# Patient Record
Sex: FEMALE | Race: WHITE | NOT HISPANIC OR LATINO | Employment: OTHER | URBAN - METROPOLITAN AREA
[De-identification: names, ages, dates, MRNs, and addresses within clinical notes are randomized per-mention and may not be internally consistent; named-entity substitution may affect disease eponyms.]

---

## 2017-01-12 ENCOUNTER — AMBULATORY - HEALTHEAST (OUTPATIENT)
Dept: INFUSION THERAPY | Facility: HOSPITAL | Age: 82
End: 2017-01-12

## 2017-01-12 ENCOUNTER — INFUSION - HEALTHEAST (OUTPATIENT)
Dept: INFUSION THERAPY | Facility: HOSPITAL | Age: 82
End: 2017-01-12

## 2017-01-12 DIAGNOSIS — D69.3 IDIOPATHIC THROMBOCYTOPENIC PURPURA (H): ICD-10-CM

## 2017-01-20 ENCOUNTER — AMBULATORY - HEALTHEAST (OUTPATIENT)
Dept: INFUSION THERAPY | Facility: HOSPITAL | Age: 82
End: 2017-01-20

## 2017-01-20 ENCOUNTER — INFUSION - HEALTHEAST (OUTPATIENT)
Dept: INFUSION THERAPY | Facility: HOSPITAL | Age: 82
End: 2017-01-20

## 2017-01-20 DIAGNOSIS — D69.3 IDIOPATHIC THROMBOCYTOPENIC PURPURA (H): ICD-10-CM

## 2017-01-24 ENCOUNTER — INFUSION - HEALTHEAST (OUTPATIENT)
Dept: INFUSION THERAPY | Facility: HOSPITAL | Age: 82
End: 2017-01-24

## 2017-01-24 DIAGNOSIS — D69.6 THROMBOCYTOPENIA (H): ICD-10-CM

## 2017-01-24 ASSESSMENT — MIFFLIN-ST. JEOR: SCORE: 866.8

## 2017-02-09 ENCOUNTER — AMBULATORY - HEALTHEAST (OUTPATIENT)
Dept: INFUSION THERAPY | Facility: HOSPITAL | Age: 82
End: 2017-02-09

## 2017-02-09 ENCOUNTER — INFUSION - HEALTHEAST (OUTPATIENT)
Dept: INFUSION THERAPY | Facility: HOSPITAL | Age: 82
End: 2017-02-09

## 2017-02-09 DIAGNOSIS — D69.3 IDIOPATHIC THROMBOCYTOPENIC PURPURA (H): ICD-10-CM

## 2017-02-20 ENCOUNTER — RECORDS - HEALTHEAST (OUTPATIENT)
Dept: ADMINISTRATIVE | Facility: OTHER | Age: 82
End: 2017-02-20

## 2017-02-23 ENCOUNTER — INFUSION - HEALTHEAST (OUTPATIENT)
Dept: INFUSION THERAPY | Facility: HOSPITAL | Age: 82
End: 2017-02-23

## 2017-02-23 ENCOUNTER — AMBULATORY - HEALTHEAST (OUTPATIENT)
Dept: INFUSION THERAPY | Facility: HOSPITAL | Age: 82
End: 2017-02-23

## 2017-02-23 DIAGNOSIS — D69.3 IDIOPATHIC THROMBOCYTOPENIC PURPURA (H): ICD-10-CM

## 2017-03-09 ENCOUNTER — INFUSION - HEALTHEAST (OUTPATIENT)
Dept: INFUSION THERAPY | Facility: HOSPITAL | Age: 82
End: 2017-03-09

## 2017-03-09 ENCOUNTER — AMBULATORY - HEALTHEAST (OUTPATIENT)
Dept: INFUSION THERAPY | Facility: HOSPITAL | Age: 82
End: 2017-03-09

## 2017-03-09 DIAGNOSIS — D69.3 IDIOPATHIC THROMBOCYTOPENIC PURPURA (H): ICD-10-CM

## 2017-03-20 ENCOUNTER — RECORDS - HEALTHEAST (OUTPATIENT)
Dept: ADMINISTRATIVE | Facility: OTHER | Age: 82
End: 2017-03-20

## 2017-03-23 ENCOUNTER — INFUSION - HEALTHEAST (OUTPATIENT)
Dept: INFUSION THERAPY | Facility: HOSPITAL | Age: 82
End: 2017-03-23

## 2017-03-23 ENCOUNTER — AMBULATORY - HEALTHEAST (OUTPATIENT)
Dept: INFUSION THERAPY | Facility: HOSPITAL | Age: 82
End: 2017-03-23

## 2017-03-23 ENCOUNTER — OFFICE VISIT - HEALTHEAST (OUTPATIENT)
Dept: ONCOLOGY | Facility: HOSPITAL | Age: 82
End: 2017-03-23

## 2017-03-23 DIAGNOSIS — D69.3 IDIOPATHIC THROMBOCYTOPENIC PURPURA (H): ICD-10-CM

## 2017-04-06 ENCOUNTER — INFUSION - HEALTHEAST (OUTPATIENT)
Dept: INFUSION THERAPY | Facility: HOSPITAL | Age: 82
End: 2017-04-06

## 2017-04-06 ENCOUNTER — AMBULATORY - HEALTHEAST (OUTPATIENT)
Dept: INFUSION THERAPY | Facility: HOSPITAL | Age: 82
End: 2017-04-06

## 2017-04-06 DIAGNOSIS — D69.3 IDIOPATHIC THROMBOCYTOPENIC PURPURA (H): ICD-10-CM

## 2017-04-06 DIAGNOSIS — D69.3 CHRONIC ITP (IDIOPATHIC THROMBOCYTOPENIC PURPURA) (H): ICD-10-CM

## 2017-04-12 ASSESSMENT — MIFFLIN-ST. JEOR: SCORE: 883.58

## 2017-04-13 ENCOUNTER — AMBULATORY - HEALTHEAST (OUTPATIENT)
Dept: ONCOLOGY | Facility: HOSPITAL | Age: 82
End: 2017-04-13

## 2017-04-13 ENCOUNTER — SURGERY - HEALTHEAST (OUTPATIENT)
Dept: GASTROENTEROLOGY | Facility: HOSPITAL | Age: 82
End: 2017-04-13

## 2017-04-14 ENCOUNTER — AMBULATORY - HEALTHEAST (OUTPATIENT)
Dept: ONCOLOGY | Facility: HOSPITAL | Age: 82
End: 2017-04-14

## 2017-04-14 ASSESSMENT — MIFFLIN-ST. JEOR: SCORE: 885.25

## 2017-04-15 ASSESSMENT — MIFFLIN-ST. JEOR: SCORE: 870.42

## 2017-04-16 ASSESSMENT — MIFFLIN-ST. JEOR: SCORE: 875.87

## 2017-04-17 ASSESSMENT — MIFFLIN-ST. JEOR: SCORE: 881.31

## 2017-04-18 ENCOUNTER — HOME CARE/HOSPICE - HEALTHEAST (OUTPATIENT)
Dept: HOME HEALTH SERVICES | Facility: HOME HEALTH | Age: 82
End: 2017-04-18

## 2017-04-18 ASSESSMENT — MIFFLIN-ST. JEOR: SCORE: 892.65

## 2017-04-19 ASSESSMENT — MIFFLIN-ST. JEOR: SCORE: 909.89

## 2017-04-20 ENCOUNTER — HOME CARE/HOSPICE - HEALTHEAST (OUTPATIENT)
Dept: HOME HEALTH SERVICES | Facility: HOME HEALTH | Age: 82
End: 2017-04-20

## 2017-04-20 ASSESSMENT — MIFFLIN-ST. JEOR: SCORE: 913.29

## 2017-04-21 ENCOUNTER — COMMUNICATION - HEALTHEAST (OUTPATIENT)
Dept: HOME HEALTH SERVICES | Facility: HOME HEALTH | Age: 82
End: 2017-04-21

## 2017-04-25 ENCOUNTER — OFFICE VISIT - HEALTHEAST (OUTPATIENT)
Dept: INTERNAL MEDICINE | Facility: CLINIC | Age: 82
End: 2017-04-25

## 2017-04-25 DIAGNOSIS — M89.9 DISORDER OF BONE AND CARTILAGE: ICD-10-CM

## 2017-04-25 DIAGNOSIS — M94.9 DISORDER OF BONE AND CARTILAGE: ICD-10-CM

## 2017-04-25 DIAGNOSIS — I48.91 ATRIAL FIBRILLATION (H): ICD-10-CM

## 2017-04-25 DIAGNOSIS — Z86.2 HISTORY OF ITP: ICD-10-CM

## 2017-04-25 DIAGNOSIS — D50.9 IRON DEFICIENCY ANEMIA: ICD-10-CM

## 2017-04-26 ENCOUNTER — COMMUNICATION - HEALTHEAST (OUTPATIENT)
Dept: INTERNAL MEDICINE | Facility: CLINIC | Age: 82
End: 2017-04-26

## 2017-04-26 ENCOUNTER — HOME CARE/HOSPICE - HEALTHEAST (OUTPATIENT)
Dept: HOME HEALTH SERVICES | Facility: HOME HEALTH | Age: 82
End: 2017-04-26

## 2017-04-27 ENCOUNTER — HOME CARE/HOSPICE - HEALTHEAST (OUTPATIENT)
Dept: HOME HEALTH SERVICES | Facility: HOME HEALTH | Age: 82
End: 2017-04-27

## 2017-04-27 ENCOUNTER — AMBULATORY - HEALTHEAST (OUTPATIENT)
Dept: INFUSION THERAPY | Facility: HOSPITAL | Age: 82
End: 2017-04-27

## 2017-04-27 ENCOUNTER — INFUSION - HEALTHEAST (OUTPATIENT)
Dept: INFUSION THERAPY | Facility: HOSPITAL | Age: 82
End: 2017-04-27

## 2017-04-27 DIAGNOSIS — D69.3 IDIOPATHIC THROMBOCYTOPENIC PURPURA (H): ICD-10-CM

## 2017-04-27 DIAGNOSIS — D69.6 THROMBOCYTOPENIA (H): ICD-10-CM

## 2017-04-28 ENCOUNTER — COMMUNICATION - HEALTHEAST (OUTPATIENT)
Dept: PEDIATRICS | Facility: CLINIC | Age: 82
End: 2017-04-28

## 2017-04-28 ENCOUNTER — HOME CARE/HOSPICE - HEALTHEAST (OUTPATIENT)
Dept: HOME HEALTH SERVICES | Facility: HOME HEALTH | Age: 82
End: 2017-04-28

## 2017-05-01 ENCOUNTER — COMMUNICATION - HEALTHEAST (OUTPATIENT)
Dept: HOME HEALTH SERVICES | Facility: HOME HEALTH | Age: 82
End: 2017-05-01

## 2017-05-01 ENCOUNTER — HOME CARE/HOSPICE - HEALTHEAST (OUTPATIENT)
Dept: HOME HEALTH SERVICES | Facility: HOME HEALTH | Age: 82
End: 2017-05-01

## 2017-05-03 ENCOUNTER — HOME CARE/HOSPICE - HEALTHEAST (OUTPATIENT)
Dept: HOME HEALTH SERVICES | Facility: HOME HEALTH | Age: 82
End: 2017-05-03

## 2017-05-04 ENCOUNTER — HOME CARE/HOSPICE - HEALTHEAST (OUTPATIENT)
Dept: HOME HEALTH SERVICES | Facility: HOME HEALTH | Age: 82
End: 2017-05-04

## 2017-05-04 ENCOUNTER — AMBULATORY - HEALTHEAST (OUTPATIENT)
Dept: INFUSION THERAPY | Facility: HOSPITAL | Age: 82
End: 2017-05-04

## 2017-05-04 ENCOUNTER — INFUSION - HEALTHEAST (OUTPATIENT)
Dept: INFUSION THERAPY | Facility: HOSPITAL | Age: 82
End: 2017-05-04

## 2017-05-04 DIAGNOSIS — D69.3 IDIOPATHIC THROMBOCYTOPENIC PURPURA (H): ICD-10-CM

## 2017-05-04 ASSESSMENT — MIFFLIN-ST. JEOR: SCORE: 876.19

## 2017-05-08 ENCOUNTER — HOME CARE/HOSPICE - HEALTHEAST (OUTPATIENT)
Dept: HOME HEALTH SERVICES | Facility: HOME HEALTH | Age: 82
End: 2017-05-08

## 2017-05-09 ENCOUNTER — HOME CARE/HOSPICE - HEALTHEAST (OUTPATIENT)
Dept: HOME HEALTH SERVICES | Facility: HOME HEALTH | Age: 82
End: 2017-05-09

## 2017-05-11 ENCOUNTER — AMBULATORY - HEALTHEAST (OUTPATIENT)
Dept: INFUSION THERAPY | Facility: HOSPITAL | Age: 82
End: 2017-05-11

## 2017-05-11 ENCOUNTER — INFUSION - HEALTHEAST (OUTPATIENT)
Dept: INFUSION THERAPY | Facility: HOSPITAL | Age: 82
End: 2017-05-11

## 2017-05-11 DIAGNOSIS — D69.3 IDIOPATHIC THROMBOCYTOPENIC PURPURA (H): ICD-10-CM

## 2017-05-12 ENCOUNTER — HOME CARE/HOSPICE - HEALTHEAST (OUTPATIENT)
Dept: HOME HEALTH SERVICES | Facility: HOME HEALTH | Age: 82
End: 2017-05-12

## 2017-05-15 ENCOUNTER — OFFICE VISIT - HEALTHEAST (OUTPATIENT)
Dept: INTERNAL MEDICINE | Facility: CLINIC | Age: 82
End: 2017-05-15

## 2017-05-15 ENCOUNTER — RECORDS - HEALTHEAST (OUTPATIENT)
Dept: ADMINISTRATIVE | Facility: OTHER | Age: 82
End: 2017-05-15

## 2017-05-15 DIAGNOSIS — I48.91 ATRIAL FIBRILLATION (H): ICD-10-CM

## 2017-05-15 DIAGNOSIS — G47.00 INSOMNIA: ICD-10-CM

## 2017-05-16 ENCOUNTER — HOME CARE/HOSPICE - HEALTHEAST (OUTPATIENT)
Dept: HOME HEALTH SERVICES | Facility: HOME HEALTH | Age: 82
End: 2017-05-16

## 2017-05-18 ENCOUNTER — INFUSION - HEALTHEAST (OUTPATIENT)
Dept: INFUSION THERAPY | Facility: HOSPITAL | Age: 82
End: 2017-05-18

## 2017-05-18 ENCOUNTER — AMBULATORY - HEALTHEAST (OUTPATIENT)
Dept: INFUSION THERAPY | Facility: HOSPITAL | Age: 82
End: 2017-05-18

## 2017-05-18 DIAGNOSIS — D69.6 THROMBOCYTOPENIA (H): ICD-10-CM

## 2017-05-18 DIAGNOSIS — D69.3 IDIOPATHIC THROMBOCYTOPENIC PURPURA (H): ICD-10-CM

## 2017-05-22 ENCOUNTER — AMBULATORY - HEALTHEAST (OUTPATIENT)
Dept: ONCOLOGY | Facility: HOSPITAL | Age: 82
End: 2017-05-22

## 2017-05-22 ENCOUNTER — AMBULATORY - HEALTHEAST (OUTPATIENT)
Dept: INFUSION THERAPY | Facility: HOSPITAL | Age: 82
End: 2017-05-22

## 2017-05-22 ENCOUNTER — INFUSION - HEALTHEAST (OUTPATIENT)
Dept: INFUSION THERAPY | Facility: HOSPITAL | Age: 82
End: 2017-05-22

## 2017-05-22 DIAGNOSIS — D69.3 IDIOPATHIC THROMBOCYTOPENIC PURPURA (H): ICD-10-CM

## 2017-05-22 DIAGNOSIS — D69.3 CHRONIC ITP (IDIOPATHIC THROMBOCYTOPENIC PURPURA) (H): ICD-10-CM

## 2017-05-25 ENCOUNTER — INFUSION - HEALTHEAST (OUTPATIENT)
Dept: INFUSION THERAPY | Facility: HOSPITAL | Age: 82
End: 2017-05-25

## 2017-05-25 ENCOUNTER — AMBULATORY - HEALTHEAST (OUTPATIENT)
Dept: INFUSION THERAPY | Facility: HOSPITAL | Age: 82
End: 2017-05-25

## 2017-05-25 DIAGNOSIS — D69.3 IDIOPATHIC THROMBOCYTOPENIC PURPURA (H): ICD-10-CM

## 2017-06-01 ENCOUNTER — INFUSION - HEALTHEAST (OUTPATIENT)
Dept: INFUSION THERAPY | Facility: HOSPITAL | Age: 82
End: 2017-06-01

## 2017-06-01 ENCOUNTER — AMBULATORY - HEALTHEAST (OUTPATIENT)
Dept: INFUSION THERAPY | Facility: HOSPITAL | Age: 82
End: 2017-06-01

## 2017-06-01 DIAGNOSIS — D69.3 IDIOPATHIC THROMBOCYTOPENIC PURPURA (H): ICD-10-CM

## 2017-06-05 ENCOUNTER — OFFICE VISIT - HEALTHEAST (OUTPATIENT)
Dept: CARDIOLOGY | Facility: CLINIC | Age: 82
End: 2017-06-05

## 2017-06-05 DIAGNOSIS — Z53.09 CONTRAINDICATION TO ANTICOAGULATION THERAPY: ICD-10-CM

## 2017-06-05 DIAGNOSIS — D69.3 IDIOPATHIC THROMBOCYTOPENIC PURPURA (H): ICD-10-CM

## 2017-06-05 DIAGNOSIS — R41.89 COGNITIVE IMPAIRMENT: ICD-10-CM

## 2017-06-05 DIAGNOSIS — I48.0 PAROXYSMAL ATRIAL FIBRILLATION (H): ICD-10-CM

## 2017-06-05 LAB
ATRIAL RATE - MUSE: 63 BPM
DIASTOLIC BLOOD PRESSURE - MUSE: NORMAL MMHG
INTERPRETATION ECG - MUSE: NORMAL
P AXIS - MUSE: 68 DEGREES
PR INTERVAL - MUSE: 150 MS
QRS DURATION - MUSE: 98 MS
QT - MUSE: 436 MS
QTC - MUSE: 446 MS
R AXIS - MUSE: -41 DEGREES
SYSTOLIC BLOOD PRESSURE - MUSE: NORMAL MMHG
T AXIS - MUSE: 50 DEGREES
VENTRICULAR RATE- MUSE: 63 BPM

## 2017-06-05 ASSESSMENT — MIFFLIN-ST. JEOR: SCORE: 890.61

## 2017-06-08 ENCOUNTER — INFUSION - HEALTHEAST (OUTPATIENT)
Dept: INFUSION THERAPY | Facility: HOSPITAL | Age: 82
End: 2017-06-08

## 2017-06-08 ENCOUNTER — AMBULATORY - HEALTHEAST (OUTPATIENT)
Dept: INFUSION THERAPY | Facility: HOSPITAL | Age: 82
End: 2017-06-08

## 2017-06-08 DIAGNOSIS — D69.3 IDIOPATHIC THROMBOCYTOPENIC PURPURA (H): ICD-10-CM

## 2017-06-13 ENCOUNTER — OFFICE VISIT - HEALTHEAST (OUTPATIENT)
Dept: INTERNAL MEDICINE | Facility: CLINIC | Age: 82
End: 2017-06-13

## 2017-06-13 DIAGNOSIS — G30.9 ALZHEIMER DISEASE (H): ICD-10-CM

## 2017-06-13 DIAGNOSIS — Z23 NEED FOR TETANUS BOOSTER: ICD-10-CM

## 2017-06-13 DIAGNOSIS — F02.80 ALZHEIMER DISEASE (H): ICD-10-CM

## 2017-06-16 ENCOUNTER — AMBULATORY - HEALTHEAST (OUTPATIENT)
Dept: INFUSION THERAPY | Facility: HOSPITAL | Age: 82
End: 2017-06-16

## 2017-06-16 ENCOUNTER — HOSPITAL ENCOUNTER (OUTPATIENT)
Dept: CARDIOLOGY | Facility: HOSPITAL | Age: 82
Discharge: HOME | End: 2017-06-16
Attending: INTERNAL MEDICINE

## 2017-06-16 ENCOUNTER — INFUSION - HEALTHEAST (OUTPATIENT)
Dept: INFUSION THERAPY | Facility: HOSPITAL | Age: 82
End: 2017-06-16

## 2017-06-16 DIAGNOSIS — D69.6 THROMBOCYTOPENIA (H): ICD-10-CM

## 2017-06-16 DIAGNOSIS — I48.0 PAROXYSMAL ATRIAL FIBRILLATION (H): ICD-10-CM

## 2017-06-16 DIAGNOSIS — D69.3 IDIOPATHIC THROMBOCYTOPENIC PURPURA (H): ICD-10-CM

## 2017-06-16 LAB
AORTIC ROOT: 3.3 CM
AORTIC VALVE MEAN VELOCITY: 73.8 CM/S
AV DIMENSIONLESS INDEX VTI: 0.9
AV MEAN GRADIENT: 3 MMHG
AV PEAK GRADIENT: 5.2 MMHG
AV VALVE AREA: 2 CM2
AV VELOCITY RATIO: 0.8
BSA FOR ECHO PROCEDURE: 1.47 M2
CV BLOOD PRESSURE: NORMAL MMHG
CV ECHO HEIGHT: 62.5 IN
CV ECHO WEIGHT: 108 LBS
DOP CALC AO PEAK VEL: 114 CM/S
DOP CALC AO VTI: 25.4 CM
DOP CALC LVOT AREA: 2.27 CM2
DOP CALC LVOT DIAMETER: 1.7 CM
DOP CALC LVOT PEAK VEL: 91.5 CM/S
DOP CALC LVOT STROKE VOLUME: 50.1 CM3
DOP CALCLVOT PEAK VEL VTI: 22.1 CM
EJECTION FRACTION: 60 % (ref 55–75)
FRACTIONAL SHORTENING: 37.2 % (ref 28–44)
INTERVENTRICULAR SEPTUM IN END DIASTOLE: 0.7 CM (ref 0.6–0.9)
IVS/PW RATIO: 0.8
LA AREA 1: 11.1 CM2
LA AREA 2: 9.98 CM2
LEFT ATRIUM LENGTH: 3.9 CM
LEFT ATRIUM SIZE: 1.5 CM
LEFT ATRIUM TO AORTIC ROOT RATIO: 0.42 NO UNITS
LEFT ATRIUM VOLUME INDEX: 16.4 ML/M2
LEFT ATRIUM VOLUME: 24.1 CM3
LEFT VENTRICLE CARDIAC INDEX: 2.4 L/MIN/M2
LEFT VENTRICLE CARDIAC OUTPUT: 3.5 L/MIN
LEFT VENTRICLE DIASTOLIC VOLUME INDEX: 34 CM3/M2 (ref 34–74)
LEFT VENTRICLE DIASTOLIC VOLUME: 50 CM3 (ref 46–106)
LEFT VENTRICLE HEART RATE: 69 BPM
LEFT VENTRICLE MASS INDEX: 71.7 G/M2
LEFT VENTRICLE SYSTOLIC VOLUME INDEX: 13.6 CM3/M2 (ref 11–31)
LEFT VENTRICLE SYSTOLIC VOLUME: 20 CM3 (ref 14–42)
LEFT VENTRICULAR INTERNAL DIMENSION IN DIASTOLE: 4.3 CM (ref 3.8–5.2)
LEFT VENTRICULAR INTERNAL DIMENSION IN SYSTOLE: 2.7 CM (ref 2.2–3.5)
LEFT VENTRICULAR MASS: 105.3 G
LEFT VENTRICULAR OUTFLOW TRACT MEAN GRADIENT: 2 MMHG
LEFT VENTRICULAR OUTFLOW TRACT MEAN VELOCITY: 61.6 CM/S
LEFT VENTRICULAR OUTFLOW TRACT PEAK GRADIENT: 3 MMHG
LEFT VENTRICULAR POSTERIOR WALL IN END DIASTOLE: 0.9 CM (ref 0.6–0.9)
LV STROKE VOLUME INDEX: 34.1 ML/M2
MITRAL VALVE E/A RATIO: 1.2
MV DECELERATION TIME: 197 MS
MV PEAK A VELOCITY: 84.9 CM/S
MV PEAK E VELOCITY: 97.7 CM/S
NUC REST DIASTOLIC VOLUME INDEX: 1728 LBS
NUC REST SYSTOLIC VOLUME INDEX: 62.5 IN
RIGHT VENTRICULAR INTERNAL DIMENSION IN DYSTOLE: 1.3 CM
TRICUSPID REGURGITATION PEAK PRESSURE GRADIENT: 33.9 MMHG
TRICUSPID VALVE ANULAR PLANE SYSTOLIC EXCURSION: 2.2 CM
TRICUSPID VALVE PEAK REGURGITANT VELOCITY: 291 CM/S

## 2017-06-16 ASSESSMENT — MIFFLIN-ST. JEOR: SCORE: 886.07

## 2017-06-19 ENCOUNTER — INFUSION - HEALTHEAST (OUTPATIENT)
Dept: INFUSION THERAPY | Facility: HOSPITAL | Age: 82
End: 2017-06-19

## 2017-06-19 ENCOUNTER — AMBULATORY - HEALTHEAST (OUTPATIENT)
Dept: ONCOLOGY | Facility: CLINIC | Age: 82
End: 2017-06-19

## 2017-06-19 ENCOUNTER — AMBULATORY - HEALTHEAST (OUTPATIENT)
Dept: INFUSION THERAPY | Facility: HOSPITAL | Age: 82
End: 2017-06-19

## 2017-06-19 DIAGNOSIS — D69.3 IDIOPATHIC THROMBOCYTOPENIC PURPURA (H): ICD-10-CM

## 2017-06-19 DIAGNOSIS — D69.6 THROMBOCYTOPENIA (H): ICD-10-CM

## 2017-06-23 ENCOUNTER — AMBULATORY - HEALTHEAST (OUTPATIENT)
Dept: INFUSION THERAPY | Facility: HOSPITAL | Age: 82
End: 2017-06-23

## 2017-06-23 ENCOUNTER — OFFICE VISIT - HEALTHEAST (OUTPATIENT)
Dept: ONCOLOGY | Facility: HOSPITAL | Age: 82
End: 2017-06-23

## 2017-06-23 DIAGNOSIS — D69.3 IDIOPATHIC THROMBOCYTOPENIC PURPURA (H): ICD-10-CM

## 2017-06-29 ENCOUNTER — AMBULATORY - HEALTHEAST (OUTPATIENT)
Dept: INFUSION THERAPY | Facility: HOSPITAL | Age: 82
End: 2017-06-29

## 2017-06-29 ENCOUNTER — AMBULATORY - HEALTHEAST (OUTPATIENT)
Dept: ONCOLOGY | Facility: HOSPITAL | Age: 82
End: 2017-06-29

## 2017-06-29 ENCOUNTER — INFUSION - HEALTHEAST (OUTPATIENT)
Dept: INFUSION THERAPY | Facility: HOSPITAL | Age: 82
End: 2017-06-29

## 2017-06-29 DIAGNOSIS — D69.3 IDIOPATHIC THROMBOCYTOPENIC PURPURA (H): ICD-10-CM

## 2017-07-05 ENCOUNTER — COMMUNICATION - HEALTHEAST (OUTPATIENT)
Dept: ONCOLOGY | Facility: HOSPITAL | Age: 82
End: 2017-07-05

## 2017-07-05 DIAGNOSIS — D50.9 IRON DEFICIENCY ANEMIA: ICD-10-CM

## 2017-07-06 ENCOUNTER — INFUSION - HEALTHEAST (OUTPATIENT)
Dept: INFUSION THERAPY | Facility: HOSPITAL | Age: 82
End: 2017-07-06

## 2017-07-06 ENCOUNTER — AMBULATORY - HEALTHEAST (OUTPATIENT)
Dept: INFUSION THERAPY | Facility: HOSPITAL | Age: 82
End: 2017-07-06

## 2017-07-06 ENCOUNTER — AMBULATORY - HEALTHEAST (OUTPATIENT)
Dept: ONCOLOGY | Facility: HOSPITAL | Age: 82
End: 2017-07-06

## 2017-07-06 DIAGNOSIS — D50.9 IRON DEFICIENCY ANEMIA: ICD-10-CM

## 2017-07-06 DIAGNOSIS — D69.3 IDIOPATHIC THROMBOCYTOPENIC PURPURA (H): ICD-10-CM

## 2017-07-13 ENCOUNTER — INFUSION - HEALTHEAST (OUTPATIENT)
Dept: INFUSION THERAPY | Facility: HOSPITAL | Age: 82
End: 2017-07-13

## 2017-07-13 ENCOUNTER — AMBULATORY - HEALTHEAST (OUTPATIENT)
Dept: INFUSION THERAPY | Facility: HOSPITAL | Age: 82
End: 2017-07-13

## 2017-07-13 DIAGNOSIS — D69.3 IDIOPATHIC THROMBOCYTOPENIC PURPURA (H): ICD-10-CM

## 2017-07-20 ENCOUNTER — AMBULATORY - HEALTHEAST (OUTPATIENT)
Dept: INFUSION THERAPY | Facility: HOSPITAL | Age: 82
End: 2017-07-20

## 2017-07-20 ENCOUNTER — AMBULATORY - HEALTHEAST (OUTPATIENT)
Dept: ONCOLOGY | Facility: HOSPITAL | Age: 82
End: 2017-07-20

## 2017-07-20 ENCOUNTER — INFUSION - HEALTHEAST (OUTPATIENT)
Dept: INFUSION THERAPY | Facility: HOSPITAL | Age: 82
End: 2017-07-20

## 2017-07-20 DIAGNOSIS — D69.3 IDIOPATHIC THROMBOCYTOPENIC PURPURA (H): ICD-10-CM

## 2017-07-27 ENCOUNTER — AMBULATORY - HEALTHEAST (OUTPATIENT)
Dept: INFUSION THERAPY | Facility: HOSPITAL | Age: 82
End: 2017-07-27

## 2017-07-27 ENCOUNTER — INFUSION - HEALTHEAST (OUTPATIENT)
Dept: INFUSION THERAPY | Facility: HOSPITAL | Age: 82
End: 2017-07-27

## 2017-07-27 DIAGNOSIS — D69.3 IDIOPATHIC THROMBOCYTOPENIC PURPURA (H): ICD-10-CM

## 2017-08-03 ENCOUNTER — AMBULATORY - HEALTHEAST (OUTPATIENT)
Dept: INFUSION THERAPY | Facility: HOSPITAL | Age: 82
End: 2017-08-03

## 2017-08-03 ENCOUNTER — INFUSION - HEALTHEAST (OUTPATIENT)
Dept: INFUSION THERAPY | Facility: HOSPITAL | Age: 82
End: 2017-08-03

## 2017-08-03 DIAGNOSIS — D69.3 IDIOPATHIC THROMBOCYTOPENIC PURPURA (H): ICD-10-CM

## 2017-08-10 ENCOUNTER — INFUSION - HEALTHEAST (OUTPATIENT)
Dept: INFUSION THERAPY | Facility: HOSPITAL | Age: 82
End: 2017-08-10

## 2017-08-10 ENCOUNTER — AMBULATORY - HEALTHEAST (OUTPATIENT)
Dept: INFUSION THERAPY | Facility: HOSPITAL | Age: 82
End: 2017-08-10

## 2017-08-10 DIAGNOSIS — D69.3 IDIOPATHIC THROMBOCYTOPENIC PURPURA (H): ICD-10-CM

## 2017-08-17 ENCOUNTER — AMBULATORY - HEALTHEAST (OUTPATIENT)
Dept: INFUSION THERAPY | Facility: HOSPITAL | Age: 82
End: 2017-08-17

## 2017-08-17 ENCOUNTER — INFUSION - HEALTHEAST (OUTPATIENT)
Dept: INFUSION THERAPY | Facility: HOSPITAL | Age: 82
End: 2017-08-17

## 2017-08-17 DIAGNOSIS — D69.3 IDIOPATHIC THROMBOCYTOPENIC PURPURA (H): ICD-10-CM

## 2017-08-17 ASSESSMENT — MIFFLIN-ST. JEOR: SCORE: 906.03

## 2017-08-24 ENCOUNTER — INFUSION - HEALTHEAST (OUTPATIENT)
Dept: INFUSION THERAPY | Facility: HOSPITAL | Age: 82
End: 2017-08-24

## 2017-08-24 ENCOUNTER — AMBULATORY - HEALTHEAST (OUTPATIENT)
Dept: INFUSION THERAPY | Facility: HOSPITAL | Age: 82
End: 2017-08-24

## 2017-08-24 DIAGNOSIS — D69.3 IDIOPATHIC THROMBOCYTOPENIC PURPURA (H): ICD-10-CM

## 2017-08-31 ENCOUNTER — AMBULATORY - HEALTHEAST (OUTPATIENT)
Dept: INFUSION THERAPY | Facility: HOSPITAL | Age: 82
End: 2017-08-31

## 2017-08-31 ENCOUNTER — INFUSION - HEALTHEAST (OUTPATIENT)
Dept: INFUSION THERAPY | Facility: HOSPITAL | Age: 82
End: 2017-08-31

## 2017-08-31 DIAGNOSIS — D69.3 IDIOPATHIC THROMBOCYTOPENIC PURPURA (H): ICD-10-CM

## 2017-09-07 ENCOUNTER — AMBULATORY - HEALTHEAST (OUTPATIENT)
Dept: INFUSION THERAPY | Facility: HOSPITAL | Age: 82
End: 2017-09-07

## 2017-09-07 ENCOUNTER — INFUSION - HEALTHEAST (OUTPATIENT)
Dept: INFUSION THERAPY | Facility: HOSPITAL | Age: 82
End: 2017-09-07

## 2017-09-07 DIAGNOSIS — D69.3 IDIOPATHIC THROMBOCYTOPENIC PURPURA (H): ICD-10-CM

## 2017-09-07 ASSESSMENT — MIFFLIN-ST. JEOR: SCORE: 906.03

## 2017-09-14 ENCOUNTER — INFUSION - HEALTHEAST (OUTPATIENT)
Dept: INFUSION THERAPY | Facility: HOSPITAL | Age: 82
End: 2017-09-14

## 2017-09-14 ENCOUNTER — AMBULATORY - HEALTHEAST (OUTPATIENT)
Dept: INFUSION THERAPY | Facility: HOSPITAL | Age: 82
End: 2017-09-14

## 2017-09-14 DIAGNOSIS — D69.3 IDIOPATHIC THROMBOCYTOPENIC PURPURA (H): ICD-10-CM

## 2017-09-20 ENCOUNTER — INFUSION - HEALTHEAST (OUTPATIENT)
Dept: INFUSION THERAPY | Facility: HOSPITAL | Age: 82
End: 2017-09-20

## 2017-09-20 ENCOUNTER — AMBULATORY - HEALTHEAST (OUTPATIENT)
Dept: INFUSION THERAPY | Facility: HOSPITAL | Age: 82
End: 2017-09-20

## 2017-09-20 ENCOUNTER — OFFICE VISIT - HEALTHEAST (OUTPATIENT)
Dept: ONCOLOGY | Facility: HOSPITAL | Age: 82
End: 2017-09-20

## 2017-09-20 DIAGNOSIS — D69.3 IDIOPATHIC THROMBOCYTOPENIC PURPURA (H): ICD-10-CM

## 2017-09-28 ENCOUNTER — INFUSION - HEALTHEAST (OUTPATIENT)
Dept: INFUSION THERAPY | Facility: HOSPITAL | Age: 82
End: 2017-09-28

## 2017-09-28 ENCOUNTER — AMBULATORY - HEALTHEAST (OUTPATIENT)
Dept: INFUSION THERAPY | Facility: HOSPITAL | Age: 82
End: 2017-09-28

## 2017-09-28 DIAGNOSIS — D69.3 IDIOPATHIC THROMBOCYTOPENIC PURPURA (H): ICD-10-CM

## 2017-10-05 ENCOUNTER — AMBULATORY - HEALTHEAST (OUTPATIENT)
Dept: INFUSION THERAPY | Facility: HOSPITAL | Age: 82
End: 2017-10-05

## 2017-10-05 ENCOUNTER — INFUSION - HEALTHEAST (OUTPATIENT)
Dept: INFUSION THERAPY | Facility: HOSPITAL | Age: 82
End: 2017-10-05

## 2017-10-05 DIAGNOSIS — D69.3 IDIOPATHIC THROMBOCYTOPENIC PURPURA (H): ICD-10-CM

## 2017-10-12 ENCOUNTER — INFUSION - HEALTHEAST (OUTPATIENT)
Dept: INFUSION THERAPY | Facility: HOSPITAL | Age: 82
End: 2017-10-12

## 2017-10-12 ENCOUNTER — AMBULATORY - HEALTHEAST (OUTPATIENT)
Dept: INFUSION THERAPY | Facility: HOSPITAL | Age: 82
End: 2017-10-12

## 2017-10-12 DIAGNOSIS — D69.3 IDIOPATHIC THROMBOCYTOPENIC PURPURA (H): ICD-10-CM

## 2017-10-19 ENCOUNTER — INFUSION - HEALTHEAST (OUTPATIENT)
Dept: INFUSION THERAPY | Facility: HOSPITAL | Age: 82
End: 2017-10-19

## 2017-10-19 ENCOUNTER — AMBULATORY - HEALTHEAST (OUTPATIENT)
Dept: INFUSION THERAPY | Facility: HOSPITAL | Age: 82
End: 2017-10-19

## 2017-10-19 DIAGNOSIS — D69.3 IDIOPATHIC THROMBOCYTOPENIC PURPURA (H): ICD-10-CM

## 2017-10-26 ENCOUNTER — AMBULATORY - HEALTHEAST (OUTPATIENT)
Dept: INFUSION THERAPY | Facility: HOSPITAL | Age: 82
End: 2017-10-26

## 2017-10-26 ENCOUNTER — INFUSION - HEALTHEAST (OUTPATIENT)
Dept: INFUSION THERAPY | Facility: HOSPITAL | Age: 82
End: 2017-10-26

## 2017-10-26 DIAGNOSIS — D69.3 IDIOPATHIC THROMBOCYTOPENIC PURPURA (H): ICD-10-CM

## 2017-11-02 ENCOUNTER — INFUSION - HEALTHEAST (OUTPATIENT)
Dept: INFUSION THERAPY | Facility: HOSPITAL | Age: 82
End: 2017-11-02

## 2017-11-02 ENCOUNTER — AMBULATORY - HEALTHEAST (OUTPATIENT)
Dept: INFUSION THERAPY | Facility: HOSPITAL | Age: 82
End: 2017-11-02

## 2017-11-02 DIAGNOSIS — D69.3 IDIOPATHIC THROMBOCYTOPENIC PURPURA (H): ICD-10-CM

## 2017-11-09 ENCOUNTER — AMBULATORY - HEALTHEAST (OUTPATIENT)
Dept: INFUSION THERAPY | Facility: HOSPITAL | Age: 82
End: 2017-11-09

## 2017-11-09 ENCOUNTER — INFUSION - HEALTHEAST (OUTPATIENT)
Dept: INFUSION THERAPY | Facility: HOSPITAL | Age: 82
End: 2017-11-09

## 2017-11-09 DIAGNOSIS — D69.3 IDIOPATHIC THROMBOCYTOPENIC PURPURA (H): ICD-10-CM

## 2017-11-16 ENCOUNTER — INFUSION - HEALTHEAST (OUTPATIENT)
Dept: INFUSION THERAPY | Facility: HOSPITAL | Age: 82
End: 2017-11-16

## 2017-11-16 ENCOUNTER — AMBULATORY - HEALTHEAST (OUTPATIENT)
Dept: INFUSION THERAPY | Facility: HOSPITAL | Age: 82
End: 2017-11-16

## 2017-11-16 DIAGNOSIS — D69.3 IDIOPATHIC THROMBOCYTOPENIC PURPURA (H): ICD-10-CM

## 2017-11-24 ENCOUNTER — AMBULATORY - HEALTHEAST (OUTPATIENT)
Dept: INFUSION THERAPY | Facility: HOSPITAL | Age: 82
End: 2017-11-24

## 2017-11-24 ENCOUNTER — INFUSION - HEALTHEAST (OUTPATIENT)
Dept: INFUSION THERAPY | Facility: HOSPITAL | Age: 82
End: 2017-11-24

## 2017-11-24 DIAGNOSIS — D69.3 IDIOPATHIC THROMBOCYTOPENIC PURPURA (H): ICD-10-CM

## 2017-11-30 ENCOUNTER — INFUSION - HEALTHEAST (OUTPATIENT)
Dept: INFUSION THERAPY | Facility: HOSPITAL | Age: 82
End: 2017-11-30

## 2017-11-30 ENCOUNTER — AMBULATORY - HEALTHEAST (OUTPATIENT)
Dept: INFUSION THERAPY | Facility: HOSPITAL | Age: 82
End: 2017-11-30

## 2017-11-30 DIAGNOSIS — D69.3 IDIOPATHIC THROMBOCYTOPENIC PURPURA (H): ICD-10-CM

## 2017-12-07 ENCOUNTER — INFUSION - HEALTHEAST (OUTPATIENT)
Dept: INFUSION THERAPY | Facility: HOSPITAL | Age: 82
End: 2017-12-07

## 2017-12-07 ENCOUNTER — AMBULATORY - HEALTHEAST (OUTPATIENT)
Dept: INFUSION THERAPY | Facility: HOSPITAL | Age: 82
End: 2017-12-07

## 2017-12-07 DIAGNOSIS — D69.3 IDIOPATHIC THROMBOCYTOPENIC PURPURA (H): ICD-10-CM

## 2017-12-12 ENCOUNTER — OFFICE VISIT - HEALTHEAST (OUTPATIENT)
Dept: INTERNAL MEDICINE | Facility: CLINIC | Age: 82
End: 2017-12-12

## 2017-12-12 DIAGNOSIS — R41.89 COGNITIVE IMPAIRMENT: ICD-10-CM

## 2017-12-12 DIAGNOSIS — R73.03 PREDIABETES: ICD-10-CM

## 2017-12-12 DIAGNOSIS — D69.3 IDIOPATHIC THROMBOCYTOPENIC PURPURA (H): ICD-10-CM

## 2017-12-14 ENCOUNTER — INFUSION - HEALTHEAST (OUTPATIENT)
Dept: INFUSION THERAPY | Facility: HOSPITAL | Age: 82
End: 2017-12-14

## 2017-12-14 ENCOUNTER — AMBULATORY - HEALTHEAST (OUTPATIENT)
Dept: INFUSION THERAPY | Facility: HOSPITAL | Age: 82
End: 2017-12-14

## 2017-12-14 DIAGNOSIS — D69.3 IDIOPATHIC THROMBOCYTOPENIC PURPURA (H): ICD-10-CM

## 2017-12-20 ENCOUNTER — OFFICE VISIT - HEALTHEAST (OUTPATIENT)
Dept: ONCOLOGY | Facility: HOSPITAL | Age: 82
End: 2017-12-20

## 2017-12-20 ENCOUNTER — INFUSION - HEALTHEAST (OUTPATIENT)
Dept: INFUSION THERAPY | Facility: HOSPITAL | Age: 82
End: 2017-12-20

## 2017-12-20 ENCOUNTER — AMBULATORY - HEALTHEAST (OUTPATIENT)
Dept: INFUSION THERAPY | Facility: HOSPITAL | Age: 82
End: 2017-12-20

## 2017-12-20 DIAGNOSIS — D69.3 IDIOPATHIC THROMBOCYTOPENIC PURPURA (H): ICD-10-CM

## 2017-12-28 ENCOUNTER — AMBULATORY - HEALTHEAST (OUTPATIENT)
Dept: INFUSION THERAPY | Facility: HOSPITAL | Age: 82
End: 2017-12-28

## 2017-12-28 ENCOUNTER — INFUSION - HEALTHEAST (OUTPATIENT)
Dept: INFUSION THERAPY | Facility: HOSPITAL | Age: 82
End: 2017-12-28

## 2017-12-28 DIAGNOSIS — D69.3 IDIOPATHIC THROMBOCYTOPENIC PURPURA (H): ICD-10-CM

## 2018-01-04 ENCOUNTER — AMBULATORY - HEALTHEAST (OUTPATIENT)
Dept: INFUSION THERAPY | Facility: HOSPITAL | Age: 83
End: 2018-01-04

## 2018-01-04 ENCOUNTER — INFUSION - HEALTHEAST (OUTPATIENT)
Dept: INFUSION THERAPY | Facility: HOSPITAL | Age: 83
End: 2018-01-04

## 2018-01-04 DIAGNOSIS — D69.3 IDIOPATHIC THROMBOCYTOPENIC PURPURA (H): ICD-10-CM

## 2018-01-04 LAB
ERYTHROCYTE [DISTWIDTH] IN BLOOD BY AUTOMATED COUNT: 15.6 % (ref 11–14.5)
HCT VFR BLD AUTO: 43 % (ref 35–47)
HGB BLD-MCNC: 14.3 G/DL (ref 12–16)
MCH RBC QN AUTO: 32.4 PG (ref 27–34)
MCHC RBC AUTO-ENTMCNC: 33.3 G/DL (ref 32–36)
MCV RBC AUTO: 97 FL (ref 80–100)
PLATELET # BLD AUTO: 283 THOU/UL (ref 140–440)
PMV BLD AUTO: 11.7 FL (ref 8.5–12.5)
RBC # BLD AUTO: 4.42 MILL/UL (ref 3.8–5.4)
WBC: 11.1 THOU/UL (ref 4–11)

## 2018-01-11 ENCOUNTER — AMBULATORY - HEALTHEAST (OUTPATIENT)
Dept: INFUSION THERAPY | Facility: HOSPITAL | Age: 83
End: 2018-01-11

## 2018-01-11 ENCOUNTER — INFUSION - HEALTHEAST (OUTPATIENT)
Dept: INFUSION THERAPY | Facility: HOSPITAL | Age: 83
End: 2018-01-11

## 2018-01-11 DIAGNOSIS — D69.3 IDIOPATHIC THROMBOCYTOPENIC PURPURA (H): ICD-10-CM

## 2018-01-11 LAB
ERYTHROCYTE [DISTWIDTH] IN BLOOD BY AUTOMATED COUNT: 15.5 % (ref 11–14.5)
HCT VFR BLD AUTO: 45.3 % (ref 35–47)
HGB BLD-MCNC: 14.5 G/DL (ref 12–16)
MCH RBC QN AUTO: 31.5 PG (ref 27–34)
MCHC RBC AUTO-ENTMCNC: 32 G/DL (ref 32–36)
MCV RBC AUTO: 99 FL (ref 80–100)
PLATELET # BLD AUTO: 235 THOU/UL (ref 140–440)
PMV BLD AUTO: 11.9 FL (ref 8.5–12.5)
RBC # BLD AUTO: 4.6 MILL/UL (ref 3.8–5.4)
WBC: 12.9 THOU/UL (ref 4–11)

## 2018-01-18 ENCOUNTER — INFUSION - HEALTHEAST (OUTPATIENT)
Dept: INFUSION THERAPY | Facility: HOSPITAL | Age: 83
End: 2018-01-18

## 2018-01-18 ENCOUNTER — AMBULATORY - HEALTHEAST (OUTPATIENT)
Dept: INFUSION THERAPY | Facility: HOSPITAL | Age: 83
End: 2018-01-18

## 2018-01-18 DIAGNOSIS — D69.3 IDIOPATHIC THROMBOCYTOPENIC PURPURA (H): ICD-10-CM

## 2018-01-18 LAB
ERYTHROCYTE [DISTWIDTH] IN BLOOD BY AUTOMATED COUNT: 15.1 % (ref 11–14.5)
HCT VFR BLD AUTO: 43 % (ref 35–47)
HGB BLD-MCNC: 14 G/DL (ref 12–16)
MCH RBC QN AUTO: 31.7 PG (ref 27–34)
MCHC RBC AUTO-ENTMCNC: 32.6 G/DL (ref 32–36)
MCV RBC AUTO: 98 FL (ref 80–100)
PLATELET # BLD AUTO: 63 THOU/UL (ref 140–440)
PMV BLD AUTO: 12.7 FL (ref 8.5–12.5)
RBC # BLD AUTO: 4.41 MILL/UL (ref 3.8–5.4)
WBC: 12.8 THOU/UL (ref 4–11)

## 2018-01-26 ENCOUNTER — AMBULATORY - HEALTHEAST (OUTPATIENT)
Dept: INFUSION THERAPY | Facility: HOSPITAL | Age: 83
End: 2018-01-26

## 2018-01-26 ENCOUNTER — INFUSION - HEALTHEAST (OUTPATIENT)
Dept: INFUSION THERAPY | Facility: HOSPITAL | Age: 83
End: 2018-01-26

## 2018-01-26 DIAGNOSIS — D69.3 IDIOPATHIC THROMBOCYTOPENIC PURPURA (H): ICD-10-CM

## 2018-01-26 LAB
ERYTHROCYTE [DISTWIDTH] IN BLOOD BY AUTOMATED COUNT: 14.9 % (ref 11–14.5)
HCT VFR BLD AUTO: 42.7 % (ref 35–47)
HGB BLD-MCNC: 14.1 G/DL (ref 12–16)
MCH RBC QN AUTO: 32.3 PG (ref 27–34)
MCHC RBC AUTO-ENTMCNC: 33 G/DL (ref 32–36)
MCV RBC AUTO: 98 FL (ref 80–100)
PLATELET # BLD AUTO: 96 THOU/UL (ref 140–440)
PMV BLD AUTO: 12.2 FL (ref 8.5–12.5)
RBC # BLD AUTO: 4.37 MILL/UL (ref 3.8–5.4)
WBC: 12.7 THOU/UL (ref 4–11)

## 2018-01-29 ENCOUNTER — COMMUNICATION - HEALTHEAST (OUTPATIENT)
Dept: INTERNAL MEDICINE | Facility: CLINIC | Age: 83
End: 2018-01-29

## 2018-01-29 ENCOUNTER — HOME CARE/HOSPICE - HEALTHEAST (OUTPATIENT)
Dept: HOME HEALTH SERVICES | Facility: HOME HEALTH | Age: 83
End: 2018-01-29

## 2018-01-30 ENCOUNTER — COMMUNICATION - HEALTHEAST (OUTPATIENT)
Dept: HOME HEALTH SERVICES | Facility: HOME HEALTH | Age: 83
End: 2018-01-30

## 2018-02-01 ENCOUNTER — OFFICE VISIT - HEALTHEAST (OUTPATIENT)
Dept: INTERNAL MEDICINE | Facility: CLINIC | Age: 83
End: 2018-02-01

## 2018-02-01 ENCOUNTER — COMMUNICATION - HEALTHEAST (OUTPATIENT)
Dept: INTERNAL MEDICINE | Facility: CLINIC | Age: 83
End: 2018-02-01

## 2018-02-01 DIAGNOSIS — R41.89 COGNITIVE IMPAIRMENT: ICD-10-CM

## 2018-02-01 DIAGNOSIS — Z09 HOSPITAL DISCHARGE FOLLOW-UP: ICD-10-CM

## 2018-02-01 DIAGNOSIS — D69.3 IDIOPATHIC THROMBOCYTOPENIC PURPURA (H): ICD-10-CM

## 2018-02-02 ENCOUNTER — AMBULATORY - HEALTHEAST (OUTPATIENT)
Dept: INFUSION THERAPY | Facility: HOSPITAL | Age: 83
End: 2018-02-02

## 2018-02-02 ENCOUNTER — INFUSION - HEALTHEAST (OUTPATIENT)
Dept: INFUSION THERAPY | Facility: HOSPITAL | Age: 83
End: 2018-02-02

## 2018-02-02 DIAGNOSIS — D69.3 IDIOPATHIC THROMBOCYTOPENIC PURPURA (H): ICD-10-CM

## 2018-02-02 LAB
ERYTHROCYTE [DISTWIDTH] IN BLOOD BY AUTOMATED COUNT: 15 % (ref 11–14.5)
HCT VFR BLD AUTO: 44.1 % (ref 35–47)
HGB BLD-MCNC: 14.3 G/DL (ref 12–16)
MCH RBC QN AUTO: 31.9 PG (ref 27–34)
MCHC RBC AUTO-ENTMCNC: 32.4 G/DL (ref 32–36)
MCV RBC AUTO: 98 FL (ref 80–100)
PLATELET # BLD AUTO: 136 THOU/UL (ref 140–440)
PMV BLD AUTO: 12.7 FL (ref 8.5–12.5)
RBC # BLD AUTO: 4.48 MILL/UL (ref 3.8–5.4)
WBC: 13.1 THOU/UL (ref 4–11)

## 2018-02-05 ENCOUNTER — COMMUNICATION - HEALTHEAST (OUTPATIENT)
Dept: INTERNAL MEDICINE | Facility: CLINIC | Age: 83
End: 2018-02-05

## 2018-02-06 ENCOUNTER — COMMUNICATION - HEALTHEAST (OUTPATIENT)
Dept: INTERNAL MEDICINE | Facility: CLINIC | Age: 83
End: 2018-02-06

## 2018-02-08 ENCOUNTER — INFUSION - HEALTHEAST (OUTPATIENT)
Dept: INFUSION THERAPY | Facility: HOSPITAL | Age: 83
End: 2018-02-08

## 2018-02-08 ENCOUNTER — AMBULATORY - HEALTHEAST (OUTPATIENT)
Dept: INFUSION THERAPY | Facility: HOSPITAL | Age: 83
End: 2018-02-08

## 2018-02-08 DIAGNOSIS — D69.3 IDIOPATHIC THROMBOCYTOPENIC PURPURA (H): ICD-10-CM

## 2018-02-08 LAB
ERYTHROCYTE [DISTWIDTH] IN BLOOD BY AUTOMATED COUNT: 14.9 % (ref 11–14.5)
HCT VFR BLD AUTO: 45.8 % (ref 35–47)
HGB BLD-MCNC: 14.8 G/DL (ref 12–16)
MCH RBC QN AUTO: 31.8 PG (ref 27–34)
MCHC RBC AUTO-ENTMCNC: 32.3 G/DL (ref 32–36)
MCV RBC AUTO: 99 FL (ref 80–100)
PLATELET # BLD AUTO: 132 THOU/UL (ref 140–440)
PMV BLD AUTO: 12.3 FL (ref 8.5–12.5)
RBC # BLD AUTO: 4.65 MILL/UL (ref 3.8–5.4)
WBC: 15.2 THOU/UL (ref 4–11)

## 2018-02-16 ENCOUNTER — AMBULATORY - HEALTHEAST (OUTPATIENT)
Dept: INFUSION THERAPY | Facility: HOSPITAL | Age: 83
End: 2018-02-16

## 2018-02-16 ENCOUNTER — INFUSION - HEALTHEAST (OUTPATIENT)
Dept: INFUSION THERAPY | Facility: HOSPITAL | Age: 83
End: 2018-02-16

## 2018-02-16 DIAGNOSIS — D69.3 IDIOPATHIC THROMBOCYTOPENIC PURPURA (H): ICD-10-CM

## 2018-02-16 LAB
ERYTHROCYTE [DISTWIDTH] IN BLOOD BY AUTOMATED COUNT: 14.8 % (ref 11–14.5)
HCT VFR BLD AUTO: 44.2 % (ref 35–47)
HGB BLD-MCNC: 14.5 G/DL (ref 12–16)
MCH RBC QN AUTO: 32.2 PG (ref 27–34)
MCHC RBC AUTO-ENTMCNC: 32.8 G/DL (ref 32–36)
MCV RBC AUTO: 98 FL (ref 80–100)
PLATELET # BLD AUTO: 222 THOU/UL (ref 140–440)
PMV BLD AUTO: 11.7 FL (ref 8.5–12.5)
RBC # BLD AUTO: 4.51 MILL/UL (ref 3.8–5.4)
WBC: 13.7 THOU/UL (ref 4–11)

## 2018-02-16 ASSESSMENT — MIFFLIN-ST. JEOR: SCORE: 929.39

## 2018-02-22 ENCOUNTER — RECORDS - HEALTHEAST (OUTPATIENT)
Dept: ADMINISTRATIVE | Facility: OTHER | Age: 83
End: 2018-02-22

## 2018-02-23 ENCOUNTER — INFUSION - HEALTHEAST (OUTPATIENT)
Dept: INFUSION THERAPY | Facility: HOSPITAL | Age: 83
End: 2018-02-23

## 2018-02-23 ENCOUNTER — AMBULATORY - HEALTHEAST (OUTPATIENT)
Dept: INFUSION THERAPY | Facility: HOSPITAL | Age: 83
End: 2018-02-23

## 2018-02-23 DIAGNOSIS — D69.3 IDIOPATHIC THROMBOCYTOPENIC PURPURA (H): ICD-10-CM

## 2018-02-23 LAB
ERYTHROCYTE [DISTWIDTH] IN BLOOD BY AUTOMATED COUNT: 14.9 % (ref 11–14.5)
HCT VFR BLD AUTO: 43.8 % (ref 35–47)
HGB BLD-MCNC: 14.2 G/DL (ref 12–16)
MCH RBC QN AUTO: 32.1 PG (ref 27–34)
MCHC RBC AUTO-ENTMCNC: 32.4 G/DL (ref 32–36)
MCV RBC AUTO: 99 FL (ref 80–100)
PLATELET # BLD AUTO: 389 THOU/UL (ref 140–440)
PMV BLD AUTO: 11.6 FL (ref 8.5–12.5)
RBC # BLD AUTO: 4.43 MILL/UL (ref 3.8–5.4)
WBC: 14.9 THOU/UL (ref 4–11)

## 2018-03-02 ENCOUNTER — AMBULATORY - HEALTHEAST (OUTPATIENT)
Dept: INFUSION THERAPY | Facility: HOSPITAL | Age: 83
End: 2018-03-02

## 2018-03-02 ENCOUNTER — INFUSION - HEALTHEAST (OUTPATIENT)
Dept: INFUSION THERAPY | Facility: HOSPITAL | Age: 83
End: 2018-03-02

## 2018-03-02 DIAGNOSIS — D69.3 IDIOPATHIC THROMBOCYTOPENIC PURPURA (H): ICD-10-CM

## 2018-03-02 LAB
ERYTHROCYTE [DISTWIDTH] IN BLOOD BY AUTOMATED COUNT: 14.8 % (ref 11–14.5)
HCT VFR BLD AUTO: 43.3 % (ref 35–47)
HGB BLD-MCNC: 14.1 G/DL (ref 12–16)
MCH RBC QN AUTO: 32.2 PG (ref 27–34)
MCHC RBC AUTO-ENTMCNC: 32.6 G/DL (ref 32–36)
MCV RBC AUTO: 99 FL (ref 80–100)
PLATELET # BLD AUTO: 414 THOU/UL (ref 140–440)
PMV BLD AUTO: 10.8 FL (ref 8.5–12.5)
RBC # BLD AUTO: 4.38 MILL/UL (ref 3.8–5.4)
WBC: 14.7 THOU/UL (ref 4–11)

## 2018-03-09 ENCOUNTER — INFUSION - HEALTHEAST (OUTPATIENT)
Dept: INFUSION THERAPY | Facility: HOSPITAL | Age: 83
End: 2018-03-09

## 2018-03-09 ENCOUNTER — AMBULATORY - HEALTHEAST (OUTPATIENT)
Dept: INFUSION THERAPY | Facility: HOSPITAL | Age: 83
End: 2018-03-09

## 2018-03-09 DIAGNOSIS — D69.3 IDIOPATHIC THROMBOCYTOPENIC PURPURA (H): ICD-10-CM

## 2018-03-09 LAB
ERYTHROCYTE [DISTWIDTH] IN BLOOD BY AUTOMATED COUNT: 14.6 % (ref 11–14.5)
HCT VFR BLD AUTO: 43.6 % (ref 35–47)
HGB BLD-MCNC: 14.3 G/DL (ref 12–16)
MCH RBC QN AUTO: 32.2 PG (ref 27–34)
MCHC RBC AUTO-ENTMCNC: 32.8 G/DL (ref 32–36)
MCV RBC AUTO: 98 FL (ref 80–100)
PLATELET # BLD AUTO: 121 THOU/UL (ref 140–440)
PMV BLD AUTO: 12.8 FL (ref 8.5–12.5)
RBC # BLD AUTO: 4.44 MILL/UL (ref 3.8–5.4)
WBC: 13.3 THOU/UL (ref 4–11)

## 2018-03-15 ENCOUNTER — RECORDS - HEALTHEAST (OUTPATIENT)
Dept: ADMINISTRATIVE | Facility: OTHER | Age: 83
End: 2018-03-15

## 2018-03-16 ENCOUNTER — AMBULATORY - HEALTHEAST (OUTPATIENT)
Dept: INFUSION THERAPY | Facility: HOSPITAL | Age: 83
End: 2018-03-16

## 2018-03-16 ENCOUNTER — INFUSION - HEALTHEAST (OUTPATIENT)
Dept: INFUSION THERAPY | Facility: HOSPITAL | Age: 83
End: 2018-03-16

## 2018-03-16 DIAGNOSIS — D69.3 IDIOPATHIC THROMBOCYTOPENIC PURPURA (H): ICD-10-CM

## 2018-03-16 LAB
ERYTHROCYTE [DISTWIDTH] IN BLOOD BY AUTOMATED COUNT: 14.6 % (ref 11–14.5)
HCT VFR BLD AUTO: 42.1 % (ref 35–47)
HGB BLD-MCNC: 13.9 G/DL (ref 12–16)
MCH RBC QN AUTO: 32.2 PG (ref 27–34)
MCHC RBC AUTO-ENTMCNC: 33 G/DL (ref 32–36)
MCV RBC AUTO: 98 FL (ref 80–100)
PLATELET # BLD AUTO: 25 THOU/UL (ref 140–440)
RBC # BLD AUTO: 4.32 MILL/UL (ref 3.8–5.4)
WBC: 12.9 THOU/UL (ref 4–11)

## 2018-03-23 ENCOUNTER — INFUSION - HEALTHEAST (OUTPATIENT)
Dept: INFUSION THERAPY | Facility: HOSPITAL | Age: 83
End: 2018-03-23

## 2018-03-23 ENCOUNTER — AMBULATORY - HEALTHEAST (OUTPATIENT)
Dept: INFUSION THERAPY | Facility: HOSPITAL | Age: 83
End: 2018-03-23

## 2018-03-23 DIAGNOSIS — D69.3 IDIOPATHIC THROMBOCYTOPENIC PURPURA (H): ICD-10-CM

## 2018-03-23 LAB
ERYTHROCYTE [DISTWIDTH] IN BLOOD BY AUTOMATED COUNT: 15 % (ref 11–14.5)
HCT VFR BLD AUTO: 43.3 % (ref 35–47)
HGB BLD-MCNC: 14.2 G/DL (ref 12–16)
MCH RBC QN AUTO: 32.2 PG (ref 27–34)
MCHC RBC AUTO-ENTMCNC: 32.8 G/DL (ref 32–36)
MCV RBC AUTO: 98 FL (ref 80–100)
PLATELET # BLD AUTO: 107 THOU/UL (ref 140–440)
PMV BLD AUTO: 12.4 FL (ref 8.5–12.5)
RBC # BLD AUTO: 4.41 MILL/UL (ref 3.8–5.4)
WBC: 14.2 THOU/UL (ref 4–11)

## 2018-03-30 ENCOUNTER — AMBULATORY - HEALTHEAST (OUTPATIENT)
Dept: INFUSION THERAPY | Facility: HOSPITAL | Age: 83
End: 2018-03-30

## 2018-03-30 ENCOUNTER — INFUSION - HEALTHEAST (OUTPATIENT)
Dept: INFUSION THERAPY | Facility: HOSPITAL | Age: 83
End: 2018-03-30

## 2018-03-30 DIAGNOSIS — D69.3 IDIOPATHIC THROMBOCYTOPENIC PURPURA (H): ICD-10-CM

## 2018-03-30 LAB
ERYTHROCYTE [DISTWIDTH] IN BLOOD BY AUTOMATED COUNT: 15.2 % (ref 11–14.5)
HCT VFR BLD AUTO: 42.6 % (ref 35–47)
HGB BLD-MCNC: 14 G/DL (ref 12–16)
MCH RBC QN AUTO: 32.2 PG (ref 27–34)
MCHC RBC AUTO-ENTMCNC: 32.9 G/DL (ref 32–36)
MCV RBC AUTO: 98 FL (ref 80–100)
PLATELET # BLD AUTO: 111 THOU/UL (ref 140–440)
PMV BLD AUTO: 12.3 FL (ref 8.5–12.5)
RBC # BLD AUTO: 4.35 MILL/UL (ref 3.8–5.4)
WBC: 13.2 THOU/UL (ref 4–11)

## 2018-04-06 ENCOUNTER — AMBULATORY - HEALTHEAST (OUTPATIENT)
Dept: INFUSION THERAPY | Facility: HOSPITAL | Age: 83
End: 2018-04-06

## 2018-04-06 ENCOUNTER — INFUSION - HEALTHEAST (OUTPATIENT)
Dept: INFUSION THERAPY | Facility: HOSPITAL | Age: 83
End: 2018-04-06

## 2018-04-06 DIAGNOSIS — D69.3 IDIOPATHIC THROMBOCYTOPENIC PURPURA (H): ICD-10-CM

## 2018-04-06 LAB
ERYTHROCYTE [DISTWIDTH] IN BLOOD BY AUTOMATED COUNT: 14.6 % (ref 11–14.5)
HCT VFR BLD AUTO: 46.6 % (ref 35–47)
HGB BLD-MCNC: 15.5 G/DL (ref 12–16)
MCH RBC QN AUTO: 32.4 PG (ref 27–34)
MCHC RBC AUTO-ENTMCNC: 33.3 G/DL (ref 32–36)
MCV RBC AUTO: 98 FL (ref 80–100)
PLATELET # BLD AUTO: 24 THOU/UL (ref 140–440)
RBC # BLD AUTO: 4.78 MILL/UL (ref 3.8–5.4)
WBC: 12.4 THOU/UL (ref 4–11)

## 2018-04-13 ENCOUNTER — AMBULATORY - HEALTHEAST (OUTPATIENT)
Dept: INFUSION THERAPY | Facility: HOSPITAL | Age: 83
End: 2018-04-13

## 2018-04-13 ENCOUNTER — INFUSION - HEALTHEAST (OUTPATIENT)
Dept: INFUSION THERAPY | Facility: HOSPITAL | Age: 83
End: 2018-04-13

## 2018-04-13 DIAGNOSIS — D69.3 IDIOPATHIC THROMBOCYTOPENIC PURPURA (H): ICD-10-CM

## 2018-04-13 LAB
ERYTHROCYTE [DISTWIDTH] IN BLOOD BY AUTOMATED COUNT: 14.9 % (ref 11–14.5)
HCT VFR BLD AUTO: 41.9 % (ref 35–47)
HGB BLD-MCNC: 13.7 G/DL (ref 12–16)
MCH RBC QN AUTO: 32.2 PG (ref 27–34)
MCHC RBC AUTO-ENTMCNC: 32.7 G/DL (ref 32–36)
MCV RBC AUTO: 99 FL (ref 80–100)
PLATELET # BLD AUTO: 100 THOU/UL (ref 140–440)
PMV BLD AUTO: 11.9 FL (ref 8.5–12.5)
RBC # BLD AUTO: 4.25 MILL/UL (ref 3.8–5.4)
WBC: 13.4 THOU/UL (ref 4–11)

## 2018-04-16 ENCOUNTER — RECORDS - HEALTHEAST (OUTPATIENT)
Dept: ADMINISTRATIVE | Facility: OTHER | Age: 83
End: 2018-04-16

## 2018-04-20 ENCOUNTER — AMBULATORY - HEALTHEAST (OUTPATIENT)
Dept: INFUSION THERAPY | Facility: HOSPITAL | Age: 83
End: 2018-04-20

## 2018-04-20 ENCOUNTER — INFUSION - HEALTHEAST (OUTPATIENT)
Dept: INFUSION THERAPY | Facility: HOSPITAL | Age: 83
End: 2018-04-20

## 2018-04-20 DIAGNOSIS — D69.3 IDIOPATHIC THROMBOCYTOPENIC PURPURA (H): ICD-10-CM

## 2018-04-20 LAB
ERYTHROCYTE [DISTWIDTH] IN BLOOD BY AUTOMATED COUNT: 14.9 % (ref 11–14.5)
HCT VFR BLD AUTO: 43.7 % (ref 35–47)
HGB BLD-MCNC: 14.5 G/DL (ref 12–16)
MCH RBC QN AUTO: 32.4 PG (ref 27–34)
MCHC RBC AUTO-ENTMCNC: 33.2 G/DL (ref 32–36)
MCV RBC AUTO: 98 FL (ref 80–100)
PLATELET # BLD AUTO: 179 THOU/UL (ref 140–440)
PMV BLD AUTO: 11.7 FL (ref 8.5–12.5)
RBC # BLD AUTO: 4.47 MILL/UL (ref 3.8–5.4)
WBC: 14.2 THOU/UL (ref 4–11)

## 2018-04-27 ENCOUNTER — AMBULATORY - HEALTHEAST (OUTPATIENT)
Dept: INFUSION THERAPY | Facility: HOSPITAL | Age: 83
End: 2018-04-27

## 2018-04-27 ENCOUNTER — INFUSION - HEALTHEAST (OUTPATIENT)
Dept: INFUSION THERAPY | Facility: HOSPITAL | Age: 83
End: 2018-04-27

## 2018-04-27 DIAGNOSIS — D69.3 IDIOPATHIC THROMBOCYTOPENIC PURPURA (H): ICD-10-CM

## 2018-04-27 LAB
ALBUMIN SERPL-MCNC: 3.7 G/DL (ref 3.5–5)
ALP SERPL-CCNC: 81 U/L (ref 45–120)
ALT SERPL W P-5'-P-CCNC: 12 U/L (ref 0–45)
ANION GAP SERPL CALCULATED.3IONS-SCNC: 13 MMOL/L (ref 5–18)
AST SERPL W P-5'-P-CCNC: 26 U/L (ref 0–40)
BASOPHILS # BLD AUTO: 0.1 THOU/UL (ref 0–0.2)
BASOPHILS NFR BLD AUTO: 1 % (ref 0–2)
BILIRUB SERPL-MCNC: 0.5 MG/DL (ref 0–1)
BUN SERPL-MCNC: 20 MG/DL (ref 8–28)
CALCIUM SERPL-MCNC: 9.3 MG/DL (ref 8.5–10.5)
CHLORIDE BLD-SCNC: 102 MMOL/L (ref 98–107)
CO2 SERPL-SCNC: 27 MMOL/L (ref 22–31)
CREAT SERPL-MCNC: 0.9 MG/DL (ref 0.6–1.1)
EOSINOPHIL # BLD AUTO: 0.2 THOU/UL (ref 0–0.4)
EOSINOPHIL NFR BLD AUTO: 2 % (ref 0–6)
ERYTHROCYTE [DISTWIDTH] IN BLOOD BY AUTOMATED COUNT: 15.2 % (ref 11–14.5)
GFR SERPL CREATININE-BSD FRML MDRD: 60 ML/MIN/1.73M2
GLUCOSE BLD-MCNC: 100 MG/DL (ref 70–125)
HCT VFR BLD AUTO: 44 % (ref 35–47)
HGB BLD-MCNC: 14.5 G/DL (ref 12–16)
LYMPHOCYTES # BLD AUTO: 4.2 THOU/UL (ref 0.8–4.4)
LYMPHOCYTES NFR BLD AUTO: 32 % (ref 20–40)
MCH RBC QN AUTO: 32.4 PG (ref 27–34)
MCHC RBC AUTO-ENTMCNC: 33 G/DL (ref 32–36)
MCV RBC AUTO: 98 FL (ref 80–100)
MONOCYTES # BLD AUTO: 1.1 THOU/UL (ref 0–0.9)
MONOCYTES NFR BLD AUTO: 8 % (ref 2–10)
NEUTROPHILS # BLD AUTO: 7.3 THOU/UL (ref 2–7.7)
NEUTROPHILS NFR BLD AUTO: 57 % (ref 50–70)
PLATELET # BLD AUTO: 186 THOU/UL (ref 140–440)
PMV BLD AUTO: 12.3 FL (ref 8.5–12.5)
POTASSIUM BLD-SCNC: 4.1 MMOL/L (ref 3.5–5)
PROT SERPL-MCNC: 7.3 G/DL (ref 6–8)
RBC # BLD AUTO: 4.48 MILL/UL (ref 3.8–5.4)
SODIUM SERPL-SCNC: 142 MMOL/L (ref 136–145)
WBC: 12.9 THOU/UL (ref 4–11)

## 2018-05-04 ENCOUNTER — INFUSION - HEALTHEAST (OUTPATIENT)
Dept: INFUSION THERAPY | Facility: HOSPITAL | Age: 83
End: 2018-05-04

## 2018-05-04 ENCOUNTER — AMBULATORY - HEALTHEAST (OUTPATIENT)
Dept: INFUSION THERAPY | Facility: HOSPITAL | Age: 83
End: 2018-05-04

## 2018-05-04 DIAGNOSIS — D69.3 IDIOPATHIC THROMBOCYTOPENIC PURPURA (H): ICD-10-CM

## 2018-05-04 LAB
ERYTHROCYTE [DISTWIDTH] IN BLOOD BY AUTOMATED COUNT: 15 % (ref 11–14.5)
HCT VFR BLD AUTO: 42 % (ref 35–47)
HGB BLD-MCNC: 13.8 G/DL (ref 12–16)
MCH RBC QN AUTO: 32.2 PG (ref 27–34)
MCHC RBC AUTO-ENTMCNC: 32.9 G/DL (ref 32–36)
MCV RBC AUTO: 98 FL (ref 80–100)
PLATELET # BLD AUTO: 134 THOU/UL (ref 140–440)
PMV BLD AUTO: 12.3 FL (ref 8.5–12.5)
RBC # BLD AUTO: 4.28 MILL/UL (ref 3.8–5.4)
WBC: 14.4 THOU/UL (ref 4–11)

## 2018-05-11 ENCOUNTER — AMBULATORY - HEALTHEAST (OUTPATIENT)
Dept: INFUSION THERAPY | Facility: HOSPITAL | Age: 83
End: 2018-05-11

## 2018-05-11 ENCOUNTER — INFUSION - HEALTHEAST (OUTPATIENT)
Dept: INFUSION THERAPY | Facility: HOSPITAL | Age: 83
End: 2018-05-11

## 2018-05-11 DIAGNOSIS — D69.3 IDIOPATHIC THROMBOCYTOPENIC PURPURA (H): ICD-10-CM

## 2018-05-11 LAB
ERYTHROCYTE [DISTWIDTH] IN BLOOD BY AUTOMATED COUNT: 15 % (ref 11–14.5)
HCT VFR BLD AUTO: 44.2 % (ref 35–47)
HGB BLD-MCNC: 14.6 G/DL (ref 12–16)
MCH RBC QN AUTO: 32.5 PG (ref 27–34)
MCHC RBC AUTO-ENTMCNC: 33 G/DL (ref 32–36)
MCV RBC AUTO: 98 FL (ref 80–100)
PLATELET # BLD AUTO: 76 THOU/UL (ref 140–440)
PMV BLD AUTO: 13.3 FL (ref 8.5–12.5)
RBC # BLD AUTO: 4.49 MILL/UL (ref 3.8–5.4)
WBC: 13.6 THOU/UL (ref 4–11)

## 2018-05-18 ENCOUNTER — AMBULATORY - HEALTHEAST (OUTPATIENT)
Dept: INFUSION THERAPY | Facility: HOSPITAL | Age: 83
End: 2018-05-18

## 2018-05-18 ENCOUNTER — INFUSION - HEALTHEAST (OUTPATIENT)
Dept: INFUSION THERAPY | Facility: HOSPITAL | Age: 83
End: 2018-05-18

## 2018-05-18 DIAGNOSIS — D69.3 IDIOPATHIC THROMBOCYTOPENIC PURPURA (H): ICD-10-CM

## 2018-05-18 LAB
ERYTHROCYTE [DISTWIDTH] IN BLOOD BY AUTOMATED COUNT: 15.2 % (ref 11–14.5)
HCT VFR BLD AUTO: 42.1 % (ref 35–47)
HGB BLD-MCNC: 13.9 G/DL (ref 12–16)
MCH RBC QN AUTO: 32.4 PG (ref 27–34)
MCHC RBC AUTO-ENTMCNC: 33 G/DL (ref 32–36)
MCV RBC AUTO: 98 FL (ref 80–100)
PLATELET # BLD AUTO: 77 THOU/UL (ref 140–440)
PMV BLD AUTO: 12.5 FL (ref 8.5–12.5)
RBC # BLD AUTO: 4.29 MILL/UL (ref 3.8–5.4)
WBC: 12.5 THOU/UL (ref 4–11)

## 2018-05-25 ENCOUNTER — AMBULATORY - HEALTHEAST (OUTPATIENT)
Dept: INFUSION THERAPY | Facility: HOSPITAL | Age: 83
End: 2018-05-25

## 2018-05-25 ENCOUNTER — INFUSION - HEALTHEAST (OUTPATIENT)
Dept: INFUSION THERAPY | Facility: HOSPITAL | Age: 83
End: 2018-05-25

## 2018-05-25 DIAGNOSIS — D69.3 IDIOPATHIC THROMBOCYTOPENIC PURPURA (H): ICD-10-CM

## 2018-05-25 LAB
ERYTHROCYTE [DISTWIDTH] IN BLOOD BY AUTOMATED COUNT: 15.2 % (ref 11–14.5)
HCT VFR BLD AUTO: 45.7 % (ref 35–47)
HGB BLD-MCNC: 14.9 G/DL (ref 12–16)
MCH RBC QN AUTO: 32 PG (ref 27–34)
MCHC RBC AUTO-ENTMCNC: 32.6 G/DL (ref 32–36)
MCV RBC AUTO: 98 FL (ref 80–100)
PLATELET # BLD AUTO: 172 THOU/UL (ref 140–440)
PMV BLD AUTO: 11.4 FL (ref 8.5–12.5)
RBC # BLD AUTO: 4.65 MILL/UL (ref 3.8–5.4)
WBC: 14.1 THOU/UL (ref 4–11)

## 2018-05-31 ENCOUNTER — INFUSION - HEALTHEAST (OUTPATIENT)
Dept: INFUSION THERAPY | Facility: HOSPITAL | Age: 83
End: 2018-05-31

## 2018-05-31 ENCOUNTER — AMBULATORY - HEALTHEAST (OUTPATIENT)
Dept: INFUSION THERAPY | Facility: HOSPITAL | Age: 83
End: 2018-05-31

## 2018-05-31 DIAGNOSIS — D69.3 IDIOPATHIC THROMBOCYTOPENIC PURPURA (H): ICD-10-CM

## 2018-05-31 LAB
ERYTHROCYTE [DISTWIDTH] IN BLOOD BY AUTOMATED COUNT: 15.4 % (ref 11–14.5)
HCT VFR BLD AUTO: 41.5 % (ref 35–47)
HGB BLD-MCNC: 13.8 G/DL (ref 12–16)
MCH RBC QN AUTO: 32.5 PG (ref 27–34)
MCHC RBC AUTO-ENTMCNC: 33.3 G/DL (ref 32–36)
MCV RBC AUTO: 98 FL (ref 80–100)
PLATELET # BLD AUTO: 109 THOU/UL (ref 140–440)
PMV BLD AUTO: 12 FL (ref 8.5–12.5)
RBC # BLD AUTO: 4.25 MILL/UL (ref 3.8–5.4)
WBC: 13 THOU/UL (ref 4–11)

## 2018-06-07 ENCOUNTER — INFUSION - HEALTHEAST (OUTPATIENT)
Dept: INFUSION THERAPY | Facility: HOSPITAL | Age: 83
End: 2018-06-07

## 2018-06-07 ENCOUNTER — AMBULATORY - HEALTHEAST (OUTPATIENT)
Dept: INFUSION THERAPY | Facility: HOSPITAL | Age: 83
End: 2018-06-07

## 2018-06-07 DIAGNOSIS — D69.3 IDIOPATHIC THROMBOCYTOPENIC PURPURA (H): ICD-10-CM

## 2018-06-07 LAB
ERYTHROCYTE [DISTWIDTH] IN BLOOD BY AUTOMATED COUNT: 15.1 % (ref 11–14.5)
HCT VFR BLD AUTO: 42.8 % (ref 35–47)
HGB BLD-MCNC: 13.9 G/DL (ref 12–16)
MCH RBC QN AUTO: 31.9 PG (ref 27–34)
MCHC RBC AUTO-ENTMCNC: 32.5 G/DL (ref 32–36)
MCV RBC AUTO: 98 FL (ref 80–100)
PLATELET # BLD AUTO: 94 THOU/UL (ref 140–440)
PMV BLD AUTO: 12.7 FL (ref 8.5–12.5)
RBC # BLD AUTO: 4.36 MILL/UL (ref 3.8–5.4)
WBC: 13.1 THOU/UL (ref 4–11)

## 2018-06-14 ENCOUNTER — INFUSION - HEALTHEAST (OUTPATIENT)
Dept: INFUSION THERAPY | Facility: HOSPITAL | Age: 83
End: 2018-06-14

## 2018-06-14 ENCOUNTER — AMBULATORY - HEALTHEAST (OUTPATIENT)
Dept: INFUSION THERAPY | Facility: HOSPITAL | Age: 83
End: 2018-06-14

## 2018-06-14 ENCOUNTER — AMBULATORY - HEALTHEAST (OUTPATIENT)
Dept: ONCOLOGY | Facility: HOSPITAL | Age: 83
End: 2018-06-14

## 2018-06-14 DIAGNOSIS — D69.3 IDIOPATHIC THROMBOCYTOPENIC PURPURA (H): ICD-10-CM

## 2018-06-14 LAB
ERYTHROCYTE [DISTWIDTH] IN BLOOD BY AUTOMATED COUNT: 15 % (ref 11–14.5)
HCT VFR BLD AUTO: 42.5 % (ref 35–47)
HGB BLD-MCNC: 14 G/DL (ref 12–16)
MCH RBC QN AUTO: 32.3 PG (ref 27–34)
MCHC RBC AUTO-ENTMCNC: 32.9 G/DL (ref 32–36)
MCV RBC AUTO: 98 FL (ref 80–100)
PLATELET # BLD AUTO: 82 THOU/UL (ref 140–440)
PMV BLD AUTO: 12.6 FL (ref 8.5–12.5)
RBC # BLD AUTO: 4.34 MILL/UL (ref 3.8–5.4)
WBC: 11.7 THOU/UL (ref 4–11)

## 2018-06-20 ENCOUNTER — INFUSION - HEALTHEAST (OUTPATIENT)
Dept: INFUSION THERAPY | Facility: HOSPITAL | Age: 83
End: 2018-06-20

## 2018-06-20 ENCOUNTER — AMBULATORY - HEALTHEAST (OUTPATIENT)
Dept: INFUSION THERAPY | Facility: HOSPITAL | Age: 83
End: 2018-06-20

## 2018-06-20 ENCOUNTER — OFFICE VISIT - HEALTHEAST (OUTPATIENT)
Dept: ONCOLOGY | Facility: HOSPITAL | Age: 83
End: 2018-06-20

## 2018-06-20 DIAGNOSIS — D69.3 IDIOPATHIC THROMBOCYTOPENIC PURPURA (H): ICD-10-CM

## 2018-06-20 LAB
ERYTHROCYTE [DISTWIDTH] IN BLOOD BY AUTOMATED COUNT: 15.1 % (ref 11–14.5)
HCT VFR BLD AUTO: 43.3 % (ref 35–47)
HGB BLD-MCNC: 14.3 G/DL (ref 12–16)
MCH RBC QN AUTO: 32.5 PG (ref 27–34)
MCHC RBC AUTO-ENTMCNC: 33 G/DL (ref 32–36)
MCV RBC AUTO: 98 FL (ref 80–100)
PLATELET # BLD AUTO: 75 THOU/UL (ref 140–440)
PMV BLD AUTO: 12.5 FL (ref 8.5–12.5)
RBC # BLD AUTO: 4.4 MILL/UL (ref 3.8–5.4)
WBC: 12.8 THOU/UL (ref 4–11)

## 2018-06-27 ENCOUNTER — INFUSION - HEALTHEAST (OUTPATIENT)
Dept: INFUSION THERAPY | Facility: HOSPITAL | Age: 83
End: 2018-06-27

## 2018-06-27 ENCOUNTER — AMBULATORY - HEALTHEAST (OUTPATIENT)
Dept: INFUSION THERAPY | Facility: HOSPITAL | Age: 83
End: 2018-06-27

## 2018-06-27 DIAGNOSIS — D69.3 IDIOPATHIC THROMBOCYTOPENIC PURPURA (H): ICD-10-CM

## 2018-06-27 LAB
ERYTHROCYTE [DISTWIDTH] IN BLOOD BY AUTOMATED COUNT: 15.1 % (ref 11–14.5)
HCT VFR BLD AUTO: 43.6 % (ref 35–47)
HGB BLD-MCNC: 14.3 G/DL (ref 12–16)
MCH RBC QN AUTO: 32.3 PG (ref 27–34)
MCHC RBC AUTO-ENTMCNC: 32.8 G/DL (ref 32–36)
MCV RBC AUTO: 98 FL (ref 80–100)
PLATELET # BLD AUTO: 80 THOU/UL (ref 140–440)
PMV BLD AUTO: 13 FL (ref 8.5–12.5)
RBC # BLD AUTO: 4.43 MILL/UL (ref 3.8–5.4)
WBC: 13 THOU/UL (ref 4–11)

## 2018-07-05 ENCOUNTER — AMBULATORY - HEALTHEAST (OUTPATIENT)
Dept: INFUSION THERAPY | Facility: HOSPITAL | Age: 83
End: 2018-07-05

## 2018-07-05 ENCOUNTER — INFUSION - HEALTHEAST (OUTPATIENT)
Dept: INFUSION THERAPY | Facility: HOSPITAL | Age: 83
End: 2018-07-05

## 2018-07-05 DIAGNOSIS — D69.3 IDIOPATHIC THROMBOCYTOPENIC PURPURA (H): ICD-10-CM

## 2018-07-05 LAB
ERYTHROCYTE [DISTWIDTH] IN BLOOD BY AUTOMATED COUNT: 14.8 % (ref 11–14.5)
HCT VFR BLD AUTO: 43 % (ref 35–47)
HGB BLD-MCNC: 14.2 G/DL (ref 12–16)
MCH RBC QN AUTO: 32.3 PG (ref 27–34)
MCHC RBC AUTO-ENTMCNC: 33 G/DL (ref 32–36)
MCV RBC AUTO: 98 FL (ref 80–100)
PLATELET # BLD AUTO: 97 THOU/UL (ref 140–440)
PMV BLD AUTO: 12.2 FL (ref 8.5–12.5)
RBC # BLD AUTO: 4.4 MILL/UL (ref 3.8–5.4)
WBC: 13.2 THOU/UL (ref 4–11)

## 2018-07-11 ENCOUNTER — INFUSION - HEALTHEAST (OUTPATIENT)
Dept: INFUSION THERAPY | Facility: HOSPITAL | Age: 83
End: 2018-07-11

## 2018-07-11 ENCOUNTER — AMBULATORY - HEALTHEAST (OUTPATIENT)
Dept: INFUSION THERAPY | Facility: HOSPITAL | Age: 83
End: 2018-07-11

## 2018-07-11 DIAGNOSIS — D69.3 IDIOPATHIC THROMBOCYTOPENIC PURPURA (H): ICD-10-CM

## 2018-07-11 LAB
ERYTHROCYTE [DISTWIDTH] IN BLOOD BY AUTOMATED COUNT: 14.8 % (ref 11–14.5)
HCT VFR BLD AUTO: 42.7 % (ref 35–47)
HGB BLD-MCNC: 14.3 G/DL (ref 12–16)
MCH RBC QN AUTO: 32.6 PG (ref 27–34)
MCHC RBC AUTO-ENTMCNC: 33.5 G/DL (ref 32–36)
MCV RBC AUTO: 97 FL (ref 80–100)
PLATELET # BLD AUTO: 66 THOU/UL (ref 140–440)
PMV BLD AUTO: 12.3 FL (ref 8.5–12.5)
RBC # BLD AUTO: 4.39 MILL/UL (ref 3.8–5.4)
WBC: 12.8 THOU/UL (ref 4–11)

## 2018-07-18 ENCOUNTER — INFUSION - HEALTHEAST (OUTPATIENT)
Dept: INFUSION THERAPY | Facility: HOSPITAL | Age: 83
End: 2018-07-18

## 2018-07-18 ENCOUNTER — AMBULATORY - HEALTHEAST (OUTPATIENT)
Dept: INFUSION THERAPY | Facility: HOSPITAL | Age: 83
End: 2018-07-18

## 2018-07-18 DIAGNOSIS — D69.3 IDIOPATHIC THROMBOCYTOPENIC PURPURA (H): ICD-10-CM

## 2018-07-18 LAB
ERYTHROCYTE [DISTWIDTH] IN BLOOD BY AUTOMATED COUNT: 14.9 % (ref 11–14.5)
HCT VFR BLD AUTO: 41.7 % (ref 35–47)
HGB BLD-MCNC: 13.8 G/DL (ref 12–16)
MCH RBC QN AUTO: 32.5 PG (ref 27–34)
MCHC RBC AUTO-ENTMCNC: 33.1 G/DL (ref 32–36)
MCV RBC AUTO: 98 FL (ref 80–100)
PLATELET # BLD AUTO: 151 THOU/UL (ref 140–440)
PMV BLD AUTO: 11.9 FL (ref 8.5–12.5)
RBC # BLD AUTO: 4.24 MILL/UL (ref 3.8–5.4)
WBC: 10.3 THOU/UL (ref 4–11)

## 2018-07-25 ENCOUNTER — INFUSION - HEALTHEAST (OUTPATIENT)
Dept: INFUSION THERAPY | Facility: HOSPITAL | Age: 83
End: 2018-07-25

## 2018-07-25 ENCOUNTER — AMBULATORY - HEALTHEAST (OUTPATIENT)
Dept: INFUSION THERAPY | Facility: HOSPITAL | Age: 83
End: 2018-07-25

## 2018-07-25 DIAGNOSIS — D69.3 IDIOPATHIC THROMBOCYTOPENIC PURPURA (H): ICD-10-CM

## 2018-07-25 LAB
ERYTHROCYTE [DISTWIDTH] IN BLOOD BY AUTOMATED COUNT: 14.9 % (ref 11–14.5)
HCT VFR BLD AUTO: 42.1 % (ref 35–47)
HGB BLD-MCNC: 13.7 G/DL (ref 12–16)
MCH RBC QN AUTO: 32.3 PG (ref 27–34)
MCHC RBC AUTO-ENTMCNC: 32.5 G/DL (ref 32–36)
MCV RBC AUTO: 99 FL (ref 80–100)
PLATELET # BLD AUTO: 155 THOU/UL (ref 140–440)
PMV BLD AUTO: 11.9 FL (ref 8.5–12.5)
RBC # BLD AUTO: 4.24 MILL/UL (ref 3.8–5.4)
WBC: 12.7 THOU/UL (ref 4–11)

## 2018-08-01 ENCOUNTER — AMBULATORY - HEALTHEAST (OUTPATIENT)
Dept: INFUSION THERAPY | Facility: HOSPITAL | Age: 83
End: 2018-08-01

## 2018-08-01 ENCOUNTER — INFUSION - HEALTHEAST (OUTPATIENT)
Dept: INFUSION THERAPY | Facility: HOSPITAL | Age: 83
End: 2018-08-01

## 2018-08-01 DIAGNOSIS — D69.3 IDIOPATHIC THROMBOCYTOPENIC PURPURA (H): ICD-10-CM

## 2018-08-01 LAB
ERYTHROCYTE [DISTWIDTH] IN BLOOD BY AUTOMATED COUNT: 15 % (ref 11–14.5)
HCT VFR BLD AUTO: 42.7 % (ref 35–47)
HGB BLD-MCNC: 14.1 G/DL (ref 12–16)
MCH RBC QN AUTO: 32.6 PG (ref 27–34)
MCHC RBC AUTO-ENTMCNC: 33 G/DL (ref 32–36)
MCV RBC AUTO: 99 FL (ref 80–100)
PLATELET # BLD AUTO: 93 THOU/UL (ref 140–440)
PMV BLD AUTO: 12.7 FL (ref 8.5–12.5)
RBC # BLD AUTO: 4.32 MILL/UL (ref 3.8–5.4)
WBC: 14 THOU/UL (ref 4–11)

## 2018-08-06 ENCOUNTER — COMMUNICATION - HEALTHEAST (OUTPATIENT)
Dept: INTERNAL MEDICINE | Facility: CLINIC | Age: 83
End: 2018-08-06

## 2018-08-06 ENCOUNTER — OFFICE VISIT - HEALTHEAST (OUTPATIENT)
Dept: INTERNAL MEDICINE | Facility: CLINIC | Age: 83
End: 2018-08-06

## 2018-08-06 DIAGNOSIS — E04.9 NON-TOXIC NODULAR GOITER: ICD-10-CM

## 2018-08-06 DIAGNOSIS — E78.5 HLD (HYPERLIPIDEMIA): ICD-10-CM

## 2018-08-06 DIAGNOSIS — R73.03 PREDIABETES: ICD-10-CM

## 2018-08-06 DIAGNOSIS — G47.00 INSOMNIA: ICD-10-CM

## 2018-08-06 LAB
CHOLEST SERPL-MCNC: 192 MG/DL
FASTING STATUS PATIENT QL REPORTED: ABNORMAL
HBA1C MFR BLD: 5.7 % (ref 3.5–6)
HDLC SERPL-MCNC: 60 MG/DL
LDLC SERPL CALC-MCNC: 101 MG/DL
TRIGL SERPL-MCNC: 157 MG/DL

## 2018-08-08 ENCOUNTER — AMBULATORY - HEALTHEAST (OUTPATIENT)
Dept: INFUSION THERAPY | Facility: HOSPITAL | Age: 83
End: 2018-08-08

## 2018-08-08 ENCOUNTER — INFUSION - HEALTHEAST (OUTPATIENT)
Dept: INFUSION THERAPY | Facility: HOSPITAL | Age: 83
End: 2018-08-08

## 2018-08-08 DIAGNOSIS — D69.3 IDIOPATHIC THROMBOCYTOPENIC PURPURA (H): ICD-10-CM

## 2018-08-08 LAB
ERYTHROCYTE [DISTWIDTH] IN BLOOD BY AUTOMATED COUNT: 14.8 % (ref 11–14.5)
HCT VFR BLD AUTO: 42.2 % (ref 35–47)
HGB BLD-MCNC: 14 G/DL (ref 12–16)
MCH RBC QN AUTO: 32.6 PG (ref 27–34)
MCHC RBC AUTO-ENTMCNC: 33.2 G/DL (ref 32–36)
MCV RBC AUTO: 98 FL (ref 80–100)
PLATELET # BLD AUTO: 129 THOU/UL (ref 140–440)
PMV BLD AUTO: 11.9 FL (ref 8.5–12.5)
RBC # BLD AUTO: 4.3 MILL/UL (ref 3.8–5.4)
WBC: 13.1 THOU/UL (ref 4–11)

## 2018-08-15 ENCOUNTER — INFUSION - HEALTHEAST (OUTPATIENT)
Dept: INFUSION THERAPY | Facility: HOSPITAL | Age: 83
End: 2018-08-15

## 2018-08-15 ENCOUNTER — AMBULATORY - HEALTHEAST (OUTPATIENT)
Dept: INFUSION THERAPY | Facility: HOSPITAL | Age: 83
End: 2018-08-15

## 2018-08-15 DIAGNOSIS — D69.3 IDIOPATHIC THROMBOCYTOPENIC PURPURA (H): ICD-10-CM

## 2018-08-15 LAB
ERYTHROCYTE [DISTWIDTH] IN BLOOD BY AUTOMATED COUNT: 14.6 % (ref 11–14.5)
HCT VFR BLD AUTO: 41.5 % (ref 35–47)
HGB BLD-MCNC: 13.7 G/DL (ref 12–16)
MCH RBC QN AUTO: 32.5 PG (ref 27–34)
MCHC RBC AUTO-ENTMCNC: 33 G/DL (ref 32–36)
MCV RBC AUTO: 99 FL (ref 80–100)
PLATELET # BLD AUTO: 221 THOU/UL (ref 140–440)
PMV BLD AUTO: 11.7 FL (ref 8.5–12.5)
RBC # BLD AUTO: 4.21 MILL/UL (ref 3.8–5.4)
WBC: 12.4 THOU/UL (ref 4–11)

## 2018-08-22 ENCOUNTER — AMBULATORY - HEALTHEAST (OUTPATIENT)
Dept: INFUSION THERAPY | Facility: HOSPITAL | Age: 83
End: 2018-08-22

## 2018-08-22 ENCOUNTER — INFUSION - HEALTHEAST (OUTPATIENT)
Dept: INFUSION THERAPY | Facility: HOSPITAL | Age: 83
End: 2018-08-22

## 2018-08-22 DIAGNOSIS — D69.3 IDIOPATHIC THROMBOCYTOPENIC PURPURA (H): ICD-10-CM

## 2018-08-22 LAB
ERYTHROCYTE [DISTWIDTH] IN BLOOD BY AUTOMATED COUNT: 14.7 % (ref 11–14.5)
HCT VFR BLD AUTO: 42.9 % (ref 35–47)
HGB BLD-MCNC: 14 G/DL (ref 12–16)
MCH RBC QN AUTO: 32.3 PG (ref 27–34)
MCHC RBC AUTO-ENTMCNC: 32.6 G/DL (ref 32–36)
MCV RBC AUTO: 99 FL (ref 80–100)
PLATELET # BLD AUTO: 208 THOU/UL (ref 140–440)
PMV BLD AUTO: 11.8 FL (ref 8.5–12.5)
RBC # BLD AUTO: 4.33 MILL/UL (ref 3.8–5.4)
WBC: 12.6 THOU/UL (ref 4–11)

## 2018-08-22 ASSESSMENT — MIFFLIN-ST. JEOR: SCORE: 914.25

## 2018-08-29 ENCOUNTER — AMBULATORY - HEALTHEAST (OUTPATIENT)
Dept: INFUSION THERAPY | Facility: HOSPITAL | Age: 83
End: 2018-08-29

## 2018-08-29 ENCOUNTER — INFUSION - HEALTHEAST (OUTPATIENT)
Dept: INFUSION THERAPY | Facility: HOSPITAL | Age: 83
End: 2018-08-29

## 2018-08-29 DIAGNOSIS — D69.3 IDIOPATHIC THROMBOCYTOPENIC PURPURA (H): ICD-10-CM

## 2018-08-29 LAB
ERYTHROCYTE [DISTWIDTH] IN BLOOD BY AUTOMATED COUNT: 14.6 % (ref 11–14.5)
HCT VFR BLD AUTO: 43.6 % (ref 35–47)
HGB BLD-MCNC: 14.4 G/DL (ref 12–16)
MCH RBC QN AUTO: 32.9 PG (ref 27–34)
MCHC RBC AUTO-ENTMCNC: 33 G/DL (ref 32–36)
MCV RBC AUTO: 100 FL (ref 80–100)
PLATELET # BLD AUTO: 135 THOU/UL (ref 140–440)
PMV BLD AUTO: 12.1 FL (ref 8.5–12.5)
RBC # BLD AUTO: 4.38 MILL/UL (ref 3.8–5.4)
WBC: 12.3 THOU/UL (ref 4–11)

## 2018-09-05 ENCOUNTER — INFUSION - HEALTHEAST (OUTPATIENT)
Dept: INFUSION THERAPY | Facility: HOSPITAL | Age: 83
End: 2018-09-05

## 2018-09-05 ENCOUNTER — AMBULATORY - HEALTHEAST (OUTPATIENT)
Dept: INFUSION THERAPY | Facility: HOSPITAL | Age: 83
End: 2018-09-05

## 2018-09-05 DIAGNOSIS — D69.3 IDIOPATHIC THROMBOCYTOPENIC PURPURA (H): ICD-10-CM

## 2018-09-05 LAB
ERYTHROCYTE [DISTWIDTH] IN BLOOD BY AUTOMATED COUNT: 14.6 % (ref 11–14.5)
HCT VFR BLD AUTO: 43.5 % (ref 35–47)
HGB BLD-MCNC: 14.4 G/DL (ref 12–16)
MCH RBC QN AUTO: 32.9 PG (ref 27–34)
MCHC RBC AUTO-ENTMCNC: 33.1 G/DL (ref 32–36)
MCV RBC AUTO: 99 FL (ref 80–100)
PLATELET # BLD AUTO: 99 THOU/UL (ref 140–440)
PMV BLD AUTO: 12.4 FL (ref 8.5–12.5)
RBC # BLD AUTO: 4.38 MILL/UL (ref 3.8–5.4)
WBC: 11.7 THOU/UL (ref 4–11)

## 2018-09-06 ENCOUNTER — COMMUNICATION - HEALTHEAST (OUTPATIENT)
Dept: ADMINISTRATIVE | Facility: HOSPITAL | Age: 83
End: 2018-09-06

## 2018-09-12 ENCOUNTER — INFUSION - HEALTHEAST (OUTPATIENT)
Dept: INFUSION THERAPY | Facility: HOSPITAL | Age: 83
End: 2018-09-12

## 2018-09-12 ENCOUNTER — AMBULATORY - HEALTHEAST (OUTPATIENT)
Dept: INFUSION THERAPY | Facility: HOSPITAL | Age: 83
End: 2018-09-12

## 2018-09-12 DIAGNOSIS — D69.3 IDIOPATHIC THROMBOCYTOPENIC PURPURA (H): ICD-10-CM

## 2018-09-12 LAB
ERYTHROCYTE [DISTWIDTH] IN BLOOD BY AUTOMATED COUNT: 14.8 % (ref 11–14.5)
HCT VFR BLD AUTO: 42.4 % (ref 35–47)
HGB BLD-MCNC: 13.6 G/DL (ref 12–16)
MCH RBC QN AUTO: 31.9 PG (ref 27–34)
MCHC RBC AUTO-ENTMCNC: 32.1 G/DL (ref 32–36)
MCV RBC AUTO: 100 FL (ref 80–100)
PLATELET # BLD AUTO: 116 THOU/UL (ref 140–440)
PMV BLD AUTO: 12.5 FL (ref 8.5–12.5)
RBC # BLD AUTO: 4.26 MILL/UL (ref 3.8–5.4)
WBC: 12.1 THOU/UL (ref 4–11)

## 2018-09-19 ENCOUNTER — INFUSION - HEALTHEAST (OUTPATIENT)
Dept: INFUSION THERAPY | Facility: HOSPITAL | Age: 83
End: 2018-09-19

## 2018-09-19 ENCOUNTER — AMBULATORY - HEALTHEAST (OUTPATIENT)
Dept: INFUSION THERAPY | Facility: HOSPITAL | Age: 83
End: 2018-09-19

## 2018-09-19 DIAGNOSIS — D69.3 IDIOPATHIC THROMBOCYTOPENIC PURPURA (H): ICD-10-CM

## 2018-09-19 LAB
ERYTHROCYTE [DISTWIDTH] IN BLOOD BY AUTOMATED COUNT: 14.7 % (ref 11–14.5)
HCT VFR BLD AUTO: 44.3 % (ref 35–47)
HGB BLD-MCNC: 14.6 G/DL (ref 12–16)
MCH RBC QN AUTO: 32.7 PG (ref 27–34)
MCHC RBC AUTO-ENTMCNC: 33 G/DL (ref 32–36)
MCV RBC AUTO: 99 FL (ref 80–100)
PLATELET # BLD AUTO: 167 THOU/UL (ref 140–440)
PMV BLD AUTO: 11.8 FL (ref 8.5–12.5)
RBC # BLD AUTO: 4.47 MILL/UL (ref 3.8–5.4)
WBC: 13.5 THOU/UL (ref 4–11)

## 2018-09-26 ENCOUNTER — AMBULATORY - HEALTHEAST (OUTPATIENT)
Dept: INFUSION THERAPY | Facility: HOSPITAL | Age: 83
End: 2018-09-26

## 2018-09-26 ENCOUNTER — INFUSION - HEALTHEAST (OUTPATIENT)
Dept: INFUSION THERAPY | Facility: HOSPITAL | Age: 83
End: 2018-09-26

## 2018-09-26 DIAGNOSIS — D69.3 IDIOPATHIC THROMBOCYTOPENIC PURPURA (H): ICD-10-CM

## 2018-09-26 LAB
ERYTHROCYTE [DISTWIDTH] IN BLOOD BY AUTOMATED COUNT: 14.5 % (ref 11–14.5)
HCT VFR BLD AUTO: 45.6 % (ref 35–47)
HGB BLD-MCNC: 14.7 G/DL (ref 12–16)
MCH RBC QN AUTO: 32 PG (ref 27–34)
MCHC RBC AUTO-ENTMCNC: 32.2 G/DL (ref 32–36)
MCV RBC AUTO: 99 FL (ref 80–100)
PLATELET # BLD AUTO: 261 THOU/UL (ref 140–440)
PMV BLD AUTO: 11 FL (ref 8.5–12.5)
RBC # BLD AUTO: 4.59 MILL/UL (ref 3.8–5.4)
WBC: 15.2 THOU/UL (ref 4–11)

## 2018-10-03 ENCOUNTER — INFUSION - HEALTHEAST (OUTPATIENT)
Dept: INFUSION THERAPY | Facility: HOSPITAL | Age: 83
End: 2018-10-03

## 2018-10-03 ENCOUNTER — AMBULATORY - HEALTHEAST (OUTPATIENT)
Dept: INFUSION THERAPY | Facility: HOSPITAL | Age: 83
End: 2018-10-03

## 2018-10-03 DIAGNOSIS — D69.3 IDIOPATHIC THROMBOCYTOPENIC PURPURA (H): ICD-10-CM

## 2018-10-03 LAB
ERYTHROCYTE [DISTWIDTH] IN BLOOD BY AUTOMATED COUNT: 14.4 % (ref 11–14.5)
HCT VFR BLD AUTO: 43 % (ref 35–47)
HGB BLD-MCNC: 14.3 G/DL (ref 12–16)
MCH RBC QN AUTO: 32.9 PG (ref 27–34)
MCHC RBC AUTO-ENTMCNC: 33.3 G/DL (ref 32–36)
MCV RBC AUTO: 99 FL (ref 80–100)
PLATELET # BLD AUTO: 189 THOU/UL (ref 140–440)
PMV BLD AUTO: 11.4 FL (ref 8.5–12.5)
RBC # BLD AUTO: 4.34 MILL/UL (ref 3.8–5.4)
WBC: 16.2 THOU/UL (ref 4–11)

## 2018-10-10 ENCOUNTER — INFUSION - HEALTHEAST (OUTPATIENT)
Dept: INFUSION THERAPY | Facility: HOSPITAL | Age: 83
End: 2018-10-10

## 2018-10-10 ENCOUNTER — AMBULATORY - HEALTHEAST (OUTPATIENT)
Dept: INFUSION THERAPY | Facility: HOSPITAL | Age: 83
End: 2018-10-10

## 2018-10-10 DIAGNOSIS — D69.3 IDIOPATHIC THROMBOCYTOPENIC PURPURA (H): ICD-10-CM

## 2018-10-10 LAB
ERYTHROCYTE [DISTWIDTH] IN BLOOD BY AUTOMATED COUNT: 14.3 % (ref 11–14.5)
HCT VFR BLD AUTO: 45.4 % (ref 35–47)
HGB BLD-MCNC: 14.9 G/DL (ref 12–16)
MCH RBC QN AUTO: 32.5 PG (ref 27–34)
MCHC RBC AUTO-ENTMCNC: 32.8 G/DL (ref 32–36)
MCV RBC AUTO: 99 FL (ref 80–100)
PLATELET # BLD AUTO: 184 THOU/UL (ref 140–440)
PMV BLD AUTO: 11.7 FL (ref 8.5–12.5)
RBC # BLD AUTO: 4.59 MILL/UL (ref 3.8–5.4)
WBC: 15.2 THOU/UL (ref 4–11)

## 2018-10-17 ENCOUNTER — AMBULATORY - HEALTHEAST (OUTPATIENT)
Dept: INFUSION THERAPY | Facility: HOSPITAL | Age: 83
End: 2018-10-17

## 2018-10-17 ENCOUNTER — INFUSION - HEALTHEAST (OUTPATIENT)
Dept: INFUSION THERAPY | Facility: HOSPITAL | Age: 83
End: 2018-10-17

## 2018-10-17 DIAGNOSIS — D69.3 IDIOPATHIC THROMBOCYTOPENIC PURPURA (H): ICD-10-CM

## 2018-10-17 LAB
ERYTHROCYTE [DISTWIDTH] IN BLOOD BY AUTOMATED COUNT: 14.5 % (ref 11–14.5)
HCT VFR BLD AUTO: 43.8 % (ref 35–47)
HGB BLD-MCNC: 14.2 G/DL (ref 12–16)
MCH RBC QN AUTO: 32.1 PG (ref 27–34)
MCHC RBC AUTO-ENTMCNC: 32.4 G/DL (ref 32–36)
MCV RBC AUTO: 99 FL (ref 80–100)
PLATELET # BLD AUTO: 165 THOU/UL (ref 140–440)
PMV BLD AUTO: 11.5 FL (ref 8.5–12.5)
RBC # BLD AUTO: 4.42 MILL/UL (ref 3.8–5.4)
WBC: 13.7 THOU/UL (ref 4–11)

## 2018-10-24 ENCOUNTER — AMBULATORY - HEALTHEAST (OUTPATIENT)
Dept: INFUSION THERAPY | Facility: HOSPITAL | Age: 83
End: 2018-10-24

## 2018-10-24 ENCOUNTER — INFUSION - HEALTHEAST (OUTPATIENT)
Dept: INFUSION THERAPY | Facility: HOSPITAL | Age: 83
End: 2018-10-24

## 2018-10-24 DIAGNOSIS — D69.3 IDIOPATHIC THROMBOCYTOPENIC PURPURA (H): ICD-10-CM

## 2018-10-24 LAB
ERYTHROCYTE [DISTWIDTH] IN BLOOD BY AUTOMATED COUNT: 14.5 % (ref 11–14.5)
HCT VFR BLD AUTO: 44 % (ref 35–47)
HGB BLD-MCNC: 14.3 G/DL (ref 12–16)
MCH RBC QN AUTO: 32.4 PG (ref 27–34)
MCHC RBC AUTO-ENTMCNC: 32.5 G/DL (ref 32–36)
MCV RBC AUTO: 100 FL (ref 80–100)
PLATELET # BLD AUTO: 146 THOU/UL (ref 140–440)
PMV BLD AUTO: 11.7 FL (ref 8.5–12.5)
RBC # BLD AUTO: 4.41 MILL/UL (ref 3.8–5.4)
WBC: 12.5 THOU/UL (ref 4–11)

## 2018-10-31 ENCOUNTER — AMBULATORY - HEALTHEAST (OUTPATIENT)
Dept: INFUSION THERAPY | Facility: HOSPITAL | Age: 83
End: 2018-10-31

## 2018-10-31 ENCOUNTER — AMBULATORY - HEALTHEAST (OUTPATIENT)
Dept: ONCOLOGY | Facility: HOSPITAL | Age: 83
End: 2018-10-31

## 2018-10-31 ENCOUNTER — INFUSION - HEALTHEAST (OUTPATIENT)
Dept: INFUSION THERAPY | Facility: HOSPITAL | Age: 83
End: 2018-10-31

## 2018-10-31 DIAGNOSIS — D69.3 IDIOPATHIC THROMBOCYTOPENIC PURPURA (H): ICD-10-CM

## 2018-10-31 DIAGNOSIS — D50.9 IRON DEFICIENCY ANEMIA: ICD-10-CM

## 2018-10-31 LAB
ERYTHROCYTE [DISTWIDTH] IN BLOOD BY AUTOMATED COUNT: 14.6 % (ref 11–14.5)
HCT VFR BLD AUTO: 41.6 % (ref 35–47)
HGB BLD-MCNC: 13.6 G/DL (ref 12–16)
MCH RBC QN AUTO: 32.3 PG (ref 27–34)
MCHC RBC AUTO-ENTMCNC: 32.7 G/DL (ref 32–36)
MCV RBC AUTO: 99 FL (ref 80–100)
PLATELET # BLD AUTO: 154 THOU/UL (ref 140–440)
PMV BLD AUTO: 11.1 FL (ref 8.5–12.5)
RBC # BLD AUTO: 4.21 MILL/UL (ref 3.8–5.4)
WBC: 13.1 THOU/UL (ref 4–11)

## 2018-11-07 ENCOUNTER — INFUSION - HEALTHEAST (OUTPATIENT)
Dept: INFUSION THERAPY | Facility: HOSPITAL | Age: 83
End: 2018-11-07

## 2018-11-07 ENCOUNTER — AMBULATORY - HEALTHEAST (OUTPATIENT)
Dept: INFUSION THERAPY | Facility: HOSPITAL | Age: 83
End: 2018-11-07

## 2018-11-07 DIAGNOSIS — D69.3 IDIOPATHIC THROMBOCYTOPENIC PURPURA (H): ICD-10-CM

## 2018-11-07 LAB
ERYTHROCYTE [DISTWIDTH] IN BLOOD BY AUTOMATED COUNT: 14.7 % (ref 11–14.5)
HCT VFR BLD AUTO: 46.6 % (ref 35–47)
HGB BLD-MCNC: 15.2 G/DL (ref 12–16)
MCH RBC QN AUTO: 32.5 PG (ref 27–34)
MCHC RBC AUTO-ENTMCNC: 32.6 G/DL (ref 32–36)
MCV RBC AUTO: 100 FL (ref 80–100)
PLATELET # BLD AUTO: 221 THOU/UL (ref 140–440)
PMV BLD AUTO: 11.4 FL (ref 8.5–12.5)
RBC # BLD AUTO: 4.67 MILL/UL (ref 3.8–5.4)
WBC: 14.2 THOU/UL (ref 4–11)

## 2018-11-12 ENCOUNTER — COMMUNICATION - HEALTHEAST (OUTPATIENT)
Dept: ADMINISTRATIVE | Facility: HOSPITAL | Age: 83
End: 2018-11-12

## 2018-11-14 ENCOUNTER — AMBULATORY - HEALTHEAST (OUTPATIENT)
Dept: INFUSION THERAPY | Facility: HOSPITAL | Age: 83
End: 2018-11-14

## 2018-11-14 ENCOUNTER — INFUSION - HEALTHEAST (OUTPATIENT)
Dept: INFUSION THERAPY | Facility: HOSPITAL | Age: 83
End: 2018-11-14

## 2018-11-14 DIAGNOSIS — D69.3 IDIOPATHIC THROMBOCYTOPENIC PURPURA (H): ICD-10-CM

## 2018-11-14 LAB
ERYTHROCYTE [DISTWIDTH] IN BLOOD BY AUTOMATED COUNT: 14.5 % (ref 11–14.5)
HCT VFR BLD AUTO: 44.4 % (ref 35–47)
HGB BLD-MCNC: 14.5 G/DL (ref 12–16)
MCH RBC QN AUTO: 32.4 PG (ref 27–34)
MCHC RBC AUTO-ENTMCNC: 32.7 G/DL (ref 32–36)
MCV RBC AUTO: 99 FL (ref 80–100)
PLATELET # BLD AUTO: 198 THOU/UL (ref 140–440)
PMV BLD AUTO: 12 FL (ref 8.5–12.5)
RBC # BLD AUTO: 4.48 MILL/UL (ref 3.8–5.4)
WBC: 12.6 THOU/UL (ref 4–11)

## 2018-11-21 ENCOUNTER — INFUSION - HEALTHEAST (OUTPATIENT)
Dept: INFUSION THERAPY | Facility: HOSPITAL | Age: 83
End: 2018-11-21

## 2018-11-21 ENCOUNTER — AMBULATORY - HEALTHEAST (OUTPATIENT)
Dept: INFUSION THERAPY | Facility: HOSPITAL | Age: 83
End: 2018-11-21

## 2018-11-21 DIAGNOSIS — D69.3 IDIOPATHIC THROMBOCYTOPENIC PURPURA (H): ICD-10-CM

## 2018-11-21 DIAGNOSIS — D50.9 IRON DEFICIENCY ANEMIA: ICD-10-CM

## 2018-11-21 LAB
ERYTHROCYTE [DISTWIDTH] IN BLOOD BY AUTOMATED COUNT: 14.6 % (ref 11–14.5)
HCT VFR BLD AUTO: 43.5 % (ref 35–47)
HGB BLD-MCNC: 14.3 G/DL (ref 12–16)
MCH RBC QN AUTO: 32.5 PG (ref 27–34)
MCHC RBC AUTO-ENTMCNC: 32.9 G/DL (ref 32–36)
MCV RBC AUTO: 99 FL (ref 80–100)
PLATELET # BLD AUTO: 152 THOU/UL (ref 140–440)
PMV BLD AUTO: 12.2 FL (ref 8.5–12.5)
RBC # BLD AUTO: 4.4 MILL/UL (ref 3.8–5.4)
WBC: 15.5 THOU/UL (ref 4–11)

## 2018-11-28 ENCOUNTER — AMBULATORY - HEALTHEAST (OUTPATIENT)
Dept: INFUSION THERAPY | Facility: HOSPITAL | Age: 83
End: 2018-11-28

## 2018-11-28 ENCOUNTER — INFUSION - HEALTHEAST (OUTPATIENT)
Dept: INFUSION THERAPY | Facility: HOSPITAL | Age: 83
End: 2018-11-28

## 2018-11-28 DIAGNOSIS — D69.3 IDIOPATHIC THROMBOCYTOPENIC PURPURA (H): ICD-10-CM

## 2018-11-28 DIAGNOSIS — D50.9 IRON DEFICIENCY ANEMIA: ICD-10-CM

## 2018-11-28 LAB
ERYTHROCYTE [DISTWIDTH] IN BLOOD BY AUTOMATED COUNT: 14.6 % (ref 11–14.5)
HCT VFR BLD AUTO: 45.3 % (ref 35–47)
HGB BLD-MCNC: 14.8 G/DL (ref 12–16)
MCH RBC QN AUTO: 32.6 PG (ref 27–34)
MCHC RBC AUTO-ENTMCNC: 32.7 G/DL (ref 32–36)
MCV RBC AUTO: 100 FL (ref 80–100)
PLATELET # BLD AUTO: 182 THOU/UL (ref 140–440)
PMV BLD AUTO: 11.9 FL (ref 8.5–12.5)
RBC # BLD AUTO: 4.54 MILL/UL (ref 3.8–5.4)
WBC: 15.4 THOU/UL (ref 4–11)

## 2018-12-05 ENCOUNTER — AMBULATORY - HEALTHEAST (OUTPATIENT)
Dept: INFUSION THERAPY | Facility: HOSPITAL | Age: 83
End: 2018-12-05

## 2018-12-05 ENCOUNTER — INFUSION - HEALTHEAST (OUTPATIENT)
Dept: INFUSION THERAPY | Facility: HOSPITAL | Age: 83
End: 2018-12-05

## 2018-12-05 DIAGNOSIS — D69.3 IDIOPATHIC THROMBOCYTOPENIC PURPURA (H): ICD-10-CM

## 2018-12-05 LAB
ERYTHROCYTE [DISTWIDTH] IN BLOOD BY AUTOMATED COUNT: 14.6 % (ref 11–14.5)
HCT VFR BLD AUTO: 44.5 % (ref 35–47)
HGB BLD-MCNC: 14.7 G/DL (ref 12–16)
MCH RBC QN AUTO: 32.7 PG (ref 27–34)
MCHC RBC AUTO-ENTMCNC: 33 G/DL (ref 32–36)
MCV RBC AUTO: 99 FL (ref 80–100)
PLATELET # BLD AUTO: 224 THOU/UL (ref 140–440)
PMV BLD AUTO: 11.6 FL (ref 8.5–12.5)
RBC # BLD AUTO: 4.49 MILL/UL (ref 3.8–5.4)
WBC: 13.8 THOU/UL (ref 4–11)

## 2018-12-12 ENCOUNTER — INFUSION - HEALTHEAST (OUTPATIENT)
Dept: INFUSION THERAPY | Facility: HOSPITAL | Age: 83
End: 2018-12-12

## 2018-12-12 ENCOUNTER — AMBULATORY - HEALTHEAST (OUTPATIENT)
Dept: INFUSION THERAPY | Facility: HOSPITAL | Age: 83
End: 2018-12-12

## 2018-12-12 DIAGNOSIS — D69.3 IDIOPATHIC THROMBOCYTOPENIC PURPURA (H): ICD-10-CM

## 2018-12-12 DIAGNOSIS — D50.9 IRON DEFICIENCY ANEMIA: ICD-10-CM

## 2018-12-12 LAB
ERYTHROCYTE [DISTWIDTH] IN BLOOD BY AUTOMATED COUNT: 14.6 % (ref 11–14.5)
HCT VFR BLD AUTO: 43.2 % (ref 35–47)
HGB BLD-MCNC: 14.4 G/DL (ref 12–16)
MCH RBC QN AUTO: 32.9 PG (ref 27–34)
MCHC RBC AUTO-ENTMCNC: 33.3 G/DL (ref 32–36)
MCV RBC AUTO: 99 FL (ref 80–100)
PLATELET # BLD AUTO: 222 THOU/UL (ref 140–440)
PMV BLD AUTO: 11.5 FL (ref 8.5–12.5)
RBC # BLD AUTO: 4.38 MILL/UL (ref 3.8–5.4)
WBC: 14.6 THOU/UL (ref 4–11)

## 2018-12-19 ENCOUNTER — AMBULATORY - HEALTHEAST (OUTPATIENT)
Dept: INFUSION THERAPY | Facility: HOSPITAL | Age: 83
End: 2018-12-19

## 2018-12-19 ENCOUNTER — INFUSION - HEALTHEAST (OUTPATIENT)
Dept: INFUSION THERAPY | Facility: HOSPITAL | Age: 83
End: 2018-12-19

## 2018-12-19 ENCOUNTER — OFFICE VISIT - HEALTHEAST (OUTPATIENT)
Dept: ONCOLOGY | Facility: HOSPITAL | Age: 83
End: 2018-12-19

## 2018-12-19 DIAGNOSIS — D69.3 IDIOPATHIC THROMBOCYTOPENIC PURPURA (H): ICD-10-CM

## 2018-12-19 LAB
ERYTHROCYTE [DISTWIDTH] IN BLOOD BY AUTOMATED COUNT: 14.6 % (ref 11–14.5)
HCT VFR BLD AUTO: 45.9 % (ref 35–47)
HGB BLD-MCNC: 15.1 G/DL (ref 12–16)
MCH RBC QN AUTO: 32.3 PG (ref 27–34)
MCHC RBC AUTO-ENTMCNC: 32.9 G/DL (ref 32–36)
MCV RBC AUTO: 98 FL (ref 80–100)
PLATELET # BLD AUTO: 174 THOU/UL (ref 140–440)
PMV BLD AUTO: 12 FL (ref 8.5–12.5)
RBC # BLD AUTO: 4.68 MILL/UL (ref 3.8–5.4)
WBC: 14.7 THOU/UL (ref 4–11)

## 2018-12-27 ENCOUNTER — AMBULATORY - HEALTHEAST (OUTPATIENT)
Dept: INFUSION THERAPY | Facility: HOSPITAL | Age: 83
End: 2018-12-27

## 2018-12-27 ENCOUNTER — INFUSION - HEALTHEAST (OUTPATIENT)
Dept: INFUSION THERAPY | Facility: HOSPITAL | Age: 83
End: 2018-12-27

## 2018-12-27 DIAGNOSIS — D50.9 IRON DEFICIENCY ANEMIA: ICD-10-CM

## 2018-12-27 DIAGNOSIS — D69.3 IDIOPATHIC THROMBOCYTOPENIC PURPURA (H): ICD-10-CM

## 2018-12-27 LAB
ERYTHROCYTE [DISTWIDTH] IN BLOOD BY AUTOMATED COUNT: 14.6 % (ref 11–14.5)
HCT VFR BLD AUTO: 43.2 % (ref 35–47)
HGB BLD-MCNC: 14.2 G/DL (ref 12–16)
MCH RBC QN AUTO: 32.6 PG (ref 27–34)
MCHC RBC AUTO-ENTMCNC: 32.9 G/DL (ref 32–36)
MCV RBC AUTO: 99 FL (ref 80–100)
PLATELET # BLD AUTO: 184 THOU/UL (ref 140–440)
PMV BLD AUTO: 11.3 FL (ref 8.5–12.5)
RBC # BLD AUTO: 4.36 MILL/UL (ref 3.8–5.4)
WBC: 14 THOU/UL (ref 4–11)

## 2019-01-03 ENCOUNTER — AMBULATORY - HEALTHEAST (OUTPATIENT)
Dept: INFUSION THERAPY | Facility: HOSPITAL | Age: 84
End: 2019-01-03

## 2019-01-03 ENCOUNTER — INFUSION - HEALTHEAST (OUTPATIENT)
Dept: INFUSION THERAPY | Facility: HOSPITAL | Age: 84
End: 2019-01-03

## 2019-01-03 DIAGNOSIS — D69.3 IDIOPATHIC THROMBOCYTOPENIC PURPURA (H): ICD-10-CM

## 2019-01-03 LAB
ERYTHROCYTE [DISTWIDTH] IN BLOOD BY AUTOMATED COUNT: 14.6 % (ref 11–14.5)
HCT VFR BLD AUTO: 44.5 % (ref 35–47)
HGB BLD-MCNC: 14.5 G/DL (ref 12–16)
MCH RBC QN AUTO: 32.2 PG (ref 27–34)
MCHC RBC AUTO-ENTMCNC: 32.6 G/DL (ref 32–36)
MCV RBC AUTO: 99 FL (ref 80–100)
PLATELET # BLD AUTO: 201 THOU/UL (ref 140–440)
PMV BLD AUTO: 11.4 FL (ref 8.5–12.5)
RBC # BLD AUTO: 4.51 MILL/UL (ref 3.8–5.4)
WBC: 13.6 THOU/UL (ref 4–11)

## 2019-01-10 ENCOUNTER — INFUSION - HEALTHEAST (OUTPATIENT)
Dept: INFUSION THERAPY | Facility: HOSPITAL | Age: 84
End: 2019-01-10

## 2019-01-10 ENCOUNTER — AMBULATORY - HEALTHEAST (OUTPATIENT)
Dept: INFUSION THERAPY | Facility: HOSPITAL | Age: 84
End: 2019-01-10

## 2019-01-10 DIAGNOSIS — D69.3 IDIOPATHIC THROMBOCYTOPENIC PURPURA (H): ICD-10-CM

## 2019-01-10 LAB
ERYTHROCYTE [DISTWIDTH] IN BLOOD BY AUTOMATED COUNT: 14.7 % (ref 11–14.5)
HCT VFR BLD AUTO: 44.1 % (ref 35–47)
HGB BLD-MCNC: 14.5 G/DL (ref 12–16)
MCH RBC QN AUTO: 32.7 PG (ref 27–34)
MCHC RBC AUTO-ENTMCNC: 32.9 G/DL (ref 32–36)
MCV RBC AUTO: 100 FL (ref 80–100)
PLATELET # BLD AUTO: 206 THOU/UL (ref 140–440)
PMV BLD AUTO: 11.6 FL (ref 8.5–12.5)
RBC # BLD AUTO: 4.43 MILL/UL (ref 3.8–5.4)
WBC: 12.3 THOU/UL (ref 4–11)

## 2019-01-10 ASSESSMENT — MIFFLIN-ST. JEOR: SCORE: 928.03

## 2019-01-17 ENCOUNTER — AMBULATORY - HEALTHEAST (OUTPATIENT)
Dept: INFUSION THERAPY | Facility: HOSPITAL | Age: 84
End: 2019-01-17

## 2019-01-17 ENCOUNTER — INFUSION - HEALTHEAST (OUTPATIENT)
Dept: INFUSION THERAPY | Facility: HOSPITAL | Age: 84
End: 2019-01-17

## 2019-01-17 DIAGNOSIS — D69.3 IDIOPATHIC THROMBOCYTOPENIC PURPURA (H): ICD-10-CM

## 2019-01-17 DIAGNOSIS — D50.9 IRON DEFICIENCY ANEMIA: ICD-10-CM

## 2019-01-17 LAB
ERYTHROCYTE [DISTWIDTH] IN BLOOD BY AUTOMATED COUNT: 14.5 % (ref 11–14.5)
HCT VFR BLD AUTO: 43.5 % (ref 35–47)
HGB BLD-MCNC: 14.2 G/DL (ref 12–16)
MCH RBC QN AUTO: 32.3 PG (ref 27–34)
MCHC RBC AUTO-ENTMCNC: 32.6 G/DL (ref 32–36)
MCV RBC AUTO: 99 FL (ref 80–100)
PLATELET # BLD AUTO: 229 THOU/UL (ref 140–440)
PMV BLD AUTO: 11.8 FL (ref 8.5–12.5)
RBC # BLD AUTO: 4.39 MILL/UL (ref 3.8–5.4)
WBC: 14.2 THOU/UL (ref 4–11)

## 2019-01-24 ENCOUNTER — AMBULATORY - HEALTHEAST (OUTPATIENT)
Dept: ONCOLOGY | Facility: HOSPITAL | Age: 84
End: 2019-01-24

## 2019-01-24 ENCOUNTER — AMBULATORY - HEALTHEAST (OUTPATIENT)
Dept: INFUSION THERAPY | Facility: HOSPITAL | Age: 84
End: 2019-01-24

## 2019-01-24 ENCOUNTER — INFUSION - HEALTHEAST (OUTPATIENT)
Dept: INFUSION THERAPY | Facility: HOSPITAL | Age: 84
End: 2019-01-24

## 2019-01-24 DIAGNOSIS — D69.3 IDIOPATHIC THROMBOCYTOPENIC PURPURA (H): ICD-10-CM

## 2019-01-24 LAB
ERYTHROCYTE [DISTWIDTH] IN BLOOD BY AUTOMATED COUNT: 14.1 % (ref 11–14.5)
HCT VFR BLD AUTO: 43.6 % (ref 35–47)
HGB BLD-MCNC: 14.5 G/DL (ref 12–16)
MCH RBC QN AUTO: 32.5 PG (ref 27–34)
MCHC RBC AUTO-ENTMCNC: 33.3 G/DL (ref 32–36)
MCV RBC AUTO: 98 FL (ref 80–100)
PLATELET # BLD AUTO: 316 THOU/UL (ref 140–440)
PMV BLD AUTO: 11.1 FL (ref 8.5–12.5)
RBC # BLD AUTO: 4.46 MILL/UL (ref 3.8–5.4)
WBC: 13.6 THOU/UL (ref 4–11)

## 2019-01-31 ENCOUNTER — AMBULATORY - HEALTHEAST (OUTPATIENT)
Dept: INFUSION THERAPY | Facility: HOSPITAL | Age: 84
End: 2019-01-31

## 2019-01-31 ENCOUNTER — INFUSION - HEALTHEAST (OUTPATIENT)
Dept: INFUSION THERAPY | Facility: HOSPITAL | Age: 84
End: 2019-01-31

## 2019-01-31 DIAGNOSIS — D69.3 IDIOPATHIC THROMBOCYTOPENIC PURPURA (H): ICD-10-CM

## 2019-01-31 LAB
ERYTHROCYTE [DISTWIDTH] IN BLOOD BY AUTOMATED COUNT: 14.2 % (ref 11–14.5)
HCT VFR BLD AUTO: 42.9 % (ref 35–47)
HGB BLD-MCNC: 13.9 G/DL (ref 12–16)
MCH RBC QN AUTO: 32.2 PG (ref 27–34)
MCHC RBC AUTO-ENTMCNC: 32.4 G/DL (ref 32–36)
MCV RBC AUTO: 99 FL (ref 80–100)
PLATELET # BLD AUTO: 312 THOU/UL (ref 140–440)
PMV BLD AUTO: 11.6 FL (ref 8.5–12.5)
RBC # BLD AUTO: 4.32 MILL/UL (ref 3.8–5.4)
WBC: 14.5 THOU/UL (ref 4–11)

## 2019-02-07 ENCOUNTER — AMBULATORY - HEALTHEAST (OUTPATIENT)
Dept: INFUSION THERAPY | Facility: HOSPITAL | Age: 84
End: 2019-02-07

## 2019-02-07 ENCOUNTER — INFUSION - HEALTHEAST (OUTPATIENT)
Dept: INFUSION THERAPY | Facility: HOSPITAL | Age: 84
End: 2019-02-07

## 2019-02-07 DIAGNOSIS — D69.3 IDIOPATHIC THROMBOCYTOPENIC PURPURA (H): ICD-10-CM

## 2019-02-07 LAB
ERYTHROCYTE [DISTWIDTH] IN BLOOD BY AUTOMATED COUNT: 14.5 % (ref 11–14.5)
HCT VFR BLD AUTO: 44.1 % (ref 35–47)
HGB BLD-MCNC: 14.4 G/DL (ref 12–16)
MCH RBC QN AUTO: 32.4 PG (ref 27–34)
MCHC RBC AUTO-ENTMCNC: 32.7 G/DL (ref 32–36)
MCV RBC AUTO: 99 FL (ref 80–100)
PLATELET # BLD AUTO: 251 THOU/UL (ref 140–440)
PMV BLD AUTO: 11.3 FL (ref 8.5–12.5)
RBC # BLD AUTO: 4.45 MILL/UL (ref 3.8–5.4)
WBC: 14.7 THOU/UL (ref 4–11)

## 2019-02-14 ENCOUNTER — INFUSION - HEALTHEAST (OUTPATIENT)
Dept: INFUSION THERAPY | Facility: HOSPITAL | Age: 84
End: 2019-02-14

## 2019-02-14 ENCOUNTER — AMBULATORY - HEALTHEAST (OUTPATIENT)
Dept: INFUSION THERAPY | Facility: HOSPITAL | Age: 84
End: 2019-02-14

## 2019-02-14 DIAGNOSIS — D69.3 IDIOPATHIC THROMBOCYTOPENIC PURPURA (H): ICD-10-CM

## 2019-02-14 LAB
ERYTHROCYTE [DISTWIDTH] IN BLOOD BY AUTOMATED COUNT: 14.6 % (ref 11–14.5)
HCT VFR BLD AUTO: 44.6 % (ref 35–47)
HGB BLD-MCNC: 14.7 G/DL (ref 12–16)
MCH RBC QN AUTO: 32.5 PG (ref 27–34)
MCHC RBC AUTO-ENTMCNC: 33 G/DL (ref 32–36)
MCV RBC AUTO: 99 FL (ref 80–100)
PLATELET # BLD AUTO: 176 THOU/UL (ref 140–440)
PMV BLD AUTO: 11.6 FL (ref 8.5–12.5)
RBC # BLD AUTO: 4.52 MILL/UL (ref 3.8–5.4)
WBC: 13.8 THOU/UL (ref 4–11)

## 2019-02-21 ENCOUNTER — INFUSION - HEALTHEAST (OUTPATIENT)
Dept: INFUSION THERAPY | Facility: HOSPITAL | Age: 84
End: 2019-02-21

## 2019-02-21 ENCOUNTER — AMBULATORY - HEALTHEAST (OUTPATIENT)
Dept: INFUSION THERAPY | Facility: HOSPITAL | Age: 84
End: 2019-02-21

## 2019-02-21 DIAGNOSIS — D69.3 IDIOPATHIC THROMBOCYTOPENIC PURPURA (H): ICD-10-CM

## 2019-02-21 LAB
ERYTHROCYTE [DISTWIDTH] IN BLOOD BY AUTOMATED COUNT: 14.6 % (ref 11–14.5)
HCT VFR BLD AUTO: 45.6 % (ref 35–47)
HGB BLD-MCNC: 15.1 G/DL (ref 12–16)
MCH RBC QN AUTO: 32.6 PG (ref 27–34)
MCHC RBC AUTO-ENTMCNC: 33.1 G/DL (ref 32–36)
MCV RBC AUTO: 99 FL (ref 80–100)
PLATELET # BLD AUTO: 211 THOU/UL (ref 140–440)
PMV BLD AUTO: 11.7 FL (ref 8.5–12.5)
RBC # BLD AUTO: 4.63 MILL/UL (ref 3.8–5.4)
WBC: 13.7 THOU/UL (ref 4–11)

## 2019-02-28 ENCOUNTER — INFUSION - HEALTHEAST (OUTPATIENT)
Dept: INFUSION THERAPY | Facility: HOSPITAL | Age: 84
End: 2019-02-28

## 2019-02-28 ENCOUNTER — AMBULATORY - HEALTHEAST (OUTPATIENT)
Dept: INFUSION THERAPY | Facility: HOSPITAL | Age: 84
End: 2019-02-28

## 2019-02-28 DIAGNOSIS — D69.3 IDIOPATHIC THROMBOCYTOPENIC PURPURA (H): ICD-10-CM

## 2019-02-28 DIAGNOSIS — D50.9 IRON DEFICIENCY ANEMIA: ICD-10-CM

## 2019-02-28 LAB
ERYTHROCYTE [DISTWIDTH] IN BLOOD BY AUTOMATED COUNT: 14.6 % (ref 11–14.5)
HCT VFR BLD AUTO: 45.8 % (ref 35–47)
HGB BLD-MCNC: 14.9 G/DL (ref 12–16)
MCH RBC QN AUTO: 32.3 PG (ref 27–34)
MCHC RBC AUTO-ENTMCNC: 32.5 G/DL (ref 32–36)
MCV RBC AUTO: 99 FL (ref 80–100)
PLATELET # BLD AUTO: 236 THOU/UL (ref 140–440)
PMV BLD AUTO: 12.1 FL (ref 8.5–12.5)
RBC # BLD AUTO: 4.62 MILL/UL (ref 3.8–5.4)
WBC: 15.6 THOU/UL (ref 4–11)

## 2019-03-07 ENCOUNTER — INFUSION - HEALTHEAST (OUTPATIENT)
Dept: INFUSION THERAPY | Facility: HOSPITAL | Age: 84
End: 2019-03-07

## 2019-03-07 ENCOUNTER — AMBULATORY - HEALTHEAST (OUTPATIENT)
Dept: INFUSION THERAPY | Facility: HOSPITAL | Age: 84
End: 2019-03-07

## 2019-03-07 DIAGNOSIS — D69.3 IDIOPATHIC THROMBOCYTOPENIC PURPURA (H): ICD-10-CM

## 2019-03-07 LAB
ERYTHROCYTE [DISTWIDTH] IN BLOOD BY AUTOMATED COUNT: 14.5 % (ref 11–14.5)
HCT VFR BLD AUTO: 43.9 % (ref 35–47)
HGB BLD-MCNC: 14.4 G/DL (ref 12–16)
MCH RBC QN AUTO: 32.4 PG (ref 27–34)
MCHC RBC AUTO-ENTMCNC: 32.8 G/DL (ref 32–36)
MCV RBC AUTO: 99 FL (ref 80–100)
PLATELET # BLD AUTO: 234 THOU/UL (ref 140–440)
PMV BLD AUTO: 11.2 FL (ref 8.5–12.5)
RBC # BLD AUTO: 4.44 MILL/UL (ref 3.8–5.4)
WBC: 13.6 THOU/UL (ref 4–11)

## 2019-03-14 ENCOUNTER — INFUSION - HEALTHEAST (OUTPATIENT)
Dept: INFUSION THERAPY | Facility: HOSPITAL | Age: 84
End: 2019-03-14

## 2019-03-14 ENCOUNTER — AMBULATORY - HEALTHEAST (OUTPATIENT)
Dept: INFUSION THERAPY | Facility: HOSPITAL | Age: 84
End: 2019-03-14

## 2019-03-14 DIAGNOSIS — D69.3 IDIOPATHIC THROMBOCYTOPENIC PURPURA (H): ICD-10-CM

## 2019-03-14 LAB
ERYTHROCYTE [DISTWIDTH] IN BLOOD BY AUTOMATED COUNT: 14.5 % (ref 11–14.5)
HCT VFR BLD AUTO: 43.6 % (ref 35–47)
HGB BLD-MCNC: 14.4 G/DL (ref 12–16)
MCH RBC QN AUTO: 32.7 PG (ref 27–34)
MCHC RBC AUTO-ENTMCNC: 33 G/DL (ref 32–36)
MCV RBC AUTO: 99 FL (ref 80–100)
PLATELET # BLD AUTO: 212 THOU/UL (ref 140–440)
PMV BLD AUTO: 11.4 FL (ref 8.5–12.5)
RBC # BLD AUTO: 4.4 MILL/UL (ref 3.8–5.4)
WBC: 13.4 THOU/UL (ref 4–11)

## 2019-03-21 ENCOUNTER — INFUSION - HEALTHEAST (OUTPATIENT)
Dept: INFUSION THERAPY | Facility: HOSPITAL | Age: 84
End: 2019-03-21

## 2019-03-21 ENCOUNTER — AMBULATORY - HEALTHEAST (OUTPATIENT)
Dept: INFUSION THERAPY | Facility: HOSPITAL | Age: 84
End: 2019-03-21

## 2019-03-21 DIAGNOSIS — D69.3 IDIOPATHIC THROMBOCYTOPENIC PURPURA (H): ICD-10-CM

## 2019-03-21 LAB
ERYTHROCYTE [DISTWIDTH] IN BLOOD BY AUTOMATED COUNT: 14.5 % (ref 11–14.5)
HCT VFR BLD AUTO: 43.4 % (ref 35–47)
HGB BLD-MCNC: 14.3 G/DL (ref 12–16)
MCH RBC QN AUTO: 32.4 PG (ref 27–34)
MCHC RBC AUTO-ENTMCNC: 32.9 G/DL (ref 32–36)
MCV RBC AUTO: 98 FL (ref 80–100)
PLATELET # BLD AUTO: 169 THOU/UL (ref 140–440)
PMV BLD AUTO: 11.8 FL (ref 8.5–12.5)
RBC # BLD AUTO: 4.42 MILL/UL (ref 3.8–5.4)
WBC: 14.2 THOU/UL (ref 4–11)

## 2019-03-28 ENCOUNTER — INFUSION - HEALTHEAST (OUTPATIENT)
Dept: INFUSION THERAPY | Facility: HOSPITAL | Age: 84
End: 2019-03-28

## 2019-03-28 ENCOUNTER — OFFICE VISIT - HEALTHEAST (OUTPATIENT)
Dept: INTERNAL MEDICINE | Facility: CLINIC | Age: 84
End: 2019-03-28

## 2019-03-28 ENCOUNTER — AMBULATORY - HEALTHEAST (OUTPATIENT)
Dept: INFUSION THERAPY | Facility: HOSPITAL | Age: 84
End: 2019-03-28

## 2019-03-28 DIAGNOSIS — Z00.00 ENCOUNTER FOR MEDICARE ANNUAL WELLNESS EXAM: ICD-10-CM

## 2019-03-28 DIAGNOSIS — R26.81 UNSTEADY GAIT: ICD-10-CM

## 2019-03-28 DIAGNOSIS — M94.9 DISORDER OF BONE AND CARTILAGE: ICD-10-CM

## 2019-03-28 DIAGNOSIS — D69.3 IDIOPATHIC THROMBOCYTOPENIC PURPURA (H): ICD-10-CM

## 2019-03-28 DIAGNOSIS — R73.03 PREDIABETES: ICD-10-CM

## 2019-03-28 DIAGNOSIS — M89.9 DISORDER OF BONE AND CARTILAGE: ICD-10-CM

## 2019-03-28 DIAGNOSIS — E78.2 MIXED HYPERLIPIDEMIA: ICD-10-CM

## 2019-03-28 DIAGNOSIS — R41.89 COGNITIVE IMPAIRMENT: ICD-10-CM

## 2019-03-28 DIAGNOSIS — Z53.09 CONTRAINDICATION TO ANTICOAGULATION THERAPY: ICD-10-CM

## 2019-03-28 DIAGNOSIS — I48.0 PAROXYSMAL ATRIAL FIBRILLATION (H): ICD-10-CM

## 2019-03-28 LAB
ERYTHROCYTE [DISTWIDTH] IN BLOOD BY AUTOMATED COUNT: 14.7 % (ref 11–14.5)
HCT VFR BLD AUTO: 44.6 % (ref 35–47)
HGB BLD-MCNC: 14.6 G/DL (ref 12–16)
MCH RBC QN AUTO: 32.3 PG (ref 27–34)
MCHC RBC AUTO-ENTMCNC: 32.7 G/DL (ref 32–36)
MCV RBC AUTO: 99 FL (ref 80–100)
PLATELET # BLD AUTO: 195 THOU/UL (ref 140–440)
PMV BLD AUTO: 11.8 FL (ref 8.5–12.5)
RBC # BLD AUTO: 4.52 MILL/UL (ref 3.8–5.4)
WBC: 15.4 THOU/UL (ref 4–11)

## 2019-03-28 ASSESSMENT — MIFFLIN-ST. JEOR: SCORE: 923.49

## 2019-03-29 LAB — HBA1C MFR BLD: 5.9 % (ref 4.2–6.1)

## 2019-04-01 ENCOUNTER — COMMUNICATION - HEALTHEAST (OUTPATIENT)
Dept: INTERNAL MEDICINE | Facility: CLINIC | Age: 84
End: 2019-04-01

## 2019-04-03 ENCOUNTER — OFFICE VISIT - HEALTHEAST (OUTPATIENT)
Dept: PHYSICAL THERAPY | Facility: REHABILITATION | Age: 84
End: 2019-04-03

## 2019-04-03 DIAGNOSIS — R26.9 ABNORMALITY OF GAIT: ICD-10-CM

## 2019-04-03 DIAGNOSIS — R26.81 UNSTEADINESS ON FEET: ICD-10-CM

## 2019-04-03 DIAGNOSIS — M62.81 GENERALIZED MUSCLE WEAKNESS: ICD-10-CM

## 2019-04-04 ENCOUNTER — AMBULATORY - HEALTHEAST (OUTPATIENT)
Dept: INFUSION THERAPY | Facility: HOSPITAL | Age: 84
End: 2019-04-04

## 2019-04-04 ENCOUNTER — INFUSION - HEALTHEAST (OUTPATIENT)
Dept: INFUSION THERAPY | Facility: HOSPITAL | Age: 84
End: 2019-04-04

## 2019-04-04 DIAGNOSIS — D69.3 IDIOPATHIC THROMBOCYTOPENIC PURPURA (H): ICD-10-CM

## 2019-04-04 LAB
ERYTHROCYTE [DISTWIDTH] IN BLOOD BY AUTOMATED COUNT: 14.8 % (ref 11–14.5)
HCT VFR BLD AUTO: 43.4 % (ref 35–47)
HGB BLD-MCNC: 14.2 G/DL (ref 12–16)
MCH RBC QN AUTO: 32.5 PG (ref 27–34)
MCHC RBC AUTO-ENTMCNC: 32.7 G/DL (ref 32–36)
MCV RBC AUTO: 99 FL (ref 80–100)
PLATELET # BLD AUTO: 234 THOU/UL (ref 140–440)
PMV BLD AUTO: 11.5 FL (ref 8.5–12.5)
RBC # BLD AUTO: 4.37 MILL/UL (ref 3.8–5.4)
WBC: 15.5 THOU/UL (ref 4–11)

## 2019-04-11 ENCOUNTER — INFUSION - HEALTHEAST (OUTPATIENT)
Dept: INFUSION THERAPY | Facility: HOSPITAL | Age: 84
End: 2019-04-11

## 2019-04-11 ENCOUNTER — AMBULATORY - HEALTHEAST (OUTPATIENT)
Dept: INFUSION THERAPY | Facility: HOSPITAL | Age: 84
End: 2019-04-11

## 2019-04-11 DIAGNOSIS — D69.3 IDIOPATHIC THROMBOCYTOPENIC PURPURA (H): ICD-10-CM

## 2019-04-11 LAB
ERYTHROCYTE [DISTWIDTH] IN BLOOD BY AUTOMATED COUNT: 14.7 % (ref 11–14.5)
HCT VFR BLD AUTO: 43.9 % (ref 35–47)
HGB BLD-MCNC: 14.1 G/DL (ref 12–16)
MCH RBC QN AUTO: 32 PG (ref 27–34)
MCHC RBC AUTO-ENTMCNC: 32.1 G/DL (ref 32–36)
MCV RBC AUTO: 100 FL (ref 80–100)
PLATELET # BLD AUTO: 200 THOU/UL (ref 140–440)
PMV BLD AUTO: 11.8 FL (ref 8.5–12.5)
RBC # BLD AUTO: 4.4 MILL/UL (ref 3.8–5.4)
WBC: 13.1 THOU/UL (ref 4–11)

## 2019-04-18 ENCOUNTER — AMBULATORY - HEALTHEAST (OUTPATIENT)
Dept: INFUSION THERAPY | Facility: HOSPITAL | Age: 84
End: 2019-04-18

## 2019-04-18 ENCOUNTER — INFUSION - HEALTHEAST (OUTPATIENT)
Dept: INFUSION THERAPY | Facility: HOSPITAL | Age: 84
End: 2019-04-18

## 2019-04-18 DIAGNOSIS — D69.3 IDIOPATHIC THROMBOCYTOPENIC PURPURA (H): ICD-10-CM

## 2019-04-18 LAB
ERYTHROCYTE [DISTWIDTH] IN BLOOD BY AUTOMATED COUNT: 14.6 % (ref 11–14.5)
HCT VFR BLD AUTO: 43.4 % (ref 35–47)
HGB BLD-MCNC: 14 G/DL (ref 12–16)
MCH RBC QN AUTO: 32 PG (ref 27–34)
MCHC RBC AUTO-ENTMCNC: 32.3 G/DL (ref 32–36)
MCV RBC AUTO: 99 FL (ref 80–100)
PLATELET # BLD AUTO: 235 THOU/UL (ref 140–440)
PMV BLD AUTO: 11.8 FL (ref 8.5–12.5)
RBC # BLD AUTO: 4.37 MILL/UL (ref 3.8–5.4)
WBC: 16.1 THOU/UL (ref 4–11)

## 2019-04-25 ENCOUNTER — AMBULATORY - HEALTHEAST (OUTPATIENT)
Dept: INFUSION THERAPY | Facility: HOSPITAL | Age: 84
End: 2019-04-25

## 2019-04-25 ENCOUNTER — INFUSION - HEALTHEAST (OUTPATIENT)
Dept: INFUSION THERAPY | Facility: HOSPITAL | Age: 84
End: 2019-04-25

## 2019-04-25 DIAGNOSIS — D50.9 IRON DEFICIENCY ANEMIA: ICD-10-CM

## 2019-04-25 DIAGNOSIS — D69.3 IDIOPATHIC THROMBOCYTOPENIC PURPURA (H): ICD-10-CM

## 2019-04-25 LAB
ERYTHROCYTE [DISTWIDTH] IN BLOOD BY AUTOMATED COUNT: 14.9 % (ref 11–14.5)
HCT VFR BLD AUTO: 43.7 % (ref 35–47)
HGB BLD-MCNC: 14.3 G/DL (ref 12–16)
MCH RBC QN AUTO: 32.2 PG (ref 27–34)
MCHC RBC AUTO-ENTMCNC: 32.7 G/DL (ref 32–36)
MCV RBC AUTO: 98 FL (ref 80–100)
PLATELET # BLD AUTO: 274 THOU/UL (ref 140–440)
PMV BLD AUTO: 11.7 FL (ref 8.5–12.5)
RBC # BLD AUTO: 4.44 MILL/UL (ref 3.8–5.4)
WBC: 12.1 THOU/UL (ref 4–11)

## 2019-05-01 ENCOUNTER — OFFICE VISIT - HEALTHEAST (OUTPATIENT)
Dept: PHYSICAL THERAPY | Facility: REHABILITATION | Age: 84
End: 2019-05-01

## 2019-05-01 DIAGNOSIS — R26.9 ABNORMALITY OF GAIT: ICD-10-CM

## 2019-05-01 DIAGNOSIS — M62.81 GENERALIZED MUSCLE WEAKNESS: ICD-10-CM

## 2019-05-01 DIAGNOSIS — R26.81 UNSTEADINESS ON FEET: ICD-10-CM

## 2019-05-02 ENCOUNTER — INFUSION - HEALTHEAST (OUTPATIENT)
Dept: INFUSION THERAPY | Facility: HOSPITAL | Age: 84
End: 2019-05-02

## 2019-05-02 ENCOUNTER — AMBULATORY - HEALTHEAST (OUTPATIENT)
Dept: INFUSION THERAPY | Facility: HOSPITAL | Age: 84
End: 2019-05-02

## 2019-05-02 DIAGNOSIS — D69.3 IDIOPATHIC THROMBOCYTOPENIC PURPURA (H): ICD-10-CM

## 2019-05-02 LAB
ERYTHROCYTE [DISTWIDTH] IN BLOOD BY AUTOMATED COUNT: 14.8 % (ref 11–14.5)
HCT VFR BLD AUTO: 43.9 % (ref 35–47)
HGB BLD-MCNC: 14.3 G/DL (ref 12–16)
MCH RBC QN AUTO: 32.2 PG (ref 27–34)
MCHC RBC AUTO-ENTMCNC: 32.6 G/DL (ref 32–36)
MCV RBC AUTO: 99 FL (ref 80–100)
PLATELET # BLD AUTO: 231 THOU/UL (ref 140–440)
PMV BLD AUTO: 11.7 FL (ref 8.5–12.5)
RBC # BLD AUTO: 4.44 MILL/UL (ref 3.8–5.4)
WBC: 14.7 THOU/UL (ref 4–11)

## 2019-05-08 ENCOUNTER — AMBULATORY - HEALTHEAST (OUTPATIENT)
Dept: INFUSION THERAPY | Facility: HOSPITAL | Age: 84
End: 2019-05-08

## 2019-05-08 ENCOUNTER — INFUSION - HEALTHEAST (OUTPATIENT)
Dept: INFUSION THERAPY | Facility: HOSPITAL | Age: 84
End: 2019-05-08

## 2019-05-08 DIAGNOSIS — D69.3 IDIOPATHIC THROMBOCYTOPENIC PURPURA (H): ICD-10-CM

## 2019-05-08 LAB
ERYTHROCYTE [DISTWIDTH] IN BLOOD BY AUTOMATED COUNT: 14.7 % (ref 11–14.5)
HCT VFR BLD AUTO: 44.6 % (ref 35–47)
HGB BLD-MCNC: 14.7 G/DL (ref 12–16)
MCH RBC QN AUTO: 32.3 PG (ref 27–34)
MCHC RBC AUTO-ENTMCNC: 33 G/DL (ref 32–36)
MCV RBC AUTO: 98 FL (ref 80–100)
PLATELET # BLD AUTO: 251 THOU/UL (ref 140–440)
PMV BLD AUTO: 11.8 FL (ref 8.5–12.5)
RBC # BLD AUTO: 4.55 MILL/UL (ref 3.8–5.4)
WBC: 13 THOU/UL (ref 4–11)

## 2019-05-16 ENCOUNTER — INFUSION - HEALTHEAST (OUTPATIENT)
Dept: INFUSION THERAPY | Facility: HOSPITAL | Age: 84
End: 2019-05-16

## 2019-05-16 ENCOUNTER — AMBULATORY - HEALTHEAST (OUTPATIENT)
Dept: INFUSION THERAPY | Facility: HOSPITAL | Age: 84
End: 2019-05-16

## 2019-05-16 DIAGNOSIS — D69.3 IDIOPATHIC THROMBOCYTOPENIC PURPURA (H): ICD-10-CM

## 2019-05-16 LAB
ERYTHROCYTE [DISTWIDTH] IN BLOOD BY AUTOMATED COUNT: 14.7 % (ref 11–14.5)
HCT VFR BLD AUTO: 43.3 % (ref 35–47)
HGB BLD-MCNC: 14.1 G/DL (ref 12–16)
MCH RBC QN AUTO: 32.3 PG (ref 27–34)
MCHC RBC AUTO-ENTMCNC: 32.6 G/DL (ref 32–36)
MCV RBC AUTO: 99 FL (ref 80–100)
PLATELET # BLD AUTO: 256 THOU/UL (ref 140–440)
PMV BLD AUTO: 11.8 FL (ref 8.5–12.5)
RBC # BLD AUTO: 4.36 MILL/UL (ref 3.8–5.4)
WBC: 11.8 THOU/UL (ref 4–11)

## 2019-05-23 ENCOUNTER — AMBULATORY - HEALTHEAST (OUTPATIENT)
Dept: INFUSION THERAPY | Facility: HOSPITAL | Age: 84
End: 2019-05-23

## 2019-05-23 ENCOUNTER — INFUSION - HEALTHEAST (OUTPATIENT)
Dept: INFUSION THERAPY | Facility: HOSPITAL | Age: 84
End: 2019-05-23

## 2019-05-23 DIAGNOSIS — D69.3 IDIOPATHIC THROMBOCYTOPENIC PURPURA (H): ICD-10-CM

## 2019-05-23 DIAGNOSIS — D50.9 IRON DEFICIENCY ANEMIA: ICD-10-CM

## 2019-05-23 LAB
ERYTHROCYTE [DISTWIDTH] IN BLOOD BY AUTOMATED COUNT: 14.7 % (ref 11–14.5)
HCT VFR BLD AUTO: 43.1 % (ref 35–47)
HGB BLD-MCNC: 14.2 G/DL (ref 12–16)
MCH RBC QN AUTO: 32.6 PG (ref 27–34)
MCHC RBC AUTO-ENTMCNC: 32.9 G/DL (ref 32–36)
MCV RBC AUTO: 99 FL (ref 80–100)
PLATELET # BLD AUTO: 233 THOU/UL (ref 140–440)
PMV BLD AUTO: 11.5 FL (ref 8.5–12.5)
RBC # BLD AUTO: 4.36 MILL/UL (ref 3.8–5.4)
WBC: 13.2 THOU/UL (ref 4–11)

## 2019-05-30 ENCOUNTER — AMBULATORY - HEALTHEAST (OUTPATIENT)
Dept: INFUSION THERAPY | Facility: HOSPITAL | Age: 84
End: 2019-05-30

## 2019-05-30 ENCOUNTER — INFUSION - HEALTHEAST (OUTPATIENT)
Dept: INFUSION THERAPY | Facility: HOSPITAL | Age: 84
End: 2019-05-30

## 2019-05-30 DIAGNOSIS — I48.0 PAROXYSMAL ATRIAL FIBRILLATION (H): ICD-10-CM

## 2019-05-30 DIAGNOSIS — D50.9 IRON DEFICIENCY ANEMIA: ICD-10-CM

## 2019-05-30 DIAGNOSIS — D69.3 IDIOPATHIC THROMBOCYTOPENIC PURPURA (H): ICD-10-CM

## 2019-05-30 LAB
ERYTHROCYTE [DISTWIDTH] IN BLOOD BY AUTOMATED COUNT: 14.7 % (ref 11–14.5)
HCT VFR BLD AUTO: 43.1 % (ref 35–47)
HGB BLD-MCNC: 14.1 G/DL (ref 12–16)
MCH RBC QN AUTO: 32.3 PG (ref 27–34)
MCHC RBC AUTO-ENTMCNC: 32.7 G/DL (ref 32–36)
MCV RBC AUTO: 99 FL (ref 80–100)
PLATELET # BLD AUTO: 176 THOU/UL (ref 140–440)
PMV BLD AUTO: 12.1 FL (ref 8.5–12.5)
RBC # BLD AUTO: 4.37 MILL/UL (ref 3.8–5.4)
WBC: 14.5 THOU/UL (ref 4–11)

## 2019-06-06 ENCOUNTER — AMBULATORY - HEALTHEAST (OUTPATIENT)
Dept: INFUSION THERAPY | Facility: HOSPITAL | Age: 84
End: 2019-06-06

## 2019-06-06 ENCOUNTER — INFUSION - HEALTHEAST (OUTPATIENT)
Dept: INFUSION THERAPY | Facility: HOSPITAL | Age: 84
End: 2019-06-06

## 2019-06-06 DIAGNOSIS — D69.3 IDIOPATHIC THROMBOCYTOPENIC PURPURA (H): ICD-10-CM

## 2019-06-06 DIAGNOSIS — D50.9 IRON DEFICIENCY ANEMIA: ICD-10-CM

## 2019-06-06 LAB
ERYTHROCYTE [DISTWIDTH] IN BLOOD BY AUTOMATED COUNT: 14.6 % (ref 11–14.5)
HCT VFR BLD AUTO: 44 % (ref 35–47)
HGB BLD-MCNC: 14.3 G/DL (ref 12–16)
MCH RBC QN AUTO: 32.1 PG (ref 27–34)
MCHC RBC AUTO-ENTMCNC: 32.5 G/DL (ref 32–36)
MCV RBC AUTO: 99 FL (ref 80–100)
PLATELET # BLD AUTO: 170 THOU/UL (ref 140–440)
PMV BLD AUTO: 11.6 FL (ref 8.5–12.5)
RBC # BLD AUTO: 4.45 MILL/UL (ref 3.8–5.4)
WBC: 13.3 THOU/UL (ref 4–11)

## 2019-06-12 ENCOUNTER — RECORDS - HEALTHEAST (OUTPATIENT)
Dept: ADMINISTRATIVE | Facility: OTHER | Age: 84
End: 2019-06-12

## 2019-06-13 ENCOUNTER — INFUSION - HEALTHEAST (OUTPATIENT)
Dept: INFUSION THERAPY | Facility: HOSPITAL | Age: 84
End: 2019-06-13

## 2019-06-13 ENCOUNTER — AMBULATORY - HEALTHEAST (OUTPATIENT)
Dept: INFUSION THERAPY | Facility: HOSPITAL | Age: 84
End: 2019-06-13

## 2019-06-13 DIAGNOSIS — D50.9 IRON DEFICIENCY ANEMIA: ICD-10-CM

## 2019-06-13 DIAGNOSIS — D69.3 IDIOPATHIC THROMBOCYTOPENIC PURPURA (H): ICD-10-CM

## 2019-06-13 LAB
ERYTHROCYTE [DISTWIDTH] IN BLOOD BY AUTOMATED COUNT: 14.6 % (ref 11–14.5)
HCT VFR BLD AUTO: 43 % (ref 35–47)
HGB BLD-MCNC: 14 G/DL (ref 12–16)
MCH RBC QN AUTO: 32.3 PG (ref 27–34)
MCHC RBC AUTO-ENTMCNC: 32.6 G/DL (ref 32–36)
MCV RBC AUTO: 99 FL (ref 80–100)
PLATELET # BLD AUTO: 227 THOU/UL (ref 140–440)
PMV BLD AUTO: 11.4 FL (ref 8.5–12.5)
RBC # BLD AUTO: 4.33 MILL/UL (ref 3.8–5.4)
WBC: 12.6 THOU/UL (ref 4–11)

## 2019-06-20 ENCOUNTER — OFFICE VISIT - HEALTHEAST (OUTPATIENT)
Dept: ONCOLOGY | Facility: HOSPITAL | Age: 84
End: 2019-06-20

## 2019-06-20 ENCOUNTER — AMBULATORY - HEALTHEAST (OUTPATIENT)
Dept: INFUSION THERAPY | Facility: HOSPITAL | Age: 84
End: 2019-06-20

## 2019-06-20 ENCOUNTER — INFUSION - HEALTHEAST (OUTPATIENT)
Dept: INFUSION THERAPY | Facility: HOSPITAL | Age: 84
End: 2019-06-20

## 2019-06-20 DIAGNOSIS — D69.3 IDIOPATHIC THROMBOCYTOPENIC PURPURA (H): ICD-10-CM

## 2019-06-20 DIAGNOSIS — D50.9 IRON DEFICIENCY ANEMIA: ICD-10-CM

## 2019-06-20 LAB
ERYTHROCYTE [DISTWIDTH] IN BLOOD BY AUTOMATED COUNT: 14.8 % (ref 11–14.5)
HCT VFR BLD AUTO: 42.9 % (ref 35–47)
HGB BLD-MCNC: 13.8 G/DL (ref 12–16)
MCH RBC QN AUTO: 32.2 PG (ref 27–34)
MCHC RBC AUTO-ENTMCNC: 32.2 G/DL (ref 32–36)
MCV RBC AUTO: 100 FL (ref 80–100)
PLATELET # BLD AUTO: 293 THOU/UL (ref 140–440)
PMV BLD AUTO: 11.6 FL (ref 8.5–12.5)
RBC # BLD AUTO: 4.28 MILL/UL (ref 3.8–5.4)
WBC: 12.7 THOU/UL (ref 4–11)

## 2019-06-27 ENCOUNTER — INFUSION - HEALTHEAST (OUTPATIENT)
Dept: INFUSION THERAPY | Facility: HOSPITAL | Age: 84
End: 2019-06-27

## 2019-06-27 ENCOUNTER — AMBULATORY - HEALTHEAST (OUTPATIENT)
Dept: INFUSION THERAPY | Facility: HOSPITAL | Age: 84
End: 2019-06-27

## 2019-06-27 DIAGNOSIS — D50.9 IRON DEFICIENCY ANEMIA: ICD-10-CM

## 2019-06-27 DIAGNOSIS — D69.3 IDIOPATHIC THROMBOCYTOPENIC PURPURA (H): ICD-10-CM

## 2019-06-27 LAB
ERYTHROCYTE [DISTWIDTH] IN BLOOD BY AUTOMATED COUNT: 14.8 % (ref 11–14.5)
HCT VFR BLD AUTO: 43.1 % (ref 35–47)
HGB BLD-MCNC: 14 G/DL (ref 12–16)
MCH RBC QN AUTO: 32.3 PG (ref 27–34)
MCHC RBC AUTO-ENTMCNC: 32.5 G/DL (ref 32–36)
MCV RBC AUTO: 100 FL (ref 80–100)
PLATELET # BLD AUTO: 268 THOU/UL (ref 140–440)
PMV BLD AUTO: 11.7 FL (ref 8.5–12.5)
RBC # BLD AUTO: 4.33 MILL/UL (ref 3.8–5.4)
WBC: 12.8 THOU/UL (ref 4–11)

## 2019-07-05 ENCOUNTER — INFUSION - HEALTHEAST (OUTPATIENT)
Dept: INFUSION THERAPY | Facility: HOSPITAL | Age: 84
End: 2019-07-05

## 2019-07-05 ENCOUNTER — AMBULATORY - HEALTHEAST (OUTPATIENT)
Dept: INFUSION THERAPY | Facility: HOSPITAL | Age: 84
End: 2019-07-05

## 2019-07-05 DIAGNOSIS — D69.3 IDIOPATHIC THROMBOCYTOPENIC PURPURA (H): ICD-10-CM

## 2019-07-05 DIAGNOSIS — D50.9 IRON DEFICIENCY ANEMIA: ICD-10-CM

## 2019-07-05 LAB
ERYTHROCYTE [DISTWIDTH] IN BLOOD BY AUTOMATED COUNT: 14.5 % (ref 11–14.5)
HCT VFR BLD AUTO: 43.7 % (ref 35–47)
HGB BLD-MCNC: 14.2 G/DL (ref 12–16)
MCH RBC QN AUTO: 32.1 PG (ref 27–34)
MCHC RBC AUTO-ENTMCNC: 32.5 G/DL (ref 32–36)
MCV RBC AUTO: 99 FL (ref 80–100)
PLATELET # BLD AUTO: 169 THOU/UL (ref 140–440)
PMV BLD AUTO: 11.6 FL (ref 8.5–12.5)
RBC # BLD AUTO: 4.43 MILL/UL (ref 3.8–5.4)
WBC: 14.3 THOU/UL (ref 4–11)

## 2019-07-11 ENCOUNTER — INFUSION - HEALTHEAST (OUTPATIENT)
Dept: INFUSION THERAPY | Facility: HOSPITAL | Age: 84
End: 2019-07-11

## 2019-07-11 ENCOUNTER — AMBULATORY - HEALTHEAST (OUTPATIENT)
Dept: INFUSION THERAPY | Facility: HOSPITAL | Age: 84
End: 2019-07-11

## 2019-07-11 DIAGNOSIS — D69.3 IDIOPATHIC THROMBOCYTOPENIC PURPURA (H): ICD-10-CM

## 2019-07-11 LAB
ERYTHROCYTE [DISTWIDTH] IN BLOOD BY AUTOMATED COUNT: 14.6 % (ref 11–14.5)
HCT VFR BLD AUTO: 42.9 % (ref 35–47)
HGB BLD-MCNC: 14.2 G/DL (ref 12–16)
MCH RBC QN AUTO: 32.6 PG (ref 27–34)
MCHC RBC AUTO-ENTMCNC: 33.1 G/DL (ref 32–36)
MCV RBC AUTO: 99 FL (ref 80–100)
PLATELET # BLD AUTO: 138 THOU/UL (ref 140–440)
PMV BLD AUTO: 12.3 FL (ref 8.5–12.5)
RBC # BLD AUTO: 4.35 MILL/UL (ref 3.8–5.4)
WBC: 12.7 THOU/UL (ref 4–11)

## 2019-07-18 ENCOUNTER — AMBULATORY - HEALTHEAST (OUTPATIENT)
Dept: INFUSION THERAPY | Facility: HOSPITAL | Age: 84
End: 2019-07-18

## 2019-07-18 ENCOUNTER — INFUSION - HEALTHEAST (OUTPATIENT)
Dept: INFUSION THERAPY | Facility: HOSPITAL | Age: 84
End: 2019-07-18

## 2019-07-18 DIAGNOSIS — D69.3 IDIOPATHIC THROMBOCYTOPENIC PURPURA (H): ICD-10-CM

## 2019-07-18 DIAGNOSIS — D50.9 IRON DEFICIENCY ANEMIA: ICD-10-CM

## 2019-07-18 LAB
ERYTHROCYTE [DISTWIDTH] IN BLOOD BY AUTOMATED COUNT: 14.7 % (ref 11–14.5)
HCT VFR BLD AUTO: 42.7 % (ref 35–47)
HGB BLD-MCNC: 13.7 G/DL (ref 12–16)
MCH RBC QN AUTO: 31.8 PG (ref 27–34)
MCHC RBC AUTO-ENTMCNC: 32.1 G/DL (ref 32–36)
MCV RBC AUTO: 99 FL (ref 80–100)
PLATELET # BLD AUTO: 270 THOU/UL (ref 140–440)
PMV BLD AUTO: 11.2 FL (ref 8.5–12.5)
RBC # BLD AUTO: 4.31 MILL/UL (ref 3.8–5.4)
WBC: 13.5 THOU/UL (ref 4–11)

## 2019-07-25 ENCOUNTER — AMBULATORY - HEALTHEAST (OUTPATIENT)
Dept: INFUSION THERAPY | Facility: HOSPITAL | Age: 84
End: 2019-07-25

## 2019-07-25 ENCOUNTER — INFUSION - HEALTHEAST (OUTPATIENT)
Dept: INFUSION THERAPY | Facility: HOSPITAL | Age: 84
End: 2019-07-25

## 2019-07-25 DIAGNOSIS — D69.3 IDIOPATHIC THROMBOCYTOPENIC PURPURA (H): ICD-10-CM

## 2019-07-25 LAB
ERYTHROCYTE [DISTWIDTH] IN BLOOD BY AUTOMATED COUNT: 14.7 % (ref 11–14.5)
HCT VFR BLD AUTO: 41.7 % (ref 35–47)
HGB BLD-MCNC: 13.5 G/DL (ref 12–16)
MCH RBC QN AUTO: 32.1 PG (ref 27–34)
MCHC RBC AUTO-ENTMCNC: 32.4 G/DL (ref 32–36)
MCV RBC AUTO: 99 FL (ref 80–100)
PLATELET # BLD AUTO: 392 THOU/UL (ref 140–440)
PMV BLD AUTO: 11 FL (ref 8.5–12.5)
RBC # BLD AUTO: 4.2 MILL/UL (ref 3.8–5.4)
WBC: 12.2 THOU/UL (ref 4–11)

## 2019-07-31 ENCOUNTER — AMBULATORY - HEALTHEAST (OUTPATIENT)
Dept: INFUSION THERAPY | Facility: HOSPITAL | Age: 84
End: 2019-07-31

## 2019-07-31 ENCOUNTER — INFUSION - HEALTHEAST (OUTPATIENT)
Dept: INFUSION THERAPY | Facility: HOSPITAL | Age: 84
End: 2019-07-31

## 2019-07-31 DIAGNOSIS — D69.3 IDIOPATHIC THROMBOCYTOPENIC PURPURA (H): ICD-10-CM

## 2019-07-31 LAB
ERYTHROCYTE [DISTWIDTH] IN BLOOD BY AUTOMATED COUNT: 15 % (ref 11–14.5)
HCT VFR BLD AUTO: 42.8 % (ref 35–47)
HGB BLD-MCNC: 13.8 G/DL (ref 12–16)
MCH RBC QN AUTO: 32.2 PG (ref 27–34)
MCHC RBC AUTO-ENTMCNC: 32.2 G/DL (ref 32–36)
MCV RBC AUTO: 100 FL (ref 80–100)
PLATELET # BLD AUTO: 303 THOU/UL (ref 140–440)
PMV BLD AUTO: 11.4 FL (ref 8.5–12.5)
RBC # BLD AUTO: 4.28 MILL/UL (ref 3.8–5.4)
WBC: 11.5 THOU/UL (ref 4–11)

## 2019-08-08 ENCOUNTER — INFUSION - HEALTHEAST (OUTPATIENT)
Dept: INFUSION THERAPY | Facility: HOSPITAL | Age: 84
End: 2019-08-08

## 2019-08-08 ENCOUNTER — AMBULATORY - HEALTHEAST (OUTPATIENT)
Dept: INFUSION THERAPY | Facility: HOSPITAL | Age: 84
End: 2019-08-08

## 2019-08-08 DIAGNOSIS — D69.3 IDIOPATHIC THROMBOCYTOPENIC PURPURA (H): ICD-10-CM

## 2019-08-08 LAB
ERYTHROCYTE [DISTWIDTH] IN BLOOD BY AUTOMATED COUNT: 14.6 % (ref 11–14.5)
HCT VFR BLD AUTO: 42.3 % (ref 35–47)
HGB BLD-MCNC: 13.9 G/DL (ref 12–16)
MCH RBC QN AUTO: 32.4 PG (ref 27–34)
MCHC RBC AUTO-ENTMCNC: 32.9 G/DL (ref 32–36)
MCV RBC AUTO: 99 FL (ref 80–100)
PLATELET # BLD AUTO: 153 THOU/UL (ref 140–440)
PMV BLD AUTO: 11.8 FL (ref 8.5–12.5)
RBC # BLD AUTO: 4.29 MILL/UL (ref 3.8–5.4)
WBC: 14.2 THOU/UL (ref 4–11)

## 2019-08-15 ENCOUNTER — INFUSION - HEALTHEAST (OUTPATIENT)
Dept: INFUSION THERAPY | Facility: HOSPITAL | Age: 84
End: 2019-08-15

## 2019-08-15 ENCOUNTER — AMBULATORY - HEALTHEAST (OUTPATIENT)
Dept: INFUSION THERAPY | Facility: HOSPITAL | Age: 84
End: 2019-08-15

## 2019-08-15 DIAGNOSIS — D69.3 IDIOPATHIC THROMBOCYTOPENIC PURPURA (H): ICD-10-CM

## 2019-08-15 LAB
ERYTHROCYTE [DISTWIDTH] IN BLOOD BY AUTOMATED COUNT: 15.2 % (ref 11–14.5)
HCT VFR BLD AUTO: 42.3 % (ref 35–47)
HGB BLD-MCNC: 13.8 G/DL (ref 12–16)
MCH RBC QN AUTO: 32.4 PG (ref 27–34)
MCHC RBC AUTO-ENTMCNC: 32.6 G/DL (ref 32–36)
MCV RBC AUTO: 99 FL (ref 80–100)
PLATELET # BLD AUTO: 126 THOU/UL (ref 140–440)
PMV BLD AUTO: 11.9 FL (ref 8.5–12.5)
RBC # BLD AUTO: 4.26 MILL/UL (ref 3.8–5.4)
WBC: 15.9 THOU/UL (ref 4–11)

## 2019-08-22 ENCOUNTER — INFUSION - HEALTHEAST (OUTPATIENT)
Dept: INFUSION THERAPY | Facility: HOSPITAL | Age: 84
End: 2019-08-22

## 2019-08-22 ENCOUNTER — AMBULATORY - HEALTHEAST (OUTPATIENT)
Dept: INFUSION THERAPY | Facility: HOSPITAL | Age: 84
End: 2019-08-22

## 2019-08-22 DIAGNOSIS — D69.3 IDIOPATHIC THROMBOCYTOPENIC PURPURA (H): ICD-10-CM

## 2019-08-22 LAB
ERYTHROCYTE [DISTWIDTH] IN BLOOD BY AUTOMATED COUNT: 15.1 % (ref 11–14.5)
HCT VFR BLD AUTO: 42 % (ref 35–47)
HGB BLD-MCNC: 13.5 G/DL (ref 12–16)
MCH RBC QN AUTO: 32.1 PG (ref 27–34)
MCHC RBC AUTO-ENTMCNC: 32.1 G/DL (ref 32–36)
MCV RBC AUTO: 100 FL (ref 80–100)
PLATELET # BLD AUTO: 170 THOU/UL (ref 140–440)
PMV BLD AUTO: 11.5 FL (ref 8.5–12.5)
RBC # BLD AUTO: 4.2 MILL/UL (ref 3.8–5.4)
WBC: 13.7 THOU/UL (ref 4–11)

## 2019-08-29 ENCOUNTER — AMBULATORY - HEALTHEAST (OUTPATIENT)
Dept: INFUSION THERAPY | Facility: HOSPITAL | Age: 84
End: 2019-08-29

## 2019-08-29 ENCOUNTER — INFUSION - HEALTHEAST (OUTPATIENT)
Dept: INFUSION THERAPY | Facility: HOSPITAL | Age: 84
End: 2019-08-29

## 2019-08-29 DIAGNOSIS — D69.3 IDIOPATHIC THROMBOCYTOPENIC PURPURA (H): ICD-10-CM

## 2019-08-29 LAB
ERYTHROCYTE [DISTWIDTH] IN BLOOD BY AUTOMATED COUNT: 14.9 % (ref 11–14.5)
HCT VFR BLD AUTO: 44.6 % (ref 35–47)
HGB BLD-MCNC: 14.3 G/DL (ref 12–16)
MCH RBC QN AUTO: 31.8 PG (ref 27–34)
MCHC RBC AUTO-ENTMCNC: 32.1 G/DL (ref 32–36)
MCV RBC AUTO: 99 FL (ref 80–100)
PLATELET # BLD AUTO: 232 THOU/UL (ref 140–440)
PMV BLD AUTO: 11.3 FL (ref 8.5–12.5)
RBC # BLD AUTO: 4.5 MILL/UL (ref 3.8–5.4)
WBC: 13 THOU/UL (ref 4–11)

## 2019-09-05 ENCOUNTER — AMBULATORY - HEALTHEAST (OUTPATIENT)
Dept: INFUSION THERAPY | Facility: HOSPITAL | Age: 84
End: 2019-09-05

## 2019-09-05 ENCOUNTER — INFUSION - HEALTHEAST (OUTPATIENT)
Dept: INFUSION THERAPY | Facility: HOSPITAL | Age: 84
End: 2019-09-05

## 2019-09-05 DIAGNOSIS — D69.3 IDIOPATHIC THROMBOCYTOPENIC PURPURA (H): ICD-10-CM

## 2019-09-05 LAB
ERYTHROCYTE [DISTWIDTH] IN BLOOD BY AUTOMATED COUNT: 14.8 % (ref 11–14.5)
HCT VFR BLD AUTO: 43.4 % (ref 35–47)
HGB BLD-MCNC: 14 G/DL (ref 12–16)
MCH RBC QN AUTO: 32.1 PG (ref 27–34)
MCHC RBC AUTO-ENTMCNC: 32.3 G/DL (ref 32–36)
MCV RBC AUTO: 100 FL (ref 80–100)
PLATELET # BLD AUTO: 221 THOU/UL (ref 140–440)
PMV BLD AUTO: 11.8 FL (ref 8.5–12.5)
RBC # BLD AUTO: 4.36 MILL/UL (ref 3.8–5.4)
WBC: 13.7 THOU/UL (ref 4–11)

## 2019-09-12 ENCOUNTER — AMBULATORY - HEALTHEAST (OUTPATIENT)
Dept: INFUSION THERAPY | Facility: HOSPITAL | Age: 84
End: 2019-09-12

## 2019-09-12 ENCOUNTER — INFUSION - HEALTHEAST (OUTPATIENT)
Dept: INFUSION THERAPY | Facility: HOSPITAL | Age: 84
End: 2019-09-12

## 2019-09-12 DIAGNOSIS — D69.3 IDIOPATHIC THROMBOCYTOPENIC PURPURA (H): ICD-10-CM

## 2019-09-12 DIAGNOSIS — D50.9 IRON DEFICIENCY ANEMIA: ICD-10-CM

## 2019-09-12 LAB
ERYTHROCYTE [DISTWIDTH] IN BLOOD BY AUTOMATED COUNT: 14.9 % (ref 11–14.5)
HCT VFR BLD AUTO: 43.4 % (ref 35–47)
HGB BLD-MCNC: 13.9 G/DL (ref 12–16)
MCH RBC QN AUTO: 32 PG (ref 27–34)
MCHC RBC AUTO-ENTMCNC: 32 G/DL (ref 32–36)
MCV RBC AUTO: 100 FL (ref 80–100)
PLATELET # BLD AUTO: 204 THOU/UL (ref 140–440)
PMV BLD AUTO: 11.5 FL (ref 8.5–12.5)
RBC # BLD AUTO: 4.35 MILL/UL (ref 3.8–5.4)
WBC: 14.3 THOU/UL (ref 4–11)

## 2019-09-19 ENCOUNTER — AMBULATORY - HEALTHEAST (OUTPATIENT)
Dept: INFUSION THERAPY | Facility: HOSPITAL | Age: 84
End: 2019-09-19

## 2019-09-19 ENCOUNTER — INFUSION - HEALTHEAST (OUTPATIENT)
Dept: INFUSION THERAPY | Facility: HOSPITAL | Age: 84
End: 2019-09-19

## 2019-09-19 DIAGNOSIS — D69.3 IDIOPATHIC THROMBOCYTOPENIC PURPURA (H): ICD-10-CM

## 2019-09-19 LAB
ERYTHROCYTE [DISTWIDTH] IN BLOOD BY AUTOMATED COUNT: 14.7 % (ref 11–14.5)
HCT VFR BLD AUTO: 44 % (ref 35–47)
HGB BLD-MCNC: 14.1 G/DL (ref 12–16)
MCH RBC QN AUTO: 31.8 PG (ref 27–34)
MCHC RBC AUTO-ENTMCNC: 32 G/DL (ref 32–36)
MCV RBC AUTO: 99 FL (ref 80–100)
PLATELET # BLD AUTO: 198 THOU/UL (ref 140–440)
PMV BLD AUTO: 11.9 FL (ref 8.5–12.5)
RBC # BLD AUTO: 4.43 MILL/UL (ref 3.8–5.4)
WBC: 14 THOU/UL (ref 4–11)

## 2019-09-26 ENCOUNTER — AMBULATORY - HEALTHEAST (OUTPATIENT)
Dept: INFUSION THERAPY | Facility: HOSPITAL | Age: 84
End: 2019-09-26

## 2019-09-26 ENCOUNTER — INFUSION - HEALTHEAST (OUTPATIENT)
Dept: INFUSION THERAPY | Facility: HOSPITAL | Age: 84
End: 2019-09-26

## 2019-09-26 DIAGNOSIS — D69.3 IDIOPATHIC THROMBOCYTOPENIC PURPURA (H): ICD-10-CM

## 2019-09-26 LAB
ERYTHROCYTE [DISTWIDTH] IN BLOOD BY AUTOMATED COUNT: 14.8 % (ref 11–14.5)
HCT VFR BLD AUTO: 42.4 % (ref 35–47)
HGB BLD-MCNC: 13.8 G/DL (ref 12–16)
MCH RBC QN AUTO: 32.2 PG (ref 27–34)
MCHC RBC AUTO-ENTMCNC: 32.5 G/DL (ref 32–36)
MCV RBC AUTO: 99 FL (ref 80–100)
PLATELET # BLD AUTO: 166 THOU/UL (ref 140–440)
PMV BLD AUTO: 12.1 FL (ref 8.5–12.5)
RBC # BLD AUTO: 4.28 MILL/UL (ref 3.8–5.4)
WBC: 13.9 THOU/UL (ref 4–11)

## 2019-10-03 ENCOUNTER — AMBULATORY - HEALTHEAST (OUTPATIENT)
Dept: INFUSION THERAPY | Facility: HOSPITAL | Age: 84
End: 2019-10-03

## 2019-10-03 ENCOUNTER — INFUSION - HEALTHEAST (OUTPATIENT)
Dept: INFUSION THERAPY | Facility: HOSPITAL | Age: 84
End: 2019-10-03

## 2019-10-03 DIAGNOSIS — D69.3 IDIOPATHIC THROMBOCYTOPENIC PURPURA (H): ICD-10-CM

## 2019-10-03 LAB
ERYTHROCYTE [DISTWIDTH] IN BLOOD BY AUTOMATED COUNT: 14.9 % (ref 11–14.5)
HCT VFR BLD AUTO: 42.7 % (ref 35–47)
HGB BLD-MCNC: 13.7 G/DL (ref 12–16)
MCH RBC QN AUTO: 31.9 PG (ref 27–34)
MCHC RBC AUTO-ENTMCNC: 32.1 G/DL (ref 32–36)
MCV RBC AUTO: 100 FL (ref 80–100)
PLATELET # BLD AUTO: 215 THOU/UL (ref 140–440)
PMV BLD AUTO: 11.3 FL (ref 8.5–12.5)
RBC # BLD AUTO: 4.29 MILL/UL (ref 3.8–5.4)
WBC: 14.8 THOU/UL (ref 4–11)

## 2019-10-10 ENCOUNTER — AMBULATORY - HEALTHEAST (OUTPATIENT)
Dept: INFUSION THERAPY | Facility: HOSPITAL | Age: 84
End: 2019-10-10

## 2019-10-10 ENCOUNTER — INFUSION - HEALTHEAST (OUTPATIENT)
Dept: INFUSION THERAPY | Facility: HOSPITAL | Age: 84
End: 2019-10-10

## 2019-10-10 DIAGNOSIS — D69.3 IDIOPATHIC THROMBOCYTOPENIC PURPURA (H): ICD-10-CM

## 2019-10-10 DIAGNOSIS — D50.9 IRON DEFICIENCY ANEMIA: ICD-10-CM

## 2019-10-10 LAB
ERYTHROCYTE [DISTWIDTH] IN BLOOD BY AUTOMATED COUNT: 14.9 % (ref 11–14.5)
HCT VFR BLD AUTO: 42.9 % (ref 35–47)
HGB BLD-MCNC: 13.7 G/DL (ref 12–16)
MCH RBC QN AUTO: 31.6 PG (ref 27–34)
MCHC RBC AUTO-ENTMCNC: 31.9 G/DL (ref 32–36)
MCV RBC AUTO: 99 FL (ref 80–100)
PLATELET # BLD AUTO: 202 THOU/UL (ref 140–440)
PMV BLD AUTO: 11.4 FL (ref 8.5–12.5)
RBC # BLD AUTO: 4.33 MILL/UL (ref 3.8–5.4)
WBC: 14.1 THOU/UL (ref 4–11)

## 2019-10-17 ENCOUNTER — INFUSION - HEALTHEAST (OUTPATIENT)
Dept: INFUSION THERAPY | Facility: HOSPITAL | Age: 84
End: 2019-10-17

## 2019-10-17 ENCOUNTER — AMBULATORY - HEALTHEAST (OUTPATIENT)
Dept: INFUSION THERAPY | Facility: HOSPITAL | Age: 84
End: 2019-10-17

## 2019-10-17 DIAGNOSIS — D69.3 IDIOPATHIC THROMBOCYTOPENIC PURPURA (H): ICD-10-CM

## 2019-10-17 LAB
ERYTHROCYTE [DISTWIDTH] IN BLOOD BY AUTOMATED COUNT: 15 % (ref 11–14.5)
HCT VFR BLD AUTO: 45 % (ref 35–47)
HGB BLD-MCNC: 14.3 G/DL (ref 12–16)
MCH RBC QN AUTO: 31.7 PG (ref 27–34)
MCHC RBC AUTO-ENTMCNC: 31.8 G/DL (ref 32–36)
MCV RBC AUTO: 100 FL (ref 80–100)
PLATELET # BLD AUTO: 197 THOU/UL (ref 140–440)
PMV BLD AUTO: 11.9 FL (ref 8.5–12.5)
RBC # BLD AUTO: 4.51 MILL/UL (ref 3.8–5.4)
WBC: 13.3 THOU/UL (ref 4–11)

## 2019-10-24 ENCOUNTER — AMBULATORY - HEALTHEAST (OUTPATIENT)
Dept: INFUSION THERAPY | Facility: HOSPITAL | Age: 84
End: 2019-10-24

## 2019-10-24 ENCOUNTER — INFUSION - HEALTHEAST (OUTPATIENT)
Dept: INFUSION THERAPY | Facility: HOSPITAL | Age: 84
End: 2019-10-24

## 2019-10-24 DIAGNOSIS — D69.3 IDIOPATHIC THROMBOCYTOPENIC PURPURA (H): ICD-10-CM

## 2019-10-24 LAB
ERYTHROCYTE [DISTWIDTH] IN BLOOD BY AUTOMATED COUNT: 14.6 % (ref 11–14.5)
HCT VFR BLD AUTO: 44.9 % (ref 35–47)
HGB BLD-MCNC: 14.4 G/DL (ref 12–16)
MCH RBC QN AUTO: 31.9 PG (ref 27–34)
MCHC RBC AUTO-ENTMCNC: 32.1 G/DL (ref 32–36)
MCV RBC AUTO: 99 FL (ref 80–100)
PLATELET # BLD AUTO: 156 THOU/UL (ref 140–440)
PMV BLD AUTO: 12.1 FL (ref 8.5–12.5)
RBC # BLD AUTO: 4.52 MILL/UL (ref 3.8–5.4)
WBC: 13.9 THOU/UL (ref 4–11)

## 2019-10-31 ENCOUNTER — AMBULATORY - HEALTHEAST (OUTPATIENT)
Dept: INFUSION THERAPY | Facility: HOSPITAL | Age: 84
End: 2019-10-31

## 2019-10-31 ENCOUNTER — INFUSION - HEALTHEAST (OUTPATIENT)
Dept: INFUSION THERAPY | Facility: HOSPITAL | Age: 84
End: 2019-10-31

## 2019-10-31 DIAGNOSIS — D69.3 IDIOPATHIC THROMBOCYTOPENIC PURPURA (H): ICD-10-CM

## 2019-10-31 DIAGNOSIS — D50.9 IRON DEFICIENCY ANEMIA: ICD-10-CM

## 2019-10-31 LAB
ERYTHROCYTE [DISTWIDTH] IN BLOOD BY AUTOMATED COUNT: 14.4 % (ref 11–14.5)
HCT VFR BLD AUTO: 45.3 % (ref 35–47)
HGB BLD-MCNC: 14.5 G/DL (ref 12–16)
MCH RBC QN AUTO: 31.9 PG (ref 27–34)
MCHC RBC AUTO-ENTMCNC: 32 G/DL (ref 32–36)
MCV RBC AUTO: 100 FL (ref 80–100)
PLATELET # BLD AUTO: 136 THOU/UL (ref 140–440)
PMV BLD AUTO: 12.1 FL (ref 8.5–12.5)
RBC # BLD AUTO: 4.55 MILL/UL (ref 3.8–5.4)
WBC: 13.3 THOU/UL (ref 4–11)

## 2019-11-07 ENCOUNTER — AMBULATORY - HEALTHEAST (OUTPATIENT)
Dept: INFUSION THERAPY | Facility: HOSPITAL | Age: 84
End: 2019-11-07

## 2019-11-07 ENCOUNTER — INFUSION - HEALTHEAST (OUTPATIENT)
Dept: INFUSION THERAPY | Facility: HOSPITAL | Age: 84
End: 2019-11-07

## 2019-11-07 DIAGNOSIS — D69.3 IDIOPATHIC THROMBOCYTOPENIC PURPURA (H): ICD-10-CM

## 2019-11-07 LAB
ERYTHROCYTE [DISTWIDTH] IN BLOOD BY AUTOMATED COUNT: 14.4 % (ref 11–14.5)
HCT VFR BLD AUTO: 44.4 % (ref 35–47)
HGB BLD-MCNC: 14.2 G/DL (ref 12–16)
MCH RBC QN AUTO: 32 PG (ref 27–34)
MCHC RBC AUTO-ENTMCNC: 32 G/DL (ref 32–36)
MCV RBC AUTO: 100 FL (ref 80–100)
PLATELET # BLD AUTO: 150 THOU/UL (ref 140–440)
PMV BLD AUTO: 11.8 FL (ref 8.5–12.5)
RBC # BLD AUTO: 4.44 MILL/UL (ref 3.8–5.4)
WBC: 15.8 THOU/UL (ref 4–11)

## 2019-11-14 ENCOUNTER — AMBULATORY - HEALTHEAST (OUTPATIENT)
Dept: INFUSION THERAPY | Facility: HOSPITAL | Age: 84
End: 2019-11-14

## 2019-11-14 ENCOUNTER — INFUSION - HEALTHEAST (OUTPATIENT)
Dept: INFUSION THERAPY | Facility: HOSPITAL | Age: 84
End: 2019-11-14

## 2019-11-14 DIAGNOSIS — D69.3 IDIOPATHIC THROMBOCYTOPENIC PURPURA (H): ICD-10-CM

## 2019-11-14 LAB
ERYTHROCYTE [DISTWIDTH] IN BLOOD BY AUTOMATED COUNT: 14.6 % (ref 11–14.5)
HCT VFR BLD AUTO: 43.2 % (ref 35–47)
HGB BLD-MCNC: 13.8 G/DL (ref 12–16)
MCH RBC QN AUTO: 31.4 PG (ref 27–34)
MCHC RBC AUTO-ENTMCNC: 31.9 G/DL (ref 32–36)
MCV RBC AUTO: 98 FL (ref 80–100)
PLATELET # BLD AUTO: 169 THOU/UL (ref 140–440)
PMV BLD AUTO: 11.7 FL (ref 8.5–12.5)
RBC # BLD AUTO: 4.4 MILL/UL (ref 3.8–5.4)
WBC: 13.1 THOU/UL (ref 4–11)

## 2019-11-21 ENCOUNTER — INFUSION - HEALTHEAST (OUTPATIENT)
Dept: INFUSION THERAPY | Facility: HOSPITAL | Age: 84
End: 2019-11-21

## 2019-11-21 ENCOUNTER — AMBULATORY - HEALTHEAST (OUTPATIENT)
Dept: INFUSION THERAPY | Facility: HOSPITAL | Age: 84
End: 2019-11-21

## 2019-11-21 DIAGNOSIS — D69.3 IDIOPATHIC THROMBOCYTOPENIC PURPURA (H): ICD-10-CM

## 2019-11-21 LAB
ERYTHROCYTE [DISTWIDTH] IN BLOOD BY AUTOMATED COUNT: 14.5 % (ref 11–14.5)
HCT VFR BLD AUTO: 45.9 % (ref 35–47)
HGB BLD-MCNC: 14.7 G/DL (ref 12–16)
MCH RBC QN AUTO: 31.7 PG (ref 27–34)
MCHC RBC AUTO-ENTMCNC: 32 G/DL (ref 32–36)
MCV RBC AUTO: 99 FL (ref 80–100)
PLATELET # BLD AUTO: 155 THOU/UL (ref 140–440)
PMV BLD AUTO: 12.2 FL (ref 8.5–12.5)
RBC # BLD AUTO: 4.64 MILL/UL (ref 3.8–5.4)
WBC: 13.7 THOU/UL (ref 4–11)

## 2019-11-27 ENCOUNTER — COMMUNICATION - HEALTHEAST (OUTPATIENT)
Dept: ONCOLOGY | Facility: HOSPITAL | Age: 84
End: 2019-11-27

## 2019-11-29 ENCOUNTER — AMBULATORY - HEALTHEAST (OUTPATIENT)
Dept: INFUSION THERAPY | Facility: HOSPITAL | Age: 84
End: 2019-11-29

## 2019-11-29 ENCOUNTER — INFUSION - HEALTHEAST (OUTPATIENT)
Dept: INFUSION THERAPY | Facility: HOSPITAL | Age: 84
End: 2019-11-29

## 2019-11-29 DIAGNOSIS — D69.3 IDIOPATHIC THROMBOCYTOPENIC PURPURA (H): ICD-10-CM

## 2019-11-29 LAB
ERYTHROCYTE [DISTWIDTH] IN BLOOD BY AUTOMATED COUNT: 14.4 % (ref 11–14.5)
HCT VFR BLD AUTO: 43.4 % (ref 35–47)
HGB BLD-MCNC: 13.9 G/DL (ref 12–16)
MCH RBC QN AUTO: 31.7 PG (ref 27–34)
MCHC RBC AUTO-ENTMCNC: 32 G/DL (ref 32–36)
MCV RBC AUTO: 99 FL (ref 80–100)
PLATELET # BLD AUTO: 209 THOU/UL (ref 140–440)
PMV BLD AUTO: 11.9 FL (ref 8.5–12.5)
RBC # BLD AUTO: 4.38 MILL/UL (ref 3.8–5.4)
WBC: 15 THOU/UL (ref 4–11)

## 2019-12-05 ENCOUNTER — INFUSION - HEALTHEAST (OUTPATIENT)
Dept: INFUSION THERAPY | Facility: HOSPITAL | Age: 84
End: 2019-12-05

## 2019-12-05 ENCOUNTER — AMBULATORY - HEALTHEAST (OUTPATIENT)
Dept: INFUSION THERAPY | Facility: HOSPITAL | Age: 84
End: 2019-12-05

## 2019-12-05 DIAGNOSIS — D69.3 IDIOPATHIC THROMBOCYTOPENIC PURPURA (H): ICD-10-CM

## 2019-12-05 LAB
ERYTHROCYTE [DISTWIDTH] IN BLOOD BY AUTOMATED COUNT: 14.5 % (ref 11–14.5)
HCT VFR BLD AUTO: 44.2 % (ref 35–47)
HGB BLD-MCNC: 13.9 G/DL (ref 12–16)
MCH RBC QN AUTO: 31.4 PG (ref 27–34)
MCHC RBC AUTO-ENTMCNC: 31.4 G/DL (ref 32–36)
MCV RBC AUTO: 100 FL (ref 80–100)
PLATELET # BLD AUTO: 153 THOU/UL (ref 140–440)
PMV BLD AUTO: 12.6 FL (ref 8.5–12.5)
RBC # BLD AUTO: 4.42 MILL/UL (ref 3.8–5.4)
WBC: 15.5 THOU/UL (ref 4–11)

## 2019-12-12 ENCOUNTER — INFUSION - HEALTHEAST (OUTPATIENT)
Dept: INFUSION THERAPY | Facility: HOSPITAL | Age: 84
End: 2019-12-12

## 2019-12-12 ENCOUNTER — AMBULATORY - HEALTHEAST (OUTPATIENT)
Dept: INFUSION THERAPY | Facility: HOSPITAL | Age: 84
End: 2019-12-12

## 2019-12-12 DIAGNOSIS — D69.3 IDIOPATHIC THROMBOCYTOPENIC PURPURA (H): ICD-10-CM

## 2019-12-12 LAB
ERYTHROCYTE [DISTWIDTH] IN BLOOD BY AUTOMATED COUNT: 14.6 % (ref 11–14.5)
HCT VFR BLD AUTO: 44.4 % (ref 35–47)
HGB BLD-MCNC: 13.9 G/DL (ref 12–16)
MCH RBC QN AUTO: 31.4 PG (ref 27–34)
MCHC RBC AUTO-ENTMCNC: 31.3 G/DL (ref 32–36)
MCV RBC AUTO: 100 FL (ref 80–100)
PLATELET # BLD AUTO: 135 THOU/UL (ref 140–440)
PMV BLD AUTO: 12.2 FL (ref 8.5–12.5)
RBC # BLD AUTO: 4.43 MILL/UL (ref 3.8–5.4)
WBC: 13.6 THOU/UL (ref 4–11)

## 2019-12-20 ENCOUNTER — INFUSION - HEALTHEAST (OUTPATIENT)
Dept: INFUSION THERAPY | Facility: HOSPITAL | Age: 84
End: 2019-12-20

## 2019-12-20 ENCOUNTER — AMBULATORY - HEALTHEAST (OUTPATIENT)
Dept: INFUSION THERAPY | Facility: HOSPITAL | Age: 84
End: 2019-12-20

## 2019-12-20 DIAGNOSIS — D69.3 IDIOPATHIC THROMBOCYTOPENIC PURPURA (H): ICD-10-CM

## 2019-12-20 LAB
ERYTHROCYTE [DISTWIDTH] IN BLOOD BY AUTOMATED COUNT: 14.6 % (ref 11–14.5)
HCT VFR BLD AUTO: 44.5 % (ref 35–47)
HGB BLD-MCNC: 14 G/DL (ref 12–16)
MCH RBC QN AUTO: 31.3 PG (ref 27–34)
MCHC RBC AUTO-ENTMCNC: 31.5 G/DL (ref 32–36)
MCV RBC AUTO: 99 FL (ref 80–100)
PLATELET # BLD AUTO: 248 THOU/UL (ref 140–440)
PMV BLD AUTO: 11.5 FL (ref 8.5–12.5)
RBC # BLD AUTO: 4.48 MILL/UL (ref 3.8–5.4)
WBC: 14.6 THOU/UL (ref 4–11)

## 2019-12-27 ENCOUNTER — INFUSION - HEALTHEAST (OUTPATIENT)
Dept: INFUSION THERAPY | Facility: HOSPITAL | Age: 84
End: 2019-12-27

## 2019-12-27 ENCOUNTER — AMBULATORY - HEALTHEAST (OUTPATIENT)
Dept: INFUSION THERAPY | Facility: HOSPITAL | Age: 84
End: 2019-12-27

## 2019-12-27 DIAGNOSIS — D69.3 IDIOPATHIC THROMBOCYTOPENIC PURPURA (H): ICD-10-CM

## 2019-12-27 LAB
ERYTHROCYTE [DISTWIDTH] IN BLOOD BY AUTOMATED COUNT: 15.1 % (ref 11–14.5)
HCT VFR BLD AUTO: 45.5 % (ref 35–47)
HGB BLD-MCNC: 14.6 G/DL (ref 12–16)
MCH RBC QN AUTO: 31.4 PG (ref 27–34)
MCHC RBC AUTO-ENTMCNC: 32.1 G/DL (ref 32–36)
MCV RBC AUTO: 98 FL (ref 80–100)
PLATELET # BLD AUTO: 89 THOU/UL (ref 140–440)
PMV BLD AUTO: 12.7 FL (ref 8.5–12.5)
RBC # BLD AUTO: 4.65 MILL/UL (ref 3.8–5.4)
WBC: 11 THOU/UL (ref 4–11)

## 2020-01-02 ENCOUNTER — AMBULATORY - HEALTHEAST (OUTPATIENT)
Dept: INFUSION THERAPY | Facility: HOSPITAL | Age: 85
End: 2020-01-02

## 2020-01-02 ENCOUNTER — INFUSION - HEALTHEAST (OUTPATIENT)
Dept: INFUSION THERAPY | Facility: HOSPITAL | Age: 85
End: 2020-01-02

## 2020-01-02 DIAGNOSIS — D69.3 IDIOPATHIC THROMBOCYTOPENIC PURPURA (H): ICD-10-CM

## 2020-01-02 LAB
ERYTHROCYTE [DISTWIDTH] IN BLOOD BY AUTOMATED COUNT: 14.3 % (ref 11–14.5)
HCT VFR BLD AUTO: 43.8 % (ref 35–47)
HGB BLD-MCNC: 13.9 G/DL (ref 12–16)
MCH RBC QN AUTO: 31.4 PG (ref 27–34)
MCHC RBC AUTO-ENTMCNC: 31.7 G/DL (ref 32–36)
MCV RBC AUTO: 99 FL (ref 80–100)
PLATELET # BLD AUTO: 103 THOU/UL (ref 140–440)
PMV BLD AUTO: 12.6 FL (ref 8.5–12.5)
RBC # BLD AUTO: 4.43 MILL/UL (ref 3.8–5.4)
WBC: 11.7 THOU/UL (ref 4–11)

## 2020-01-09 ENCOUNTER — INFUSION - HEALTHEAST (OUTPATIENT)
Dept: INFUSION THERAPY | Facility: HOSPITAL | Age: 85
End: 2020-01-09

## 2020-01-09 ENCOUNTER — AMBULATORY - HEALTHEAST (OUTPATIENT)
Dept: INFUSION THERAPY | Facility: HOSPITAL | Age: 85
End: 2020-01-09

## 2020-01-09 DIAGNOSIS — D69.3 IDIOPATHIC THROMBOCYTOPENIC PURPURA (H): ICD-10-CM

## 2020-01-09 LAB
ERYTHROCYTE [DISTWIDTH] IN BLOOD BY AUTOMATED COUNT: 14.6 % (ref 11–14.5)
HCT VFR BLD AUTO: 41.4 % (ref 35–47)
HGB BLD-MCNC: 13.1 G/DL (ref 12–16)
MCH RBC QN AUTO: 31 PG (ref 27–34)
MCHC RBC AUTO-ENTMCNC: 31.6 G/DL (ref 32–36)
MCV RBC AUTO: 98 FL (ref 80–100)
PLATELET # BLD AUTO: 316 THOU/UL (ref 140–440)
PMV BLD AUTO: 11.5 FL (ref 8.5–12.5)
RBC # BLD AUTO: 4.23 MILL/UL (ref 3.8–5.4)
WBC: 12.7 THOU/UL (ref 4–11)

## 2020-01-16 ENCOUNTER — AMBULATORY - HEALTHEAST (OUTPATIENT)
Dept: INFUSION THERAPY | Facility: HOSPITAL | Age: 85
End: 2020-01-16

## 2020-01-16 ENCOUNTER — INFUSION - HEALTHEAST (OUTPATIENT)
Dept: INFUSION THERAPY | Facility: HOSPITAL | Age: 85
End: 2020-01-16

## 2020-01-16 DIAGNOSIS — D69.3 IDIOPATHIC THROMBOCYTOPENIC PURPURA (H): ICD-10-CM

## 2020-01-16 LAB
ERYTHROCYTE [DISTWIDTH] IN BLOOD BY AUTOMATED COUNT: 15 % (ref 11–14.5)
HCT VFR BLD AUTO: 42.3 % (ref 35–47)
HGB BLD-MCNC: 13.1 G/DL (ref 12–16)
MCH RBC QN AUTO: 31 PG (ref 27–34)
MCHC RBC AUTO-ENTMCNC: 31 G/DL (ref 32–36)
MCV RBC AUTO: 100 FL (ref 80–100)
PLATELET # BLD AUTO: 220 THOU/UL (ref 140–440)
PMV BLD AUTO: 11.9 FL (ref 8.5–12.5)
RBC # BLD AUTO: 4.22 MILL/UL (ref 3.8–5.4)
WBC: 15.6 THOU/UL (ref 4–11)

## 2020-01-24 ENCOUNTER — AMBULATORY - HEALTHEAST (OUTPATIENT)
Dept: INFUSION THERAPY | Facility: HOSPITAL | Age: 85
End: 2020-01-24

## 2020-01-24 ENCOUNTER — INFUSION - HEALTHEAST (OUTPATIENT)
Dept: INFUSION THERAPY | Facility: HOSPITAL | Age: 85
End: 2020-01-24

## 2020-01-24 ENCOUNTER — OFFICE VISIT - HEALTHEAST (OUTPATIENT)
Dept: ONCOLOGY | Facility: HOSPITAL | Age: 85
End: 2020-01-24

## 2020-01-24 DIAGNOSIS — D69.3 IDIOPATHIC THROMBOCYTOPENIC PURPURA (H): ICD-10-CM

## 2020-01-24 LAB
ERYTHROCYTE [DISTWIDTH] IN BLOOD BY AUTOMATED COUNT: 14.9 % (ref 11–14.5)
HCT VFR BLD AUTO: 43.2 % (ref 35–47)
HGB BLD-MCNC: 13.6 G/DL (ref 12–16)
MCH RBC QN AUTO: 31.3 PG (ref 27–34)
MCHC RBC AUTO-ENTMCNC: 31.5 G/DL (ref 32–36)
MCV RBC AUTO: 99 FL (ref 80–100)
PLATELET # BLD AUTO: 200 THOU/UL (ref 140–440)
PMV BLD AUTO: 11.3 FL (ref 8.5–12.5)
RBC # BLD AUTO: 4.35 MILL/UL (ref 3.8–5.4)
WBC: 13.4 THOU/UL (ref 4–11)

## 2020-01-30 ENCOUNTER — INFUSION - HEALTHEAST (OUTPATIENT)
Dept: INFUSION THERAPY | Facility: HOSPITAL | Age: 85
End: 2020-01-30

## 2020-01-30 ENCOUNTER — AMBULATORY - HEALTHEAST (OUTPATIENT)
Dept: INFUSION THERAPY | Facility: HOSPITAL | Age: 85
End: 2020-01-30

## 2020-01-30 DIAGNOSIS — D69.3 IDIOPATHIC THROMBOCYTOPENIC PURPURA (H): ICD-10-CM

## 2020-01-30 LAB
ERYTHROCYTE [DISTWIDTH] IN BLOOD BY AUTOMATED COUNT: 15.3 % (ref 11–14.5)
HCT VFR BLD AUTO: 42.7 % (ref 35–47)
HGB BLD-MCNC: 13.4 G/DL (ref 12–16)
MCH RBC QN AUTO: 31.2 PG (ref 27–34)
MCHC RBC AUTO-ENTMCNC: 31.4 G/DL (ref 32–36)
MCV RBC AUTO: 99 FL (ref 80–100)
PLATELET # BLD AUTO: 179 THOU/UL (ref 140–440)
PMV BLD AUTO: 11.8 FL (ref 8.5–12.5)
RBC # BLD AUTO: 4.3 MILL/UL (ref 3.8–5.4)
WBC: 12.9 THOU/UL (ref 4–11)

## 2020-02-06 ENCOUNTER — INFUSION - HEALTHEAST (OUTPATIENT)
Dept: INFUSION THERAPY | Facility: HOSPITAL | Age: 85
End: 2020-02-06

## 2020-02-06 ENCOUNTER — AMBULATORY - HEALTHEAST (OUTPATIENT)
Dept: INFUSION THERAPY | Facility: HOSPITAL | Age: 85
End: 2020-02-06

## 2020-02-06 DIAGNOSIS — D69.3 IDIOPATHIC THROMBOCYTOPENIC PURPURA (H): ICD-10-CM

## 2020-02-06 LAB
ERYTHROCYTE [DISTWIDTH] IN BLOOD BY AUTOMATED COUNT: 15.1 % (ref 11–14.5)
HCT VFR BLD AUTO: 43.1 % (ref 35–47)
HGB BLD-MCNC: 13.9 G/DL (ref 12–16)
MCH RBC QN AUTO: 31.3 PG (ref 27–34)
MCHC RBC AUTO-ENTMCNC: 32.3 G/DL (ref 32–36)
MCV RBC AUTO: 97 FL (ref 80–100)
PLATELET # BLD AUTO: 184 THOU/UL (ref 140–440)
PMV BLD AUTO: 11.8 FL (ref 8.5–12.5)
RBC # BLD AUTO: 4.44 MILL/UL (ref 3.8–5.4)
WBC: 13.5 THOU/UL (ref 4–11)

## 2020-02-13 ENCOUNTER — AMBULATORY - HEALTHEAST (OUTPATIENT)
Dept: INFUSION THERAPY | Facility: HOSPITAL | Age: 85
End: 2020-02-13

## 2020-02-13 ENCOUNTER — INFUSION - HEALTHEAST (OUTPATIENT)
Dept: INFUSION THERAPY | Facility: HOSPITAL | Age: 85
End: 2020-02-13

## 2020-02-13 DIAGNOSIS — D69.3 IDIOPATHIC THROMBOCYTOPENIC PURPURA (H): ICD-10-CM

## 2020-02-13 LAB
ERYTHROCYTE [DISTWIDTH] IN BLOOD BY AUTOMATED COUNT: 15.4 % (ref 11–14.5)
HCT VFR BLD AUTO: 44.1 % (ref 35–47)
HGB BLD-MCNC: 14.2 G/DL (ref 12–16)
MCH RBC QN AUTO: 31.6 PG (ref 27–34)
MCHC RBC AUTO-ENTMCNC: 32.2 G/DL (ref 32–36)
MCV RBC AUTO: 98 FL (ref 80–100)
PLATELET # BLD AUTO: 184 THOU/UL (ref 140–440)
PMV BLD AUTO: 12 FL (ref 8.5–12.5)
RBC # BLD AUTO: 4.49 MILL/UL (ref 3.8–5.4)
WBC: 13.4 THOU/UL (ref 4–11)

## 2020-02-20 ENCOUNTER — INFUSION - HEALTHEAST (OUTPATIENT)
Dept: INFUSION THERAPY | Facility: HOSPITAL | Age: 85
End: 2020-02-20

## 2020-02-20 ENCOUNTER — AMBULATORY - HEALTHEAST (OUTPATIENT)
Dept: INFUSION THERAPY | Facility: HOSPITAL | Age: 85
End: 2020-02-20

## 2020-02-20 DIAGNOSIS — D69.3 IDIOPATHIC THROMBOCYTOPENIC PURPURA (H): ICD-10-CM

## 2020-02-20 LAB
ERYTHROCYTE [DISTWIDTH] IN BLOOD BY AUTOMATED COUNT: 15.2 % (ref 11–14.5)
HCT VFR BLD AUTO: 43.1 % (ref 35–47)
HGB BLD-MCNC: 13.7 G/DL (ref 12–16)
MCH RBC QN AUTO: 31.6 PG (ref 27–34)
MCHC RBC AUTO-ENTMCNC: 31.8 G/DL (ref 32–36)
MCV RBC AUTO: 99 FL (ref 80–100)
PLATELET # BLD AUTO: 168 THOU/UL (ref 140–440)
PMV BLD AUTO: 12.1 FL (ref 8.5–12.5)
RBC # BLD AUTO: 4.34 MILL/UL (ref 3.8–5.4)
WBC: 15.2 THOU/UL (ref 4–11)

## 2020-02-27 ENCOUNTER — INFUSION - HEALTHEAST (OUTPATIENT)
Dept: INFUSION THERAPY | Facility: HOSPITAL | Age: 85
End: 2020-02-27

## 2020-02-27 ENCOUNTER — AMBULATORY - HEALTHEAST (OUTPATIENT)
Dept: INFUSION THERAPY | Facility: HOSPITAL | Age: 85
End: 2020-02-27

## 2020-02-27 DIAGNOSIS — D69.3 IDIOPATHIC THROMBOCYTOPENIC PURPURA (H): ICD-10-CM

## 2020-02-27 LAB
ERYTHROCYTE [DISTWIDTH] IN BLOOD BY AUTOMATED COUNT: 15.3 % (ref 11–14.5)
HCT VFR BLD AUTO: 44.2 % (ref 35–47)
HGB BLD-MCNC: 14.1 G/DL (ref 12–16)
MCH RBC QN AUTO: 31.3 PG (ref 27–34)
MCHC RBC AUTO-ENTMCNC: 31.9 G/DL (ref 32–36)
MCV RBC AUTO: 98 FL (ref 80–100)
PLATELET # BLD AUTO: 121 THOU/UL (ref 140–440)
PMV BLD AUTO: 12.2 FL (ref 8.5–12.5)
RBC # BLD AUTO: 4.5 MILL/UL (ref 3.8–5.4)
WBC: 14 THOU/UL (ref 4–11)

## 2020-03-05 ENCOUNTER — INFUSION - HEALTHEAST (OUTPATIENT)
Dept: INFUSION THERAPY | Facility: HOSPITAL | Age: 85
End: 2020-03-05

## 2020-03-05 ENCOUNTER — AMBULATORY - HEALTHEAST (OUTPATIENT)
Dept: INFUSION THERAPY | Facility: HOSPITAL | Age: 85
End: 2020-03-05

## 2020-03-05 DIAGNOSIS — D69.3 IDIOPATHIC THROMBOCYTOPENIC PURPURA (H): ICD-10-CM

## 2020-03-05 LAB
ERYTHROCYTE [DISTWIDTH] IN BLOOD BY AUTOMATED COUNT: 15.5 % (ref 11–14.5)
HCT VFR BLD AUTO: 44.9 % (ref 35–47)
HGB BLD-MCNC: 14.4 G/DL (ref 12–16)
MCH RBC QN AUTO: 31.2 PG (ref 27–34)
MCHC RBC AUTO-ENTMCNC: 32.1 G/DL (ref 32–36)
MCV RBC AUTO: 97 FL (ref 80–100)
PLATELET # BLD AUTO: 119 THOU/UL (ref 140–440)
PMV BLD AUTO: 12.2 FL (ref 8.5–12.5)
RBC # BLD AUTO: 4.61 MILL/UL (ref 3.8–5.4)
WBC: 12.5 THOU/UL (ref 4–11)

## 2020-03-12 ENCOUNTER — INFUSION - HEALTHEAST (OUTPATIENT)
Dept: INFUSION THERAPY | Facility: HOSPITAL | Age: 85
End: 2020-03-12

## 2020-03-12 ENCOUNTER — AMBULATORY - HEALTHEAST (OUTPATIENT)
Dept: INFUSION THERAPY | Facility: HOSPITAL | Age: 85
End: 2020-03-12

## 2020-03-12 DIAGNOSIS — D69.3 IDIOPATHIC THROMBOCYTOPENIC PURPURA (H): ICD-10-CM

## 2020-03-12 LAB
ERYTHROCYTE [DISTWIDTH] IN BLOOD BY AUTOMATED COUNT: 15.6 % (ref 11–14.5)
HCT VFR BLD AUTO: 44.5 % (ref 35–47)
HGB BLD-MCNC: 14.1 G/DL (ref 12–16)
MCH RBC QN AUTO: 31.1 PG (ref 27–34)
MCHC RBC AUTO-ENTMCNC: 31.7 G/DL (ref 32–36)
MCV RBC AUTO: 98 FL (ref 80–100)
PLATELET # BLD AUTO: 174 THOU/UL (ref 140–440)
PMV BLD AUTO: 12.1 FL (ref 8.5–12.5)
RBC # BLD AUTO: 4.54 MILL/UL (ref 3.8–5.4)
WBC: 15.6 THOU/UL (ref 4–11)

## 2020-03-19 ENCOUNTER — AMBULATORY - HEALTHEAST (OUTPATIENT)
Dept: INFUSION THERAPY | Facility: HOSPITAL | Age: 85
End: 2020-03-19

## 2020-03-19 ENCOUNTER — INFUSION - HEALTHEAST (OUTPATIENT)
Dept: INFUSION THERAPY | Facility: HOSPITAL | Age: 85
End: 2020-03-19

## 2020-03-19 DIAGNOSIS — D69.3 IDIOPATHIC THROMBOCYTOPENIC PURPURA (H): ICD-10-CM

## 2020-03-19 LAB
ERYTHROCYTE [DISTWIDTH] IN BLOOD BY AUTOMATED COUNT: 15.9 % (ref 11–14.5)
HCT VFR BLD AUTO: 45.9 % (ref 35–47)
HGB BLD-MCNC: 14.6 G/DL (ref 12–16)
MCH RBC QN AUTO: 31.5 PG (ref 27–34)
MCHC RBC AUTO-ENTMCNC: 31.8 G/DL (ref 32–36)
MCV RBC AUTO: 99 FL (ref 80–100)
PLATELET # BLD AUTO: 226 THOU/UL (ref 140–440)
PMV BLD AUTO: 11.5 FL (ref 8.5–12.5)
RBC # BLD AUTO: 4.64 MILL/UL (ref 3.8–5.4)
WBC: 14.9 THOU/UL (ref 4–11)

## 2020-03-26 ENCOUNTER — AMBULATORY - HEALTHEAST (OUTPATIENT)
Dept: INFUSION THERAPY | Facility: HOSPITAL | Age: 85
End: 2020-03-26

## 2020-03-26 ENCOUNTER — INFUSION - HEALTHEAST (OUTPATIENT)
Dept: INFUSION THERAPY | Facility: HOSPITAL | Age: 85
End: 2020-03-26

## 2020-03-26 DIAGNOSIS — D69.3 IDIOPATHIC THROMBOCYTOPENIC PURPURA (H): ICD-10-CM

## 2020-03-26 LAB
ERYTHROCYTE [DISTWIDTH] IN BLOOD BY AUTOMATED COUNT: 15.7 % (ref 11–14.5)
HCT VFR BLD AUTO: 42.7 % (ref 35–47)
HGB BLD-MCNC: 13.7 G/DL (ref 12–16)
MCH RBC QN AUTO: 31.1 PG (ref 27–34)
MCHC RBC AUTO-ENTMCNC: 32.1 G/DL (ref 32–36)
MCV RBC AUTO: 97 FL (ref 80–100)
PLATELET # BLD AUTO: 224 THOU/UL (ref 140–440)
PMV BLD AUTO: 11.9 FL (ref 8.5–12.5)
RBC # BLD AUTO: 4.4 MILL/UL (ref 3.8–5.4)
WBC: 13.3 THOU/UL (ref 4–11)

## 2020-04-02 ENCOUNTER — AMBULATORY - HEALTHEAST (OUTPATIENT)
Dept: INFUSION THERAPY | Facility: HOSPITAL | Age: 85
End: 2020-04-02

## 2020-04-02 ENCOUNTER — INFUSION - HEALTHEAST (OUTPATIENT)
Dept: INFUSION THERAPY | Facility: HOSPITAL | Age: 85
End: 2020-04-02

## 2020-04-02 DIAGNOSIS — D69.3 IDIOPATHIC THROMBOCYTOPENIC PURPURA (H): ICD-10-CM

## 2020-04-02 LAB
ERYTHROCYTE [DISTWIDTH] IN BLOOD BY AUTOMATED COUNT: 15.7 % (ref 11–14.5)
HCT VFR BLD AUTO: 43.2 % (ref 35–47)
HGB BLD-MCNC: 13.8 G/DL (ref 12–16)
MCH RBC QN AUTO: 31.6 PG (ref 27–34)
MCHC RBC AUTO-ENTMCNC: 31.9 G/DL (ref 32–36)
MCV RBC AUTO: 99 FL (ref 80–100)
PLATELET # BLD AUTO: 237 THOU/UL (ref 140–440)
PMV BLD AUTO: 11.9 FL (ref 8.5–12.5)
RBC # BLD AUTO: 4.37 MILL/UL (ref 3.8–5.4)
WBC: 11.8 THOU/UL (ref 4–11)

## 2020-04-09 ENCOUNTER — AMBULATORY - HEALTHEAST (OUTPATIENT)
Dept: INFUSION THERAPY | Facility: HOSPITAL | Age: 85
End: 2020-04-09

## 2020-04-09 ENCOUNTER — INFUSION - HEALTHEAST (OUTPATIENT)
Dept: INFUSION THERAPY | Facility: HOSPITAL | Age: 85
End: 2020-04-09

## 2020-04-09 DIAGNOSIS — D69.3 IDIOPATHIC THROMBOCYTOPENIC PURPURA (H): ICD-10-CM

## 2020-04-09 LAB
ERYTHROCYTE [DISTWIDTH] IN BLOOD BY AUTOMATED COUNT: 15.5 % (ref 11–14.5)
HCT VFR BLD AUTO: 42.9 % (ref 35–47)
HGB BLD-MCNC: 13.7 G/DL (ref 12–16)
MCH RBC QN AUTO: 30.9 PG (ref 27–34)
MCHC RBC AUTO-ENTMCNC: 31.9 G/DL (ref 32–36)
MCV RBC AUTO: 97 FL (ref 80–100)
PLATELET # BLD AUTO: 230 THOU/UL (ref 140–440)
PMV BLD AUTO: 12.2 FL (ref 8.5–12.5)
RBC # BLD AUTO: 4.44 MILL/UL (ref 3.8–5.4)
WBC: 12.1 THOU/UL (ref 4–11)

## 2020-04-16 ENCOUNTER — AMBULATORY - HEALTHEAST (OUTPATIENT)
Dept: INFUSION THERAPY | Facility: HOSPITAL | Age: 85
End: 2020-04-16

## 2020-04-16 ENCOUNTER — INFUSION - HEALTHEAST (OUTPATIENT)
Dept: INFUSION THERAPY | Facility: HOSPITAL | Age: 85
End: 2020-04-16

## 2020-04-16 DIAGNOSIS — D69.3 IDIOPATHIC THROMBOCYTOPENIC PURPURA (H): ICD-10-CM

## 2020-04-16 LAB
ERYTHROCYTE [DISTWIDTH] IN BLOOD BY AUTOMATED COUNT: 15.3 % (ref 11–14.5)
HCT VFR BLD AUTO: 45.1 % (ref 35–47)
HGB BLD-MCNC: 14.5 G/DL (ref 12–16)
MCH RBC QN AUTO: 31.7 PG (ref 27–34)
MCHC RBC AUTO-ENTMCNC: 32.2 G/DL (ref 32–36)
MCV RBC AUTO: 99 FL (ref 80–100)
PLATELET # BLD AUTO: 230 THOU/UL (ref 140–440)
PMV BLD AUTO: 11.8 FL (ref 8.5–12.5)
RBC # BLD AUTO: 4.58 MILL/UL (ref 3.8–5.4)
WBC: 12.9 THOU/UL (ref 4–11)

## 2020-04-23 ENCOUNTER — AMBULATORY - HEALTHEAST (OUTPATIENT)
Dept: INFUSION THERAPY | Facility: HOSPITAL | Age: 85
End: 2020-04-23

## 2020-04-23 ENCOUNTER — INFUSION - HEALTHEAST (OUTPATIENT)
Dept: INFUSION THERAPY | Facility: HOSPITAL | Age: 85
End: 2020-04-23

## 2020-04-23 DIAGNOSIS — D69.3 IDIOPATHIC THROMBOCYTOPENIC PURPURA (H): ICD-10-CM

## 2020-04-23 LAB
ERYTHROCYTE [DISTWIDTH] IN BLOOD BY AUTOMATED COUNT: 15.5 % (ref 11–14.5)
HCT VFR BLD AUTO: 43.5 % (ref 35–47)
HGB BLD-MCNC: 14.4 G/DL (ref 12–16)
MCH RBC QN AUTO: 32.2 PG (ref 27–34)
MCHC RBC AUTO-ENTMCNC: 33.1 G/DL (ref 32–36)
MCV RBC AUTO: 97 FL (ref 80–100)
PLATELET # BLD AUTO: 233 THOU/UL (ref 140–440)
PMV BLD AUTO: 11.9 FL (ref 8.5–12.5)
RBC # BLD AUTO: 4.47 MILL/UL (ref 3.8–5.4)
WBC: 14.5 THOU/UL (ref 4–11)

## 2020-04-30 ENCOUNTER — AMBULATORY - HEALTHEAST (OUTPATIENT)
Dept: INFUSION THERAPY | Facility: HOSPITAL | Age: 85
End: 2020-04-30

## 2020-04-30 ENCOUNTER — INFUSION - HEALTHEAST (OUTPATIENT)
Dept: INFUSION THERAPY | Facility: HOSPITAL | Age: 85
End: 2020-04-30

## 2020-04-30 DIAGNOSIS — D69.3 IDIOPATHIC THROMBOCYTOPENIC PURPURA (H): ICD-10-CM

## 2020-04-30 LAB
ERYTHROCYTE [DISTWIDTH] IN BLOOD BY AUTOMATED COUNT: 15.4 % (ref 11–14.5)
HCT VFR BLD AUTO: 44.9 % (ref 35–47)
HGB BLD-MCNC: 14.6 G/DL (ref 12–16)
MCH RBC QN AUTO: 31.5 PG (ref 27–34)
MCHC RBC AUTO-ENTMCNC: 32.5 G/DL (ref 32–36)
MCV RBC AUTO: 97 FL (ref 80–100)
PLATELET # BLD AUTO: 185 THOU/UL (ref 140–440)
PMV BLD AUTO: 11.7 FL (ref 8.5–12.5)
RBC # BLD AUTO: 4.63 MILL/UL (ref 3.8–5.4)
WBC: 13.4 THOU/UL (ref 4–11)

## 2020-05-07 ENCOUNTER — AMBULATORY - HEALTHEAST (OUTPATIENT)
Dept: INFUSION THERAPY | Facility: HOSPITAL | Age: 85
End: 2020-05-07

## 2020-05-07 ENCOUNTER — INFUSION - HEALTHEAST (OUTPATIENT)
Dept: INFUSION THERAPY | Facility: HOSPITAL | Age: 85
End: 2020-05-07

## 2020-05-07 DIAGNOSIS — D69.3 IDIOPATHIC THROMBOCYTOPENIC PURPURA (H): ICD-10-CM

## 2020-05-07 LAB
ERYTHROCYTE [DISTWIDTH] IN BLOOD BY AUTOMATED COUNT: 15.4 % (ref 11–14.5)
HCT VFR BLD AUTO: 44.4 % (ref 35–47)
HGB BLD-MCNC: 14.3 G/DL (ref 12–16)
MCH RBC QN AUTO: 31.6 PG (ref 27–34)
MCHC RBC AUTO-ENTMCNC: 32.2 G/DL (ref 32–36)
MCV RBC AUTO: 98 FL (ref 80–100)
PLATELET # BLD AUTO: 155 THOU/UL (ref 140–440)
PMV BLD AUTO: 12.3 FL (ref 8.5–12.5)
RBC # BLD AUTO: 4.53 MILL/UL (ref 3.8–5.4)
WBC: 12 THOU/UL (ref 4–11)

## 2020-05-14 ENCOUNTER — AMBULATORY - HEALTHEAST (OUTPATIENT)
Dept: INFUSION THERAPY | Facility: HOSPITAL | Age: 85
End: 2020-05-14

## 2020-05-14 ENCOUNTER — INFUSION - HEALTHEAST (OUTPATIENT)
Dept: INFUSION THERAPY | Facility: HOSPITAL | Age: 85
End: 2020-05-14

## 2020-05-14 DIAGNOSIS — D69.3 IDIOPATHIC THROMBOCYTOPENIC PURPURA (H): ICD-10-CM

## 2020-05-14 LAB
ERYTHROCYTE [DISTWIDTH] IN BLOOD BY AUTOMATED COUNT: 15.3 % (ref 11–14.5)
HCT VFR BLD AUTO: 44.6 % (ref 35–47)
HGB BLD-MCNC: 14.5 G/DL (ref 12–16)
MCH RBC QN AUTO: 31.5 PG (ref 27–34)
MCHC RBC AUTO-ENTMCNC: 32.5 G/DL (ref 32–36)
MCV RBC AUTO: 97 FL (ref 80–100)
PLATELET # BLD AUTO: 121 THOU/UL (ref 140–440)
PMV BLD AUTO: 12.3 FL (ref 8.5–12.5)
RBC # BLD AUTO: 4.6 MILL/UL (ref 3.8–5.4)
WBC: 15 THOU/UL (ref 4–11)

## 2020-05-21 ENCOUNTER — INFUSION - HEALTHEAST (OUTPATIENT)
Dept: INFUSION THERAPY | Facility: HOSPITAL | Age: 85
End: 2020-05-21

## 2020-05-21 ENCOUNTER — AMBULATORY - HEALTHEAST (OUTPATIENT)
Dept: INFUSION THERAPY | Facility: HOSPITAL | Age: 85
End: 2020-05-21

## 2020-05-21 DIAGNOSIS — D69.3 IDIOPATHIC THROMBOCYTOPENIC PURPURA (H): ICD-10-CM

## 2020-05-21 LAB
ERYTHROCYTE [DISTWIDTH] IN BLOOD BY AUTOMATED COUNT: 15.2 % (ref 11–14.5)
HCT VFR BLD AUTO: 44.6 % (ref 35–47)
HGB BLD-MCNC: 14.4 G/DL (ref 12–16)
MCH RBC QN AUTO: 31.9 PG (ref 27–34)
MCHC RBC AUTO-ENTMCNC: 32.3 G/DL (ref 32–36)
MCV RBC AUTO: 99 FL (ref 80–100)
PLATELET # BLD AUTO: 271 THOU/UL (ref 140–440)
PMV BLD AUTO: 11.5 FL (ref 8.5–12.5)
RBC # BLD AUTO: 4.51 MILL/UL (ref 3.8–5.4)
WBC: 13.5 THOU/UL (ref 4–11)

## 2020-05-28 ENCOUNTER — INFUSION - HEALTHEAST (OUTPATIENT)
Dept: INFUSION THERAPY | Facility: HOSPITAL | Age: 85
End: 2020-05-28

## 2020-05-28 ENCOUNTER — AMBULATORY - HEALTHEAST (OUTPATIENT)
Dept: INFUSION THERAPY | Facility: HOSPITAL | Age: 85
End: 2020-05-28

## 2020-05-28 DIAGNOSIS — D69.3 IDIOPATHIC THROMBOCYTOPENIC PURPURA (H): ICD-10-CM

## 2020-05-28 LAB
ERYTHROCYTE [DISTWIDTH] IN BLOOD BY AUTOMATED COUNT: 15.2 % (ref 11–14.5)
HCT VFR BLD AUTO: 41.8 % (ref 35–47)
HGB BLD-MCNC: 13.6 G/DL (ref 12–16)
MCH RBC QN AUTO: 31.6 PG (ref 27–34)
MCHC RBC AUTO-ENTMCNC: 32.5 G/DL (ref 32–36)
MCV RBC AUTO: 97 FL (ref 80–100)
PLATELET # BLD AUTO: 349 THOU/UL (ref 140–440)
PMV BLD AUTO: 11.2 FL (ref 8.5–12.5)
RBC # BLD AUTO: 4.31 MILL/UL (ref 3.8–5.4)
WBC: 14.7 THOU/UL (ref 4–11)

## 2020-06-04 ENCOUNTER — INFUSION - HEALTHEAST (OUTPATIENT)
Dept: INFUSION THERAPY | Facility: HOSPITAL | Age: 85
End: 2020-06-04

## 2020-06-04 ENCOUNTER — AMBULATORY - HEALTHEAST (OUTPATIENT)
Dept: INFUSION THERAPY | Facility: HOSPITAL | Age: 85
End: 2020-06-04

## 2020-06-04 DIAGNOSIS — D69.3 IDIOPATHIC THROMBOCYTOPENIC PURPURA (H): ICD-10-CM

## 2020-06-04 LAB
ERYTHROCYTE [DISTWIDTH] IN BLOOD BY AUTOMATED COUNT: 15.1 % (ref 11–14.5)
HCT VFR BLD AUTO: 42.6 % (ref 35–47)
HGB BLD-MCNC: 14.1 G/DL (ref 12–16)
MCH RBC QN AUTO: 32 PG (ref 27–34)
MCHC RBC AUTO-ENTMCNC: 33.1 G/DL (ref 32–36)
MCV RBC AUTO: 97 FL (ref 80–100)
PLATELET # BLD AUTO: 200 THOU/UL (ref 140–440)
PMV BLD AUTO: 11.9 FL (ref 8.5–12.5)
RBC # BLD AUTO: 4.4 MILL/UL (ref 3.8–5.4)
WBC: 14 THOU/UL (ref 4–11)

## 2020-06-11 ENCOUNTER — AMBULATORY - HEALTHEAST (OUTPATIENT)
Dept: INFUSION THERAPY | Facility: HOSPITAL | Age: 85
End: 2020-06-11

## 2020-06-11 ENCOUNTER — INFUSION - HEALTHEAST (OUTPATIENT)
Dept: INFUSION THERAPY | Facility: HOSPITAL | Age: 85
End: 2020-06-11

## 2020-06-11 DIAGNOSIS — D69.3 IDIOPATHIC THROMBOCYTOPENIC PURPURA (H): ICD-10-CM

## 2020-06-11 LAB
ERYTHROCYTE [DISTWIDTH] IN BLOOD BY AUTOMATED COUNT: 15.2 % (ref 11–14.5)
HCT VFR BLD AUTO: 41.9 % (ref 35–47)
HGB BLD-MCNC: 13.6 G/DL (ref 12–16)
MCH RBC QN AUTO: 31.9 PG (ref 27–34)
MCHC RBC AUTO-ENTMCNC: 32.5 G/DL (ref 32–36)
MCV RBC AUTO: 98 FL (ref 80–100)
PLATELET # BLD AUTO: 135 THOU/UL (ref 140–440)
PMV BLD AUTO: 12.2 FL (ref 8.5–12.5)
RBC # BLD AUTO: 4.26 MILL/UL (ref 3.8–5.4)
WBC: 13.7 THOU/UL (ref 4–11)

## 2020-06-18 ENCOUNTER — AMBULATORY - HEALTHEAST (OUTPATIENT)
Dept: INFUSION THERAPY | Facility: HOSPITAL | Age: 85
End: 2020-06-18

## 2020-06-18 ENCOUNTER — INFUSION - HEALTHEAST (OUTPATIENT)
Dept: INFUSION THERAPY | Facility: HOSPITAL | Age: 85
End: 2020-06-18

## 2020-06-18 DIAGNOSIS — D69.3 IDIOPATHIC THROMBOCYTOPENIC PURPURA (H): ICD-10-CM

## 2020-06-18 LAB
ERYTHROCYTE [DISTWIDTH] IN BLOOD BY AUTOMATED COUNT: 15.1 % (ref 11–14.5)
HCT VFR BLD AUTO: 43.5 % (ref 35–47)
HGB BLD-MCNC: 13.7 G/DL (ref 12–16)
MCH RBC QN AUTO: 31.4 PG (ref 27–34)
MCHC RBC AUTO-ENTMCNC: 31.5 G/DL (ref 32–36)
MCV RBC AUTO: 100 FL (ref 80–100)
PLATELET # BLD AUTO: 190 THOU/UL (ref 140–440)
PMV BLD AUTO: 12.1 FL (ref 8.5–12.5)
RBC # BLD AUTO: 4.36 MILL/UL (ref 3.8–5.4)
WBC: 13.3 THOU/UL (ref 4–11)

## 2020-06-25 ENCOUNTER — INFUSION - HEALTHEAST (OUTPATIENT)
Dept: INFUSION THERAPY | Facility: HOSPITAL | Age: 85
End: 2020-06-25

## 2020-06-25 ENCOUNTER — AMBULATORY - HEALTHEAST (OUTPATIENT)
Dept: INFUSION THERAPY | Facility: HOSPITAL | Age: 85
End: 2020-06-25

## 2020-06-25 DIAGNOSIS — D69.3 IDIOPATHIC THROMBOCYTOPENIC PURPURA (H): ICD-10-CM

## 2020-06-25 LAB
ERYTHROCYTE [DISTWIDTH] IN BLOOD BY AUTOMATED COUNT: 15.2 % (ref 11–14.5)
HCT VFR BLD AUTO: 43.9 % (ref 35–47)
HGB BLD-MCNC: 13.8 G/DL (ref 12–16)
MCH RBC QN AUTO: 31.2 PG (ref 27–34)
MCHC RBC AUTO-ENTMCNC: 31.4 G/DL (ref 32–36)
MCV RBC AUTO: 99 FL (ref 80–100)
PLATELET # BLD AUTO: 222 THOU/UL (ref 140–440)
PMV BLD AUTO: 11.8 FL (ref 8.5–12.5)
RBC # BLD AUTO: 4.43 MILL/UL (ref 3.8–5.4)
WBC: 11.5 THOU/UL (ref 4–11)

## 2020-07-02 ENCOUNTER — INFUSION - HEALTHEAST (OUTPATIENT)
Dept: INFUSION THERAPY | Facility: HOSPITAL | Age: 85
End: 2020-07-02

## 2020-07-02 ENCOUNTER — AMBULATORY - HEALTHEAST (OUTPATIENT)
Dept: INFUSION THERAPY | Facility: HOSPITAL | Age: 85
End: 2020-07-02

## 2020-07-02 DIAGNOSIS — D69.3 IDIOPATHIC THROMBOCYTOPENIC PURPURA (H): ICD-10-CM

## 2020-07-02 LAB
ERYTHROCYTE [DISTWIDTH] IN BLOOD BY AUTOMATED COUNT: 15.2 % (ref 11–14.5)
HCT VFR BLD AUTO: 42.5 % (ref 35–47)
HGB BLD-MCNC: 13.6 G/DL (ref 12–16)
MCH RBC QN AUTO: 31.9 PG (ref 27–34)
MCHC RBC AUTO-ENTMCNC: 32 G/DL (ref 32–36)
MCV RBC AUTO: 100 FL (ref 80–100)
PLATELET # BLD AUTO: 232 THOU/UL (ref 140–440)
PMV BLD AUTO: 11.7 FL (ref 8.5–12.5)
RBC # BLD AUTO: 4.26 MILL/UL (ref 3.8–5.4)
WBC: 12.5 THOU/UL (ref 4–11)

## 2020-07-03 ENCOUNTER — COMMUNICATION - HEALTHEAST (OUTPATIENT)
Dept: INTERNAL MEDICINE | Facility: CLINIC | Age: 85
End: 2020-07-03

## 2020-07-08 ENCOUNTER — COMMUNICATION - HEALTHEAST (OUTPATIENT)
Dept: INTERNAL MEDICINE | Facility: CLINIC | Age: 85
End: 2020-07-08

## 2020-07-08 ENCOUNTER — OFFICE VISIT - HEALTHEAST (OUTPATIENT)
Dept: INTERNAL MEDICINE | Facility: CLINIC | Age: 85
End: 2020-07-08

## 2020-07-08 DIAGNOSIS — Z53.09 CONTRAINDICATION TO ANTICOAGULATION THERAPY: ICD-10-CM

## 2020-07-08 DIAGNOSIS — R73.03 PREDIABETES: ICD-10-CM

## 2020-07-08 DIAGNOSIS — Z00.00 ENCOUNTER FOR MEDICARE ANNUAL WELLNESS EXAM: ICD-10-CM

## 2020-07-08 DIAGNOSIS — Z86.711 HISTORY OF PULMONARY EMBOLISM: ICD-10-CM

## 2020-07-08 DIAGNOSIS — D69.3 IDIOPATHIC THROMBOCYTOPENIC PURPURA (H): ICD-10-CM

## 2020-07-08 DIAGNOSIS — G30.0 EARLY ONSET ALZHEIMER'S DEMENTIA WITHOUT BEHAVIORAL DISTURBANCE (H): ICD-10-CM

## 2020-07-08 DIAGNOSIS — F02.80 EARLY ONSET ALZHEIMER'S DEMENTIA WITHOUT BEHAVIORAL DISTURBANCE (H): ICD-10-CM

## 2020-07-08 DIAGNOSIS — I48.0 PAROXYSMAL ATRIAL FIBRILLATION (H): ICD-10-CM

## 2020-07-08 LAB
ALBUMIN SERPL-MCNC: 4 G/DL (ref 3.5–5)
ALP SERPL-CCNC: 100 U/L (ref 45–120)
ALT SERPL W P-5'-P-CCNC: 19 U/L (ref 0–45)
ANION GAP SERPL CALCULATED.3IONS-SCNC: 14 MMOL/L (ref 5–18)
AST SERPL W P-5'-P-CCNC: 28 U/L (ref 0–40)
BILIRUB SERPL-MCNC: 0.6 MG/DL (ref 0–1)
BUN SERPL-MCNC: 17 MG/DL (ref 8–28)
CALCIUM SERPL-MCNC: 9.3 MG/DL (ref 8.5–10.5)
CHLORIDE BLD-SCNC: 105 MMOL/L (ref 98–107)
CO2 SERPL-SCNC: 22 MMOL/L (ref 22–31)
CREAT SERPL-MCNC: 0.96 MG/DL (ref 0.6–1.1)
GFR SERPL CREATININE-BSD FRML MDRD: 55 ML/MIN/1.73M2
GLUCOSE BLD-MCNC: 93 MG/DL (ref 70–125)
HBA1C MFR BLD: 5.4 % (ref 3.5–6)
POTASSIUM BLD-SCNC: 4.4 MMOL/L (ref 3.5–5)
PROT SERPL-MCNC: 7.4 G/DL (ref 6–8)
SODIUM SERPL-SCNC: 141 MMOL/L (ref 136–145)

## 2020-07-08 ASSESSMENT — MIFFLIN-ST. JEOR: SCORE: 904.89

## 2020-07-08 ASSESSMENT — PATIENT HEALTH QUESTIONNAIRE - PHQ9: SUM OF ALL RESPONSES TO PHQ QUESTIONS 1-9: 0

## 2020-07-09 ENCOUNTER — AMBULATORY - HEALTHEAST (OUTPATIENT)
Dept: INFUSION THERAPY | Facility: HOSPITAL | Age: 85
End: 2020-07-09

## 2020-07-09 ENCOUNTER — INFUSION - HEALTHEAST (OUTPATIENT)
Dept: INFUSION THERAPY | Facility: HOSPITAL | Age: 85
End: 2020-07-09

## 2020-07-09 DIAGNOSIS — D69.3 IDIOPATHIC THROMBOCYTOPENIC PURPURA (H): ICD-10-CM

## 2020-07-09 LAB
ERYTHROCYTE [DISTWIDTH] IN BLOOD BY AUTOMATED COUNT: 15 % (ref 11–14.5)
HCT VFR BLD AUTO: 46 % (ref 35–47)
HGB BLD-MCNC: 14.7 G/DL (ref 12–16)
MCH RBC QN AUTO: 31.8 PG (ref 27–34)
MCHC RBC AUTO-ENTMCNC: 32 G/DL (ref 32–36)
MCV RBC AUTO: 100 FL (ref 80–100)
PLATELET # BLD AUTO: 194 THOU/UL (ref 140–440)
PMV BLD AUTO: 11.7 FL (ref 8.5–12.5)
RBC # BLD AUTO: 4.62 MILL/UL (ref 3.8–5.4)
WBC: 13 THOU/UL (ref 4–11)

## 2020-07-10 ENCOUNTER — OFFICE VISIT - HEALTHEAST (OUTPATIENT)
Dept: FAMILY MEDICINE | Facility: CLINIC | Age: 85
End: 2020-07-10

## 2020-07-10 ENCOUNTER — COMMUNICATION - HEALTHEAST (OUTPATIENT)
Dept: SCHEDULING | Facility: CLINIC | Age: 85
End: 2020-07-10

## 2020-07-10 DIAGNOSIS — H10.11 ALLERGIC CONJUNCTIVITIS, RIGHT: ICD-10-CM

## 2020-07-10 DIAGNOSIS — L03.211 CELLULITIS, FACE: ICD-10-CM

## 2020-07-10 DIAGNOSIS — H01.113 ALLERGIC BLEPHARITIS, RIGHT: ICD-10-CM

## 2020-07-10 DIAGNOSIS — R22.0 FACIAL SWELLING: ICD-10-CM

## 2020-07-15 ENCOUNTER — COMMUNICATION - HEALTHEAST (OUTPATIENT)
Dept: SCHEDULING | Facility: CLINIC | Age: 85
End: 2020-07-15

## 2020-07-16 ENCOUNTER — AMBULATORY - HEALTHEAST (OUTPATIENT)
Dept: INFUSION THERAPY | Facility: HOSPITAL | Age: 85
End: 2020-07-16

## 2020-07-16 ENCOUNTER — INFUSION - HEALTHEAST (OUTPATIENT)
Dept: INFUSION THERAPY | Facility: HOSPITAL | Age: 85
End: 2020-07-16

## 2020-07-16 ENCOUNTER — OFFICE VISIT - HEALTHEAST (OUTPATIENT)
Dept: INTERNAL MEDICINE | Facility: CLINIC | Age: 85
End: 2020-07-16

## 2020-07-16 DIAGNOSIS — D69.3 IDIOPATHIC THROMBOCYTOPENIC PURPURA (H): ICD-10-CM

## 2020-07-16 DIAGNOSIS — B02.8 HERPES ZOSTER WITH COMPLICATION: ICD-10-CM

## 2020-07-16 LAB
ERYTHROCYTE [DISTWIDTH] IN BLOOD BY AUTOMATED COUNT: 15.5 % (ref 11–14.5)
HCT VFR BLD AUTO: 46.2 % (ref 35–47)
HGB BLD-MCNC: 14.9 G/DL (ref 12–16)
MCH RBC QN AUTO: 31.7 PG (ref 27–34)
MCHC RBC AUTO-ENTMCNC: 32.3 G/DL (ref 32–36)
MCV RBC AUTO: 98 FL (ref 80–100)
PLATELET # BLD AUTO: 248 THOU/UL (ref 140–440)
PMV BLD AUTO: 11.3 FL (ref 8.5–12.5)
RBC # BLD AUTO: 4.7 MILL/UL (ref 3.8–5.4)
WBC: 16.8 THOU/UL (ref 4–11)

## 2020-07-20 ENCOUNTER — RECORDS - HEALTHEAST (OUTPATIENT)
Dept: ADMINISTRATIVE | Facility: OTHER | Age: 85
End: 2020-07-20

## 2020-07-23 ENCOUNTER — INFUSION - HEALTHEAST (OUTPATIENT)
Dept: INFUSION THERAPY | Facility: HOSPITAL | Age: 85
End: 2020-07-23

## 2020-07-23 ENCOUNTER — AMBULATORY - HEALTHEAST (OUTPATIENT)
Dept: INFUSION THERAPY | Facility: HOSPITAL | Age: 85
End: 2020-07-23

## 2020-07-23 ENCOUNTER — OFFICE VISIT - HEALTHEAST (OUTPATIENT)
Dept: ONCOLOGY | Facility: HOSPITAL | Age: 85
End: 2020-07-23

## 2020-07-23 DIAGNOSIS — D69.3 IDIOPATHIC THROMBOCYTOPENIC PURPURA (H): ICD-10-CM

## 2020-07-23 LAB
ERYTHROCYTE [DISTWIDTH] IN BLOOD BY AUTOMATED COUNT: 15.6 % (ref 11–14.5)
HCT VFR BLD AUTO: 44.2 % (ref 35–47)
HGB BLD-MCNC: 14.1 G/DL (ref 12–16)
MCH RBC QN AUTO: 31.7 PG (ref 27–34)
MCHC RBC AUTO-ENTMCNC: 31.9 G/DL (ref 32–36)
MCV RBC AUTO: 99 FL (ref 80–100)
PLATELET # BLD AUTO: 307 THOU/UL (ref 140–440)
PMV BLD AUTO: 11.2 FL (ref 8.5–12.5)
RBC # BLD AUTO: 4.45 MILL/UL (ref 3.8–5.4)
WBC: 15.3 THOU/UL (ref 4–11)

## 2020-07-23 ASSESSMENT — MIFFLIN-ST. JEOR: SCORE: 903.98

## 2020-07-27 ENCOUNTER — RECORDS - HEALTHEAST (OUTPATIENT)
Dept: ADMINISTRATIVE | Facility: OTHER | Age: 85
End: 2020-07-27

## 2020-07-30 ENCOUNTER — AMBULATORY - HEALTHEAST (OUTPATIENT)
Dept: INFUSION THERAPY | Facility: HOSPITAL | Age: 85
End: 2020-07-30

## 2020-07-30 ENCOUNTER — INFUSION - HEALTHEAST (OUTPATIENT)
Dept: INFUSION THERAPY | Facility: HOSPITAL | Age: 85
End: 2020-07-30

## 2020-07-30 DIAGNOSIS — D69.3 IDIOPATHIC THROMBOCYTOPENIC PURPURA (H): ICD-10-CM

## 2020-08-06 ENCOUNTER — INFUSION - HEALTHEAST (OUTPATIENT)
Dept: INFUSION THERAPY | Facility: HOSPITAL | Age: 85
End: 2020-08-06

## 2020-08-06 ENCOUNTER — AMBULATORY - HEALTHEAST (OUTPATIENT)
Dept: INFUSION THERAPY | Facility: HOSPITAL | Age: 85
End: 2020-08-06

## 2020-08-06 DIAGNOSIS — D69.3 IDIOPATHIC THROMBOCYTOPENIC PURPURA (H): ICD-10-CM

## 2020-08-13 ENCOUNTER — INFUSION - HEALTHEAST (OUTPATIENT)
Dept: INFUSION THERAPY | Facility: HOSPITAL | Age: 85
End: 2020-08-13

## 2020-08-13 DIAGNOSIS — D69.3 IDIOPATHIC THROMBOCYTOPENIC PURPURA (H): ICD-10-CM

## 2020-08-20 ENCOUNTER — INFUSION - HEALTHEAST (OUTPATIENT)
Dept: INFUSION THERAPY | Facility: HOSPITAL | Age: 85
End: 2020-08-20

## 2020-08-20 ENCOUNTER — AMBULATORY - HEALTHEAST (OUTPATIENT)
Dept: INFUSION THERAPY | Facility: HOSPITAL | Age: 85
End: 2020-08-20

## 2020-08-20 DIAGNOSIS — D69.3 IDIOPATHIC THROMBOCYTOPENIC PURPURA (H): ICD-10-CM

## 2020-08-20 LAB
ERYTHROCYTE [DISTWIDTH] IN BLOOD BY AUTOMATED COUNT: 16.5 % (ref 11–14.5)
HCT VFR BLD AUTO: 42.9 % (ref 35–47)
HGB BLD-MCNC: 13.9 G/DL (ref 12–16)
MCH RBC QN AUTO: 32.8 PG (ref 27–34)
MCHC RBC AUTO-ENTMCNC: 32.4 G/DL (ref 32–36)
MCV RBC AUTO: 101 FL (ref 80–100)
PLATELET # BLD AUTO: 350 THOU/UL (ref 140–440)
PMV BLD AUTO: 11 FL (ref 8.5–12.5)
RBC # BLD AUTO: 4.24 MILL/UL (ref 3.8–5.4)
WBC: 17 THOU/UL (ref 4–11)

## 2020-08-27 ENCOUNTER — AMBULATORY - HEALTHEAST (OUTPATIENT)
Dept: INFUSION THERAPY | Facility: HOSPITAL | Age: 85
End: 2020-08-27

## 2020-08-27 ENCOUNTER — INFUSION - HEALTHEAST (OUTPATIENT)
Dept: INFUSION THERAPY | Facility: HOSPITAL | Age: 85
End: 2020-08-27

## 2020-08-27 DIAGNOSIS — D69.3 IDIOPATHIC THROMBOCYTOPENIC PURPURA (H): ICD-10-CM

## 2020-09-03 ENCOUNTER — INFUSION - HEALTHEAST (OUTPATIENT)
Dept: INFUSION THERAPY | Facility: HOSPITAL | Age: 85
End: 2020-09-03

## 2020-09-03 DIAGNOSIS — D69.3 IDIOPATHIC THROMBOCYTOPENIC PURPURA (H): ICD-10-CM

## 2020-09-10 ENCOUNTER — INFUSION - HEALTHEAST (OUTPATIENT)
Dept: INFUSION THERAPY | Facility: HOSPITAL | Age: 85
End: 2020-09-10

## 2020-09-10 DIAGNOSIS — D69.3 IDIOPATHIC THROMBOCYTOPENIC PURPURA (H): ICD-10-CM

## 2020-09-17 ENCOUNTER — INFUSION - HEALTHEAST (OUTPATIENT)
Dept: INFUSION THERAPY | Facility: HOSPITAL | Age: 85
End: 2020-09-17

## 2020-09-17 ENCOUNTER — AMBULATORY - HEALTHEAST (OUTPATIENT)
Dept: INFUSION THERAPY | Facility: HOSPITAL | Age: 85
End: 2020-09-17

## 2020-09-17 DIAGNOSIS — D69.3 IDIOPATHIC THROMBOCYTOPENIC PURPURA (H): ICD-10-CM

## 2020-09-17 LAB
ERYTHROCYTE [DISTWIDTH] IN BLOOD BY AUTOMATED COUNT: 15.7 % (ref 11–14.5)
HCT VFR BLD AUTO: 43 % (ref 35–47)
HGB BLD-MCNC: 14.1 G/DL (ref 12–16)
MCH RBC QN AUTO: 33.5 PG (ref 27–34)
MCHC RBC AUTO-ENTMCNC: 32.8 G/DL (ref 32–36)
MCV RBC AUTO: 102 FL (ref 80–100)
PLATELET # BLD AUTO: 336 THOU/UL (ref 140–440)
PMV BLD AUTO: 11 FL (ref 8.5–12.5)
RBC # BLD AUTO: 4.21 MILL/UL (ref 3.8–5.4)
WBC: 14.1 THOU/UL (ref 4–11)

## 2020-09-24 ENCOUNTER — INFUSION - HEALTHEAST (OUTPATIENT)
Dept: INFUSION THERAPY | Facility: HOSPITAL | Age: 85
End: 2020-09-24

## 2020-09-24 DIAGNOSIS — D69.3 IDIOPATHIC THROMBOCYTOPENIC PURPURA (H): ICD-10-CM

## 2020-10-01 ENCOUNTER — INFUSION - HEALTHEAST (OUTPATIENT)
Dept: INFUSION THERAPY | Facility: HOSPITAL | Age: 85
End: 2020-10-01

## 2020-10-01 DIAGNOSIS — D69.3 IDIOPATHIC THROMBOCYTOPENIC PURPURA (H): ICD-10-CM

## 2020-10-08 ENCOUNTER — INFUSION - HEALTHEAST (OUTPATIENT)
Dept: INFUSION THERAPY | Facility: HOSPITAL | Age: 85
End: 2020-10-08

## 2020-10-08 DIAGNOSIS — D69.3 IDIOPATHIC THROMBOCYTOPENIC PURPURA (H): ICD-10-CM

## 2020-10-15 ENCOUNTER — INFUSION - HEALTHEAST (OUTPATIENT)
Dept: INFUSION THERAPY | Facility: HOSPITAL | Age: 85
End: 2020-10-15

## 2020-10-15 ENCOUNTER — AMBULATORY - HEALTHEAST (OUTPATIENT)
Dept: INFUSION THERAPY | Facility: HOSPITAL | Age: 85
End: 2020-10-15

## 2020-10-15 DIAGNOSIS — D69.3 IDIOPATHIC THROMBOCYTOPENIC PURPURA (H): ICD-10-CM

## 2020-10-15 LAB
ERYTHROCYTE [DISTWIDTH] IN BLOOD BY AUTOMATED COUNT: 15.1 % (ref 11–14.5)
HCT VFR BLD AUTO: 42.6 % (ref 35–47)
HGB BLD-MCNC: 13.6 G/DL (ref 12–16)
MCH RBC QN AUTO: 32.2 PG (ref 27–34)
MCHC RBC AUTO-ENTMCNC: 31.9 G/DL (ref 32–36)
MCV RBC AUTO: 101 FL (ref 80–100)
PLATELET # BLD AUTO: 254 THOU/UL (ref 140–440)
PMV BLD AUTO: 11.3 FL (ref 8.5–12.5)
RBC # BLD AUTO: 4.22 MILL/UL (ref 3.8–5.4)
WBC: 14 THOU/UL (ref 4–11)

## 2020-10-22 ENCOUNTER — INFUSION - HEALTHEAST (OUTPATIENT)
Dept: INFUSION THERAPY | Facility: HOSPITAL | Age: 85
End: 2020-10-22

## 2020-10-22 DIAGNOSIS — D69.3 IDIOPATHIC THROMBOCYTOPENIC PURPURA (H): ICD-10-CM

## 2020-10-29 ENCOUNTER — INFUSION - HEALTHEAST (OUTPATIENT)
Dept: INFUSION THERAPY | Facility: HOSPITAL | Age: 85
End: 2020-10-29

## 2020-10-29 DIAGNOSIS — D69.3 IDIOPATHIC THROMBOCYTOPENIC PURPURA (H): ICD-10-CM

## 2020-11-05 ENCOUNTER — INFUSION - HEALTHEAST (OUTPATIENT)
Dept: INFUSION THERAPY | Facility: HOSPITAL | Age: 85
End: 2020-11-05

## 2020-11-05 DIAGNOSIS — D69.3 IDIOPATHIC THROMBOCYTOPENIC PURPURA (H): ICD-10-CM

## 2020-11-12 ENCOUNTER — INFUSION - HEALTHEAST (OUTPATIENT)
Dept: INFUSION THERAPY | Facility: HOSPITAL | Age: 85
End: 2020-11-12

## 2020-11-12 ENCOUNTER — AMBULATORY - HEALTHEAST (OUTPATIENT)
Dept: INFUSION THERAPY | Facility: HOSPITAL | Age: 85
End: 2020-11-12

## 2020-11-12 DIAGNOSIS — D69.3 IDIOPATHIC THROMBOCYTOPENIC PURPURA (H): ICD-10-CM

## 2020-11-12 LAB
ERYTHROCYTE [DISTWIDTH] IN BLOOD BY AUTOMATED COUNT: 14.6 % (ref 11–14.5)
HCT VFR BLD AUTO: 44.1 % (ref 35–47)
HGB BLD-MCNC: 14.1 G/DL (ref 12–16)
MCH RBC QN AUTO: 32.8 PG (ref 27–34)
MCHC RBC AUTO-ENTMCNC: 32 G/DL (ref 32–36)
MCV RBC AUTO: 103 FL (ref 80–100)
PLATELET # BLD AUTO: 249 THOU/UL (ref 140–440)
PMV BLD AUTO: 11.6 FL (ref 8.5–12.5)
RBC # BLD AUTO: 4.3 MILL/UL (ref 3.8–5.4)
WBC: 14 THOU/UL (ref 4–11)

## 2020-11-19 ENCOUNTER — INFUSION - HEALTHEAST (OUTPATIENT)
Dept: INFUSION THERAPY | Facility: HOSPITAL | Age: 85
End: 2020-11-19

## 2020-11-19 DIAGNOSIS — D69.3 IDIOPATHIC THROMBOCYTOPENIC PURPURA (H): ICD-10-CM

## 2020-11-27 ENCOUNTER — INFUSION - HEALTHEAST (OUTPATIENT)
Dept: INFUSION THERAPY | Facility: HOSPITAL | Age: 85
End: 2020-11-27

## 2020-11-27 DIAGNOSIS — D69.3 IDIOPATHIC THROMBOCYTOPENIC PURPURA (H): ICD-10-CM

## 2020-12-03 ENCOUNTER — INFUSION - HEALTHEAST (OUTPATIENT)
Dept: INFUSION THERAPY | Facility: HOSPITAL | Age: 85
End: 2020-12-03

## 2020-12-03 DIAGNOSIS — D69.3 IDIOPATHIC THROMBOCYTOPENIC PURPURA (H): ICD-10-CM

## 2020-12-10 ENCOUNTER — INFUSION - HEALTHEAST (OUTPATIENT)
Dept: INFUSION THERAPY | Facility: HOSPITAL | Age: 85
End: 2020-12-10

## 2020-12-10 ENCOUNTER — AMBULATORY - HEALTHEAST (OUTPATIENT)
Dept: INFUSION THERAPY | Facility: HOSPITAL | Age: 85
End: 2020-12-10

## 2020-12-10 DIAGNOSIS — D69.3 IDIOPATHIC THROMBOCYTOPENIC PURPURA (H): ICD-10-CM

## 2020-12-10 LAB
ERYTHROCYTE [DISTWIDTH] IN BLOOD BY AUTOMATED COUNT: 14.8 % (ref 11–14.5)
HCT VFR BLD AUTO: 43.1 % (ref 35–47)
HGB BLD-MCNC: 13.7 G/DL (ref 12–16)
MCH RBC QN AUTO: 32.2 PG (ref 27–34)
MCHC RBC AUTO-ENTMCNC: 31.8 G/DL (ref 32–36)
MCV RBC AUTO: 101 FL (ref 80–100)
PLATELET # BLD AUTO: 188 THOU/UL (ref 140–440)
PMV BLD AUTO: 12.3 FL (ref 8.5–12.5)
RBC # BLD AUTO: 4.25 MILL/UL (ref 3.8–5.4)
WBC: 15.8 THOU/UL (ref 4–11)

## 2020-12-17 ENCOUNTER — INFUSION - HEALTHEAST (OUTPATIENT)
Dept: INFUSION THERAPY | Facility: HOSPITAL | Age: 85
End: 2020-12-17

## 2020-12-17 DIAGNOSIS — D69.3 IDIOPATHIC THROMBOCYTOPENIC PURPURA (H): ICD-10-CM

## 2020-12-24 ENCOUNTER — INFUSION - HEALTHEAST (OUTPATIENT)
Dept: INFUSION THERAPY | Facility: HOSPITAL | Age: 85
End: 2020-12-24

## 2020-12-24 DIAGNOSIS — D69.3 IDIOPATHIC THROMBOCYTOPENIC PURPURA (H): ICD-10-CM

## 2020-12-31 ENCOUNTER — INFUSION - HEALTHEAST (OUTPATIENT)
Dept: INFUSION THERAPY | Facility: HOSPITAL | Age: 85
End: 2020-12-31

## 2020-12-31 DIAGNOSIS — D69.3 IDIOPATHIC THROMBOCYTOPENIC PURPURA (H): ICD-10-CM

## 2021-01-07 ENCOUNTER — AMBULATORY - HEALTHEAST (OUTPATIENT)
Dept: INFUSION THERAPY | Facility: HOSPITAL | Age: 86
End: 2021-01-07

## 2021-01-07 ENCOUNTER — INFUSION - HEALTHEAST (OUTPATIENT)
Dept: INFUSION THERAPY | Facility: HOSPITAL | Age: 86
End: 2021-01-07

## 2021-01-07 DIAGNOSIS — D69.3 IDIOPATHIC THROMBOCYTOPENIC PURPURA (H): ICD-10-CM

## 2021-01-07 LAB
ERYTHROCYTE [DISTWIDTH] IN BLOOD BY AUTOMATED COUNT: 14.6 % (ref 11–14.5)
HCT VFR BLD AUTO: 43.4 % (ref 35–47)
HGB BLD-MCNC: 14.1 G/DL (ref 12–16)
MCH RBC QN AUTO: 32.3 PG (ref 27–34)
MCHC RBC AUTO-ENTMCNC: 32.5 G/DL (ref 32–36)
MCV RBC AUTO: 99 FL (ref 80–100)
PLATELET # BLD AUTO: 283 THOU/UL (ref 140–440)
PMV BLD AUTO: 11 FL (ref 8.5–12.5)
RBC # BLD AUTO: 4.37 MILL/UL (ref 3.8–5.4)
WBC: 14 THOU/UL (ref 4–11)

## 2021-01-14 ENCOUNTER — INFUSION - HEALTHEAST (OUTPATIENT)
Dept: INFUSION THERAPY | Facility: HOSPITAL | Age: 86
End: 2021-01-14

## 2021-01-14 DIAGNOSIS — D69.3 IDIOPATHIC THROMBOCYTOPENIC PURPURA (H): ICD-10-CM

## 2021-01-22 ENCOUNTER — OFFICE VISIT - HEALTHEAST (OUTPATIENT)
Dept: ONCOLOGY | Facility: HOSPITAL | Age: 86
End: 2021-01-22

## 2021-01-22 ENCOUNTER — INFUSION - HEALTHEAST (OUTPATIENT)
Dept: INFUSION THERAPY | Facility: HOSPITAL | Age: 86
End: 2021-01-22

## 2021-01-22 DIAGNOSIS — D69.3 IDIOPATHIC THROMBOCYTOPENIC PURPURA (H): ICD-10-CM

## 2021-01-28 ENCOUNTER — INFUSION - HEALTHEAST (OUTPATIENT)
Dept: INFUSION THERAPY | Facility: HOSPITAL | Age: 86
End: 2021-01-28

## 2021-01-28 DIAGNOSIS — D69.3 IDIOPATHIC THROMBOCYTOPENIC PURPURA (H): ICD-10-CM

## 2021-02-04 ENCOUNTER — INFUSION - HEALTHEAST (OUTPATIENT)
Dept: INFUSION THERAPY | Facility: HOSPITAL | Age: 86
End: 2021-02-04

## 2021-02-04 ENCOUNTER — AMBULATORY - HEALTHEAST (OUTPATIENT)
Dept: INFUSION THERAPY | Facility: HOSPITAL | Age: 86
End: 2021-02-04

## 2021-02-04 DIAGNOSIS — D69.3 IDIOPATHIC THROMBOCYTOPENIC PURPURA (H): ICD-10-CM

## 2021-02-04 LAB
ERYTHROCYTE [DISTWIDTH] IN BLOOD BY AUTOMATED COUNT: 14.4 % (ref 11–14.5)
HCT VFR BLD AUTO: 44.1 % (ref 35–47)
HGB BLD-MCNC: 14.1 G/DL (ref 12–16)
MCH RBC QN AUTO: 32.3 PG (ref 27–34)
MCHC RBC AUTO-ENTMCNC: 32 G/DL (ref 32–36)
MCV RBC AUTO: 101 FL (ref 80–100)
PLATELET # BLD AUTO: 255 THOU/UL (ref 140–440)
PMV BLD AUTO: 11.5 FL (ref 8.5–12.5)
RBC # BLD AUTO: 4.36 MILL/UL (ref 3.8–5.4)
WBC: 15.4 THOU/UL (ref 4–11)

## 2021-02-11 ENCOUNTER — INFUSION - HEALTHEAST (OUTPATIENT)
Dept: INFUSION THERAPY | Facility: HOSPITAL | Age: 86
End: 2021-02-11

## 2021-02-11 DIAGNOSIS — D69.3 IDIOPATHIC THROMBOCYTOPENIC PURPURA (H): ICD-10-CM

## 2021-02-18 ENCOUNTER — INFUSION - HEALTHEAST (OUTPATIENT)
Dept: INFUSION THERAPY | Facility: HOSPITAL | Age: 86
End: 2021-02-18

## 2021-02-18 DIAGNOSIS — D69.3 IDIOPATHIC THROMBOCYTOPENIC PURPURA (H): ICD-10-CM

## 2021-02-24 ENCOUNTER — AMBULATORY - HEALTHEAST (OUTPATIENT)
Dept: ONCOLOGY | Facility: HOSPITAL | Age: 86
End: 2021-02-24

## 2021-02-25 ENCOUNTER — INFUSION - HEALTHEAST (OUTPATIENT)
Dept: INFUSION THERAPY | Facility: HOSPITAL | Age: 86
End: 2021-02-25

## 2021-02-25 DIAGNOSIS — D69.3 IDIOPATHIC THROMBOCYTOPENIC PURPURA (H): ICD-10-CM

## 2021-03-04 ENCOUNTER — INFUSION - HEALTHEAST (OUTPATIENT)
Dept: INFUSION THERAPY | Facility: HOSPITAL | Age: 86
End: 2021-03-04

## 2021-03-04 ENCOUNTER — AMBULATORY - HEALTHEAST (OUTPATIENT)
Dept: INFUSION THERAPY | Facility: HOSPITAL | Age: 86
End: 2021-03-04

## 2021-03-04 DIAGNOSIS — D69.3 IDIOPATHIC THROMBOCYTOPENIC PURPURA (H): ICD-10-CM

## 2021-03-04 LAB
ERYTHROCYTE [DISTWIDTH] IN BLOOD BY AUTOMATED COUNT: 14.6 % (ref 11–14.5)
HCT VFR BLD AUTO: 45.6 % (ref 35–47)
HGB BLD-MCNC: 14.6 G/DL (ref 12–16)
MCH RBC QN AUTO: 31.9 PG (ref 27–34)
MCHC RBC AUTO-ENTMCNC: 32 G/DL (ref 32–36)
MCV RBC AUTO: 100 FL (ref 80–100)
PLATELET # BLD AUTO: 229 THOU/UL (ref 140–440)
PMV BLD AUTO: 11.3 FL (ref 8.5–12.5)
RBC # BLD AUTO: 4.57 MILL/UL (ref 3.8–5.4)
WBC: 15.1 THOU/UL (ref 4–11)

## 2021-03-11 ENCOUNTER — INFUSION - HEALTHEAST (OUTPATIENT)
Dept: INFUSION THERAPY | Facility: HOSPITAL | Age: 86
End: 2021-03-11

## 2021-03-11 DIAGNOSIS — D69.3 IDIOPATHIC THROMBOCYTOPENIC PURPURA (H): ICD-10-CM

## 2021-03-18 ENCOUNTER — INFUSION - HEALTHEAST (OUTPATIENT)
Dept: INFUSION THERAPY | Facility: HOSPITAL | Age: 86
End: 2021-03-18

## 2021-03-18 DIAGNOSIS — D69.3 IDIOPATHIC THROMBOCYTOPENIC PURPURA (H): ICD-10-CM

## 2021-03-25 ENCOUNTER — INFUSION - HEALTHEAST (OUTPATIENT)
Dept: INFUSION THERAPY | Facility: HOSPITAL | Age: 86
End: 2021-03-25

## 2021-03-25 DIAGNOSIS — D69.3 IDIOPATHIC THROMBOCYTOPENIC PURPURA (H): ICD-10-CM

## 2021-04-01 ENCOUNTER — AMBULATORY - HEALTHEAST (OUTPATIENT)
Dept: INFUSION THERAPY | Facility: HOSPITAL | Age: 86
End: 2021-04-01

## 2021-04-01 ENCOUNTER — INFUSION - HEALTHEAST (OUTPATIENT)
Dept: INFUSION THERAPY | Facility: HOSPITAL | Age: 86
End: 2021-04-01

## 2021-04-01 DIAGNOSIS — D69.3 IDIOPATHIC THROMBOCYTOPENIC PURPURA (H): ICD-10-CM

## 2021-04-01 LAB
ERYTHROCYTE [DISTWIDTH] IN BLOOD BY AUTOMATED COUNT: 14.7 % (ref 11–14.5)
HCT VFR BLD AUTO: 46.2 % (ref 35–47)
HGB BLD-MCNC: 14.6 G/DL (ref 12–16)
MCH RBC QN AUTO: 31.6 PG (ref 27–34)
MCHC RBC AUTO-ENTMCNC: 31.6 G/DL (ref 32–36)
MCV RBC AUTO: 100 FL (ref 80–100)
PLATELET # BLD AUTO: 279 THOU/UL (ref 140–440)
PMV BLD AUTO: 11.4 FL (ref 8.5–12.5)
RBC # BLD AUTO: 4.62 MILL/UL (ref 3.8–5.4)
WBC: 13.9 THOU/UL (ref 4–11)

## 2021-04-08 ENCOUNTER — INFUSION - HEALTHEAST (OUTPATIENT)
Dept: INFUSION THERAPY | Facility: HOSPITAL | Age: 86
End: 2021-04-08

## 2021-04-08 DIAGNOSIS — D69.3 IDIOPATHIC THROMBOCYTOPENIC PURPURA (H): ICD-10-CM

## 2021-04-15 ENCOUNTER — INFUSION - HEALTHEAST (OUTPATIENT)
Dept: INFUSION THERAPY | Facility: HOSPITAL | Age: 86
End: 2021-04-15

## 2021-04-15 DIAGNOSIS — D69.3 IDIOPATHIC THROMBOCYTOPENIC PURPURA (H): ICD-10-CM

## 2021-04-22 ENCOUNTER — INFUSION - HEALTHEAST (OUTPATIENT)
Dept: INFUSION THERAPY | Facility: HOSPITAL | Age: 86
End: 2021-04-22

## 2021-04-22 DIAGNOSIS — D69.3 IDIOPATHIC THROMBOCYTOPENIC PURPURA (H): ICD-10-CM

## 2021-04-29 ENCOUNTER — AMBULATORY - HEALTHEAST (OUTPATIENT)
Dept: INFUSION THERAPY | Facility: HOSPITAL | Age: 86
End: 2021-04-29

## 2021-04-29 ENCOUNTER — INFUSION - HEALTHEAST (OUTPATIENT)
Dept: INFUSION THERAPY | Facility: HOSPITAL | Age: 86
End: 2021-04-29

## 2021-04-29 DIAGNOSIS — D69.3 IDIOPATHIC THROMBOCYTOPENIC PURPURA (H): ICD-10-CM

## 2021-04-29 LAB
ERYTHROCYTE [DISTWIDTH] IN BLOOD BY AUTOMATED COUNT: 15.1 % (ref 11–14.5)
HCT VFR BLD AUTO: 43.6 % (ref 35–47)
HGB BLD-MCNC: 14 G/DL (ref 12–16)
MCH RBC QN AUTO: 32 PG (ref 27–34)
MCHC RBC AUTO-ENTMCNC: 32.1 G/DL (ref 32–36)
MCV RBC AUTO: 100 FL (ref 80–100)
PLATELET # BLD AUTO: 135 THOU/UL (ref 140–440)
PMV BLD AUTO: 12.2 FL (ref 8.5–12.5)
RBC # BLD AUTO: 4.37 MILL/UL (ref 3.8–5.4)
WBC: 15.1 THOU/UL (ref 4–11)

## 2021-05-06 ENCOUNTER — INFUSION - HEALTHEAST (OUTPATIENT)
Dept: INFUSION THERAPY | Facility: HOSPITAL | Age: 86
End: 2021-05-06

## 2021-05-06 DIAGNOSIS — D69.3 IDIOPATHIC THROMBOCYTOPENIC PURPURA (H): ICD-10-CM

## 2021-05-13 ENCOUNTER — INFUSION - HEALTHEAST (OUTPATIENT)
Dept: INFUSION THERAPY | Facility: HOSPITAL | Age: 86
End: 2021-05-13

## 2021-05-13 DIAGNOSIS — D69.3 IDIOPATHIC THROMBOCYTOPENIC PURPURA (H): ICD-10-CM

## 2021-05-20 ENCOUNTER — RECORDS - HEALTHEAST (OUTPATIENT)
Dept: ADMINISTRATIVE | Facility: OTHER | Age: 86
End: 2021-05-20

## 2021-05-20 ENCOUNTER — INFUSION - HEALTHEAST (OUTPATIENT)
Dept: INFUSION THERAPY | Facility: HOSPITAL | Age: 86
End: 2021-05-20

## 2021-05-20 DIAGNOSIS — D69.3 IDIOPATHIC THROMBOCYTOPENIC PURPURA (H): ICD-10-CM

## 2021-05-26 VITALS — DIASTOLIC BLOOD PRESSURE: 58 MMHG | SYSTOLIC BLOOD PRESSURE: 130 MMHG | HEART RATE: 61 BPM | OXYGEN SATURATION: 97 %

## 2021-05-26 VITALS
DIASTOLIC BLOOD PRESSURE: 66 MMHG | TEMPERATURE: 97.6 F | SYSTOLIC BLOOD PRESSURE: 148 MMHG | HEART RATE: 57 BPM | OXYGEN SATURATION: 97 %

## 2021-05-26 VITALS
OXYGEN SATURATION: 96 % | TEMPERATURE: 98.1 F | DIASTOLIC BLOOD PRESSURE: 56 MMHG | SYSTOLIC BLOOD PRESSURE: 131 MMHG | HEART RATE: 55 BPM

## 2021-05-26 VITALS
OXYGEN SATURATION: 96 % | SYSTOLIC BLOOD PRESSURE: 154 MMHG | DIASTOLIC BLOOD PRESSURE: 52 MMHG | TEMPERATURE: 97.7 F | HEART RATE: 64 BPM

## 2021-05-26 VITALS — OXYGEN SATURATION: 97 % | HEART RATE: 60 BPM

## 2021-05-26 VITALS
HEART RATE: 59 BPM | OXYGEN SATURATION: 97 % | TEMPERATURE: 97.6 F | SYSTOLIC BLOOD PRESSURE: 137 MMHG | DIASTOLIC BLOOD PRESSURE: 58 MMHG

## 2021-05-26 VITALS
TEMPERATURE: 98.2 F | DIASTOLIC BLOOD PRESSURE: 61 MMHG | SYSTOLIC BLOOD PRESSURE: 133 MMHG | OXYGEN SATURATION: 97 % | HEART RATE: 60 BPM

## 2021-05-26 VITALS
TEMPERATURE: 97.7 F | SYSTOLIC BLOOD PRESSURE: 134 MMHG | DIASTOLIC BLOOD PRESSURE: 61 MMHG | HEART RATE: 64 BPM | OXYGEN SATURATION: 98 %

## 2021-05-26 VITALS
OXYGEN SATURATION: 97 % | SYSTOLIC BLOOD PRESSURE: 127 MMHG | TEMPERATURE: 98 F | DIASTOLIC BLOOD PRESSURE: 61 MMHG | HEART RATE: 61 BPM

## 2021-05-26 VITALS — SYSTOLIC BLOOD PRESSURE: 157 MMHG | DIASTOLIC BLOOD PRESSURE: 69 MMHG | HEART RATE: 58 BPM | OXYGEN SATURATION: 97 %

## 2021-05-26 VITALS — HEART RATE: 60 BPM | SYSTOLIC BLOOD PRESSURE: 142 MMHG | OXYGEN SATURATION: 99 % | DIASTOLIC BLOOD PRESSURE: 63 MMHG

## 2021-05-26 VITALS — HEART RATE: 66 BPM | DIASTOLIC BLOOD PRESSURE: 63 MMHG | SYSTOLIC BLOOD PRESSURE: 136 MMHG | OXYGEN SATURATION: 98 %

## 2021-05-27 ENCOUNTER — INFUSION - HEALTHEAST (OUTPATIENT)
Dept: INFUSION THERAPY | Facility: HOSPITAL | Age: 86
End: 2021-05-27

## 2021-05-27 ENCOUNTER — AMBULATORY - HEALTHEAST (OUTPATIENT)
Dept: INFUSION THERAPY | Facility: HOSPITAL | Age: 86
End: 2021-05-27

## 2021-05-27 VITALS
TEMPERATURE: 98.3 F | DIASTOLIC BLOOD PRESSURE: 69 MMHG | SYSTOLIC BLOOD PRESSURE: 119 MMHG | OXYGEN SATURATION: 97 % | HEART RATE: 72 BPM

## 2021-05-27 VITALS — WEIGHT: 123.3 LBS

## 2021-05-27 DIAGNOSIS — D69.3 IDIOPATHIC THROMBOCYTOPENIC PURPURA (H): ICD-10-CM

## 2021-05-27 LAB
ERYTHROCYTE [DISTWIDTH] IN BLOOD BY AUTOMATED COUNT: 14.9 % (ref 11–14.5)
HCT VFR BLD AUTO: 45.4 % (ref 35–47)
HGB BLD-MCNC: 14.4 G/DL (ref 12–16)
MCH RBC QN AUTO: 31.5 PG (ref 27–34)
MCHC RBC AUTO-ENTMCNC: 31.7 G/DL (ref 32–36)
MCV RBC AUTO: 99 FL (ref 80–100)
PLATELET # BLD AUTO: 202 THOU/UL (ref 140–440)
PMV BLD AUTO: 11.5 FL (ref 8.5–12.5)
RBC # BLD AUTO: 4.57 MILL/UL (ref 3.8–5.4)
WBC: 14.7 THOU/UL (ref 4–11)

## 2021-05-27 NOTE — PROGRESS NOTES
[facility] Certification Request    April 3, 2019      Patient: Ora Gonzalez  MR Number: [id number]  YOB: 1933  Date of Visit: 4/3/2019      Dear Ryanne Corbett:    Thank you for this referral.   We are seeing Ora Gonzalez for Physical Therapy of her gait and balance.    Medicare and/or Medicaid requires physician review and approval of the treatment plan. Please review the plan of care and verify that you agree with the therapy plan of care by co-signing this note.      Plan of Care  Authorization / Certification Start Date: 04/03/19  Authorization / Certification End Date: 06/02/19  Authorization / Certification Number of Visits: 6  Communication with: Referral Source  Patient Related Instruction: Nature of Condition;Treatment plan and rationale;Self Care instruction;Basis of treatment;Expected outcome;Body mechanics;Posture;Precautions;Next steps  Times per Week: 1  Number of Weeks: 8  Number of Visits: 6  Discharge Planning: when goals are met or pt's progress towards PT has plataued  Therapeutic Exercise: ROM;Stretching;Strengthening  Neuromuscular Reeducation: posture;balance/proprioception;TNE;core  Manual Therapy: soft tissue mobilization;myofascial release  Gait Training: to reduce falls risk and improve function  Equipment: [employer]      Goals:  Pt. will be independent with home exercise program in : 4 weeks    Pt will: increase her 30 sec STS score by >2 reps to demonstrate a decrease falls risk in 8 weeks.        If you have any questions or concerns, please don't hesitate to call.    Sincerely,      Susan De León, PT        Physician recommendation:     ___ Follow therapist's recommendation        ___ Modify therapy      *Physician co-signature indicates they certify the need for these services furnished within this plan and while under their care.        [facility]   Balance Initial Evaluation    Patient Name: Ora Gonzalez  Date of evaluation:  4/3/2019  Referral Diagnosis: Unsteady gait [R26.81]   Referring provider: Ryanne Corbett FNP  Visit Diagnosis:     ICD-10-CM    1. Unsteadiness on feet R26.81    2. Abnormality of gait R26.9    3. Generalized muscle weakness M62.81       Visit max: 20 until prior authorization    Assessment:   Ora Gonzalez is a 85 y.o. female who presents to therapy today with chief complaints of balance and gait impairments. She is a mild falls risk with an APTA 30 sec STS score of 8 reps, gait speed of 1.1 m/s, and FGA score of 18/30.   Patient will benefit from 1:1 skilled PT services to address the above limitations.     Goals:  Pt. will be independent with home exercise program in : 4 weeks    Pt will: increase her 30 sec STS score by >2 reps to demonstrate a decrease falls risk in 8 weeks.      Patient's expectations/goals are realistic.    Barriers to Learning or Achieving Goals:  No Barriers.       Plan / Patient Instructions:        Plan of Care:   Authorization / Certification Start Date: 04/03/19  Authorization / Certification End Date: 06/02/19  Authorization / Certification Number of Visits: 6  Communication with: Referral Source  Patient Related Instruction: Nature of Condition;Treatment plan and rationale;Self Care instruction;Basis of treatment;Expected outcome;Body mechanics;Posture;Precautions;Next steps  Times per Week: 1  Number of Weeks: 8  Number of Visits: 6  Discharge Planning: when goals are met or pt's progress towards PT has plataued  Therapeutic Exercise: ROM;Stretching;Strengthening  Neuromuscular Reeducation: posture;balance/proprioception;TNE;core  Manual Therapy: soft tissue mobilization;myofascial release  Gait Training: to reduce falls risk and improve function  Equipment: [employer]raband      Plan for next visit: review HEP and progress as tolerated     Subjective:         Past medical history/precautions:   Past Medical History:   Diagnosis Date     Adjustment disorder with mixed anxiety and  depressed mood 2016     Adjustment reaction to chronic stress 2016     Chronic kidney disease      Hypertension      ITP (idiopathic thrombocytopenic purpura)      Osteoporosis      Paroxysmal atrial fibrillation (H)      Pulmonary embolism (H) 2016     Thrombocytopenia (H)        Caregiver Support: none, independent in ADLs  Equipment: none      Pt walks 5 miles per day. Her doctor and son wanted the patient to come in. She is fearful of walking on ice. No falls in the past year. Pt lives on the third floor and is able to do the stairs without problems. She is getting at least 10,000 steps a day.    Fall history:None      Pain  Pain Ratin     Objective:      Note: Items left blank indicates the item was not performed or not indicated at the time of the evaluation.    Balance Examination  1. Unsteadiness on feet     2. Abnormality of gait     3. Generalized muscle weakness           Strength    Strength   L / R ROM   L / R Comments   Seated Hip Flexion 5/4      Knee Extension 5/5      Dorsi Flexion 5/5                   Supine Straight Leg Raise (degrees)       Quad activation             Side lying Hip Abduction       Hip Adduction             Prone Hip Extension       Knee Flexion             Standing Plantar Flexion* 5/5      Dorsi Flexion        *Standing PF (single heel raise with UE support for balance only):  5= 16-20 heel raises  4= 10-15 heel raises  3= 5-9 heel raises  2= 1-4 heel raises    Timed Up and Go (TUG): Trial 1: 12 seconds Trial 2: 11 seconds  Trial 3: 9 seconds  Average: 10.6 seconds  AD used? none    TUG Cognitive: 10 seconds  Task: count backwards by 3s starting at 90    30 second sit<>stand: 8 reps with rest   UE support? Across chest  Age gender norm: 20th percentile    Balance:    Firm Surface (seconds) Unstable Surface (seconds)    EO EC EO EC   Romberg (feet together) 60 sec 60 sec with increased postural sway     Sharpened Romberg (tandem) unable      Semi-Romberg (small  step) 60 sec 30 sec       SLS: L 2 sec, R 1 sec  Other Balance Test: (Kaur, Tinettti, FGA, Mini-BESTest)    Functional Gait Assessment:     Gait Level surface: (2)   Change in Gait Speed: (2)   Gait with horizontal head turns: (0)   Gait with vertical head turns: (2)   Gait and Pivot Turn: (3)   Step Over Obstacle: (2)   Gait with Narrow Base of Support: (0)   Gait with Eyes Closed: (3)   Ambulating Backwards: (2)   Steps: (2)    Total Score: 18/30          Four Square Step Test (motor planning): Trial 1: 15 seconds with 1 dowel hit Trial 2: 12 seconds with 2 dowel hits.  (>15 seconds falls risk for best of 2 trials)    Gait speed (10 meter walk test): 5.45 sec  Comfortable gait speed: 1.1 m/s  Age gender norm: 1.15 m/s  AD used? none      Treatment Today     TREATMENT MINUTES COMMENTS   Evaluation 40    Self-care/ Home management     Manual therapy     Neuromuscular Re-education 10 -see flow sheet for balance ex   Therapeutic Activity     Therapeutic Exercises 10 -educated on POC and HEP  -see flow sheet for date completed   Gait training     Modality__________________                Total 60    Blank areas are intentional and mean the treatment did not include these items.     PT Evaluation Code: (Please list factors)  Patient History/Comorbidities: see above  Examination: balance gait  Clinical Presentation: stable  Clinical Decision Making: low    Patient History/  Comorbidities Examination  (body structures and functions, activity limitations, and/or participation restrictions) Clinical Presentation Clinical Decision Making (Complexity)   No documented Comorbidities or personal factors 1-2 Elements Stable and/or uncomplicated Low   1-2 documented comorbidities or personal factor 3 Elements Evolving clinical presentation with changing characteristics Moderate   3-4 documented comorbidities or personal factors 4 or more Unstable and unpredictable High                Susan De León, PT, DPT  4/3/2019  11:00  AM

## 2021-05-27 NOTE — PROGRESS NOTES
Pt came into infusion clinic for her NPlate as ordered. Given based on PLT count of 235. Pt tolerated this well and left infusion via ambulatory. Pt will RTC as sched.

## 2021-05-27 NOTE — PROGRESS NOTES
Assessment and Plan:     1. Encounter for Medicare annual wellness exam  - Continue regular exercise and healthy diet     2. Prediabetes  - Basic Metabolic Panel  - Glycosylated Hemoglobin A1c    3. Paroxysmal atrial fibrillation (H)  4. Contraindication to anticoagulation therapy  - Rate controlled, no symptoms lightheaded or dizziness    5. Cognitive impairment  - Followed annually by [facility], Dr. Dunn  - Continue donepezil     6. Idiopathic thrombocytopenic purpura (H)  - Continue to see oncology regularly, stable     7. Unsteady gait  - We reviewed options regarding unsteady gait. Recommended LifeAlert button   - Ambulatory referral to PT/OT    8. Mixed hyperlipidemia  - Recommend against recheck as this would not . Lifestyle management only     9. Osteopenia  - 2016 report shows stability. Will consider repeating at 5 year lizandro  - Recommended 1200 mg calcium       - Shingrix vaccine recommended, she will check on cost     The patient's current medical problems were reviewed.      The following health maintenance schedule was reviewed with the patient and provided in printed form in the after visit summary:   Health Maintenance   Topic Date Due     ZOSTER VACCINES (1 of 2) 12/18/1983     FALL RISK ASSESSMENT  03/28/2020     DXA SCAN  06/29/2021     ADVANCE DIRECTIVES DISCUSSED WITH PATIENT  12/13/2021     TD 18+ HE  06/13/2027     PNEUMOCOCCAL POLYSACCHARIDE VACCINE AGE 65 AND OVER  Completed     INFLUENZA VACCINE RULE BASED  Completed     PNEUMOCOCCAL CONJUGATE VACCINE FOR ADULTS (PCV13 OR PREVNAR)  Completed        Subjective:   Chief Complaint: Ora Gonzalez is an 85 y.o. female here for an Annual Wellness visit.     HPI:  Ora Gonzalez is an 85 y.o. female here for an Annual Wellness visit. She is here with her son today.     Ora that does not have any specific concerns today but her son mentions that he has noticed imbalance.  This is particularly noticeable on uneven surfaces.  She  does have a cane and walker at home but does not use these.  Life alert type button in the past and she does not yet have this.  She does like to walk outside and does quite a bit of walking.  She has not had any falls in the past year.    He also mentions that many months or even years she has had a chronic runny nose.  It is clear drainage.  She denies sinus pressure or ear pain or fullness.  She has never tried any specific treatments for this.    H/o ITP treated with weekly N-plate infusions.  No bleeding or bruising. She sees oncology regularly.     History of adenomatous goiter. She is not having any compressive symptoms. She has declined additional workup for this unless symptoms are evolving.     Review of Systems: Please see above.  The rest of the review of systems are negative for all systems.    Patient Care Team:  Ryanne Corbett FNP as PCP - General (Nurse Practitioner)  Vinny Garcia MD as Physician (Hematology and Oncology)     Patient Active Problem List   Diagnosis     Osteopenia     Trigger Finger Of The Right Thumb     Adenomatous Goiter     Vision Problems     Idiopathic thrombocytopenic purpura (H)     Incidental pulmonary nodule, less than or equal to 3mm     Cognitive impairment     Pulmonary embolism- provoked. Treated with 3 months anticoagulation. dx 2/2016     Iron deficiency anemia     Paroxysmal atrial fibrillation (H)     Contraindication to anticoagulation therapy     Prediabetes     HLD (hyperlipidemia)     Insomnia     Past Medical History:   Diagnosis Date     Adjustment disorder with mixed anxiety and depressed mood 1/22/2016     Adjustment reaction to chronic stress 1/22/2016     Chronic kidney disease      Hypertension      ITP (idiopathic thrombocytopenic purpura)      Osteoporosis      Paroxysmal atrial fibrillation (H)      Pulmonary embolism (H) 2/8/2016     Thrombocytopenia (H)       Past Surgical History:   Procedure Laterality Date     CATARACT EXTRACTION Left  "10/2014     ESOPHAGOGASTRODUODENOSCOPY N/A 2017    Procedure: ESOPHAGOGASTRODUODENOSCOPY (EGD);  Surgeon: Juanpablo John MD;  Location: [facility] GI;  Service:      LAPAROSCOPIC SPLENECTOMY N/A 2015    Procedure: LAPAROSCOPIC TO CONVERSION TO OPEN SPLENECTOMY;  Surgeon: Herb Mckeon MD;  Location: [facility] Main OR;  Service:            [facility]  4/15/2017          VT HEMORRHOIDECTOMY INTERNAL RUBBER BAND LIGATIONS      Description: Hemorrhoidectomy;  Recorded: 2008;  Comments: with fissure      Family History   Problem Relation Age of Onset     No Medical Problems Mother          90s of \"old age\"     No Medical Problems Father      Diabetes type II Son      Other Other         multiple family members with silicosis / black lung     Parkinsonism Daughter      Alzheimer's disease Sister      No Medical Problems Brother      No Medical Problems Sister      No Medical Problems Brother      No Medical Problems Brother      No Medical Problems Son       Social History     Socioeconomic History     Marital status:      Spouse name: Not on file     Number of children: 3     Years of education: Not on file     Highest education level: Not on file   Occupational History     Occupation: Retired   Social Needs     Financial resource strain: Not on file     Food insecurity:     Worry: Not on file     Inability: Not on file     Transportation needs:     Medical: Not on file     Non-medical: Not on file   Tobacco Use     Smoking status: Former Smoker     Packs/day: 0.25     Years: 60.00     Pack years: 15.00     Last attempt to quit: 2015     Years since quitting: 3.4     Smokeless tobacco: Former User   Substance and Sexual Activity     Alcohol use: Yes     Comment: rare, maybe 1-2 drinks per year      Drug use: No     Sexual activity: No   Lifestyle     Physical activity:     Days per week: Not on file     Minutes per session: Not on file     Stress: Not on file " "  Relationships     Social connections:     Talks on phone: Not on file     Gets together: Not on file     Attends [employer] service: Not on file     Active member of club or organization: Not on file     Attends meetings of clubs or organizations: Not on file     Relationship status: Not on file     Intimate partner violence:     Fear of current or ex partner: Not on file     Emotionally abused: Not on file     Physically abused: Not on file     Forced sexual activity: Not on file   Other Topics Concern     Not on file   Social History Narrative    .      Current Outpatient Medications   Medication Sig Dispense Refill     donepezil (ARICEPT) 10 MG tablet Take 10 mg by mouth at bedtime.        No current facility-administered medications for this visit.       Objective:   Vital Signs:   Visit Vitals  /62   Pulse 70   Ht 5' 2\" (1.575 m)   Wt 118 lb (53.5 kg)   BMI 21.58 kg/m         VisionScreening:  No exam data present     PHYSICAL EXAM  Physical Exam   Constitutional: She is well-developed, well-nourished, and in no distress.   HENT:   Head: Normocephalic and atraumatic.   Right Ear: External ear normal.   Left Ear: External ear normal.   Mouth/Throat: Oropharynx is clear and moist.   Eyes: EOM are normal. Pupils are equal, round, and reactive to light. Right eye exhibits no discharge. Left eye exhibits no discharge. No scleral icterus.   Neck: Normal range of motion. No thyromegaly present.   Cardiovascular: Normal rate, normal heart sounds and intact distal pulses. Exam reveals no gallop and no friction rub.   No murmur heard.  Pulmonary/Chest: Effort normal and breath sounds normal.   Abdominal: Soft. Bowel sounds are normal.   Skin:   No bruising noted    Psychiatric: Affect and judgment normal.   Neuro: forgetful    Assessment Results 3/28/2019   Activities of Daily Living No help needed   Instrumental Activities of Daily Living No help needed   Mini Cog Total Score 3   Some recent data might be " hidden     A Mini-Cog score of 0-2 suggests the possibility of dementia, score of 3-5 suggests no dementia    Identified Health Risks:     The patient s PHQ-9 score is consistent with moderate depression.  She denies feeling depressed and has no concerns regarding this.

## 2021-05-27 NOTE — PROGRESS NOTES
Pt ambulates to infusion center for labs and treatment.  Lab results noted.  Nplate injection given as ordered without difficulty.  Pt left clinic stable to lobby.  Plan RTC as scheduled.

## 2021-05-27 NOTE — PROGRESS NOTES
"Pt ambulates to infusion center for injection.  Pt states that she is having her \"yearly physical\" after this appointment. Nplate given as ordered without difficulty.  Pt left clinic stable to lobby.  "

## 2021-05-27 NOTE — PROGRESS NOTES
Patient arrived ambulatory (accompanied by her son), seated in chair 1. Reviewed plan of care and today's treatment. Today's platelet count is 234 - no signs of bleeding. Administered NPLATE SQ, using the right upper outer arm. Covered the site with a band aid. VSS. Declined the AVS. Left unit ambulatory in stable condition at 11:13 and plans to return in one week - instructed to call with any questions or concerns.    Angela Kaur RN

## 2021-05-28 ENCOUNTER — RECORDS - HEALTHEAST (OUTPATIENT)
Dept: ADMINISTRATIVE | Facility: CLINIC | Age: 86
End: 2021-05-28

## 2021-05-28 NOTE — PROGRESS NOTES
Pt ambulates to infusion center for injection.  Lab results noted.  Nplate given SQ as ordered without difficulty.  Pt left clinic stable to lobby.  Plan RTC as scheduled.

## 2021-05-28 NOTE — PROGRESS NOTES
Pt arrived ambulatory to clinic for labs and Nplate injection.  Reviewed labs, pt needs injection.  Administered SQ injection into TRISTA.  Pt tolerated procedure well, no s/s of bleeding or swelling at site.  Pt verbalized understanding of plan of care and return to clinic.

## 2021-05-28 NOTE — PROGRESS NOTES
[facility] Daily Progress     Patient Name: Ora Gonzalez  Date: 5/1/2019  Visit #: 2/6 until 6/2/19  PTA visit #:  na  Visit Max (if applicable):  Referral Diagnosis: Unsteady gait [R26.81]   Referring provider: Ryanne Corbett FNP  Visit Diagnosis:     ICD-10-CM    1. Unsteadiness on feet R26.81    2. Abnormality of gait R26.9    3. Generalized muscle weakness M62.81        Past Medical History:   Diagnosis Date     Adjustment disorder with mixed anxiety and depressed mood 1/22/2016     Adjustment reaction to chronic stress 1/22/2016     Chronic kidney disease      Hypertension      ITP (idiopathic thrombocytopenic purpura)      Osteoporosis      Paroxysmal atrial fibrillation (H)      Pulmonary embolism (H) 2/8/2016     Thrombocytopenia (H)          Assessment:     HEP/POC compliance is  good .  Response to Intervention Pt tolerated HEP progression.  Patient is benefitting from skilled physical therapy and is making steady progress toward functional goals.  Patient is appropriate to continue with skilled physical therapy intervention, as indicated by initial plan of care.    Goal Status:  Pt. will be independent with home exercise program in : 4 weeks    Pt will: increase her 30 sec STS score by >2 reps to demonstrate a decrease falls risk in 8 weeks.      Plan / Patient Education:     Continue with initial plan of care.  Progress with home program as tolerated. follow up in 3 weeks: reassess FGA and STS, progress HEP as needed    Subjective:     Has been doing the exercises 2x/day since the IE. Feels they are getting easier. No falls or reports of LOB. She is walking 10,000 steps per day.       Objective:     Modified tandem stance: 60 sec gonzalo  Tandem stance: intermittent UE support during 1 min holds but able to correct independently  Difficulty with braiding with stepping behind and motor planning, max cueing required for behind, min to mod cueing in front        Exercises:  Exercise #1: STS  10x, 3 times per day- verbal review  Comment #1: bridge 15x 2 sets, added 2-3 sec hold  Exercise #2: clamshell 15x 2 B, slow and controlled and cues to prevent pelvic rotation  Comment #2: feet together with head turns- progressed to modified tandem stance  Exercise #3: Modified tandem stance - added head turns, 2 x 1 min holds  Comment #3: NuStep 6 min WL6.0  Exercise #4: Ab set with knee ext 10x 3-5        Treatment Today     TREATMENT MINUTES COMMENTS   Evaluation     Self-care/ Home management     Manual therapy     Neuromuscular Re-education 35 -see flow sheet for balance ex  -braiding with initial 1 UE support with max cueing, then pt completed next to railing with continued mod to max cueing to complete correctly- not added to HEP  -walking on line, 4 x 6 m   Therapeutic Activity     Therapeutic Exercises 20 -see exercise flow sheet for date completed  Pt and daughter's questions answered  Educated on POC/progress   Gait training     Modality__________________                Total 55    Blank areas are intentional and mean the treatment did not include these items.       Susan De León, PT, DPT  5/1/2019      [facility] Discharge Summary    Patient was seen for 2 visits from 4/3/19 to 5/1/19 with 0 missed appointments.  The patient attended therapy initially, but did not finish the therapy sessions prescribed.  Goals were not fully achieved. Explanation for goals not achieved: Pt cancelled her last PT appt and did not call back to re-schedule.   Patient received a home program .  The patient discontinued therapy, did not return.    Therapy will be discontinued at this time.  The patient will need a new referral to resume.    Thank you for your referral.  Susan De León, PT, DPT  6/27/2019  2:28 PM

## 2021-05-28 NOTE — PROGRESS NOTES
Pt ambulates to infusion center for treatment.  Pt voices no concerns today.  Nplate injection given as ordered.  Pt left clinic stable to lobby.  Plan RTC as scheduled.

## 2021-05-29 NOTE — PROGRESS NOTES
Pt ambulates to infusion center for labs and treatment.  Lab results noted.  Nplate injection given as ordered.  Pt left clinic stable to lobby.  Plan RTC as scheduled.

## 2021-05-29 NOTE — PROGRESS NOTES
Pt ambulates to infusion center for labs and treatment. Lab results noted.  Nplate injection given as ordered.  Pt left clinic stable to lobby accompanied by her daughter.  Plan RTC as scheduled.

## 2021-05-29 NOTE — PROGRESS NOTES
Pt ambulates to infusion center for treatment following MD visit.  Pt was seen by Dr. Garcia today and orders were approved.  Nplate injection given as ordered.  Pt left clinic stable to ignacio.  Plan RTC as scheduled.

## 2021-05-29 NOTE — PROGRESS NOTES
[facility] Hematology and Oncology Progress Note    Patient: Ora Gonzalez  MRN: [id number]  Date of Service: June 20, 2019      Assessment and Plan:    1. Immune thrombocytopenia: Overall doing well.  Continues to have a good response to weekly N-plate.  White blood cell count is stable.  Hemoglobin is normal.  No clinical evidence suggesting bone marrow fibrosis.  We will continue with weekly injections going forward.  Return to clinic in 6 months.  She is due for immunizations postsplenectomy next year.    2. Leukocytosis: Stable, mild, chronic. Follow.     ECOG Performance   ECOG Performance Status: 1    Distress Assessment  Distress Assessment Score: No distress    Pain  Currently in Pain: No/denies    Diagnosis:    1. 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 Bone marrow biopsy was unremarkable, November 2014. Thyroid functions were normal. Anticardiolipin antibodies were normal.    2. Right-sided pulmonary embolism: Provoked. Diagnosed February 8, 2016.    Treatment:    She started course of prednisone on November 14, 2014 and finished on January 5, 2015. She had a complete response. Quickly relapsed and treated with Rituxan weekly from March 12 through April 2, 2015. She responded with highest platelet count of 112.     She then relapsed in October, 2015. She was started on Promacta in October. She responded within 2-3 weeks. She then quickly lost response after dose reduction from 50 to 25mg. She was started back on Promacta 50 mg and prednisone 60 mg daily. She did not respond. She received 2 doses of IVIG on December 4 and 5, 2015. She responded briefly with a platelet count of 59 on December 7 but then quickly lost response by December 14. 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 She received 2 more doses of IVIG on December 16 and 17th with minimal response.   Splenectomy was performed on December 20, 2015. She did not respond.   We started Rituxan on December 29, 2015. Steroids were  continued. Romiplostim was started on January 5, 2016. 1 dose of WinRho was given on January 6, 2016.  Platelet response noted on January 13.  Her last dose of Rituxan was on January 20th, 2016.    She was restarted on N-plate on July 28, 2016 for relapse.    Interim History:    Myrna returns today for follow-up visit.  She continues to come in for weekly treatments.  Last seen 6 months ago.  No changes in her health over the past 6 months.  No complaints today.  No shortness of breath or fatigue.  No bruising or bleeding.    Review of Systems:    Constitutional  Constitutional (WDL): All constitutional elements are within defined limits  Neurosensory  Neurosensory (WDL): All neurosensory elements are within defined limits  Cardiovascular  Cardiovascular (WDL): All cardiovascular elements are within defined limits  Pulmonary  Respiratory (WDL): Within Defined Limits  Gastrointestinal  Gastrointestinal (WDL): All gastrointestinal elements are within defined limits  Genitourinary  Genitourinary (WDL): All genitourinary elements are within defined limits  Integumentary  Integumentary (WDL): All integumentary elements are within defined limits  Patient Coping  Patient Coping: Accepting  Accompanied by  Accompanied by: Family Member    Past History:    Past Medical History:   Diagnosis Date     Adjustment disorder with mixed anxiety and depressed mood 1/22/2016     Adjustment reaction to chronic stress 1/22/2016     Chronic kidney disease      Hypertension      ITP (idiopathic thrombocytopenic purpura)      Osteoporosis      Paroxysmal atrial fibrillation (H)      Pulmonary embolism (H) 2/8/2016     Thrombocytopenia (H)      Physical Exam:    Recent Vitals 6/20/2019   Weight 115 lbs 8 oz   BSA (m2) 1.51 m2   /65   Pulse 54   Temp 97.8   Temp src 1   SpO2 98   Some recent data might be hidden     General: patient appears stated age of 85 y.o.. Nontoxic and in no distress.   HEENT: Head: atraumatic, normocephalic.  Sclerae anicteric.  Chest:  Normal respiratory effort.   Cardiac:  No edema.   Abdomen: abdomen is non-distended  Extremities: normal tone and muscle bulk.   Skin: no lesions or rash. Warm and dry.   CNS: alert and oriented. Grossly non-focal.   Psychiatric: normal mood and affect.     Lab Results:    Recent Results (from the past 168 hour(s))   HM2 (CBC W/O DIFF)   Result Value Ref Range    WBC 12.7 (H) 4.0 - 11.0 thou/uL    RBC 4.28 3.80 - 5.40 mill/uL    Hemoglobin 13.8 12.0 - 16.0 g/dL    Hematocrit 42.9 35.0 - 47.0 %     80 - 100 fL    MCH 32.2 27.0 - 34.0 pg    MCHC 32.2 32.0 - 36.0 g/dL    RDW 14.8 (H) 11.0 - 14.5 %    Platelets 293 140 - 440 thou/uL    MPV 11.6 8.5 - 12.5 fL     Imaging:    No results found.      Signed by: Vinny Garcia MD

## 2021-05-30 VITALS — HEIGHT: 63 IN | BODY MASS INDEX: 18.75 KG/M2 | WEIGHT: 105.82 LBS

## 2021-05-30 VITALS — WEIGHT: 105.8 LBS | BODY MASS INDEX: 19.04 KG/M2

## 2021-05-30 VITALS — BODY MASS INDEX: 19.73 KG/M2 | WEIGHT: 109.6 LBS

## 2021-05-30 VITALS — BODY MASS INDEX: 20.84 KG/M2 | WEIGHT: 110.3 LBS

## 2021-05-30 VITALS — BODY MASS INDEX: 19.94 KG/M2 | WEIGHT: 110.8 LBS

## 2021-05-30 VITALS — HEIGHT: 62 IN | BODY MASS INDEX: 21.16 KG/M2 | WEIGHT: 115 LBS

## 2021-05-30 VITALS — BODY MASS INDEX: 19.46 KG/M2 | WEIGHT: 108.13 LBS

## 2021-05-30 VITALS — HEIGHT: 63 IN | BODY MASS INDEX: 20.2 KG/M2 | WEIGHT: 114 LBS

## 2021-05-30 VITALS — WEIGHT: 109.31 LBS | BODY MASS INDEX: 20.65 KG/M2

## 2021-05-30 VITALS — BODY MASS INDEX: 20.58 KG/M2 | HEIGHT: 61 IN | WEIGHT: 109 LBS

## 2021-05-30 NOTE — PROGRESS NOTES
"Pt arrived ambulatory at approximately 09:52, seated in chair 1. Waited for lab results and discussed plan of care. Patient was weighed - 115 lbs. Today's platelet count is 268. Administered NPLATE 52.4 mcg administered SQ in the right upper outer arm, and covered site with a small cotton ball and band aid. VSS. Patient is in good spirits and quite active, \"yesterday I made 9000 steps.\" Left unit ambulatory in stable condition at 11:04, and plans to return on July 5th.    Angela Kaur RN  "

## 2021-05-30 NOTE — PROGRESS NOTES
Pt came into infusion clinic for her NPlate injection as ordered. Pt tolerated this well. Pt left infusion clinic via ambulatory and will RTC as sched.

## 2021-05-30 NOTE — PROGRESS NOTES
Pt ambulates to infusion center for labs and treatment. Lab results noted. Pt voices no new concerns today.  Nplate injection given as ordered without difficulty.  Pt left clinic stable to lobby.  Plan RTC as scheduled.

## 2021-05-30 NOTE — PROGRESS NOTES
Pt ambulates to infusion center for labs and injection.  Pt voices no new concerns today.  Nplate injection given as ordered.  Pt left clinic stable to lobby.  Plan RTC as scheduled.

## 2021-05-31 VITALS — WEIGHT: 108.5 LBS | BODY MASS INDEX: 19.53 KG/M2

## 2021-05-31 VITALS — BODY MASS INDEX: 21.5 KG/M2 | WEIGHT: 119.44 LBS

## 2021-05-31 VITALS — HEIGHT: 63 IN | WEIGHT: 108 LBS | BODY MASS INDEX: 19.14 KG/M2

## 2021-05-31 VITALS — WEIGHT: 112.4 LBS | BODY MASS INDEX: 19.91 KG/M2 | HEIGHT: 63 IN

## 2021-05-31 VITALS — BODY MASS INDEX: 21.76 KG/M2 | WEIGHT: 120.9 LBS

## 2021-05-31 VITALS — BODY MASS INDEX: 20.79 KG/M2 | WEIGHT: 115.5 LBS

## 2021-05-31 VITALS — WEIGHT: 108.03 LBS | BODY MASS INDEX: 19.44 KG/M2

## 2021-05-31 VITALS — BODY MASS INDEX: 21.78 KG/M2 | WEIGHT: 121 LBS

## 2021-05-31 VITALS — BODY MASS INDEX: 19.91 KG/M2 | HEIGHT: 63 IN | WEIGHT: 112.4 LBS

## 2021-05-31 VITALS — WEIGHT: 110.6 LBS | BODY MASS INDEX: 19.91 KG/M2

## 2021-05-31 VITALS — WEIGHT: 119.3 LBS | BODY MASS INDEX: 21.47 KG/M2

## 2021-05-31 VITALS — BODY MASS INDEX: 19.31 KG/M2 | HEIGHT: 63 IN | WEIGHT: 109 LBS

## 2021-05-31 VITALS — WEIGHT: 107.8 LBS | BODY MASS INDEX: 19.4 KG/M2

## 2021-05-31 VITALS — WEIGHT: 108.8 LBS | BODY MASS INDEX: 19.58 KG/M2

## 2021-05-31 VITALS — BODY MASS INDEX: 21.03 KG/M2 | WEIGHT: 116.84 LBS

## 2021-05-31 VITALS — WEIGHT: 116.9 LBS | BODY MASS INDEX: 21.04 KG/M2

## 2021-05-31 VITALS — BODY MASS INDEX: 20.03 KG/M2 | WEIGHT: 111.3 LBS

## 2021-05-31 VITALS — BODY MASS INDEX: 19.44 KG/M2 | WEIGHT: 108 LBS

## 2021-05-31 NOTE — PROGRESS NOTES
Pt ambulates to infusion center for labs and treatment.  Lab results noted, Nplate injection given as ordered.  Pt left clinic stable to [facility].  Plan RTC as scheduled.

## 2021-05-31 NOTE — PROGRESS NOTES
Pt here for injection which was given SQ R arm without incident. Pt denies complaint. Pt d/c ambulatory to lobby to meet her son.

## 2021-05-31 NOTE — PROGRESS NOTES
Patient arrived ambulatory for injection as prescribed. Platelet count 126 this am. Patient tolerated injection with no complaints. Discharged ambulatory to home with her son.

## 2021-05-31 NOTE — PROGRESS NOTES
Ora came to chemo infusion this morning after peripheral lab draw done for her next dose of Nplate.  VSS.  Pt assessed and is feeling well today.  No bleeding or bruising.  Platelet count today was 170.   Nplate was given sq into her right upper arm sq.  She tolerated the injection well and site was covered with a bandaid.  Ora d/c from clinic ambulatory and accompanied by her son.  She is aware of her future appointment.

## 2021-06-01 VITALS — BODY MASS INDEX: 21.34 KG/M2 | HEIGHT: 62 IN | WEIGHT: 115.96 LBS

## 2021-06-01 VITALS — HEIGHT: 62 IN | BODY MASS INDEX: 21.95 KG/M2 | WEIGHT: 119.3 LBS

## 2021-06-01 VITALS — WEIGHT: 120 LBS | BODY MASS INDEX: 21.95 KG/M2

## 2021-06-01 VITALS — BODY MASS INDEX: 21.22 KG/M2 | WEIGHT: 116 LBS

## 2021-06-01 VITALS — BODY MASS INDEX: 21.44 KG/M2 | WEIGHT: 117.2 LBS

## 2021-06-01 VITALS — WEIGHT: 117.28 LBS | BODY MASS INDEX: 21.45 KG/M2

## 2021-06-01 VITALS — WEIGHT: 119 LBS | BODY MASS INDEX: 21.77 KG/M2

## 2021-06-01 VITALS — BODY MASS INDEX: 21.45 KG/M2 | WEIGHT: 117.3 LBS

## 2021-06-01 NOTE — PROGRESS NOTES
Ora came to chemo infusion this morning after peripheral lab draw done for her next dose of Nplate.  VSS.  Pt assessed and is feeling well today.  No bleeding or bruising.  Platelet count today was 198.   Nplate was given sq into her right upper arm sq.  She tolerated the injection well and site was covered with a bandaid.  Ora d/c from clinic ambulatory and accompanied by her son.  She is aware of her future appointment.

## 2021-06-01 NOTE — PROGRESS NOTES
Pt ambulates to infusion center for labs and treatment.  Pt voices no new concerns today.  Lab results noted and reviewed w/pt.  Nplate injection given as ordered.  Pt left clinic stable to lobby.  Plan RTC as scheduled.

## 2021-06-01 NOTE — PROGRESS NOTES
Pt here for injection which was given in R arm without incident. Pt denies new complaint and d/c ambulatory to lobby to meet her son. Pt aware of future appointments.

## 2021-06-02 VITALS — WEIGHT: 119 LBS | BODY MASS INDEX: 21.9 KG/M2 | HEIGHT: 62 IN

## 2021-06-02 VITALS — WEIGHT: 119.05 LBS | BODY MASS INDEX: 21.77 KG/M2

## 2021-06-02 VITALS — BODY MASS INDEX: 21.77 KG/M2 | WEIGHT: 119.05 LBS

## 2021-06-02 VITALS — BODY MASS INDEX: 21.86 KG/M2 | WEIGHT: 119.5 LBS

## 2021-06-02 VITALS — WEIGHT: 118 LBS | HEIGHT: 62 IN | BODY MASS INDEX: 21.71 KG/M2

## 2021-06-02 VITALS — WEIGHT: 118.9 LBS | BODY MASS INDEX: 21.75 KG/M2

## 2021-06-02 VITALS — BODY MASS INDEX: 21.82 KG/M2 | WEIGHT: 119.3 LBS

## 2021-06-02 VITALS — WEIGHT: 118 LBS | BODY MASS INDEX: 21.58 KG/M2

## 2021-06-02 VITALS — WEIGHT: 119.49 LBS | BODY MASS INDEX: 21.85 KG/M2

## 2021-06-02 VITALS — BODY MASS INDEX: 21.77 KG/M2 | WEIGHT: 119 LBS

## 2021-06-02 NOTE — PROGRESS NOTES
Pt ambulates to infusion center for lab and treatment.  Lab results noted.  Nplate injection given as ordered.  Pt left clinic stable to [facility].  Plan RTC as scheduled.

## 2021-06-02 NOTE — PROGRESS NOTES
Pt ambulates to infusion center for labs and treatment.  Lab results noted and reviewed w/pt.  Nplate injection given as ordered.  Pt left clinic stable to [facility].  Plan RTC as scheduled.

## 2021-06-02 NOTE — PROGRESS NOTES
Pt here ambulatory for labs and injection. Labs reviewed with pt and Nplate injection given. Pt also requesting flu shot-that was given as well.  Pt left ambulatory with daughter. RTC planned.

## 2021-06-03 ENCOUNTER — INFUSION - HEALTHEAST (OUTPATIENT)
Dept: INFUSION THERAPY | Facility: HOSPITAL | Age: 86
End: 2021-06-03

## 2021-06-03 VITALS
HEART RATE: 56 BPM | OXYGEN SATURATION: 98 % | SYSTOLIC BLOOD PRESSURE: 149 MMHG | BODY MASS INDEX: 21.36 KG/M2 | DIASTOLIC BLOOD PRESSURE: 54 MMHG | WEIGHT: 116.8 LBS | TEMPERATURE: 97.7 F

## 2021-06-03 VITALS — WEIGHT: 117 LBS | BODY MASS INDEX: 21.4 KG/M2

## 2021-06-03 VITALS — BODY MASS INDEX: 20.85 KG/M2 | WEIGHT: 114 LBS

## 2021-06-03 VITALS — WEIGHT: 115.5 LBS | BODY MASS INDEX: 21.13 KG/M2

## 2021-06-03 VITALS — WEIGHT: 116.8 LBS | BODY MASS INDEX: 21.36 KG/M2

## 2021-06-03 VITALS
BODY MASS INDEX: 21.4 KG/M2 | WEIGHT: 117 LBS | HEART RATE: 66 BPM | SYSTOLIC BLOOD PRESSURE: 133 MMHG | DIASTOLIC BLOOD PRESSURE: 63 MMHG | OXYGEN SATURATION: 98 %

## 2021-06-03 VITALS — BODY MASS INDEX: 21.03 KG/M2 | WEIGHT: 115 LBS

## 2021-06-03 VITALS — BODY MASS INDEX: 21.58 KG/M2 | WEIGHT: 118 LBS

## 2021-06-03 VITALS — WEIGHT: 114.9 LBS | BODY MASS INDEX: 21.02 KG/M2

## 2021-06-03 VITALS
WEIGHT: 117.4 LBS | SYSTOLIC BLOOD PRESSURE: 107 MMHG | BODY MASS INDEX: 21.47 KG/M2 | HEART RATE: 65 BPM | OXYGEN SATURATION: 97 % | DIASTOLIC BLOOD PRESSURE: 50 MMHG | TEMPERATURE: 97.8 F

## 2021-06-03 DIAGNOSIS — D69.3 IDIOPATHIC THROMBOCYTOPENIC PURPURA (H): ICD-10-CM

## 2021-06-03 NOTE — PROGRESS NOTES
Pt ambulates to infusion center for labs and treatment.  Lab results noted.  Nplate injection administered as ordered without difficulty.  Pt left clinic stable to lobby.

## 2021-06-04 VITALS
HEART RATE: 58 BPM | BODY MASS INDEX: 21.77 KG/M2 | DIASTOLIC BLOOD PRESSURE: 56 MMHG | SYSTOLIC BLOOD PRESSURE: 176 MMHG | TEMPERATURE: 97.5 F | WEIGHT: 119 LBS | OXYGEN SATURATION: 96 %

## 2021-06-04 VITALS
SYSTOLIC BLOOD PRESSURE: 114 MMHG | HEART RATE: 62 BPM | BODY MASS INDEX: 21.45 KG/M2 | DIASTOLIC BLOOD PRESSURE: 48 MMHG | TEMPERATURE: 98 F | OXYGEN SATURATION: 98 % | WEIGHT: 117.3 LBS

## 2021-06-04 NOTE — PROGRESS NOTES
Ora Gonzalez, 86 y.o., female arrived ambulatory to clinic at 0900 for labs and Nplate injection. Labs reviewed- platelet count 103 today. Administered Nplate SQ into TRISTA per MD order. Pt tolerated injection well, no s/s of bleeding or swelling at site. Ora Gonzalez verbalized understanding of plan of care and return to clinic. Discharged to [facility] at 1005 alert and ambulatory.

## 2021-06-05 NOTE — PROGRESS NOTES
Pt arrived ambulatory for labs and  Nplate injection.  Pts platelets 310 today so injection was given in right upper arm without problems. Pt is aware of RTC.

## 2021-06-05 NOTE — PROGRESS NOTES
[facility] Hematology and Oncology Progress Note    Patient: Ora Gonzalez  MRN: [id number]  Date of Service: January 24, 2020      Assessment and Plan:    1. Immune thrombocytopenia: Overall doing well.  Continues to respond well to Nplate.  No evidence of marrow fibrosis.  No bleeding.  We will continue to see her every 6 months.    2. Leukocytosis: Stable, mild, chronic. Follow.     ECOG Performance   ECOG Performance Status: 0    Distress Assessment  Distress Assessment Score: No distress    Pain  Currently in Pain: No/denies    Diagnosis:    1. 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 Bone marrow biopsy was unremarkable, November 2014. Thyroid functions were normal. Anticardiolipin antibodies were normal.    2. Right-sided pulmonary embolism: Provoked. Diagnosed February 8, 2016.    Treatment:    She started course of prednisone on November 14, 2014 and finished on January 5, 2015. She had a complete response. Quickly relapsed and treated with Rituxan weekly from March 12 through April 2, 2015. She responded with highest platelet count of 112.     She then relapsed in October, 2015. She was started on Promacta in October. She responded within 2-3 weeks. She then quickly lost response after dose reduction from 50 to 25mg. She was started back on Promacta 50 mg and prednisone 60 mg daily. She did not respond. She received 2 doses of IVIG on December 4 and 5, 2015. She responded briefly with a platelet count of 59 on December 7 but then quickly lost response by December 14. 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 She received 2 more doses of IVIG on December 16 and 17th with minimal response.   Splenectomy was performed on December 20, 2015. She did not respond.   We started Rituxan on December 29, 2015. Steroids were continued. Romiplostim was started on January 5, 2016. 1 dose of WinRho was given on January 6, 2016.  Platelet response noted on January 13.  Her last dose of Rituxan was on January  20th, 2016.    She was restarted on N-plate on July 28, 2016 for relapse.    Interim History:    Myrna returns today for follow-up visit.  Overall she is been doing okay.  No bleeding.  Still coming in weekly for her injections.  Energy and appetite are okay.  No abdominal discomfort.  She cannot palpate any lymphadenopathy.    Review of Systems:    Constitutional  Constitutional (WDL): All constitutional elements are within defined limits  Neurosensory  Neurosensory (WDL): All neurosensory elements are within defined limits  Cardiovascular  Cardiovascular (WDL): All cardiovascular elements are within defined limits  Pulmonary  Respiratory (WDL): Exceptions to WDL  Dyspnea: Shortness of breath with moderate exertion  Gastrointestinal  Gastrointestinal (WDL): All gastrointestinal elements are within defined limits  Genitourinary  Genitourinary (WDL): All genitourinary elements are within defined limits  Integumentary  Integumentary (WDL): All integumentary elements are within defined limits  Patient Coping  Patient Coping: Accepting  Accompanied by  Accompanied by: Family Member(son)    Past History:    Past Medical History:   Diagnosis Date     Adjustment disorder with mixed anxiety and depressed mood 1/22/2016     Adjustment reaction to chronic stress 1/22/2016     Chronic kidney disease      Hypertension      ITP (idiopathic thrombocytopenic purpura)      Osteoporosis      Paroxysmal atrial fibrillation (H)      Pulmonary embolism (H) 2/8/2016     Thrombocytopenia (H)      Physical Exam:    Recent Vitals 1/24/2020   Weight -   BSA (m2) -   /54   Pulse 69   Temp 97.8   Temp src -   SpO2 98   Some recent data might be hidden     General: patient appears stated age of 86 y.o.. Nontoxic and in no distress.   HEENT: Head: atraumatic, normocephalic. Sclerae anicteric.  Chest:  Normal respiratory effort.   Cardiac:  No edema.   Abdomen: abdomen is non-distended  Extremities: normal tone and muscle bulk.   Skin: no  lesions or rash. Warm and dry.   CNS: alert and oriented. Grossly non-focal.   Psychiatric: normal mood and affect.     Lab Results:    Recent Results (from the past 168 hour(s))   HM2(CBC w/o Differential)   Result Value Ref Range    WBC 13.4 (H) 4.0 - 11.0 thou/uL    RBC 4.35 3.80 - 5.40 mill/uL    Hemoglobin 13.6 12.0 - 16.0 g/dL    Hematocrit 43.2 35.0 - 47.0 %    MCV 99 80 - 100 fL    MCH 31.3 27.0 - 34.0 pg    MCHC 31.5 (L) 32.0 - 36.0 g/dL    RDW 14.9 (H) 11.0 - 14.5 %    Platelets 200 140 - 440 thou/uL    MPV 11.3 8.5 - 12.5 fL     Imaging:    No results found.      Signed by: Vinny Garcia MD

## 2021-06-05 NOTE — PROGRESS NOTES
Pt arrived to infusion clinic for Nplate injection. Labs reviewed. Injection given in the back of the right arm, tolerated well. Pt ambulated out of clinic independently.

## 2021-06-05 NOTE — PROGRESS NOTES
Ora Gonzalez, 86 y.o., female arrived ambulatory to clinic at 0900 for labs and Nplate injection. Labs reviewed- platelet count 184 today. Administered Nplate SQ into TRISTA per MD order. Pt tolerated injection well, no s/s of bleeding or swelling at site. Ora Gonzalez verbalized understanding of plan of care and return to clinic. Discharged to [facility] at 1028 alert and ambulatory.

## 2021-06-06 NOTE — PROGRESS NOTES
Ora arrived ambulatory to clinic for labs and Nplate injection.  Reviewed labs,-plt 184 today.   Administered Nplate SQ injection into .  Pt tolerated well, no s/s of bleeding or swelling at site.  Pt verbalized understanding of plan of care and return to clinic.  Ora dc'd A&Ox4 ambulatory and stable

## 2021-06-06 NOTE — PROGRESS NOTES
Ora came to chemo infusion this morning after peripheral lab draw done for her next dose of Nplate.  VSS.  Pt assessed and is feeling well today.  No bleeding or bruising.  Platelet count today was 174.   Nplate was given sq into her right upper arm sq.  She tolerated the injection well and site was covered with a bandaid.  Ora d/c from clinic ambulatory and accompanied by her son.  She is aware of her future appointment.

## 2021-06-06 NOTE — PROGRESS NOTES
Ora Gonzalez, 86 y.o., female arrived ambulatory to clinic at 0900 for labs and Nplate injection. Labs reviewed- platelet count 168 today. Administered Nplate SQ into TRISTA per MD order. Pt tolerated injection well, no s/s of bleeding or swelling at site. Ora Gonzalez verbalized understanding of plan of care and return to clinic. Discharged to [facility] at 1005 alert and ambulatory.

## 2021-06-06 NOTE — PROGRESS NOTES
Ora Gonzalez, 86 y.o., female arrived ambulatory to clinic at 0900 for labs and Nplate injection. Labs reviewed- platelet count 121 today. Administered Nplate SQ into R arm per MD order. Pt tolerated injection well, no s/s of bleeding or swelling at site. Ora Gonzalez verbalized understanding of plan of care and return to clinic. Discharged to [facility] at 0955 alert and ambulatory.

## 2021-06-06 NOTE — PROGRESS NOTES
PT here ambulatory for NPlate. Platelets today 119,000. Results reviewed with pt and nplate administered. PT tolerated injection without any problems. Follow up reviewed and pt dc'd steady gait

## 2021-06-07 NOTE — PROGRESS NOTES
Pt here for lab and injection. Platelets  Are 230 today so Nplate injection given and pt left ambulatory afterwards.

## 2021-06-07 NOTE — PROGRESS NOTES
Ora came to chemo infusion this morning after peripheral lab draw done for her next dose of Nplate.  VSS.  Pt assessed and is feeling well today.  No bleeding or bruising.  Platelet count today was 226.   Nplate was given sq into her right upper arm sq.  She tolerated the injection well and site was covered with a bandaid.  [id number] Ora d/c from clinic ambulatory and stable.

## 2021-06-07 NOTE — PROGRESS NOTES
Ora Gonzalez, 86 y.o., female arrived ambulatory to clinic at 0900 for labs and Nplate injection. Labs reviewed- platelet count 224 today. VSS. Administered Nplate SQ into R arm per MD order. Pt tolerated injection well, no s/s of bleeding or swelling at site. Ora Gonzalez verbalized understanding of plan of care and return to clinic. Discharged to [facility] at 1000 alert and ambulatory.

## 2021-06-07 NOTE — PROGRESS NOTES
Ora came to clinic this morning for her next dose of Romiplostim.  VSS.  Pt assessed. Platelets 185 today so Romiplostim given sq into her Left upper arm.

## 2021-06-07 NOTE — PROGRESS NOTES
Pt ambulates to infusion center for labs and injection.  Lab result noted.  Nplate injection given as ordered without difficulty.  Pt left clinic stable to lobby.  Plan RTC as scheduled.

## 2021-06-07 NOTE — PROGRESS NOTES
Pt arrived ambulatory to clinic for labs and Nplate injection.  Labs were reviewed, pt ok for treatment.  Administered SQ injection into TRISTA.  Pt tolerated procedure well, no s/s of bleeding or swelling at site.  Pt verbalized understanding of plan of care and return to clinic.

## 2021-06-07 NOTE — PROGRESS NOTES
Ora Gonzalez, 86 y.o., female arrived ambulatory to clinic at 0850 for labs and Nplate injection. Labs reviewed- platelet count 237 today. VSS. Administered Nplate SQ into R arm per MD order. Pt tolerated injection well, no s/s of bleeding or swelling at site. Ora Gonzalez verbalized understanding of plan of care and return to clinic. Discharged to [facility] at 1000 alert and ambulatory.

## 2021-06-08 NOTE — PROGRESS NOTES
Pt came into infusion clinic for labs and poss injection. Nplate given for PLT count of 155. Pt tolerated this well. Pt left infusion clinic via ambulatory and will RTC as sched.

## 2021-06-08 NOTE — PROGRESS NOTES
Pt arrived ambulatory at approximately 12:30 from [facility] floor [facility]. Platelet count today is 4000, and has active bleeding of the mouth - along with multiple bruises on her arms. Signed blood consent is on the chart. Seated pt in chair 1. Reviewed current plan of care and instructed on receiving a platelet transfusion. The potential s/sx of a platelet reaction were explained. VSS, afebrile. Placed IV easily and transfused with 1 unit of platelets, and observed patient for 30 minutes following. No ill effects noted. Contacted Dr. Garcia and scheduled pt to return on Tuesday for labs (and possible platelets). Left unit ambulatory in stable condition at 14:15, and accompanied by her son. Post transfusion discharge AVS given and explained to the patient.    Angela Kaur RN

## 2021-06-08 NOTE — PROGRESS NOTES
"Patient arrived ambulatory, accompanied by son.  States she had blood on her pillow this a.m. When she woke up and has quite a few black and blue marks.  \"I'm sure my platelets are low.\"  Lab results reviewed.  Plts 4,000.  Reviewed with Dr. Garcia.  Given N-Plate subcut in right arm without problems.  Bandaid applied.  Patient left, ambulatory, accompanied by son, for platelet transfusion on [facility]st floor [facility].  Instructed to call with questions/concerns/problems.  Patient verbalized understanding.  "

## 2021-06-08 NOTE — PROGRESS NOTES
PT here ambulatory for N plate injection. Platelets 429106 today. Nplate reviewed and given to pt with bandaid to site. Follow up reviewed and pt dc'd steady gait

## 2021-06-08 NOTE — PROGRESS NOTES
Ora came to clinic this morning for lab and her next dose of Nplate.  VSS.  Pt assessed.  She was feeling well with no signs of bleeding with only exception being a couple small bruises on her hands.  Lab results noted and reviewed with Shanae COX CNP who instructed this RN to proceed with Nplate.  No transfusion today as she is not actively bleeding.  Platelet count today is 7.  Pt received Nplate and tolerated it well.  No bleeding from injection site noted and site was covered with a bandaid.  Ora d/c from clinic ambulatory and unaccompanied.  She is aware of her future appointment.

## 2021-06-08 NOTE — PROGRESS NOTES
Patient arrived ambulatory, by self, after labs drawn.  Lab results reviewed.  N-Plate HELD today for plts of 56,000.  Patient will return in 2 weeks per Dr. Garcia with Labs and treatment as appropriate.  Patient left ambulatory, by self, in stable condition.  Instructed to call with questions/concerns/problems.

## 2021-06-08 NOTE — PROGRESS NOTES
Ora Gonzalez came in today for labs, She has many healing bruises on her arms. No active bleeding noted. Labs drawn from right AC with 23g butterfly. Lab called to report platelet count of 24 but that specimen had clumping. Report called to cancer Care. No new orders received. Ora was discharged to home. She discharged ambulatory and with her sister in law Adina at 1059.  Ora has a follow up appointment on 02/09/17. She will call sooner if having any problems. The after visit summary was given.

## 2021-06-09 ENCOUNTER — OFFICE VISIT (OUTPATIENT)
Dept: NEUROLOGY | Facility: CLINIC | Age: 86
End: 2021-06-09
Payer: COMMERCIAL

## 2021-06-09 VITALS
HEIGHT: 61 IN | SYSTOLIC BLOOD PRESSURE: 163 MMHG | HEART RATE: 71 BPM | WEIGHT: 122 LBS | DIASTOLIC BLOOD PRESSURE: 76 MMHG | BODY MASS INDEX: 23.03 KG/M2

## 2021-06-09 DIAGNOSIS — G30.1 LATE ONSET ALZHEIMER'S DISEASE WITHOUT BEHAVIORAL DISTURBANCE (H): Primary | ICD-10-CM

## 2021-06-09 DIAGNOSIS — F02.80 LATE ONSET ALZHEIMER'S DISEASE WITHOUT BEHAVIORAL DISTURBANCE (H): Primary | ICD-10-CM

## 2021-06-09 PROBLEM — R41.3 AMNESIA: Status: ACTIVE | Noted: 2021-06-09

## 2021-06-09 PROCEDURE — 99213 OFFICE O/P EST LOW 20 MIN: CPT | Performed by: PSYCHIATRY & NEUROLOGY

## 2021-06-09 RX ORDER — DONEPEZIL HYDROCHLORIDE 10 MG/1
10 TABLET, FILM COATED ORAL AT BEDTIME
COMMUNITY
Start: 2020-05-20 | End: 2021-06-09

## 2021-06-09 RX ORDER — DONEPEZIL HYDROCHLORIDE 10 MG/1
10 TABLET, FILM COATED ORAL AT BEDTIME
Qty: 90 TABLET | Refills: 3 | Status: SHIPPED | OUTPATIENT
Start: 2021-06-09 | End: 2022-06-07

## 2021-06-09 SDOH — HEALTH STABILITY: MENTAL HEALTH: HOW OFTEN DO YOU HAVE 6 OR MORE DRINKS ON ONE OCCASION?: NOT ASKED

## 2021-06-09 SDOH — HEALTH STABILITY: MENTAL HEALTH: HOW MANY STANDARD DRINKS CONTAINING ALCOHOL DO YOU HAVE ON A TYPICAL DAY?: NOT ASKED

## 2021-06-09 SDOH — HEALTH STABILITY: MENTAL HEALTH: HOW OFTEN DO YOU HAVE A DRINK CONTAINING ALCOHOL?: NOT ASKED

## 2021-06-09 ASSESSMENT — MONTREAL COGNITIVE ASSESSMENT (MOCA)
WHAT LEVEL OF EDUCATION WAS ATTAINED: 1
4. NAME EACH OF THE THREE ANIMALS SHOWN: 3
VISUOSPATIAL/EXECUTIVE SUBSCORE: 4
WHAT IS THE TOTAL SCORE (OUT OF 30): 19
6. READ LIST OF DIGITS [FORWARD/BACKWARD]: 2
9. REPEAT EACH SENTENCE: 2
13. ORIENTATION SUBSCORE: 3
12. MEMORY INDEX SCORE: 0
10. [FLUENCY] NAME WORDS STARTING WITH DESIGNATED LETTER: 0
11. FOR EACH PAIR OF WORDS, WHAT CATEGORY DO THEY BELONG TO (OUT OF 2): 2
8. SERIAL SUBTRACTION OF 7S: 1
7. [VIGILENCE] TAP WHEN HEARING DESIGNATED LETTER: 1

## 2021-06-09 ASSESSMENT — MIFFLIN-ST. JEOR: SCORE: 925.77

## 2021-06-09 NOTE — PROGRESS NOTES
"Patient arrived ambulatory - labs were drawn, and today's platelet count is 232. A copy of the lab results was given and explained to the patient. Reviewed plan, and patient verbalized understanding of \"I come every Thursday, one of my sons brings me\" - and reminded patient that on July 24th (Friday) she has an appointment with Dr. Garcia. Administered NPLATE 52.1 mcg SQ, using the right upper outer arm - covered the site with a band aid. Left the unit ambulatory in stable condition at 10:11, and plans to return in one week.    Angela Kaur RN  "

## 2021-06-09 NOTE — PROGRESS NOTES
[facility] Hematology and Oncology Progress Note    Patient: Ora Gonzalez  MRN: [id number]  Date of Service: 07/23/20          Reason for Visit    Chief Complaint   Patient presents with     Benign Hematology     Idiopathic thrombocytopenic purpura       Assessment and Plan    1. Immune thrombocytopenia: Her platelet count has generally been stable now on her romiplostim.  She is at 1 mcg/kg and is getting it every 1 week. This is keeping her platelets above 50,000. Was 66 in July 2018 and has been basically normal over the last 2 years. I will change to checking labs every 4 weeks. Continuing injection every week. When we did try to do it every other week, her platelets dropped.      2. Leukocytosis: mild, chronic. Follow. No infectious complaints.     ECOG Performance   ECOG Performance Status: 0     Distress Assessment  Distress Assessment Score: No distress    Pain  Currently in Pain: No/denies      Problem List    1. Idiopathic thrombocytopenic purpura (H)        ______________________________________________________________________________    History of Present Illness    Diagnosis:     1. 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 Bone marrow biopsy was unremarkable, November 2014. Thyroid functions were normal. Anticardiolipin antibodies were normal.     2. Right-sided pulmonary embolism: Provoked. Diagnosed February 8, 2016.     Treatment:     She started course of prednisone on November 14, 2014 and finished on January 5, 2015. She had a complete response. Quickly relapsed and treated with Rituxan weekly from March 12 through April 2, 2015. She responded with highest platelet count of 112.      She then relapsed in October, 2015. She was started on Promacta in October. She responded within 2-3 weeks. She then quickly lost response after dose reduction. She was started back on Promacta 50 mg and prednisone 60 mg daily. She did not respond. She received 2 doses of IVIG on December 4 and 5, 2015. She  responded briefly with a platelet count of 59 on December 7 but then quickly lost response by December 14. 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 She received 2 more doses of IVIG on December 16 and 17th with minimal response. Splenectomy was performed on December 20, 2015. She did not respond. We started Rituxan on December 29, 2015. Steroids were continued. Romiplostim was started on January 5, 2016. 1 dose of WinRho was given on January 6, 2016. Platelet response noted on January 13.    Her last dose of Rituxan was on Jamuary 20th, 2016.     She was restarted on endplate on July 28, 2016 for relapse.     Interim History:     Myrna returns today for follow-up visit. She states she is doing well. No major complaints. No bruising or bleeding.     Pain Status  Currently in Pain: No/denies    Review of Systems    Constitutional  Constitutional (WDL): All constitutional elements are within defined limits  Neurosensory  Neurosensory (WDL): All neurosensory elements are within defined limits  Eye   Eye Disorder (WDL): Exceptions to WDL(right eye being treated for infection)  Ear  Ear Disorder (WDL): All ear disorder elements are within defined limits  Cardiovascular  Cardiovascular (WDL): All cardiovascular elements are within defined limits  Pulmonary  Respiratory (WDL): Within Defined Limits  Gastrointestinal  Gastrointestinal (WDL): All gastrointestinal elements are within defined limits  Genitourinary  Genitourinary (WDL): All genitourinary elements are within defined limits  Lymphatic  Lymph (WDL): Assessment not pertinent to visit  Musculoskeletal and Connective Tissue  Musculoskeletal and Connetive Tissue Disorders (WDL): All Musculoskeletal and Connetive Tissue Disorder elements are within defined limits  Integumentary  Integumentary (WDL): All integumentary elements are within defined limits  Patient Coping  Patient Coping: Accepting;Open/discussion  Accompanied by  Accompanied by: Alone  Oral Chemo  "Adherence         Past History  Past Medical History:   Diagnosis Date     Adjustment disorder with mixed anxiety and depressed mood 1/22/2016     Adjustment reaction to chronic stress 1/22/2016     Chronic kidney disease      Hypertension      ITP (idiopathic thrombocytopenic purpura)      Osteoporosis      Paroxysmal atrial fibrillation (H)      Pulmonary embolism (H) 2/8/2016     Thrombocytopenia (H)        PHYSICAL EXAM  /62   Pulse 80   Ht 5' 2\" (1.575 m)   Wt 114 lb 12.8 oz (52.1 kg)   SpO2 97%   BMI 21.00 kg/m      GENERAL: no acute distress. Cooperative in conversation. Here alone due to visitor restrictions. Mask on  RESP: Regular respiratory rate. No expiratory wheezes   MUSCULOSKELETAL: no bilateral leg swelling  NEURO: non focal. Alert and oriented x3.   PSYCH: within normal limits. No depression or anxiety.  SKIN: exposed skin is dry intact.       Lab Results    Recent Results (from the past 168 hour(s))   HM2(CBC w/o Differential)   Result Value Ref Range    WBC 15.3 (H) 4.0 - 11.0 thou/uL    RBC 4.45 3.80 - 5.40 mill/uL    Hemoglobin 14.1 12.0 - 16.0 g/dL    Hematocrit 44.2 35.0 - 47.0 %    MCV 99 80 - 100 fL    MCH 31.7 27.0 - 34.0 pg    MCHC 31.9 (L) 32.0 - 36.0 g/dL    RDW 15.6 (H) 11.0 - 14.5 %    Platelets 307 140 - 440 thou/uL    MPV 11.2 8.5 - 12.5 fL       Imaging    No results found.      Signed by: Shanae Hazel CNP  "

## 2021-06-09 NOTE — PROGRESS NOTES
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  With close family support I think it is okay for her to remain in her senior apartment.  Over time she may need more assistance.             Chief Complaint/HPI:     I saw Ora for a follow-up visit today here in our [place] office.  We had a telephone visit back in May 2020.  She was late to today's visit but [facility]  She denies any difficulties.  She is living in a senior building in [place] and is very happy there.  They do get meals provided once a week or so by local restaurants.  She has a couple of kids who check in with her regularly.  She denies any difficulties with managing the apartment, bathing, dressing etc.  She has 2 kids who live here in town, Myrna Jessica and Jensen.  They check in on her regularly and call on a daily basis.            Past Medical History:    has no past medical history on file.          Past Surgical History:    has a past surgical history that includes IR Embolization Vascular Non Head Neck (4/12/2017).          Social History:     Social History     Tobacco Use     Smoking status: Former Smoker     Types: Cigarettes     Smokeless tobacco: Never Used   Substance Use Topics     Alcohol use: Not Currently             Family History:     Family History   Problem Relation Age of Onset     Depression Father      Suicidality Father      Alzheimer Disease Sister                 Allergies:   No Known Allergies          Medications:     Current Outpatient Medications:      donepezil (ARICEPT) 10 MG tablet, Take 1 tablet (10 mg) by mouth At Bedtime, Disp: 90 tablet, Rfl: 3              Physical Exam:     BP (!) 163/76 (BP Location: Right arm, Patient  "Position: Sitting)   Pulse 71   Ht 1.549 m (5' 1\")   Wt 55.3 kg (122 lb)   BMI 23.05 kg/m       She is well-groomed today, nicely dressed  Awake, alert, no aphasia, no dysarthria  MoCA score today is 19 out of 30 (23 out of 30 in April 2018).    Cranial nerves II - XII tested and intact, no nystagmus  There is no focal or generalized weakness in the extremities  Rapid alternating movements are normal on both sides  Gait is normal       Herb Dunn MD        "

## 2021-06-09 NOTE — PROGRESS NOTES
Ora came to chemo infusion following lab appointment for her next dose of Nplate.  Platelets today was 190.  Nplate given sq as ordered and was tolerated while in clinic.  Ora d/c from clinic ambulatory.

## 2021-06-09 NOTE — TELEPHONE ENCOUNTER
Called to pt and pt states that her son is decided.  Pt states that it is fine to communicate with her son but that he can no longer make it to the appointment due to other commitments.  She will sign consent to communicate when she is at her appt.  Confirmed appt time with patient    .

## 2021-06-09 NOTE — TELEPHONE ENCOUNTER
Treating for burning in the eyes, using eye drops. Has appointment tomorrow. Her children want her to be tested for covid19. She will talk with her MD tomorrow and request the test be ordered then.   Tiffany Barrientos RN  [facility]       Reason for Disposition    Information only question and nurse able to answer    Additional Information    Negative: Nursing judgment    Negative: Nursing judgment    Negative: Nursing judgment    Negative: Nursing judgment    Protocols used: NO PROTOCOL AVAILABLE - INFORMATION ONLY-A-OH

## 2021-06-09 NOTE — PROGRESS NOTES
Pt ambulates to infusion center for lab and injection.  Lab results noted and reviewed w/pt.  Pt has ongoing infection, now in only her right eye, that she is following with her primary MD.  Nplate injection given as ordered.  Pt left clinic stable to ignacio.

## 2021-06-09 NOTE — PROGRESS NOTES
Pt arrived ambulatory to clinic for labs and possible Nplate injection.  Shanae reviewed labs, pt is just slightly over cut off for Nplate.  Pt states that last time we waited 2 weeks for injection, she woke up in blood and required plt transfusion.  Shanae gave okay to give todays Nplate dose.  Administered SQ injection into right upper arm.  Pt tolerated procedure well, no s/s of bleeding or bruising at site.  Instructed pt to call clinic with any further questions or concerns.  Pt verbalized understanding of plan of care and return to clinic.

## 2021-06-09 NOTE — PROGRESS NOTES
Ora Gonzalez, 86 y.o., female arrived ambulatory to clinic at 0850 for labs and Nplate injection. Labs reviewed- platelet count 222 today. VSS. Administered Nplate SQ into R arm per MD order. Pt tolerated injection well, no s/s of bleeding or swelling at site. Ora Gonzalez verbalized understanding of plan of care and return to clinic. Discharged to [facility] at 0940 alert and ambulatory.

## 2021-06-09 NOTE — NURSING NOTE
Chief Complaint   Patient presents with     Follow Up     Pt states she is doing well. Does not feel memory has declined.     Deisy Webster, AMEYA on 6/9/2021 at 1:58 PM

## 2021-06-09 NOTE — PROGRESS NOTES
Pt ambulates to infusion center for labs and treatment.  Pt voices no new concerns.  Labs drawn per lab w/results noted.  Treatment held today for platelet count of 62.  Pt will come back in one week for a hem2 and possible injection, which was authorized by FLOR Hazel CNP.  Pt left clinic stable to lobby.  Plan RTC as scheduled.

## 2021-06-09 NOTE — NURSING NOTE
GRICEL COGNITIVE ASSESSMENT (MOCA)  Version 7.1 Original Version  VISUOSPATIAL/EXECUTIVE               COPY CUBE      [ 1   ]                                [ 0   ] DRAW CLOCK (Ten past eleven)  (3 points)    [ 1   ]                    [  1  ]               [  1  ]       Contour            Numbers     Hands POINTS                  4 / 5   NAMING    [ 1  ]                                                                        [  1  ]                                             [  1  ]  Lijuana Aragon                                Camel                   3  / 3   MEMORY Read list of words, subject must repeat them. Do 2 trials, even if 1st trial is successful. Do a recall after 5 minutes  FACE VELVET Gnosticism NOE RED No Points    1st          2nd         ATTENTION Read list of digits (1 digit/sec) Subject has to repeat in the forward order       [   1 ]   2  1  8  5  4                                [  1  ] 7 4 2                          2/2   Read list of letters. The subject must tap with his hand at each letter A. No points if > 2 errors.  [  1  ] F B A C M N A A J K L B A F A K D E A A A J A M O F A A B              1/1   Serial 7 subtraction starting at 100          [  1  ] 93         [  0  ] 86          [  0] 79          [  0  ] 72         [  0  ] 65   4 or 5 correct subtractions: 3 points,  2 or 3 correct: 2 points,  1correct: 1 point,   0 correct: 0 points           1 /3   LANGUAGE Repeat: I only know that [place] is the one to help today. [   1  ]                                      The cat always hid under the couch when dogs were in the room. [  1 ]               2/2   Fluency: Name maximum number of words in one minute that begin with the letter F                                                                                                                    [   0 ] ___ (N > 11 words)              0 /1   ABSTRACTION Similarity  between e.g. banana-orange=fruit                                                                   [  1  ] train-bicycle                      [   1] watch-ruler             2 /2   DELAYED  RECALL Has to recall words  WITH NO CUE FACE  [ 0  ] VELVET  [   0 ] Christianity  [  0  ]  NOE  [  0  ] RED  [  0 ] Points for UNCUED recall only           0 /5           OPTIONAL Category cue           Multiple choice cue          ORIENTATION  [0    ] Date     [  1  ] Month       [  0  ] Year      [   0 ] Day      [  1  ] Place        [  13  ] City        3  /6   TOTAL  Normal > 26/30 Add 1 point if < 12 years education      19 /30

## 2021-06-09 NOTE — PROGRESS NOTES
[facility] Hematology and Oncology Progress Note    Patient: Ora Gonzalez  MRN: [id number]  Date of Service: 03/23/2017        Reason for Visit    Chief Complaint   Patient presents with     Benign Hematology       Assessment and Plan    1. Immune thrombocytopenia: Her platelet count has generally been stable now on her romiplostim.  She is at 4 mcg/kg and is getting it every 2 weeks. This is keeping her platelets above 10,000. She has no purpura or bleeding. I don't think we'll be able to keep her above 50,000 without a significant dose increase. She is stable so we will continue this for now. Hold Nplate for platelets over 50.      2. Leukocytosis: mild, chronic. Follow.     ECOG Performance   ECOG Performance Status: 0     Distress Assessment  Distress Assessment Score: No distress    Pain  Currently in Pain: No/denies  Pain Score (Initial OR Reassessment): No/Denies Pain      Problem List    1. Idiopathic thrombocytopenic purpura        ______________________________________________________________________________    History of Present Illness    Diagnosis:     1. 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 Bone marrow biopsy was unremarkable, November 2014. Thyroid functions were normal. Anticardiolipin antibodies were normal.     2. Right-sided pulmonary embolism: Provoked. Diagnosed February 8, 2016.     Treatment:     She started course of prednisone on November 14, 2014 and finished on January 5, 2015. She had a complete response. Quickly relapsed and treated with Rituxan weekly from March 12 through April 2, 2015. She responded with highest platelet count of 112.      She then relapsed in October, 2015. She was started on Promacta in October. She responded within 2-3 weeks. She then quickly lost response after dose reduction. She was started back on Promacta 50 mg and prednisone 60 mg daily. She did not respond. She received 2 doses of IVIG on December 4 and 5, 2015. She responded briefly with a  platelet count of 59 on December 7 but then quickly lost response by December 14. 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 She received 2 more doses of IVIG on December 16 and 17th with minimal response. Splenectomy was performed on December 20, 2015. She did not respond. We started Rituxan on December 29, 2015. Steroids were continued. Romiplostim was started on January 5, 2016. 1 dose of WinRho was given on January 6, 2016. Platelet response noted on January 13.    Her last dose of Rituxan was on Jamuary 20th, 2016.     She was restarted on endplate on July 28, 2016 for relapse.     Interim History:     Myrna returns today for follow-up visit. She states she is doing well. No major complaints. No bruising or bleeding.     Pain Status  Currently in Pain: No/denies    Review of Systems    Constitutional  Constitutional (WDL): Exceptions to WDL  Weight Gain: 5 - <10% from baseline (up 1 lbs since last visit)  Neurosensory  Neurosensory (WDL): All neurosensory elements are within defined limits  Eye   Eye Disorder (WDL): Exceptions to WDL (Had Cataract surgery couple years ago)  Dry Eye: Asymptomatic, clinical or diagnostic observations only, mild symptoms relieved by lubricants  Ear  Ear Disorder (WDL): All ear disorder elements are within defined limits  Cardiovascular  Cardiovascular (WDL): Exceptions to WDL  Palpitations: Definition: A disorder characterized by inflammation of the muscle tissue of the heart. (heart racing last night)  Pulmonary  Respiratory (WDL): Within Defined Limits  Gastrointestinal  Gastrointestinal (WDL): All gastrointestinal elements are within defined limits  Genitourinary  Genitourinary (WDL): All genitourinary elements are within defined limits  Lymphatic  Lymph (WDL): All lymph disorder elements are within defined limits  Musculoskeletal and Connective Tissue  Musculoskeletal and Connetive Tissue Disorders (WDL): All Musculoskeletal and Connetive Tissue Disorder elements are  within defined limits  Integumentary  Integumentary (WDL): All integumentary elements are within defined limits  Patient Coping  Patient Coping: Accepting  Distress Assessment  Distress Assessment Score: No distress  Accompanied by  Accompanied by: Alone    Past History  Past Medical History:   Diagnosis Date     Adjustment disorder with mixed anxiety and depressed mood 1/22/2016     Adjustment reaction to chronic stress 1/22/2016     Chronic kidney disease      Hypertension      ITP (idiopathic thrombocytopenic purpura)      Osteoporosis      Pulmonary embolism 2/8/2016     Thrombocytopenia        PHYSICAL EXAM:  /68  Pulse 87  Temp 98.2  F (36.8  C) (Oral)   Wt 110 lb 4.8 oz (50 kg)  SpO2 98%  BMI 20.84 kg/m2    GENERAL: no acute distress. Cooperative in conversation. Here alone  HEENT: pupils are equal, round and reactive. Oromucosa is clean and intact. No ulcerations or mucositis noted. No bleeding noted.  RESP: lungs are clear bilaterally per auscultation. Regular respiratory rate. No wheezes or rhonchi.  CV: Regular, rate and rhythm. No murmurs.  ABD: soft, nontender. Positive bowel sounds. No organomegaly.   MUSCULOSKELETAL: No lower extremity swelling.   NEURO: non focal. Alert and oriented x3.   PSYCH: within normal limits. No depression or anxiety.  SKIN: warm dry intact, no significant bruising.   LYMPH: no cervical, supraclavicular lymphadenopathy    Lab Results    Recent Results (from the past 168 hour(s))   HM2(CBC w/o Differential)   Result Value Ref Range    WBC 18.2 (H) 4.0 - 11.0 thou/uL    RBC 4.68 3.80 - 5.40 mill/uL    Hemoglobin 15.0 12.0 - 16.0 g/dL    Hematocrit 44.7 35.0 - 47.0 %    MCV 96 80 - 100 fL    MCH 32.1 27.0 - 34.0 pg    MCHC 33.6 32.0 - 36.0 g/dL    RDW 15.8 (H) 11.0 - 14.5 %    Platelets 28 (LL) 140 - 440 thou/uL       Imaging    No results found.      Signed by: Shanae Hazel, MAY

## 2021-06-09 NOTE — PROGRESS NOTES
"Pt arrived ambulatory at 09:20 and was seated in chair 4. \"No bleeding or bruising since [facility] VSS. Pt is in good spirits, and stays active with [facility] classes [facility]. Administered NPLATE SQ in right arm, and covered site with a bandaid. Reviewed today's lab results with the patient and was given a copy - platelets [id number]. Writer took pt via w/c at 10:52 to front [facility] for transportation back to residence. \"Yes, I will be back on the 23rd.\"    Angela Kaur RN  "

## 2021-06-09 NOTE — TELEPHONE ENCOUNTER
Noted. I see that she has an appointment with me tomorrow and can review whether she meets testing criteria

## 2021-06-09 NOTE — PROGRESS NOTES
Assessment and Plan:       1. Encounter for Medicare annual wellness exam  Encouraged healthy diet. I suspect she is not getting enough protein, encouraged her to add 1 Boost or Ensure daily     2. Prediabetes  - Glycosylated Hemoglobin A1c  - Comprehensive Metabolic Panel    3. Early onset Alzheimer's dementia without behavioral disturbance (H)  Followed by neurology, seems to be doing well. Will request neuro office note from most recent visit     4. Paroxysmal atrial fibrillation (H)  Contraindication to anticoagulation therapy  No symptoms   Rate controlled   Unable to anticoagulate     5. History of pulmonary embolism  - No SOA. Completed anticoagulation therapy     6. Idiopathic thrombocytopenic purpura (H)  Followed by oncology and doing well         The patient's current medical problems were reviewed.      The following health maintenance schedule was reviewed with the patient and provided in printed form in the after visit summary:   Health Maintenance   Topic Date Due     ZOSTER VACCINES (1 of 2) 12/18/1983     INFLUENZA VACCINE RULE BASED (1) 08/01/2020     DXA SCAN  06/29/2021     MEDICARE ANNUAL WELLNESS VISIT  07/08/2021     FALL RISK ASSESSMENT  07/08/2021     ADVANCE CARE PLANNING  07/08/2025     TD 18+ HE  06/13/2027     PNEUMOCOCCAL IMMUNIZATION 65+ LOW/MEDIUM RISK  Completed        Subjective:   Chief Complaint: Ora Gonzalez is an 86 y.o. female here for an Annual Wellness visit.     HPI:  Ora Gonzalez is an 86 y.o. female here for an Annual Wellness visit.     Her son sent a message about c/o memory. She prepares her own meals but this is typically a TV dinner. We discussed an option to do Meals on Wheels, she had this in the past but didn't like the foods. Her son was concerned about her safety such as wandering. She denies getting lost when she goes on her frequent walks. Her last neurology evaluation was by telephone and records are not available. The year prior her MOCA was slightly  improved.     History of paroxysmal atrial fibrillation, single episode documented 4/13/2016 associated with a severe upper GI bleed.  Relative contraindication to anticoagulation with a history of ITP and recent significant upper GI bleed. She denies heart palpitations.     She has ITP followed by oncology. She has weekly N-Plate injections and is doing well     Review of Systems:  Please see above.  The rest of the review of systems are negative for all systems.    Patient Care Team:  Ryanne Corbett FNP as PCP - General (Nurse Practitioner)  Vinny Garcia MD as Physician (Hematology and Oncology)  Ryanne Corbett FNP as Assigned PCP     Patient Active Problem List   Diagnosis     Osteopenia     Trigger Finger Of The Right Thumb     Adenomatous Goiter     Vision Problems     Idiopathic thrombocytopenic purpura (H)     Incidental pulmonary nodule, less than or equal to 3mm     Iron deficiency anemia     Paroxysmal atrial fibrillation (H)     Contraindication to anticoagulation therapy     Prediabetes     HLD (hyperlipidemia)     Insomnia     Early onset Alzheimer's dementia without behavioral disturbance (H)     History of pulmonary embolism, provoked.      Past Medical History:   Diagnosis Date     Adjustment disorder with mixed anxiety and depressed mood 1/22/2016     Adjustment reaction to chronic stress 1/22/2016     Chronic kidney disease      Hypertension      ITP (idiopathic thrombocytopenic purpura)      Osteoporosis      Paroxysmal atrial fibrillation (H)      Pulmonary embolism (H) 2/8/2016     Thrombocytopenia (H)       Past Surgical History:   Procedure Laterality Date     CATARACT EXTRACTION Left 10/2014     ESOPHAGOGASTRODUODENOSCOPY N/A 4/13/2017    Procedure: ESOPHAGOGASTRODUODENOSCOPY (EGD);  Surgeon: Juanpablo John MD;  Location: [facility];  Service:      LAPAROSCOPIC SPLENECTOMY N/A 12/20/2015    Procedure: LAPAROSCOPIC TO CONVERSION TO OPEN SPLENECTOMY;  Surgeon: Herb Mckeon  "MD;  Location: [facility] OR;  Service:            caityblake link joe     [facility]  4/15/2017          [place] HEMORRHOIDECTOMY INTERNAL RUBBER BAND LIGATIONS      Description: Hemorrhoidectomy;  Recorded: 2008;  Comments: with fissure      Family History   Problem Relation Age of Onset     No Medical Problems Mother          90s of \"old age\"     No Medical Problems Father      Diabetes type II Son      Other Other         multiple family members with silicosis / black lung     Parkinsonism Daughter      Alzheimer's disease Sister      No Medical Problems Brother      No Medical Problems Sister      No Medical Problems Brother      No Medical Problems Brother      No Medical Problems Son       Social History     Socioeconomic History     Marital status:      Spouse name: Not on file     Number of children: 3     Years of education: Not on file     Highest education level: Not on file   Occupational History     Occupation: Retired   Social Needs     Financial resource strain: Not on file     Food insecurity     Worry: Not on file     Inability: Not on file     Transportation needs     Medical: Not on file     Non-medical: Not on file   Tobacco Use     Smoking status: Former Smoker     Packs/day: 0.25     Years: 60.00     Pack years: 15.00     Last attempt to quit: 2015     Years since quittin.7     Smokeless tobacco: Former User   Substance and Sexual Activity     Alcohol use: Yes     Comment: rare, maybe 1-2 drinks per year      Drug use: No     Sexual activity: Never   Lifestyle     Physical activity     Days per week: Not on file     Minutes per session: Not on file     Stress: Not on file   Relationships     Social connections     Talks on phone: Not on file     Gets together: Not on file     Attends Anabaptism service: Not on file     Active member of club or organization: Not on file     Attends meetings of clubs or organizations: Not on file     Relationship status: Not on file     " "Intimate partner violence     Fear of current or ex partner: Not on file     Emotionally abused: Not on file     Physically abused: Not on file     Forced sexual activity: Not on file   Other Topics Concern     Not on file   Social History Narrative    .      Current Outpatient Medications   Medication Sig Dispense Refill     donepezil (ARICEPT) 10 MG tablet Take 10 mg by mouth at bedtime.        cephalexin (KEFLEX) 500 MG capsule Take 1 capsule (500 mg total) by mouth 4 (four) times a day for 10 days. 40 capsule 0     olopatadine (PATANOL) 0.1 % ophthalmic solution Administer 1 drop to both eyes 2 (two) times a day. 5 mL 0     predniSONE (DELTASONE) 20 MG tablet Take 40 mg by mouth daily for 5 days. 10 tablet 0     No current facility-administered medications for this visit.       Objective:   Vital Signs:   Visit Vitals  /60   Pulse 60   Ht 5' 2\" (1.575 m)   Wt 115 lb (52.2 kg)   BMI 21.03 kg/m           VisionScreening:  No exam data present     PHYSICAL EXAM  Gen: Well developed, well nourished, no acute distress.  HEENT: normocephalic/atraumatic, PERRL/EOMI, TMs: Gray, normal light reflex, no nasal discharge.  Oral mucosa: no erythema/exudate  Neck: No LAD/masses/thyromegaly/bruits  Lungs: clear bilaterally  Heart: regular rate and rhythm, no murmurs/gallops/rubs  Abdomen: Normal bowel sounds, soft, non-tender, non-distended  Lymphatics: no supraclavicular/cervical LAD. No edema.  Neuro: A&O x 3.   Psych: Behavior appropriate, engaging  Musculoskeletal: no gross deformities.  Skin: no rashes or lesions.      Assessment Results 7/8/2020   Activities of Daily Living No help needed   Instrumental Activities of Daily Living No help needed   Mini Cog Total Score 4   Some recent data might be hidden     A Mini-Cog score of 0-2 suggests the possibility of dementia, score of 3-5 suggests no dementia      Identified Health Risks:     The patient s PHQ-9 score is consistent with moderate depression in the " past. Current PHQ9 is negative..   Patient's advanced directive was discussed and I am comfortable with the patient's wishes. She would not want to be kept alive if she is in a vegetative state but is otherwise a FULL CODE.

## 2021-06-09 NOTE — TELEPHONE ENCOUNTER
Call patient: Please see if Ora is okay with her son attending the appointment. If so, please call son to clear him to accompany her for this visit.

## 2021-06-09 NOTE — LETTER
6/9/2021         RE: Ora Gonzalez  [address]  [address]  [place] [address]        Dear Colleague,    Thank you for referring your patient, Ora Gonzalez, to the [facility] [place]. Please see a copy of my visit note below.    [facility]  [place]    Ora Gonzalez MRN# [id number]   Age: 87 year old YOB: 1933               Assessment and Plan:   Assessment:   Dementia consistent with Alzheimer's disease -- very slow progression        Plan:     I renewed the Aricept today for the coming year.  With close family support I think it is okay for her to remain in her senior apartment.  Over time she may need more assistance.             Chief Complaint/HPI:     I saw Ora for a follow-up visit today here in our [place] office.  We had a telephone visit back in May 2020.  She was late to today's visit but [facility]  She denies any difficulties.  She is living in a senior building in [place] and is very happy there.  They do get meals provided once a week or so by local restaurants.  She has a couple of kids who check in with her regularly.  She denies any difficulties with managing the apartment, bathing, dressing etc.  She has 2 kids who live here in town, Myrna Jessica and Jensen.  They check in on her regularly and call on a daily basis.            Past Medical History:    has no past medical history on file.          Past Surgical History:    has a past surgical history that includes IR Embolization Vascular Non Head Neck (4/12/2017).          Social History:     Social History     Tobacco Use     Smoking status: Former Smoker     Types: Cigarettes     Smokeless tobacco: Never Used   Substance Use Topics     Alcohol use: Not Currently             Family History:     Family History   Problem Relation Age of Onset     Depression Father      Suicidality Father      Alzheimer Disease Sister                 Allergies:  "  No Known Allergies          Medications:     Current Outpatient Medications:      donepezil (ARICEPT) 10 MG tablet, Take 1 tablet (10 mg) by mouth At Bedtime, Disp: 90 tablet, Rfl: 3              Physical Exam:     BP (!) 163/76 (BP Location: Right arm, Patient Position: Sitting)   Pulse 71   Ht 1.549 m (5' 1\")   Wt 55.3 kg (122 lb)   BMI 23.05 kg/m       She is well-groomed today, nicely dressed  Awake, alert, no aphasia, no dysarthria  MoCA score today is 19 out of 30 (23 out of 30 in April 2018).    Cranial nerves II - XII tested and intact, no nystagmus  There is no focal or generalized weakness in the extremities  Rapid alternating movements are normal on both sides  Gait is normal       Herb Dunn MD            Again, thank you for allowing me to participate in the care of your patient.        Sincerely,        Herb Dunn MD    "

## 2021-06-09 NOTE — PROGRESS NOTES
Pt ambulates to infusion center for injection following lab and NP visit.  Pt was seen by FLOR Hazel CNP and orders approved.  Nplate injection given as ordered.  Pt left clinic [facility] to [facility].  Plan RTC as scheduled.

## 2021-06-09 NOTE — PROGRESS NOTES
Patient arrived ambulatory, by self, after seeing NP.  States she wants to come weekly so she does not have to go through bleeding and platelet transfusion.  Plts 29,000 today.  Given N-Plate subcut in right arm without problems.  Bandaid applied.  Patient left ambulatory, by self, in stable condition.  States she has next appt scheduled.  Instructed to call with questions/concerns/problems.  Patient verbalized understanding.

## 2021-06-09 NOTE — TELEPHONE ENCOUNTER
Triage call:   Patient calling about eye swelling that started yesterday.     She reports that her right eye is almost shut   Left is also swollen as well   She reports slight drainage from her eyes as well  Redness   No itching   Eyes are painful when she wipes the drainage out of her eye   Not happened before    COVID 19 Nurse Triage Plan/Patient Instructions    Please be aware that novel coronavirus (COVID-19) may be circulating in the community. If you develop symptoms such as fever, cough, or SOB or if you have concerns about the presence of another infection including coronavirus (COVID-19), please contact your health care provider or visit [email or web address].     Disposition/Instructions    In-Person Visit with provider recommended. Reference Visit Selection Guide. Recommended OV today in the [facility]. She will call her kids to get a ride.      Thank you for taking steps to prevent the spread of this virus.  o Limit your contact with others.  o Wear a simple mask to cover your cough.  o Wash your hands well and often.    Resources    [facility]: About COVID-19: [email or web address]    CDC: What to Do If You're Sick: www.cdc.gov/coronavirus/2019-ncov/about/steps-when-sick.html    CDC: Ending Home Isolation: www.cdc.gov/coronavirus/2019-ncov/hcp/disposition-in-home-patients.html     CDC: Caring for Someone: www.cdc.gov/coronavirus/2019-ncov/if-you-are-sick/care-for-someone.html     [facility]: Interim Guidance for Hospital Discharge to Home: www.health.[facility].mn.us/diseases/coronavirus/hcp/hospdischarge.pdf    [facility] clinical trials (COVID-19 research studies): clinicalaffairs.[facility]/n-clinical-trials     Below are the COVID-19 hotlines at the [facility] of Health ([facility]). Interpreters are available.   o For health questions: Call 775-470-1376 or 1-265.370.2947 (7 a.m. to 7 p.m.)  o For questions about schools and childcare: Call 213-573-9082 or 1-557.822.9105 (7 a.m. to 7 p.m.)    Alma Delia Champion RN BSBA Care Connection Triage/Med Refill 7/10/2020 9:37 AM    Additional Information    Negative: Unresponsive, passed out or very weak    Negative: Difficulty breathing or wheezing    Negative: [1] Difficulty swallowing or slurred speech AND [2] sudden onset    Negative: Sounds like a life-threatening emergency to the triager    Negative: Recent injury to the eye    Negative: Entire face is swollen    Negative: Sacs of clear fluid (blisters) on whites of eyes (allergic cysts)    Negative: Contact with pollen, other allergic substance or eyedrops    Negative: [1] Bee sting AND [2] within last 24 hours    Negative: Insect bite suspected    Negative: Sty suspected (small, painful red lump present on lid margin    Negative: Yellow or green discharge (pus) in the eye    Negative: Redness of white area (sclera) of eye(s)    Negative: [1] SEVERE eyelid swelling (i.e., shut or almost) AND [2] fever    Negative: [1] Eyelid (outer) is very red AND [2] fever    Negative: Patient sounds very sick or weak to the triager    Negative: [1] Pregnant > 20 weeks AND [2] sudden weight gain (i.e., more than 3 lbs or 1.4 kg in one week)    [1] SEVERE eyelid swelling (i.e., shut or almost) AND [2] involves both eyes      (Exception: itchy eyes, which  are probably an allergic reaction)    Protocols used: EYE - SWELLING-A-

## 2021-06-10 ENCOUNTER — INFUSION - HEALTHEAST (OUTPATIENT)
Dept: INFUSION THERAPY | Facility: HOSPITAL | Age: 86
End: 2021-06-10

## 2021-06-10 DIAGNOSIS — D69.3 IDIOPATHIC THROMBOCYTOPENIC PURPURA (H): ICD-10-CM

## 2021-06-10 NOTE — PROGRESS NOTES
Pt arrived ambulatory to clinic for Nplate injection.  Administered SQ injection into TRISTA.  Pt tolerated procedure well, no s/s of bleeding or swelling at site.  Pt verbalized understanding of plan of care and return to clinic.

## 2021-06-10 NOTE — PROGRESS NOTES
Ora came to chemo infusion this morning for lab and her next dose of Nplate.  VSS.  Pt assessed. Lab results noted and platelets today were 756 thou/ul.  Lab results reviewed with Shanae COX CNP who asked that I text page Dr Garcia.  This was done and this RN was instructed by Dr Garcia to hold Nplate today and have patient return Monday for lab/ possible injection. Pt was educated on this plan of care and scheduled return appointment.  Ora d/c from clinic ambulatory and unaccompanied.

## 2021-06-10 NOTE — PROGRESS NOTES
Ora came to clinic this morning for lab and her next dose of Nplate.  VSS.  Pt assessed.  Lab results noted and her platelet count today is 392.  This was reviewed with Shanae COX CNP who instructed this RN to proceed with Nplate today.  This was given sq into her right upper arm.  She tolerated the injection well and site was covered with a bandaid.  Ora d/c from clinic ambulatory and unaccompanied.  She is aware of her future appointment.

## 2021-06-10 NOTE — PROGRESS NOTES
Pt ambulates to infusion center for lab and injection.  Lab results noted.  Nplate injection given as ordered without difficulty.  Pt left clinic stable to lobby.  Plan RTC as scheduled.

## 2021-06-10 NOTE — PROGRESS NOTES
Ora came to chemo infusion following lab draw for her next dose of Nplate.  VSS.  Pt assessed.  She is recently released from the hospital.  Platelet count today is 409.  This was reviewed with Dr Garcia who ordered Nplate to be held and for Ora to return in 1 week for lab and possible Nplate.  She scheduled this prior to leaving.  Ora d/c from clinic ambulatory and unaccompanied.

## 2021-06-10 NOTE — PROGRESS NOTES
Pt ambulates to infusion center for labs and injection.  Pt voices no new concerns today.  Labs drawn per lab w/results noted and reviewed w/Dr. Garcia.  Romiplostim injection given SQ as ordered and band-aid applied.  Pt left clinic stable to lobby.  Plan RTC as scheduled.

## 2021-06-10 NOTE — PROGRESS NOTES
Internal Medicine Office Visit  Patient Name: Ora Gonzalez  Patient Age: 83 y.o.  YOB: 1933  MRN: [id number]  ?  Date of Visit: 2017  Reason for Office Visit:   Chief Complaint   Patient presents with     Hospital Visit Follow Up     GI bleed, pumped blood from her stomach and blocked atery in her leg to stop the bleeding, concerns are wondering about diet restrications she was on one in the hosp then taken off and unsure why. wondering about the heart medication is that correct. was told by GI surgeoun dont take fosamx should she stop this or no?       Assessment / Plan / Medical Decision Makin. Atrial fibrillation  2. Iron deficiency anemia  3. Osteopenia  4. History of ITP  - Discontinue alendronate and do not restart this medication, the risk outweighs the benefit.  She is also advised not to take any NSAIDs which she was taking regularly prior to his hospitalization.  She is advised that this could induce bleeding as well.  -Change diltiazem to 120 mg daily, I suspect that diltiazem 90 g 4 times daily was used while the patient was receiving her medications through an NG tube.  Consider changing diltiazem to metoprolol for late rate control if lower extremity edema worsens or does not improve.  The patient is encouraged to call the office or schedule a follow-up visit if she continues to have lower extremity swelling.        Health Maintenance Review  Health Maintenance   Topic Date Due     DEPRESSION FOLLOW UP  1933     ZOSTER VACCINE  1993     TD 18+ HE  2015     FALL RISK ASSESSMENT  2017     DXA SCAN  2018     ADVANCE DIRECTIVES DISCUSSED WITH PATIENT  2021     PNEUMOCOCCAL POLYSACCHARIDE VACCINE AGE 65 AND OVER  Completed     INFLUENZA VACCINE RULE BASED  Completed     PNEUMOCOCCAL CONJUGATE VACCINE FOR ADULTS (PCV13 OR PREVNAR)  Completed         I have discontinued Ms. Gonzalez's alendronate and diltiazem. I am also having her start on  diltiazem. Additionally, I am having her maintain her ACETAMINOPHEN/DIPHENHYDRAMINE (TYLENOL PM EXTRA STRENGTH ORAL) and donepezil.     HPI:   Encounter Diagnoses   Name Primary?     Atrial fibrillation Yes     Iron deficiency anemia      Osteopenia      History of ITP       The patient is an 83-year-old female with a past medical history of ITP who presents to the office today for follow-up of her recent hospitalization.  She was diagnosed with an upper GI bleed and had embolization for this.  Bleeding did stop but she needed to platelet transfusion.  She was later found to have A. fib with RVR and oral diltiazem was start it.      She is feeling well today although she is still feeling fatigued.  She has noticed some lower extremity edema which is better in the morning but worsens later in the day.  No pain in her legs and the swelling is symmetrical.  Denies any melena or hematochezia in the past 3 or 4 days.  Patient admits that she was taking a lot of naproxen prior to the hospitalization which may have led to the bleed.  She was also taking Fosamax.    She would like to know whether she should resume taking Fosamax.    Denies any heart palpitations or shortness of breath since arriving home and from the hospital.    Review of Systems: Pertinent findings as stated in HPI    Current Scheduled Meds:  Outpatient Encounter Prescriptions as of 4/25/2017   Medication Sig Dispense Refill     ACETAMINOPHEN/DIPHENHYDRAMINE (TYLENOL PM EXTRA STRENGTH ORAL) Take 1 tablet by mouth daily as needed.       donepezil (ARICEPT) 5 MG tablet Take 5 mg by mouth bedtime.   0     [DISCONTINUED] diltiazem (CARDIZEM) 90 MG tablet Take 1 tablet (90 mg total) by mouth 4 (four) times a day. 120 tablet 0     diltiazem (CARDIZEM CD) 120 MG 24 hr capsule Take 1 capsule (120 mg total) by mouth daily. 30 capsule 1     [DISCONTINUED] alendronate (FOSAMAX) 70 MG tablet Take 1 tablet (70 mg total) by mouth every 7 days. Take in the morning on an  empty stomach with a full glass of water 30 minutes before food 4 tablet 11     No facility-administered encounter medications on file as of 2017.      Past Medical History:   Diagnosis Date     Adjustment disorder with mixed anxiety and depressed mood 2016     Adjustment reaction to chronic stress 2016     Chronic kidney disease      Hypertension      ITP (idiopathic thrombocytopenic purpura)      Osteoporosis      Pulmonary embolism 2016     Thrombocytopenia      Past Surgical History:   Procedure Laterality Date     CATARACT EXTRACTION Left 10/2014     ESOPHAGOGASTRODUODENOSCOPY N/A 2017    Procedure: ESOPHAGOGASTRODUODENOSCOPY (EGD);  Surgeon: Juanpablo John MD;  Location: [facility] GI;  Service:      LAPAROSCOPIC SPLENECTOMY N/A 2015    Procedure: LAPAROSCOPIC TO CONVERSION TO OPEN SPLENECTOMY;  Surgeon: Herb Mckeon MD;  Location: [facility] OR;  Service:            [place] joe     [facility]  4/15/2017          [place] HEMORRHOIDECTOMY INTERNAL RUBBER BAND LIGATIONS      Description: Hemorrhoidectomy;  Recorded: 2008;  Comments: with fissure     Social History   Substance Use Topics     Smoking status: Former Smoker     Packs/day: 0.25     Years: 60.00     Quit date: 2015     Smokeless tobacco: Former User     Alcohol use Yes      Comment: rare, maybe 1-2 drinks per year        Objective / Physical Examination:  Vitals:    17 0929 17 1010   BP: 100/42 100/44   Patient Site: Right Arm Right Arm   Patient Position: Sitting Sitting   Cuff Size: Adult Regular Adult Regular   Pulse: 62    Weight: 110 lb 12.8 oz (50.3 kg)      Wt Readings from Last 3 Encounters:   17 109 lb 9.6 oz (49.7 kg)   17 110 lb 12.8 oz (50.3 kg)   17 114 lb (51.7 kg)     Body mass index is 19.94 kg/(m^2). (>25?)    General Appearance: Alert and oriented, cooperative, affect appropriate, speech clear, in no apparent distress  ENT: no conjunctival pallor   Lungs:  Clear to auscultation bilaterally. Normal inspiratory and expiratory effort  Cardiovascular: Regular rate, normal S1, S2. No murmurs, rubs, or gallops  Extremities: LE edema 1+ b/l      Orders Placed This Encounter   Procedures     HM2(CBC w/o Differential)     Basic Metabolic Panel     Ambulatory referral to Cardiology   Followup: Return in about 3 weeks (around 5/16/2017) for Recheck. earlier if needed.      Ryanne Corbett, CNP  [facility]

## 2021-06-10 NOTE — PROGRESS NOTES
Internal Medicine Office Visit  Patient Name: Ora Gonzalez  Patient Age: 83 y.o.  YOB: 1933  MRN: [id number]  ?  Date of Visit: 5/15/2017  Reason for Office Visit:   Chief Complaint   Patient presents with     Follow-up     Edema, doing great no more swelling since she was taken of the medication for a-fib       Assessment / Plan / Medical Decision Makin. Atrial fibrillation  2. Insomnia  - Continue diltiazem 120 mg daily  -We will trial taking donezepil 5 mg in the morning to see if this helps with insomnia, per neurology  - Follow up in 6 months    Health Maintenance Review  Health Maintenance   Topic Date Due     ZOSTER VACCINE  1993     TD 18+ HE  2015     DEPRESSION FOLLOW UP  10/25/2017     FALL RISK ASSESSMENT  2017     DXA SCAN  2018     ADVANCE DIRECTIVES DISCUSSED WITH PATIENT  2021     PNEUMOCOCCAL POLYSACCHARIDE VACCINE AGE 65 AND OVER  Completed     INFLUENZA VACCINE RULE BASED  Completed     PNEUMOCOCCAL CONJUGATE VACCINE FOR ADULTS (PCV13 OR PREVNAR)  Completed           I am having Ms. Gonzalez maintain her ACETAMINOPHEN/DIPHENHYDRAMINE (TYLENOL PM EXTRA STRENGTH ORAL), donepezil, and diltiazem.     HPI:   Encounter Diagnoses   Name Primary?     Atrial fibrillation Yes     Insomnia       Ora Gonzalez is an 83-year-old female who presents to the office today with her son for follow-up.  She was last seen after hospitalization for upper GI bleed.  She developed atrial fibrillation during this hospitalization and was started on diltiazem.  At her last office visit she was transitioned to a long-acting diltiazem from taking the medication 4 times daily.  She states that she has tolerated this medication well.  She no longer has any lower extremity edema.  Denies any lightheadedness or dizziness.  She is not aware of any abnormal heart rhythm, no lightheadedness or dizziness or heart palpitations.    She recently saw her neurologist for follow-up of  Alzheimer's disease.  She was told to take her Aricept in the morning as she reported some difficulty sleeping.  She will try this tomorrow.    Continues treatment for ITP.  She is now getting regular doses of Nplate, weekly at this time.     Review of Systems: Denies bruising, hematochezia or melena    Current Scheduled Meds:  Outpatient Encounter Prescriptions as of 5/15/2017   Medication Sig Dispense Refill     ACETAMINOPHEN/DIPHENHYDRAMINE (TYLENOL PM EXTRA STRENGTH ORAL) Take 1 tablet by mouth daily as needed.       diltiazem (CARDIZEM CD) 120 MG 24 hr capsule Take 1 capsule (120 mg total) by mouth daily. 30 capsule 1     donepezil (ARICEPT) 5 MG tablet Take 10 mg by mouth at bedtime.   0     No facility-administered encounter medications on file as of 5/15/2017.      Past Medical History:   Diagnosis Date     Adjustment disorder with mixed anxiety and depressed mood 2016     Adjustment reaction to chronic stress 2016     Chronic kidney disease      Hypertension      ITP (idiopathic thrombocytopenic purpura)      Osteoporosis      Pulmonary embolism 2016     Thrombocytopenia      Past Surgical History:   Procedure Laterality Date     CATARACT EXTRACTION Left 10/2014     ESOPHAGOGASTRODUODENOSCOPY N/A 2017    Procedure: ESOPHAGOGASTRODUODENOSCOPY (EGD);  Surgeon: Juanpablo John MD;  Location: [facility] GI;  Service:      LAPAROSCOPIC SPLENECTOMY N/A 2015    Procedure: LAPAROSCOPIC TO CONVERSION TO OPEN SPLENECTOMY;  Surgeon: Herb Mckeon MD;  Location: [facility] OR;  Service:      [facility]      [facility]     [facility]  4/15/2017          OR HEMORRHOIDECTOMY INTERNAL RUBBER BAND LIGATIONS      Description: Hemorrhoidectomy;  Recorded: 2008;  Comments: with fissure     Social History   Substance Use Topics     Smoking status: Former Smoker     Packs/day: 0.25     Years: 60.00     Quit date: 2015     Smokeless tobacco: Former User     Alcohol use Yes      Comment: rare,  maybe 1-2 drinks per year        Objective / Physical Examination:  Vitals:    05/15/17 1518 05/15/17 1537   BP: 90/48 104/54   Patient Site: Left Arm Left Arm   Patient Position: Sitting Sitting   Cuff Size: Adult Regular Adult Regular   Pulse: 74    Weight: 107 lb 12.8 oz (48.9 kg)      Wt Readings from Last 3 Encounters:   05/15/17 107 lb 12.8 oz (48.9 kg)   05/12/17 108 lb 12.8 oz (49.4 kg)   05/04/17 105 lb 13.1 oz (48 kg)     Body mass index is 19.4 kg/(m^2).     General Appearance: Alert and oriented, cooperative, affect appropriate, speech clear, in no apparent distress  Lungs: Clear to auscultation bilaterally. Normal inspiratory and expiratory effort  Cardiovascular: Regular rate, normal S1, S2  Extremities: No edema    No orders of the defined types were placed in this encounter.  Followup: Return in about 6 months (around 11/15/2017) for Next scheduled follow up. earlier if needed.      Ryanne Corbett, CNP  [facility]

## 2021-06-10 NOTE — PROGRESS NOTES
Pt arrived ambulatory to clinic for Nplate injection.  Pt's labs will now be monthly, so no need to wait for lab results.  Administered SQ injection into TRISTA.  Pt tolerated procedure well, no s/s of bleeding or swelling at site.  Pt verbalized understanding of plan of care and return to clinic.

## 2021-06-10 NOTE — PROGRESS NOTES
Pt ambulates to infusion center for labs and possible injection.  Labs drawn per lab w/results noted and reviewed w/Dr. Garcia.  Injection held today and patient will RTC 5/25/17 for lab recheck and possible injection.

## 2021-06-10 NOTE — PROGRESS NOTES
Pt ambulates to infusion center for injection.  Pt voices no new concerns today.  Nplate injection given as ordered.  Pt left clinic stable to lobby.  Plan RTC as scheduled.

## 2021-06-10 NOTE — PROGRESS NOTES
Patient arrived ambulatory, by self, for lab results and NPlate, if needed.  Plts 4,000.  Dr. Garcia notified.  NPlate given subcut in right arm without problems.  Bandaid applied.  Left ambulatory, by self, in stable condition.  Instructed to call with questions/concerns/problems, to go to ED if bleeding, and to return next week for treatment.  Patient verbalized understanding.  Appt scheduled.

## 2021-06-11 NOTE — PROGRESS NOTES
Pt came into infusion clinic for her NPlate as ordered. Plt count 209. Give Nplate per Dr. Garcia. Pt tolerated injection well and will RTC in 1 week for labs and poss Nplate.

## 2021-06-11 NOTE — PROGRESS NOTES
Pt ambulates to infusion center for labs and treatment.  Labs drawn peripherally w/results noted.  Pt voices no new concerns today and denies any bleeding and/or bruising. Nplate injection given SQ as ordered without complication.  Pt left clinic stable to lobby.  Plan RTC as scheduled.

## 2021-06-11 NOTE — PROGRESS NOTES
Ora came to clinic this morning for lab check after she noticed new multiple small bruises on her arms.  Labs drawn and results noted.  Nplate ordered.  Platelet count today was 57,000.  Nplate given sq into her right upper arm.  She tolerated the injection well and site was covered with a bandaid.  Ora d/c from clinic ambulatory and unaccompanied to await ride  for return ride home.

## 2021-06-11 NOTE — PROGRESS NOTES
Ora Gonzalez, 86 y.o., female arrived ambulatory to clinic at 0955 for labs and Nplate injection. Labs reviewed- platelet count 336 today. Patient assessed and vital signs stable. Administered Nplate SQ into R arm per provider order. Also administered Flu Vaccine today per [facility] protocol. Pt tolerated injections well, no s/s of bleeding or swelling at sites. Ora Gonzalez verbalized understanding of plan of care and return to clinic. Discharged to [facility] at 1046 alert and ambulatory.

## 2021-06-11 NOTE — PROGRESS NOTES
Internal Medicine Office Visit  Patient Name: Ora Gonzalez  Patient Age: 83 y.o.  YOB: 1933  MRN: [id number]  ?  Date of Visit: 2017  Reason for Office Visit:   Chief Complaint   Patient presents with     Follow-up       Assessment / Plan / Medical Decision Makin. Alzheimer disease  2. Need for tetanus booster  - Continue donepezil in the morning so that this does not effect sleep  - tetanus booster today  - Follow up in 6 months       Health Maintenance Review  Health Maintenance   Topic Date Due     ZOSTER VACCINE  1993     FALL RISK ASSESSMENT  2017     DXA SCAN  2018     ADVANCE DIRECTIVES DISCUSSED WITH PATIENT  2021     TD 18+ HE  2027     PNEUMOCOCCAL POLYSACCHARIDE VACCINE AGE 65 AND OVER  Completed     INFLUENZA VACCINE RULE BASED  Completed     PNEUMOCOCCAL CONJUGATE VACCINE FOR ADULTS (PCV13 OR PREVNAR)  Completed         I am having Ms. Gonzalez maintain her ACETAMINOPHEN/DIPHENHYDRAMINE (TYLENOL PM EXTRA STRENGTH ORAL) and donepezil.     HPI:   Encounter Diagnoses   Name Primary?     Alzheimer disease Yes     Need for tetanus booster       Ora Gonzalez is an 82 y/o female who presents to the office today for follow up.     Sleep- taking donepezil, sleeping better since switching to the morning for dosing.     Joint pains- acetaminophen as needed. She knows to avoid NSAID medications.     Atrial fibrillation- noted during hospitalization for GI bleed. She converted to NSR with diltiazem. Recently saw cardiology who recommended ECHO and discontinued diltiazem.     ITP- followed by hematology/oncology. Doing well with regards to this.       Review of Systems: No SOA, heart palpitations, chest pains.     Current Scheduled Meds:  Outpatient Encounter Prescriptions as of 2017   Medication Sig Dispense Refill     donepezil (ARICEPT) 10 MG tablet Take 10 mg by mouth every morning.       [DISCONTINUED] donepezil (ARICEPT) 5 MG tablet Take 10 mg by  mouth at bedtime.   0     ACETAMINOPHEN/DIPHENHYDRAMINE (TYLENOL PM EXTRA STRENGTH ORAL) Take 1 tablet by mouth daily as needed.       No facility-administered encounter medications on file as of 2017.      Past Medical History:   Diagnosis Date     Adjustment disorder with mixed anxiety and depressed mood 2016     Adjustment reaction to chronic stress 2016     Chronic kidney disease      Hypertension      ITP (idiopathic thrombocytopenic purpura)      Osteoporosis      Paroxysmal atrial fibrillation      Pulmonary embolism 2016     Thrombocytopenia      Past Surgical History:   Procedure Laterality Date     CATARACT EXTRACTION Left 10/2014     ESOPHAGOGASTRODUODENOSCOPY N/A 2017    Procedure: ESOPHAGOGASTRODUODENOSCOPY (EGD);  Surgeon: Juanpablo John MD;  Location: [facility] GI;  Service:      LAPAROSCOPIC SPLENECTOMY N/A 2015    Procedure: LAPAROSCOPIC TO CONVERSION TO OPEN SPLENECTOMY;  Surgeon: Herb Mckeon MD;  Location: [facility] OR;  Service:            [facility]     PICC  4/15/2017          [place] HEMORRHOIDECTOMY INTERNAL RUBBER BAND LIGATIONS      Description: Hemorrhoidectomy;  Recorded: 2008;  Comments: with fissure     Social History   Substance Use Topics     Smoking status: Former Smoker     Packs/day: 0.25     Years: 60.00     Quit date: 2015     Smokeless tobacco: Former User     Alcohol use Yes      Comment: rare, maybe 1-2 drinks per year        Objective / Physical Examination:  Vitals:    17 0904   BP: 132/68   Pulse: 75   Weight: 108 lb (49 kg)     Wt Readings from Last 3 Encounters:   17 108 lb (49 kg)   17 109 lb (49.4 kg)   17 108 lb 8 oz (49.2 kg)     Body mass index is 19.44 kg/(m^2).     General Appearance: Alert and oriented, cooperative, affect appropriate, speech clear, in no apparent distress  Neck: Supple, trachea midline. No carotid bruits   Lungs: Clear to auscultation bilaterally. Normal inspiratory  and expiratory effort  Cardiovascular: Regular rate, normal S1, S2. No murmurs, rubs, or gallops  Extremities: Pulses 2+ and equal throughout. No edema    Orders Placed This Encounter   Procedures     Td, Adult, Adsorbed (blue label)   Followup: Return in about 6 months (around 12/13/2017) for Recheck. earlier if needed.      Ryanne Corbett, CNP  [facility]

## 2021-06-11 NOTE — PROGRESS NOTES
Ora came to chemo infusion this morning after labs for her next dose of Nplate.  Platelets today were 67 and this was reviewed with Dr Garcia.  NPlate was given sq into her R upper arm.  She tolerated the injection well and site was covered with a bandaid.  Ora d/c from clinic ambulatory and unaccompanied.  She is aware of her future appointment.

## 2021-06-11 NOTE — PROGRESS NOTES
"Pt ambulates to infusion center for labs and treatment. Pt states that she's noted \"slightly\" more bruising than normal, but otherwise voices no new concerns today.  Labs drawn per lab w/results noted.  N-plate given SQ as ordered.  Triage RN will call pt if Dr. Garcia would like her to come in earlier next week than her normal Thursday appointment.  Pt left clinic stable to [facility]acio.  Plan RTC as scheduled.  "

## 2021-06-11 NOTE — PROGRESS NOTES
0930 douglas arrived A&Ox4 ambulatory and stable. Pt had labs drawn and waiting for Plt result/ MD orders for Nplate injection.    1005 per Maggy pt is to have Nplate injection today  (Plt 399) ands he is changing her Therapy Plan- injections Q 10days and dose change also. Pt advised of the change and stated understanding.    Nplate 98mcg SQ to R upper arm posterior. Pt tolerated w/o incident. Site covered w bandaid.  1100 Douglas dc'd A&Ox4 ambulatory and stable. She has next appt x2 made.

## 2021-06-11 NOTE — PROGRESS NOTES
Plt > 200,000 two consecutive weeks. Dose should be reduced however, RN  reviewed w/Dr. Garcia.  Dr. Garcia stated that he would like the patient to get the same dose of romiplostim 3 mcg/kg that she had been previously given and to recheck labs in 1 week.

## 2021-06-11 NOTE — PROGRESS NOTES
Ora came to chemo infusion this afternoon for her next dose of Nplate.  VSS.  Pt assessed and is feeling well.  She has had not signs of bleeding.  No labs needed today.  Nplate given sq into her right upper arm. She tolerated the injection well and site was covered with a bandaid.  Ora browning/indira from clinic ambulatory.

## 2021-06-11 NOTE — PROGRESS NOTES
Ora was in today for a lab check as she was having some abnormal bruising over the last couple days.  Her platelet count today is 57.  Per Shanae Hazel CNP, she is to get Nplate today.  Injection appt added and CONRAD Lam with take care of her.  We canceled her appt for Monday 7/10/17 and she will be back here on 7/13/17 for lab and injection.    Ashley Bell RN 7/6/17 [id number]

## 2021-06-11 NOTE — PROGRESS NOTES
Pt ambulates to infusion center for labs and treatment.  Labs drawn w/results noted and reviewed w/Dr. Garcia.  Dr. Garcia stated that he would like the patient to get the same dose of romiplostim 3 mcg/kg that she had been previously given and to recheck labs in 1 week.  Pharmacy was notified and orders were sent.  Nplate given as rodered without complication.  Pt left clinic stable to [facility].  Plan RTC as scheduled.

## 2021-06-11 NOTE — PROGRESS NOTES
Ora Gonzalez, 86 y.o., female arrived ambulatory to clinic at 1245 for Nplate injection. Patient assessed and vital signs stable. Administered Nplate SQ into R arm per provider order. Pt tolerated injection well, site covered with band-aid. Ora Gonzalez verbalized understanding of plan of care and return to clinic. Discharged to [facility] at 1305 alert and ambulatory.

## 2021-06-11 NOTE — PROGRESS NOTES
[facility] Hematology and Oncology Progress Note    Patient: Ora Gonzalez  MRN: [id number]  Date of Service:  June 23, 2017      Assessment and Plan:    1. Immune thrombocytopenia:  Her platelet count has generally been stable now on her romiplostim.  We have been titrating her dose in effort to get her injections spaced out 10 days.  On 3 mcg/kg her platelet counts has been over 300.  Her last injection was 2 micrograms per kilogram.  That was 4 days ago.  She will return on day 10, June 29, for repeat blood draw.  We will decide on that date whether to continue at 2 mcg or go back to 3 mcg/kg dosing.  She will let us know in the interim if she has any signs or symptoms of bleed.  Our ultimate goal is to keep her above 50,000.    2. Leukocytosis: mild, chronic. Follow.     ECOG Performance   ECOG Performance Status: 0    Distress Assessment  Distress Assessment Score: No distress    Pain  Currently in Pain: No/denies    Diagnosis:    1. 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 Bone marrow biopsy was unremarkable, November 2014. Thyroid functions were normal. Anticardiolipin antibodies were normal.    2. Right-sided pulmonary embolism: Provoked. Diagnosed February 8, 2016.    Treatment:    She started course of prednisone on November 14, 2014 and finished on January 5, 2015. She had a complete response. Quickly relapsed and treated with Rituxan weekly from March 12 through April 2, 2015. She responded with highest platelet count of 112.     She then relapsed in October, 2015. She was started on Promacta in October. She responded within 2-3 weeks. She then quickly lost response after dose reduction. She was started back on Promacta 50 mg and prednisone 60 mg daily. She did not respond. She received 2 doses of IVIG on December 4 and 5, 2015. She responded briefly with a platelet count of 59 on December 7 but then quickly lost response by December 14. 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 She  received 2 more doses of IVIG on December 16 and 17th with minimal response. Splenectomy was performed on December 20, 2015. She did not respond. We started Rituxan on December 29, 2015. Steroids were continued. Romiplostim was started on January 5, 2016. 1 dose of WinRho was given on January 6, 2016.  Platelet response noted on January 13.    Her last dose of Rituxan was on January 20th, 2016.    She was restarted on N-plate on July 28, 2016 for relapse.    Interim History:    Myrna returns today for follow-up visit.  She states that she is doing well.  No recent bleeding or bruising.  Energy and appetite is good.  She is out walking daily.  Overall she is feeling good.  No recent problems with melena.  No coughing or chest pain.    Review of Systems:    Constitutional  Constitutional (WDL): All constitutional elements are within defined limits  Neurosensory  Neurosensory (WDL): All neurosensory elements are within defined limits  Cardiovascular  Cardiovascular (WDL): All cardiovascular elements are within defined limits  Pulmonary  Respiratory (WDL): Within Defined Limits  Gastrointestinal  Gastrointestinal (WDL): All gastrointestinal elements are within defined limits  Genitourinary  Genitourinary (WDL): All genitourinary elements are within defined limits  Integumentary  Integumentary (WDL): All integumentary elements are within defined limits  Patient Coping  Patient Coping: Accepting  Accompanied by  Accompanied by: Alone    Past History:    Past Medical History:   Diagnosis Date     Adjustment disorder with mixed anxiety and depressed mood 1/22/2016     Adjustment reaction to chronic stress 1/22/2016     Chronic kidney disease      Hypertension      ITP (idiopathic thrombocytopenic purpura)      Osteoporosis      Paroxysmal atrial fibrillation      Pulmonary embolism 2/8/2016     Thrombocytopenia      Physical Exam:    Recent Vitals 6/23/2017   Weight 110 lbs 10 oz   /63   Pulse 72   Temp 98.1   Temp src  1   SpO2 98     General: patient appears stated age of 83 y.o.. Nontoxic and in no distress.   HEENT: Head: atraumatic, normocephalic. Sclerae anicteric.  Chest:  Normal respiratory effort.  Cardiac:  No edema.  Abdomen: abdomen is non-distended  Extremities: normal tone and muscle bulk.   Skin: no lesions or rash. Warm and dry.   CNS: alert and oriented x3. Grossly non-focal.   Psychiatric: normal mood and affect.     Lab Results:    Recent Results (from the past 168 hour(s))   HM2 (CBC W/O DIFF)   Result Value Ref Range    WBC 15.3 (H) 4.0 - 11.0 thou/uL    RBC 4.21 3.80 - 5.40 mill/uL    Hemoglobin 11.8 (L) 12.0 - 16.0 g/dL    Hematocrit 37.4 35.0 - 47.0 %    MCV 89 80 - 100 fL    MCH 28.0 27.0 - 34.0 pg    MCHC 31.6 (L) 32.0 - 36.0 g/dL    RDW 16.9 (H) 11.0 - 14.5 %    Platelets 399 140 - 440 thou/uL    MPV 11.3 8.5 - 12.5 fL   HM1 (CBC with Diff)   Result Value Ref Range    WBC 15.9 (H) 4.0 - 11.0 thou/uL    RBC 4.20 3.80 - 5.40 mill/uL    Hemoglobin 11.8 (L) 12.0 - 16.0 g/dL    Hematocrit 37.0 35.0 - 47.0 %    MCV 88 80 - 100 fL    MCH 28.1 27.0 - 34.0 pg    MCHC 31.9 (L) 32.0 - 36.0 g/dL    RDW 17.2 (H) 11.0 - 14.5 %    Platelets 142 140 - 440 thou/uL    MPV 12.0 8.5 - 12.5 fL    Neutrophils % 62 50 - 70 %    Lymphocytes % 27 20 - 40 %    Monocytes % 9 2 - 10 %    Eosinophils % 2 0 - 6 %    Basophils % 1 0 - 2 %    Neutrophils Absolute 9.7 (H) 2.0 - 7.7 thou/uL    Lymphocytes Absolute 4.3 0.8 - 4.4 thou/uL    Monocytes Absolute 1.4 (H) 0.0 - 0.9 thou/uL    Eosinophils Absolute 0.3 0.0 - 0.4 thou/uL    Basophils Absolute 0.1 0.0 - 0.2 thou/uL     Imaging:    No results found.      Signed by: Vinny Garcia MD

## 2021-06-11 NOTE — PROGRESS NOTES
Pt came into infusion clinic for lab check and poss Nplate. Plt count reported to Dr. Garcia. Hold Nplate today. Pt will RTC 6/19 for labs and poss Nplate. Pt was agreeable to this plan.

## 2021-06-12 NOTE — PROGRESS NOTES
Pt ambulates to infusion center for injection.  Pt voices no new concerns today. Nplate injection given as ordered without difficulty.  Pt left clinic stable to lobby.  Plan RTC as scheduled.

## 2021-06-12 NOTE — PROGRESS NOTES
PT here for n plate. Reviewed injection with pt and administered in right upper arm and bandaid to site. Follow up reviewed and pt dc'd steady gait

## 2021-06-12 NOTE — PROGRESS NOTES
Pt came into infusion clinic for Nplate as ordered. Based on PLT count, Nplate given. Pt tolerated injection well and left infusion clinic via ambulatory.

## 2021-06-12 NOTE — PROGRESS NOTES
Pt ambulates to infusion center for labs and treatment.  Pt voices no new concerns today.  Labs drawn w/results noted.  Romiplostim given SQ as ordered.  Pt left clinic stable to lobby.  Plan RTC as scheduled.

## 2021-06-12 NOTE — PROGRESS NOTES
Pt ambulates to infusion center for injection.  Pt voices no new concerns today.  Pt defers VS today.  Nplate injection given as ordered.  Pt left clinic stable to lobby.  Plan RTC as scheduled.

## 2021-06-12 NOTE — PROGRESS NOTES
Pt ambulates to infusion center for injection. Pt voices no new concerns.  Nplate injection given as ordered.  Pt left clinic stable to lobby.  Plan RTC as scheduled.

## 2021-06-12 NOTE — PROGRESS NOTES
Pt here for labs and injection. Pt report feeling well. No problem with injection and pt d.c ambulatory to lobby alone. Pt aware of treatment plan.

## 2021-06-12 NOTE — PROGRESS NOTES
rOa wanted to come in on Monday 7/24 to see what her platelets will be then.  She is aware that per Dr Garcia, we will not be giving her an Nplate injection that day.  He wants to keep this at weekly but is fine if she comes in to get them checked.  She will be back again on Thursday for another lab drawn and injection.    Ashley Bell RN 7/20/17 [id number]

## 2021-06-12 NOTE — PROGRESS NOTES
Ora came to chemo infusion this morning after peripheral lab draw for her next dose of Nplate.  VSS.  Pt assessed.  She is feeling well with no new bruising or bleeding.  Platelets today was 193,000.  Dr Garcia notified who instructed this Rn to proceed with Nplate.  Nplate was given sq into her right upper arm.  She tolerated the injection well and site was covered with a bandaid.  Ora d/c from clinic ambulatory to await her transportation home.

## 2021-06-12 NOTE — PROGRESS NOTES
Pt came into infusion clinic for Nplate as ordered. Labs reviewed and NPlate ordered. Pt tolerated injection with no complications. Pt left infusion clinic via ambulatory.

## 2021-06-12 NOTE — PROGRESS NOTES
Pt ambulates to infusion center for labs and treatment.  Labs drawn per lab w/results noted.  Pt voices no new concerns today.  Romiplostim injection given SQ as ordered without complication.  Pt left clinic stable to lobby.  Plan RTC as scheduled.

## 2021-06-12 NOTE — PROGRESS NOTES
Pt ambulates to infusion center for labs and treatment.  Labs drawn peripherally w/results noted.  Romiplostim given SQ as ordered.  Pt denies any new concerns today.  Pt left clinic stable to lobby.  Plan RTC as scheduled.

## 2021-06-12 NOTE — PROGRESS NOTES
Ora came to clinic this morning for lab check and her next dose of Nplate.  VSS.  Pt assessed.  She has not had any new bruising and is feeling well today. Labs results noted.  Nplate ordered.  Platelet count today was 53,000.  Nplate given sq into her right upper arm.  She tolerated the injection well and site was covered with a bandaid.  Ora d/c from clinic ambulatory and unaccompanied to await ride  for return ride home.

## 2021-06-13 NOTE — PROGRESS NOTES
PT here ambulatory for NPlate inj. PT states doing well and has no concerns. Nplate inj given in right upper arm with bandaid to site. Follow up reviewed and pt dc'd steady gait

## 2021-06-13 NOTE — PROGRESS NOTES
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  She is at 2 mcg/kg and is getting it every 1 week. This is keeping her platelets above 50,000. She has no purpura or bleeding. Her platelets have been getting a little lower over the last couple of weeks. No change to dosage today, but if they drop further next week, we may increase dose to 3mcg/kg. Continue weekly labs and injections. See Dr. Garcia in 3 months.      2. Leukocytosis: mild, chronic. Follow.     ECOG Performance   ECOG Performance Status: 0     Distress Assessment  Distress Assessment Score: No distress    Pain  Currently in Pain: No/denies  Pain Score (Initial OR Reassessment): No/Denies Pain      Problem List    1. Idiopathic thrombocytopenic purpura  HM2 (CBC W/O DIFF)      ______________________________________________________________________________    History of Present Illness    Diagnosis:     1. 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 Bone marrow biopsy was unremarkable, November 2014. Thyroid functions were normal. Anticardiolipin antibodies were normal.     2. Right-sided pulmonary embolism: Provoked. Diagnosed February 8, 2016.     Treatment:     She started course of prednisone on November 14, 2014 and finished on January 5, 2015. She had a complete response. Quickly relapsed and treated with Rituxan weekly from March 12 through April 2, 2015. She responded with highest platelet count of 112.      She then relapsed in October, 2015. She was started on Promacta in October. She responded within 2-3 weeks. She then quickly lost response after dose reduction. She was started back on Promacta 50 mg and prednisone 60 mg daily. She did not respond. She received 2 doses of  IVIG on December 4 and 5, 2015. She responded briefly with a platelet count of 59 on December 7 but then quickly lost response by December 14. 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 She received 2 more doses of IVIG on December 16 and 17th with minimal response. Splenectomy was performed on December 20, 2015. She did not respond. We started Rituxan on December 29, 2015. Steroids were continued. Romiplostim was started on January 5, 2016. 1 dose of WinRho was given on January 6, 2016. Platelet response noted on January 13.    Her last dose of Rituxan was on Jamuary 20th, 2016.     She was restarted on endplate on July 28, 2016 for relapse.     Interim History:     Myrna returns today for follow-up visit. She states she is doing well. No major complaints. No bruising or bleeding.     Pain Status  Currently in Pain: No/denies    Review of Systems    Constitutional  Constitutional (WDL): All constitutional elements are within defined limits  Neurosensory  Neurosensory (WDL): All neurosensory elements are within defined limits  Eye   Eye Disorder (WDL): All eye disorder elements are within defined limits  Ear  Ear Disorder (WDL): All ear disorder elements are within defined limits  Cardiovascular  Cardiovascular (WDL): All cardiovascular elements are within defined limits  Pulmonary  Respiratory (WDL): Within Defined Limits  Gastrointestinal  Gastrointestinal (WDL): All gastrointestinal elements are within defined limits  Genitourinary  Genitourinary (WDL): All genitourinary elements are within defined limits  Lymphatic  Lymph (WDL): All lymph disorder elements are within defined limits  Musculoskeletal and Connective Tissue  Musculoskeletal and Connetive Tissue Disorders (WDL): All Musculoskeletal and Connetive Tissue Disorder elements are within defined limits  Integumentary  Integumentary (WDL): All integumentary elements are within defined limits  Patient Coping  Patient Coping: Accepting  Distress  Assessment  Distress Assessment Score: No distress  Accompanied by  Accompanied by: Alone    Past History  Past Medical History:   Diagnosis Date     Adjustment disorder with mixed anxiety and depressed mood 1/22/2016     Adjustment reaction to chronic stress 1/22/2016     Chronic kidney disease      Hypertension      ITP (idiopathic thrombocytopenic purpura)      Osteoporosis      Paroxysmal atrial fibrillation      Pulmonary embolism 2/8/2016     Thrombocytopenia        PHYSICAL EXAM:  /67  Pulse 66  Temp 97.7  F (36.5  C) (Oral)   Wt 115 lb 8 oz (52.4 kg)  SpO2 96%  BMI 20.79 kg/m2    GENERAL: no acute distress. Cooperative in conversation. Here alone  HEENT: pupils are equal, round and reactive. Oromucosa is clean and intact. No ulcerations or mucositis noted. No bleeding noted.  RESP: lungs are clear bilaterally per auscultation. Regular respiratory rate. No wheezes or rhonchi.  CV: Regular, rate and rhythm. No murmurs.  ABD: soft, nontender. Positive bowel sounds. No organomegaly.   MUSCULOSKELETAL: No lower extremity swelling.   NEURO: non focal. Alert and oriented x3.   PSYCH: within normal limits. No depression or anxiety.  SKIN: warm dry intact, no significant bruising.   LYMPH: no cervical, supraclavicular lymphadenopathy    Lab Results    Recent Results (from the past 168 hour(s))   HM2(CBC w/o Differential)   Result Value Ref Range    WBC 13.0 (H) 4.0 - 11.0 thou/uL    RBC 4.47 3.80 - 5.40 mill/uL    Hemoglobin 12.9 12.0 - 16.0 g/dL    Hematocrit 40.2 35.0 - 47.0 %    MCV 90 80 - 100 fL    MCH 28.9 27.0 - 34.0 pg    MCHC 32.1 32.0 - 36.0 g/dL    RDW 20.0 (H) 11.0 - 14.5 %    Platelets 122 (L) 140 - 440 thou/uL    MPV 12.6 (H) 8.5 - 12.5 fL   HM2 (CBC W/O DIFF)   Result Value Ref Range    WBC 14.6 (H) 4.0 - 11.0 thou/uL    RBC 4.55 3.80 - 5.40 mill/uL    Hemoglobin 13.4 12.0 - 16.0 g/dL    Hematocrit 40.9 35.0 - 47.0 %    MCV 90 80 - 100 fL    MCH 29.5 27.0 - 34.0 pg    MCHC 32.8 32.0 - 36.0  g/dL    RDW 20.2 (H) 11.0 - 14.5 %    Platelets 62 (L) 140 - 440 thou/uL       Imaging    No results found.      Signed by: Shanae Hazel, CNP

## 2021-06-13 NOTE — PROGRESS NOTES
Ora came to chemo infusion this morning after peripheral lab draw for her next dose of Nplate.  VSS.  Pt assessed.  She is feeling well with no new bruising or bleeding.  Platelets today was 210,000.  Dr Garcia notified who instructed this RN to proceed with Nplate.  Nplate was given sq into her right upper arm.  She tolerated the injection well and site was covered with a bandaid.  Ora d/c from clinic ambulatory to await her transportation home.

## 2021-06-13 NOTE — PROGRESS NOTES
Pt ambulates to infusion center for labs and injection.  Labs drawn peripherally w/results noted.  Nplate injection given as ordered.  Pt left clinic stable to lobby.  Plan RTC as scheduled.

## 2021-06-13 NOTE — PROGRESS NOTES
Ora came to chemo infusion this morning after peripheral lab draw for her next dose of Nplate.  VSS.  Pt assessed.  She is feeling well with no new bruising or bleeding.  Platelets today was 72,000.  Nplate was given sq into her right upper arm.  She tolerated the injection well and site was covered with a bandaid.  Ora d/c from clinic ambulatory to await her transportation home.

## 2021-06-13 NOTE — PROGRESS NOTES
Ora came to chemo infusion this afternoon after peripheral lab draw for her next dose of Nplate.  VSS.  Pt assessed and is feeling well.  She has had not signs of bleeding. Lab results noted.  Platelets today were 249.  Nplate given sq into her right upper arm. She tolerated the injection well and site was covered with a bandaid.  Ora d/c from clinic ambulatory.

## 2021-06-13 NOTE — PROGRESS NOTES
Ora Gonzalez, 86 y.o., female arrived ambulatory to clinic at 1320 for Nplate injection. Patient assessed and vital signs stable. Administered Nplate SQ into R arm per provider order. Pt tolerated injection well, site covered with band-aid. Ora Gonzalez verbalized understanding of plan of care and return to clinic. Discharged to [facility] at 1410 alert and ambulatory.

## 2021-06-13 NOTE — PROGRESS NOTES
Pt arrived ambulatory to clinic for labs and Nplate injection.  Reviewed labs, pt needs injection.  Administered SQ injection into Right UA.  Pt tolerated procedure well, no s/s of bleeding or swelling at site.  Pt verbalized understanding of plan of care and return to clinic.

## 2021-06-13 NOTE — PROGRESS NOTES
Pt ambulates to infusion center for labs and injection.  Lab results noted.  Nplate injection given as ordered.  Pt left clinic stable to lobby.  Plan RTC as scheduled.

## 2021-06-13 NOTE — PROGRESS NOTES
Pt ambulates to infusion center for labs and injection.  Labs drawn per lab w/results noted.  Pt voices no new concerns today.  Nplate injection given SQ as ordered.  Pt left clinic stable to lobby.  Plan RTC as scheduled.

## 2021-06-13 NOTE — PROGRESS NOTES
Ora came to chemo infusion following labs and NP visit for her next dose of Nplate as well as a influenza vaccine.  Platelet count today was 62,000.  Nplate was given sq into her right upper arm.  She tolerated the injection well and site was covered with a bandaid.  She was screened for the influenza vaccine.  She was given printed information on the vaccine.  Influenza vaccine was give IM into her right deltoid.  She tolerated this injection well also and site was covered with a bandaid.  Ora d/c from clinic ambulatory to await her return ride to her home.  She scheduled prior to leaving.

## 2021-06-13 NOTE — PROGRESS NOTES
Pt ambulates to infusion center for labs and treatment.  Labs drawn per lab w/results noted.  Nplate injection given SQ as ordered.  Pt left clinic stable to [facility].  Plan RTC as scheduled.

## 2021-06-14 NOTE — PROGRESS NOTES
[facility] Hematology and Oncology Progress Note    Patient: Ora Gonzalez  MRN: [id number]  Date of Service: December 20, 2017      Assessment and Plan:    1. Immune thrombocytopenia: She has been on N-plate for just about 2 years.  Platelet counts have been stable.  She comes in weekly.  She is okay with doing this.  She has not had any bleeding complications.  Hemoglobin and white count remained stable.  No evidence of marrow fibrosis.  She seems to drop rather precipitously with dosing longer than 7 days.    2. Leukocytosis: mild, chronic. Follow.     ECOG Performance   ECOG Performance Status: 0    Distress Assessment  Distress Assessment Score: 2    Pain  Currently in Pain: No/denies    Diagnosis:    1. 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 Bone marrow biopsy was unremarkable, November 2014. Thyroid functions were normal. Anticardiolipin antibodies were normal.    2. Right-sided pulmonary embolism: Provoked. Diagnosed February 8, 2016.    Treatment:    She started course of prednisone on November 14, 2014 and finished on January 5, 2015. She had a complete response. Quickly relapsed and treated with Rituxan weekly from March 12 through April 2, 2015. She responded with highest platelet count of 112.     She then relapsed in October, 2015. She was started on Promacta in October. She responded within 2-3 weeks. She then quickly lost response after dose reduction from 50 to 25mg. She was started back on Promacta 50 mg and prednisone 60 mg daily. She did not respond. She received 2 doses of IVIG on December 4 and 5, 2015. She responded briefly with a platelet count of 59 on December 7 but then quickly lost response by December 14. 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 She received 2 more doses of IVIG on December 16 and 17th with minimal response.   Splenectomy was performed on December 20, 2015. She did not respond.   We started Rituxan on December 29, 2015. Steroids were continued.  Pt is 32 yo F with PMHx of colitis, C. diff infection, H. pylori gastritis, hx of appendectomy who presented to ED with complaint of abdominal pain, associated with nausea for 2 days.  Pt states she has had similar abdominal pain and nausea occur in the past, was previously following routinely with GI doctor, had colonoscopy done that revealed colitis. Also had EGD done in 2019 at [facility], revealed focal non-erosive gastritis.  Pt states she has also been experiencing increased urinary frequency, as well as clear, milky vaginal discharge for past 2 days. Presented to [facility] 1 day prior and was given one time dose of Doxycycline.  CT Abd revealed no acute pathology. Serum pregnancy neg. UA negative for LE, nitrites, ketones present.    #Abdominal Pain: Cyclical Vomiting Syndrome  -CT Abdomen negative   -UA negative for UTI   -Appetite improving.   -Protonix 40 mg two times a day  -Zofran 8 mg three times a day with meals   -EKG: ***   -F/u Outpatient GI   -Advised Marijuana cessation     #Bacterial Vaginosis   -Flagyl for 7 days   -F/u outpatient GYN  -Chlamydia and Gonorrhea negative       Romiplostim was started on January 5, 2016. 1 dose of WinRho was given on January 6, 2016.  Platelet response noted on January 13.  Her last dose of Rituxan was on January 20th, 2016.    She was restarted on N-plate on July 28, 2016 for relapse.    Interim History:    Myrna returns today for follow-up visit.  Overall things are going okay.  She just had a per day.  Energy and appetite are okay.  No bleeding or bruising.  Generally feeling well.  No cough or shortness of breath.    Review of Systems:    Constitutional  Constitutional (WDL): All constitutional elements are within defined limits  Neurosensory  Neurosensory (WDL): All neurosensory elements are within defined limits  Cardiovascular  Cardiovascular (WDL): All cardiovascular elements are within defined limits  Pulmonary  Respiratory (WDL): Within Defined Limits  Gastrointestinal  Gastrointestinal (WDL): All gastrointestinal elements are within defined limits  Genitourinary  Genitourinary (WDL): All genitourinary elements are within defined limits  Integumentary  Integumentary (WDL): All integumentary elements are within defined limits  Patient Coping  Patient Coping: Accepting  Accompanied by  Accompanied by: Alone    Past History:    Past Medical History:   Diagnosis Date     Adjustment disorder with mixed anxiety and depressed mood 1/22/2016     Adjustment reaction to chronic stress 1/22/2016     Chronic kidney disease      Hypertension      ITP (idiopathic thrombocytopenic purpura)      Osteoporosis      Paroxysmal atrial fibrillation      Pulmonary embolism 2/8/2016     Thrombocytopenia      Physical Exam:    Recent Vitals 12/20/2017   Weight 119 lbs 7 oz   /59   Pulse 78   Temp 98.1   Temp src 1   SpO2 96   Some recent data might be hidden     General: patient appears stated age of 84 y.o.. Nontoxic and in no distress.   HEENT: Head: atraumatic, normocephalic. Sclerae anicteric.  Chest:  Normal respiratory effort.  Cardiac:  No edema.  Abdomen:  abdomen is soft, non-distended  Extremities: normal tone and muscle bulk.   Skin: no lesions or rash. Warm and dry.   CNS: alert and oriented x3. Grossly non-focal.   Psychiatric: normal mood and affect.     Lab Results:    Recent Results (from the past 168 hour(s))   HM2(CBC w/o Differential)   Result Value Ref Range    WBC 14.8 (H) 4.0 - 11.0 thou/uL    RBC 4.39 3.80 - 5.40 mill/uL    Hemoglobin 13.9 12.0 - 16.0 g/dL    Hematocrit 42.9 35.0 - 47.0 %    MCV 98 80 - 100 fL    MCH 31.7 27.0 - 34.0 pg    MCHC 32.4 32.0 - 36.0 g/dL    RDW 16.5 (H) 11.0 - 14.5 %    Platelets 198 140 - 440 thou/uL    MPV 12.5 8.5 - 12.5 fL   HM2(CBC w/o Differential)   Result Value Ref Range    WBC 14.7 (H) 4.0 - 11.0 thou/uL    RBC 4.42 3.80 - 5.40 mill/uL    Hemoglobin 14.0 12.0 - 16.0 g/dL    Hematocrit 42.9 35.0 - 47.0 %    MCV 97 80 - 100 fL    MCH 31.7 27.0 - 34.0 pg    MCHC 32.6 32.0 - 36.0 g/dL    RDW 16.0 (H) 11.0 - 14.5 %    Platelets 164 140 - 440 thou/uL    MPV 12.5 8.5 - 12.5 fL     Imaging:    No results found.      Signed by: Vinny Garcia MD     Pt is 32 yo F with PMHx of colitis, C. diff infection, H. pylori gastritis, hx of appendectomy who presented to ED with complaint of abdominal pain, associated with nausea for 2 days.  Pt states she has had similar abdominal pain and nausea occur in the past, was previously following routinely with GI doctor, had colonoscopy done that revealed colitis. Also had EGD done in 2019 at [facility], revealed focal non-erosive gastritis.  Pt states she has also been experiencing increased urinary frequency, as well as clear, milky vaginal discharge for past 2 days. Presented to [facility] 1 day prior and was given one time dose of Doxycycline.  CT Abd revealed no acute pathology. Serum pregnancy neg. UA negative for LE, nitrites, ketones present.    #Abdominal Pain: Cyclical Vomiting Syndrome  -CT Abdomen negative   -UA negative for UTI   -Appetite improving.   -Protonix 40 mg two times a day  -Zofran 8 mg three times a day with meals   -F/u Outpatient GI: Friday, February 26th at 2:30 in [facility] at [address] [phone]  -Advised Marijuana cessation     #Bacterial Vaginosis   -Flagyl for 7 days   -F/u outpatient GYN: Thursday, February 11th at 8:00 am in the [facility] at [address]. [phone]  -Chlamydia and Gonorrhea negative

## 2021-06-14 NOTE — PROGRESS NOTES
Pt ambulates to infusion center for labs and treatment.  Labs drawn peripherally w/results reviewed w/K. MAY Hazel.  New orders given for nplate dose change.  Nplate given SQ as ordered.  Pt left clinic stable to lobby.  Plan RTC as scheduled.

## 2021-06-14 NOTE — PROGRESS NOTES
Ora came to chemo infusion this afternoon following lab for her next dose of Nplate.  VSS.  Pt assessed.  Platelets today was 283. She was offered Covid testing today and refused this.  Nplate was given as ordered and tolerated well.  Ora left clinic ambulatory.

## 2021-06-14 NOTE — PROGRESS NOTES
Ora came to chemo infusion this afternoon for her next dose of Nplate.  VSS.  Pt assessed and she is feeling well today.  Labs on 1/7 noted.  Nplate was given subcutaneous as ordered and she tolerated it well while in clinic today.  Site was covered with a bandaid.  Ora left clinic ambulatory.

## 2021-06-14 NOTE — PROGRESS NOTES
Patient here for Nplate. Patient denies any concerns at this time. Injection given in right arm without difficulty. Patient understands treatment plan and ambulatory to lobby.

## 2021-06-14 NOTE — PROGRESS NOTES
Patient arrived ambulatory, after seeing MD.  Given N-Plate subcut in right arm without problems.  Bandaid applied.  Patient left ambulatory, by self, in stable condition.  Instructed to call with questions/concerns/problems.  Patient verbalized understanding.

## 2021-06-14 NOTE — PROGRESS NOTES
Pt came into infusion clinic and was given Nplate based on Plt count of 198. Pt tolerated injection well and left infusion clinic via ambulatory.

## 2021-06-14 NOTE — PROGRESS NOTES
Pt her ambulatory for NPlate injection. Reviewed injection with pt and pt states tolerates without any problems. Injection administered in right upper arm and bandaid to site. Follow up reviewed and pt dc'd steady gait.

## 2021-06-14 NOTE — PROGRESS NOTES
Pt ambulates to infusion center for labs and injection.  Labs drawn per lab w/results noted.  Nplate injection given as ordered.  Pt left clinic stable to lobby.  Plan RTC as scheduled.

## 2021-06-14 NOTE — PROGRESS NOTES
Patient arrived ambulatory, by self, for labs and injection of N-Plate.  Plts 63,000 today.  Given N-Plate subcut in right arm without problems.  Bandaid applied.  Instructed patient to call with questions/concerns/  Problems.  Patient verbalized understanding.  Will return next week.

## 2021-06-14 NOTE — PROGRESS NOTES
Pt ambulates to infusion center for labs and treatment.  Labs drawn per lab w/results noted and reviewed w/K. MAY Hazel.  Nplate dose increased for platelet decrease since 11/2/17.  Nplate injection given SQ as ordered.  Pt left clinic stable to [facility].  Plan RTC as scheduled.

## 2021-06-14 NOTE — PROGRESS NOTES
Internal Medicine Office Visit  [facility]  Patient Name: Ora Gonzalez  Patient Age: 83 y.o.  YOB: 1933  MRN: [id number]    Date of Visit: 2017  Reason for Office Visit:   Chief Complaint   Patient presents with     Follow-up           Assessment / Plan / Medical Decision Makin. Idiopathic thrombocytopenic purpura  2. Cognitive impairment  3. Prediabetes  - Recheck A1C in 6 months with next office visit  - Continue follow up with hem/onc for ITP treatment as currently arranged  - Clarify advance directives with next office visit      Health Maintenance Review  Health Maintenance   Topic Date Due     ZOSTER VACCINE  1993     FALL RISK ASSESSMENT  2017     DXA SCAN  2018     ADVANCE DIRECTIVES DISCUSSED WITH PATIENT  2021     TD 18+ HE  2027     PNEUMOCOCCAL POLYSACCHARIDE VACCINE AGE 65 AND OVER  Completed     INFLUENZA VACCINE RULE BASED  Completed     PNEUMOCOCCAL CONJUGATE VACCINE FOR ADULTS (PCV13 OR PREVNAR)  Completed         I am having Ms. Gonzalez maintain her ACETAMINOPHEN/DIPHENHYDRAMINE (TYLENOL PM EXTRA STRENGTH ORAL) and donepezil.      HPI:  Ora Gonzalez is a 83 y.o. year old who presents to the office today for follow up of chronic medical conditions.     She has been gaining some weight intentionally. Her [facility] assisted living discontinued their meal program so she took over cooking for herself. She is preparing meals for herself and then freezing them. Gets her own groceries with a bus that takes her weekly to the store.     ITP- doing well with current regimen. No bruising or rashes     Feels very good. She is sleeping well at night.     She has a history of cognitive impairment but does very well. She is active in her living community and has good social support with friends and her son.     Review of Systems- pertinent positive in bold:  No fevers, chills, night sweats. She is gradually gaining  weight       Current Scheduled Meds:  Outpatient Encounter Prescriptions as of 2017   Medication Sig Dispense Refill     ACETAMINOPHEN/DIPHENHYDRAMINE (TYLENOL PM EXTRA STRENGTH ORAL) Take 1 tablet by mouth daily as needed.       donepezil (ARICEPT) 10 MG tablet Take 10 mg by mouth every morning.       No facility-administered encounter medications on file as of 2017.      Past Medical History:   Diagnosis Date     Adjustment disorder with mixed anxiety and depressed mood 2016     Adjustment reaction to chronic stress 2016     Chronic kidney disease      Hypertension      ITP (idiopathic thrombocytopenic purpura)      Osteoporosis      Paroxysmal atrial fibrillation      Pulmonary embolism 2016     Thrombocytopenia      Past Surgical History:   Procedure Laterality Date     CATARACT EXTRACTION Left 10/2014     ESOPHAGOGASTRODUODENOSCOPY N/A 2017    Procedure: ESOPHAGOGASTRODUODENOSCOPY (EGD);  Surgeon: Juanpablo John MD;  Location: [facility];  Service:      LAPAROSCOPIC SPLENECTOMY N/A 2015    Procedure: LAPAROSCOPIC TO CONVERSION TO OPEN SPLENECTOMY;  Surgeon: Herb Mckeon MD;  Location: [facility] OR;  Service:            [facility] joe     [facility]  4/15/2017          [place] HEMORRHOIDECTOMY INTERNAL RUBBER BAND LIGATIONS      Description: Hemorrhoidectomy;  Recorded: 2008;  Comments: with fissure     Social History   Substance Use Topics     Smoking status: Former Smoker     Packs/day: 0.25     Years: 60.00     Quit date: 2015     Smokeless tobacco: Former User     Alcohol use Yes      Comment: rare, maybe 1-2 drinks per year        Objective / Physical Examination:  Vitals:    17 0914   BP: 132/62   Pulse: 60   Weight: 121 lb (54.9 kg)     Wt Readings from Last 3 Encounters:   17 121 lb (54.9 kg)   17 120 lb 14.4 oz (54.8 kg)   17 116 lb 13.5 oz (53 kg)     Body mass index is 21.78 kg/(m^2).     General Appearance: Alert and  oriented, cooperative, affect appropriate, speech clear, in no apparent distress  Lungs: Clear to auscultation bilaterally. Normal inspiratory and expiratory effort  Cardiovascular: Regular rate, normal S1, S2. No murmurs, rubs, or gallops  Extremities: No edema    No orders of the defined types were placed in this encounter.  Followup: Return in about 6 months (around 6/12/2018) for Next scheduled follow up. earlier if needed.          Ryanne Corbett, CNP

## 2021-06-14 NOTE — PROGRESS NOTES
Pt here ambulatory for possible injection. Platelets today 131,000. Injection ordered and given in right upper arm with bandaid to site. Follow up reviewed with pt and copy of labwork given to pt. Pt dc'd steady gait and tolerated inj without any problems

## 2021-06-14 NOTE — PROGRESS NOTES
PT here for Nplate inj.  Reviewed injection with pt and administered in right upper arm with bandaid to site. Follow up reviewed/pt tolerated inj without any problems. Pt dc'd steady gait

## 2021-06-14 NOTE — PROGRESS NOTES
PT here ambulatory for nplate inj. Nplate administered to left upper arm and bandaid to site. Follow up reviewed and pt dc'd steady gait

## 2021-06-14 NOTE — PROGRESS NOTES
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  Continues to respond well to her romiplostim.  Platelet count is excellent.  No bleeding.  No shortness of breath.  White count is stable slightly above normal.  Hemoglobin is normal.  No evidence of fibrosis in the marrow. We will continue clinic visits every 6 months.    2. Leukocytosis: Stable, mild, chronic. Follow.     ECOG Performance   ECOG Performance Status: 0    Distress Assessment  Distress Assessment Score: No distress    Pain  Currently in Pain: No/denies    Diagnosis:    1. 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 Bone marrow biopsy was unremarkable, November 2014. Thyroid functions were normal. Anticardiolipin antibodies were normal.    2. Right-sided pulmonary embolism: Provoked. Diagnosed February 8, 2016.    Treatment:    She started course of prednisone on November 14, 2014 and finished on January 5, 2015. She had a complete response. Quickly relapsed and treated with Rituxan weekly from March 12 through April 2, 2015. She responded with highest platelet count of 112.     She then relapsed in October, 2015. She was started on Promacta in October. She responded within 2-3 weeks. She then quickly lost response after dose reduction from 50 to 25mg. She was started back on Promacta 50 mg and prednisone 60 mg daily. She did not respond. She received 2 doses of IVIG on December 4 and 5, 2015. She responded briefly with a platelet count of 59 on December 7 but then quickly lost response by December 14. 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 She received 2 more doses of IVIG on December 16 and 17th with minimal response.   Splenectomy was performed on December 20, 2015. She did not respond.   We started Rituxan on December 29, 2015. Steroids were continued. Romiplostim was  started on January 5, 2016. 1 dose of WinRho was given on January 6, 2016.  Platelet response noted on January 13.  Her last dose of Rituxan was on January 20th, 2016.    She was restarted on N-plate on July 28, 2016 for relapse.    Interim History:    Myrna returns today for follow-up visit.  She has been doing well clinically.  No acute events today.  No changes in her health over the past 6 months.  Energy and appetite are good.  No bleeding.    Review of Systems:    Constitutional  Constitutional (WDL): All constitutional elements are within defined limits  Neurosensory  Neurosensory (WDL): All neurosensory elements are within defined limits  Cardiovascular  Cardiovascular (WDL): All cardiovascular elements are within defined limits  Pulmonary  Respiratory (WDL): Within Defined Limits  Gastrointestinal  Gastrointestinal (WDL): All gastrointestinal elements are within defined limits  Genitourinary  Genitourinary (WDL): All genitourinary elements are within defined limits  Integumentary  Integumentary (WDL): All integumentary elements are within defined limits  Patient Coping  Patient Coping: Accepting  Accompanied by  Accompanied by: Alone    Past History:    Past Medical History:   Diagnosis Date     Adjustment disorder with mixed anxiety and depressed mood 1/22/2016     Adjustment reaction to chronic stress 1/22/2016     Chronic kidney disease      Hypertension      ITP (idiopathic thrombocytopenic purpura)      Osteoporosis      Paroxysmal atrial fibrillation (H)      Pulmonary embolism (H) 2/8/2016     Thrombocytopenia (H)      Physical Exam:    Recent Vitals 1/22/2021   Weight 121 lbs   BSA (m2) 1.55 m2   /72   Pulse 73   Temp 98.1   Temp src 1   SpO2 97   Some recent data might be hidden     General: patient appears stated age of 87 y.o.. Nontoxic and in no distress.   HEENT: Head: atraumatic, normocephalic. Sclerae anicteric.  Chest:  Normal respiratory effort.   Cardiac:  No edema.   Abdomen: abdomen  is non-distended  Extremities: normal tone and muscle bulk.   Skin: no lesions or rash. Warm and dry.   CNS: alert and oriented. Grossly non-focal.   Psychiatric: normal mood and affect.     Lab Results:    No results found for this or any previous visit (from the past 168 hour(s)).     Imaging:    No results found.      Signed by: Vinny Garcia MD

## 2021-06-15 NOTE — PROGRESS NOTES
Pt ambulates to infusion center for labs and injection.  Labs drawn per lab w/results noted and reviewed w/LINDA. MAY Hazel.  Dose change reviewed w/pharmacist.  Nplate given SQ as ordered.  Pt left clinic stable.  Plan RTC as scheduled.

## 2021-06-15 NOTE — PROGRESS NOTES
Pt ambulates to infusion center for labs and injection.  Lab results noted and reviewed w/pt.  Nplate injection given as ordered.  Pt left clinic stable to ignacio.  Plan RTC as scheduled.

## 2021-06-15 NOTE — PROGRESS NOTES
Patient arrived ambulatory, by self, for labs and treatment.  States she was in the hospital over the weekend for dizziness.  Platelets 136,000 - reviewed with Dr. Garcia.  Given N-Plate as ordered in right arm without problems.  Bandaid applied.  Patient left ambulatory, by self, in stable condition.  Instruceted to call with questions/concerns/problems.  Patient verbalized understanding.  Patient will return next Thursday for treatment.

## 2021-06-15 NOTE — PROGRESS NOTES
Pt arrived for Nplate injection. Injection given in right arm and bandaid applied. Pt left ambulatory afterwards.

## 2021-06-15 NOTE — PROGRESS NOTES
Patient arrived ambulatory, by self, for labs and N-Plate injections.  Platelets 132,000 today.  Given N-plate injection in right arm subcut without problems.  Bandaid applied.  Patient left ambulatory, by self, in stable condition.  Instructed to call with questions/concerns/problems.  Patient verbalized understanding.

## 2021-06-15 NOTE — PROGRESS NOTES
Pt ambulates to infusion center for labs and treatment.  Pt voices no new concerns today.  Labs drawn per lab w/results noted.  Nplate injection given SQ as ordered.  Pt left clinic stable to lobby.  Plan RTC as scheduled.

## 2021-06-15 NOTE — PROGRESS NOTES
Ora came to chemo infusion this afternoon for her next dose of Nplate.  VSS.  Pt assessed. She has not had any evidence of bleeding.  No lab ordered today.  Nplate given subcutaneous into her right upper arm.  She tolerated the injection well and site was covered with a bandaid.  Ora left clinic ambulatory.  She was offered Covid testing today but refused this.

## 2021-06-15 NOTE — PROGRESS NOTES
Pt ambulates to infusion center for labs and treatment.  Pt voices no new concerns today.  Labs drawn peripherally w/results noted.  Nplate injection given SQ as ordered.  Pt left clinic stable to lobby.  Plan RTC as scheduled.

## 2021-06-15 NOTE — PROGRESS NOTES
Internal Medicine Office Visit  [facility]  Patient Name: Ora Gonzalez  Patient Age: 84 y.o.  YOB: 1933  MRN: [id number]    Date of Visit: 2018  Reason for Office Visit:   Chief Complaint   Patient presents with     Hospital Visit Follow Up     dizziness, doing much better           Assessment / Plan / Medical Decision Makin. Hospital discharge follow-up  2. Cognitive impairment  3. Idiopathic thrombocytopenic purpura  - Reviewed inpatient notes, labs, and imaging   - Look into a Life Alert bracelet or similar   - Continue with plan for home PT/OT evaluation and treatment. She actually seems to be doing very well in her own home independently at this time. Dizziness has resolved  - Follow up in 6 months     Health Maintenance Review  Health Maintenance   Topic Date Due     ZOSTER VACCINE  1993     FALL RISK ASSESSMENT  2017     DXA SCAN  2018     ADVANCE DIRECTIVES DISCUSSED WITH PATIENT  2021     TD 18+ HE  2027     PNEUMOCOCCAL POLYSACCHARIDE VACCINE AGE 65 AND OVER  Completed     INFLUENZA VACCINE RULE BASED  Completed     PNEUMOCOCCAL CONJUGATE VACCINE FOR ADULTS (PCV13 OR PREVNAR)  Completed         I am having Ms. Gonzalez maintain her ACETAMINOPHEN/DIPHENHYDRAMINE (TYLENOL PM EXTRA STRENGTH ORAL), donepezil, and meclizine.      HPI:  Ora Gonzalez is a 84 y.o. year old who presents to the office today for hospital discharge follow up.     She was seen in the emergency department with a chief concern of ataxia. MRI of the head was negative for acute CVA. Neurology was consulted, walker was recommended. Home physical therapy was ordered. She was discharged with meclizine which was helpful. Since she returned home she has not had any vertigo episodes. Today she will have a visit with home health. She lives in a intermediate community but nursing services are not offered, there are just contractual agreement with home  care agencies. Her kids come and check on her regularly. She is preparing her own meals, she freezes these meals that she makes in bulk and then eats these or uses freezer meals. Mondays and Fridays she has access to a bus that takes her to the grocery store and the hospital for her infusions for ITP. She is independent in doing her laundry, housework. She has never left her stove on, she does not use her oven. She is active in her [facility] community and names many friends there.     Review of Systems- pertinent positive in bold:  She denies any new rashes or abnormal bleeding. No chest pain or shortness of breath. Dizziness has resolved. She does endorse a mild nasal congestion/fullness today. A 10-point ROS is negative except as stated in HPI       Current Scheduled Meds:  Outpatient Encounter Prescriptions as of 2/1/2018   Medication Sig Dispense Refill     ACETAMINOPHEN/DIPHENHYDRAMINE (TYLENOL PM EXTRA STRENGTH ORAL) Take 1 tablet by mouth daily as needed.       donepezil (ARICEPT) 10 MG tablet Take 10 mg by mouth at bedtime.        meclizine (ANTIVERT) 12.5 mg tablet Take 1 tablet (12.5 mg total) by mouth 3 (three) times a day as needed for dizziness. 16 tablet 0     No facility-administered encounter medications on file as of 2/1/2018.      Past Medical History:   Diagnosis Date     Adjustment disorder with mixed anxiety and depressed mood 1/22/2016     Adjustment reaction to chronic stress 1/22/2016     Chronic kidney disease      Hypertension      ITP (idiopathic thrombocytopenic purpura)      Osteoporosis      Paroxysmal atrial fibrillation      Pulmonary embolism 2/8/2016     Thrombocytopenia      Past Surgical History:   Procedure Laterality Date     CATARACT EXTRACTION Left 10/2014     ESOPHAGOGASTRODUODENOSCOPY N/A 4/13/2017    Procedure: ESOPHAGOGASTRODUODENOSCOPY (EGD);  Surgeon: Juanpablo John MD;  Location: [facility];  Service:      LAPAROSCOPIC SPLENECTOMY N/A 12/20/2015    Procedure:  LAPAROSCOPIC TO CONVERSION TO OPEN SPLENECTOMY;  Surgeon: Herb Mckeon MD;  Location: [facility] OR;  Service:            [facility] joe     [facility]  4/15/2017          UT HEMORRHOIDECTOMY INTERNAL RUBBER BAND LIGATIONS      Description: Hemorrhoidectomy;  Recorded: 2008;  Comments: with fissure     Social History   Substance Use Topics     Smoking status: Former Smoker     Packs/day: 0.25     Years: 60.00     Quit date: 2015     Smokeless tobacco: Former User     Alcohol use Yes      Comment: rare, maybe 1-2 drinks per year        Objective / Physical Examination:  Vitals:    18 0816   BP: 122/60   Pulse: 76   Weight: 120 lb (54.4 kg)     Wt Readings from Last 3 Encounters:   18 120 lb (54.4 kg)   18 119 lb (54 kg)   18 119 lb 4.8 oz (54.1 kg)     Body mass index is 21.95 kg/(m^2).     [facility]  MR BRAIN WITHOUT CONTRAST  2018, 11:14 AM     INDICATION: Ataxia.   TECHNIQUE: Multiplanar T1- and T2-weighted images were obtained through the brain.    CONTRAST: None.  SEDATION: None.  COMPARISON: CT brain 2017. MRI brain 2017.      FINDINGS: No areas of abnormally restricted diffusion to suggest acute or subacute infarct. No areas of abnormal parenchymal susceptibility.     At least mild prominence of the lateral ventricles and mild prominence of the sulci, similar to prior. Mild presumed sequela of chronic microvascular ischemic change, stable. No mass effect or midline shift.     Cerebellar tonsils are normally positioned. Visualized upper cervical spinal cord and sella are unremarkable. Small amount of degenerative pannus is again seen along the dorsum of the dens. Mild volume loss is seen within the corpus callosum which is   otherwise normally formed. Visualized marrow space is unremarkable. Major skull base vascular flow-voids are preserved.     Prior cataract surgeries. Right maxillary sinus retention cyst. Middle ear cavities and mastoid  air cells are clear.     IMPRESSION:   1.  Mild atrophy and presumed sequela of chronic microvascular ischemic change, similar to prior.     2. No finding for acute infarct, hemorrhage, or mass.    Study Result   [facility]  CT HEAD WO CONTRAST  1/27/2018 7:16 PM     INDICATION: Ataxia/thrombocytopenia.  TECHNIQUE: Without IV contrast. Dose reduction techniques were used.  CONTRAST: None.  COMPARISON:  Brain MRI from 3/13/2017.     FINDINGS: No intracranial hemorrhage, extraaxial collection, mass effect or CT evidence of acute infarct. Mild presumed chronic small vessel ischemic changes. Cerebral volume loss. Minimal burden presumed chronic small vessel ischemia.  Osseous   structures are intact. The visualized orbits, paranasal sinuses and mastoid air cells are free of significant disease.      IMPRESSION:   CONCLUSION:  1.  No acute intracranial hemorrhage, midline shift, or mass effect. No definite CT evidence of acute infarct.     2.  Mild age-related changes similar to the prior MRI given differences in modality.             General Appearance: Alert and oriented, cooperative, affect appropriate, speech clear, in no apparent distress  Head: Normocephalic, atraumatic. No sinus percussion tenderness   Nose: Septum midline, nares patent, no visible polyps, mucosa moist and without drainage  Throat: Lips and mucosa moist. Teeth in good repair, pharynx without erythema or exudate  Neck: Supple, trachea midline. No cervical adenopathy  Lungs: Clear to auscultation bilaterally. Normal inspiratory and expiratory effort  Cardiovascular: Regular rate, normal S1, S2. No murmurs, rubs, or gallops  Extremities: Pulses 2+ and equal throughout. No edema  Integumentary: Warm and dry. Without suspicious looking lesions  Neuro: Alert and oriented x 3, follows commands appropriately. Gait is even, walks without medical device     No orders of the defined types were placed in this encounter.  Followup: Return in about 6  months (around 8/1/2018) for Next scheduled follow up. earlier if needed.        Ryanne Corbett, CNP

## 2021-06-15 NOTE — PROGRESS NOTES
Pt ambulates to infusion center for labs and injection.  Lab results noted and reviewed w/pt.  Nplate injection given to patient as ordered.  [facility]  Plan RTC as scheduled.

## 2021-06-15 NOTE — PROGRESS NOTES
Pt ambulates to infusion center for treatment.  Pt voices no new concerns today.  Labs drawn per lab w/results noted.  N-plate given SQ as ordered without difficulty.  Pt left clinic stable to lobby.  Plan RTC as scheduled.

## 2021-06-15 NOTE — PROGRESS NOTES
Ora came to clinic this morning for her next dose of Nplate.  Platelet count today was 330.  This was reviewed with Shanae COX CNP who instructed this RN to proceed with todays dose at 2mcg/kg dosing.  Nplate was given sq into her right upper arm.  She tolerated the injection well and sie was covered with a bandaid.  Ora d/c from clinic ambulatory and unaccompanied.  She is aware of her future appointment.

## 2021-06-16 NOTE — PROGRESS NOTES
Ora Gonzalez, 86 y.o., female arrived ambulatory to clinic at 1405 for Nplate injection. Patient assessed and vital signs stable. Administered Nplate SQ into R arm per provider order. Pt tolerated injection well, site covered with band-aid. Ora Gonzalez verbalized understanding of plan of care and return to clinic. Discharged to [facility] at 1455 alert and ambulatory.

## 2021-06-16 NOTE — PROGRESS NOTES
Pt came into infusion clinic for labs and poss NPlate. Pt given Nplate based on Plt count of 121. Pt tolerated this well and left infusion clinic via ambulatory. Pt will RTC as sched

## 2021-06-16 NOTE — PROGRESS NOTES
Pt came into infusion clinic for labs and poss injection. Nplate held today for PLT count of 414. Pt informed and agreeable. Pt will RTC next week for labs and poss injection.

## 2021-06-16 NOTE — PROGRESS NOTES
Pt came into infusion clinic for labs and poss NPlate. Nplate given based on therapy plan and PLT count of 222. Pt tolerated injection well and left infusion clinic via ambulatory. Pt will RTC as sched

## 2021-06-16 NOTE — PROGRESS NOTES
Pt came into infusion clinic for labs and poss NPlate. Plt count of 25 reported to Dr. Garcia. Nplate administered as ordered. Pt reports no bleeding. Pt was instructed to report to ED for any bleeding. Pt verbalized understanding of this and will RTC in 1 week

## 2021-06-16 NOTE — PROGRESS NOTES
Ora came to chemo infusion this afternoon for her next dose of Nplate.  VSS.  PT assessed and is feeling well.  No labs needed today.  Nplate given as ordered and was well tolerated.  Site was covered with a bandaid.  Ora left clinic ambulatory to [facility] .  
Ambulatory

## 2021-06-16 NOTE — PROGRESS NOTES
Pt ambulates to infusion center for treatment.  Labs drawn per lab w/results noted.  Pt voices no new concerns today.  Nplate injection given SQ as ordered.  Pt left clinic stable to lobby and is aware of future appointment.

## 2021-06-16 NOTE — PROGRESS NOTES
Pt ambulates to infusion center for lab and injection.  Lab results noted and nplate dosage adjusted according to therapy plan instructions.  Nplate injection given SQ as ordered without complication. Pt left clinic stable to ignacio.

## 2021-06-16 NOTE — PROGRESS NOTES
/64   Pulse 75   SpO2 96%     Patient presented to infusion for NPLATE injection. Platelet count today 279. Injection given, tolerated well. Patient left clinic ambulating independently.

## 2021-06-17 ENCOUNTER — INFUSION - HEALTHEAST (OUTPATIENT)
Dept: INFUSION THERAPY | Facility: HOSPITAL | Age: 86
End: 2021-06-17

## 2021-06-17 DIAGNOSIS — D69.3 IDIOPATHIC THROMBOCYTOPENIC PURPURA (H): ICD-10-CM

## 2021-06-17 NOTE — PATIENT INSTRUCTIONS - HE
Patient Instructions by Susan De León PT at 5/1/2019 10:00 AM     Author: Susan De León PT Service: -- Author Type: Physical Therapist    Filed: 5/1/2019 10:55 AM Encounter Date: 5/1/2019 Status: Signed    : Susan De León PT (Physical Therapist)        BRACE - SINGLE KNEE EXTENSION    While lying on your back with knees bent, straighten out one knee while keeping the leg off the ground. Hold as indicated, then return to original position. Next, perform on the other leg.    Use your stomach muscles to keep your spine from moving the entire time. Hold 3-5 sec, 5-10 reps, 1-2 times, once per day        PARTIAL HEEL-TOE STANCE    Stand with your right foot partially in front of the other.  TURN HEAD SLOWLY RIGHT TO LEFT, hold 30-60 sec, 2-3 times    Repeat with left foot in front.     TANDEM STANCE    Stand with one foot directly in front of the other so that the toes of one foot touches the heel of the other. Maintain your balance- can hold onto counter- try to use just finger tips, hold 30-60 sec, 2-3 times with each foot in front          Standing marching in place-Supported    Stand facing your kitchen countertop. Place 1 or 2 hands on the counter for support. Begin marching in place, bringing your left knee up then down, followed by your right knee up and down. Continue alternating legs in this manner for the duration of the exercise.     ADD SLOWLY TURNING HEAD RIGHT TO LEFT WITH every few steps, 1 min, 2-3 times

## 2021-06-17 NOTE — PATIENT INSTRUCTIONS - HE
"Patient Instructions by Susan De León PT at 4/3/2019 11:30 AM     Author: Susan De León PT Service: -- Author Type: Physical Therapist    Filed: 4/3/2019 12:32 PM Encounter Date: 4/3/2019 Status: Signed    : Susan De León PT (Physical Therapist)        SIT TO STAND - NO HANDS    Start by sitting in a chair. Scoot to the edge of the seat.  Bend forward with nose over toes, then raise up to standing without using your hands for support. 10 reps, 3 times per day      BRIDGING    While lying on your back, tighten your lower abdominals, squeeze your buttocks and then raise your buttocks off the floor/bed as creating a \"Bridge\" with your body. Work up to 30 reps, 1-2 times      CLAM SHELLS    While lying on your side with your knees bent, draw up the top knee while keeping contact of your feet together.    Do not let your pelvis roll back during the lifting movement.  10-15 reps, 3 times on each leg      Standing marching in place-Supported    Stand facing your kitchen countertop. Place 1 or 2 hands on the counter for support. Begin marching in place, bringing your left knee up then down, followed by your right knee up and down. Continue alternating legs in this manner for the duration of the exercise. Do for 30-60 sec, 2-3 times      Static Balance: Rhomberg position on floor    Standing with your feet together and arms across your chest, practice keeping your balance with your eyes open. Stand next to a wall or stable surface for balance support if needed.    SLOWLY turn your head side to side and up/down for 30-60 sec, 2-3 times      Modified Tandem    Stand near a stable surface or near the corner of a wall. Position feet as above. Hold this position as long as you are able to. A good goal is holding 60 seconds without needing to use your hands or body against the wall or counter. Hold 30-60 sec with each foot in front, 1-2 times                "

## 2021-06-17 NOTE — PROGRESS NOTES
Patient arrived ambulatory for labs and NPLATE today. Lab results reviewed with Platelets noted at 100 and within parameters to receive dose today. Patient tolerated NPLATE injection with no complaints/no signs of adverse reaction. Patient discharged to home ambulatory/ reports will return as scheduled.

## 2021-06-17 NOTE — PROGRESS NOTES
Pt came into infusion clinic for Nplate as ordered. Labs reviewed. Pt due for Nplate based on Plt count of 179. Pt tolerated this well and left infusion clinic via ambulatory. Pt will RTC as sched.

## 2021-06-17 NOTE — PROGRESS NOTES
Ora came to chemo infusion this morning after peripheral lab draw done for her next dose of Nplate.  VSS.  Pt assessed and is feeling well today.  No bleeding or bruising.  Platelet count today was 111.  Nplate was given sq into her right upper arm sq.  She tolerated the injection well and site was covered with a bandaid.  Ora d/c from clinic ambulatory and unaccompanied to await transportation back to her residence.

## 2021-06-17 NOTE — PROGRESS NOTES
([id number]) received call from Triage RN Michelle who reports preliminary Platelet count this am is 24. Per Michelle this was reported to Dr. Garcia and per Dr. Garcia no change in Nplate dose today. [facility] notified. Patient notified.  ([id number]) Patient given NPlate injection as ordered. Patient discharged to home ambulatory and reports will call for transportation from [facility]. Reports will return for next scheduled appointment.

## 2021-06-17 NOTE — PROGRESS NOTES
Ora Gonzalez, 86 y.o., female arrived ambulatory to clinic at 1315 for Nplate injection. Labs reviewed. Patient assessed and vital signs stable. Administered Nplate SQ into R arm per provider order. Pt tolerated injection well, site covered with band-aid. Ora Gonzalez verbalized understanding of plan of care and return to clinic. Discharged to [facility] at 1405 alert and ambulatory.

## 2021-06-17 NOTE — PATIENT INSTRUCTIONS - HE
Patient Instructions by Ryanne Corbett FNP at 3/28/2019 12:50 PM     Author: Ryanne Corbett FNP Service: -- Author Type: Nurse Practitioner    Filed: 3/28/2019  1:15 PM Encounter Date: 3/28/2019 Status: Addendum    : Ryanne Corbett FNP (Nurse Practitioner)    Related Notes: Original Note by Ryanne Corbett FNP (Nurse Practitioner) filed at 3/28/2019 12:34 PM         Patient Education   Depression and Suicide in Older Adults  Nearly 2 million older Americans have some type of depression. Sadly, some of them even take their own lives. Yet depression among older adults is often ignored. Learn the warning signs. You may help spare a loved one needless pain. You may also save a life.       What Is Depression?  Depression is a mood disorder that affects the way you think and feel. The most common symptom is a feeling of deep sadness. People who are depressed also may seem tired and listless. And nothing seems to give them pleasure. Its normal to grieve or be sad sometimes. But sadness lessens or passes with time. Depression rarely goes away or improves on its own. Other symptoms of depression are:    Sleeping more or less than normal    Eating more or less than normal    Having headaches, stomachaches, or other pains that dont go away    Feeling nervous, empty, or worthless    Crying a great deal    Thinking or talking about suicide or death    Feeling confused or forgetful  What Causes It?  The causes of depression arent fully known. Certain chemicals in the brain play a role. Depression does run in families. And life stresses can also trigger depression in some people. That may be the case with older adults. They often face great burdens, such as the death of friends or a spouse. They may have failing health. And they are more likely to be alone, lonely, or poor.  How You Can Help  Often, depressed people may not want to ask for help. When they do, they may be ignored. Or, they may receive  the wrong treatment. You can help by showing parents and older friends love and support. If they seem depressed, help them find the right treatment. Talk to your doctor. Or contact a local mental health center, social service agency, or hospital. With modern treatment, no one has to suffer from depression.  Resources:    National [place] of Mental Health  117.565.7498  www.nimh.nih.gov    National [place] on Mental Illness  134.695.3155  www.madalyn.org    [facility] [place]  550.775.8871  www.[facility].org    National Suicide Hotline  543.613.4134 (800-SUICIDE)      5955-3185 [employer]. [address] [place] [address]. All rights reserved. This information is not intended as a substitute for professional medical care. Always follow your healthcare professional's instructions.         Patient Education   Personalized Prevention Plan  You are due for the preventive services outlined below.  Your care team is available to assist you in scheduling these services.  If you have already completed any of these items, please share that information with your care team to update in your medical record.  Health Maintenance   Topic Date Due   ? ZOSTER VACCINES (1 of 2) 12/18/1983   ? FALL RISK ASSESSMENT  12/13/2017   ? DXA SCAN  06/29/2018   ? ADVANCE DIRECTIVES DISCUSSED WITH PATIENT  12/13/2021   ? TD 18+ HE  06/13/2027   ? PNEUMOCOCCAL POLYSACCHARIDE VACCINE AGE 65 AND OVER  Completed   ? INFLUENZA VACCINE RULE BASED  Completed   ? PNEUMOCOCCAL CONJUGATE VACCINE FOR ADULTS (PCV13 OR PREVNAR)  Completed       The new shingles shot (Shingrix) is 2 separate shots  by 2-6 months.    The cost out of pocket is around $390 for the 2 shots, so you might want to see if your insurance covers it or a portion of it prior to having it done.  Often by having it done at a pharmacy rather than a clinic, it can be cheaper for you. I recommend that you check on the cost through your insurance or talk to your  pharmacist about the cost of the vaccine.     It is estimated that any person's risk of shingles over their lifetime is around 10-20%.    The old shingles vaccine (Zostavax) is about 50% effective, reducing your risk of shingles to about 5-10% over your lifetime.    The new shot is 90% effective, reducing your risk of shingles to about 1-2% over your lifetime.    Because the shot is strong, it is very common to have flu like symptoms for 2-3 days after the shot.  25-50% of patients will have fever, muscle aches or headache.              No

## 2021-06-17 NOTE — PROGRESS NOTES
Patient arrived ambulatory for romiplostim injection today. Lab results reviewed with platelets noted at 76 and within parameters for patient to receive injection as ordered. Patient tolerated injection with no complaints. Patient missed van  today as labs and drug not ready in time for 11:50  per transportation van. [employer] contacted for transportation home today to meet patient at main entrance of hospital. Patient did also eat lunch here today. Reports will return for next appointment as scheduled.

## 2021-06-17 NOTE — PROGRESS NOTES
Ora Gonzalez, 86 y.o., female arrived ambulatory to clinic at 1317 for Nplate injection. Patient assessed and vital signs stable. Administered Nplate SQ into R arm per provider order. Pt tolerated injection well. Ora Gonzalez verbalized understanding of plan of care and return to clinic. Discharged to [facility] at 1345 alert and ambulatory.

## 2021-06-17 NOTE — PROGRESS NOTES
Pt ambulates to infusion center for labs and treatment.  Labs drawn per lab w/results noted.  N-plate injection given SQ as ordered without difficulty.  Pt left clinic stable to lobby.  Plan RTC as scheduled.

## 2021-06-18 NOTE — PATIENT INSTRUCTIONS - HE
Patient Instructions by Johnny Daniel MD at 7/10/2020 10:40 AM     Author: Johnny Daniel MD Service: -- Author Type: Physician    Filed: 7/10/2020 11:54 AM Encounter Date: 7/10/2020 Status: Addendum    : Johnny Daniel MD (Physician)    Related Notes: Original Note by Johnny Daniel MD (Physician) filed at 7/10/2020 11:53 AM       -Take the full course of cephalexin as prescribed.  -Take a probiotic while taking this antibiotic.  This can be purchased over the counter at your local pharmacy.  -Take prednisone as directed.  Take this with food.  -Use olopatadine drops as directed.  -Apply cool compresses to the right eye and nose for 20 minutes four times a day.  -Follow up with Primary Care for recheck in 3 days.  -Return to [facility] if your symptoms fail to improve or actually worsen over the next 2 days.  Patient Education     Facial Cellulitis  Cellulitis is an infection of the deep layers of skin. A break in the skin, such as a cut or scratch, can let bacteria under the skin. It may also occur from an infected oil gland (pimple) or hair follicle. If the bacteria get to deep layers of the skin, it can be serious. If not treated, cellulitis can get into the bloodstream and lymph nodes. The infection can then spread throughout the body. This causes serious illness.  Cellulitis causes the affected skin to become red, swollen, warm, and sore. The reddened areas have a visible border. You may have a fever, chills, and pain.  Cellulitis is treated with antibiotics taken for 7 to 10 days. Symptoms should get better 1 to 2 days after treatment is started. Make sure to take all the antibiotics for the full number of days until they are gone. Keep taking the medication even if your symptoms go away.  Home care  Follow these tips:    Take all of the antibiotic medicine exactly as directed until it is gone. Dont miss any doses, especially during the first 7 days. Dont stop taking it when your  symptoms get better.    Use a cool compress (face cloth soaked in cool water) on your face to help reduce swelling and pain.    You may use acetaminophen or ibuprofen to reduce pain. Dont use these if you have chronic liver or kidney disease, or ever had a stomach ulcer or gastrointestinal bleeding. Talk with your healthcare provider first.  Follow-up care  Follow up with your healthcare provider, or as advised. If your infection does not go away on the first antibiotic, your healthcare provider will prescribe a different one.  When to seek medical advice  Call your healthcare provider right away if any of these occur:    Fever higher of 100.4  F (38.0  C) or higher after 2 days on antibiotics    Red areas that spread    Swelling or pain that gets worse    Fluid leaking from the skin (pus)    An eyelid that swells shut or leaks fluid (pus)    Headache or neck pain that gets worse    Unusual drowsiness or confusion    Convulsions (seizure)    Change in eyesight     Date Last Reviewed: 9/1/2016 2000-2017 [employer]. [address] [place] [address]. All rights reserved. This information is not intended as a substitute for professional medical care. Always follow your healthcare professional's instructions.           Patient Education     Conjunctivitis, Allergic    Conjunctivitis is an irritation of a thin membrane in the eye. This membrane is called the conjunctiva. It covers the white of the eye and the inside of the eyelid. The condition is often called pink eye or red eye because the eye looks pink or red. The eye can also be swollen. A thick fluid may leak from the eyelid. The eye may itch and burn, and feel gritty or scratchy.  Allergic conjunctivitis is caused by an allergen. Allergens are substances that cause the body to react with certain symptoms. Allergens that cause eye irritation include things such as house dust or pollen in the air. This can occur seasonally, most often in  the spring.  Home care    Eye drops may be prescribed to reduce itching and redness. Use these as directed. Otherwise, over-the-counter decongestant eye drops may be used.    Apply a cool compress (towel soaked in cool water) to the affected eye 3 to 4 times a day to reduce swelling and itching.    It is common to have mucus drainage during the night, causing the eyelids to become crusted by morning. Use a warm, wet cloth to wipe this away. You may also use saline irrigating solution or artificial tears to rinse away mucus in the eye. Don't patch the eye.    You may use acetaminophen or ibuprofen to control pain, unless another medicine was prescribed. (Note: If you have chronic liver or kidney disease, or if you have ever had a stomach ulcer or gastrointestinal bleeding, talk with your healthcare provider before using these medicines.)    Don't wear contact lenses until your eyes have healed and all symptoms are gone.  Follow-up care  Follow up with your healthcare provider, or as advised.  When to seek medical advice  Call your healthcare provider right away if any of these occur:    Increased eyelid swelling    New or worsening drainage from the eye    Increasing redness around the eye    Facial swelling  Date Last Reviewed: 7/1/2017 2000-2017 [employer]. [address] [place] [address]. All rights reserved. This information is not intended as a substitute for professional medical care. Always follow your healthcare professional's instructions.           Patient Education     Blepharitis    Blepharitis is an inflammation of the eyelid. It results in swelling of the eyelids, and it is usually caused by a bacterial infection or a skin condition. Blepharitis is a common eye condition. There are two types. Anterior blepharitis occurs where the eyelashes are attached (outside front edge of the eye). Posterior blepharitis affects the inner edge of the eyelid that touches the eyeball.  In  addition to swollen eyelids, symptoms of blepharitis can include thick, yellow, dandruff-like scales that stick to the eyelid. There may be oily patches on the eyelid. The eyelashes may be crusted (with dandruff-like scales) when you wake up from sleeping. The irritated area may itch. The eyelids may be red. The eyes can be red and burn or sting. The eyes may tear a lot, or be dry. You can become sensitive to light or have blurred vision. Symptoms of blepharitis can cause irritability.  Blepharitis is a chronic condition and difficult to cure. Even with successful treatment, recurrences are common. Good hygiene and home treatments (in the Home care section below) can improve your condition.  Causes  Causes of blepharitis may include:    Problems with the oil glands in the eyelid (meibomian glands)    Dandruff of the scalp and eyebrows (seborrheic dermatitis)    Acne rosacea (a skin condition that causes redness of the face, and other symptoms)    Eyelash mites (tiny organisms in the eyelash follicles)    Allergic reactions to cosmetics or medicines  Home care  Medicine: The healthcare provider may prescribe an antibiotic eye drops or ointment, artificial tears, and/or steroid eye drops. Follow all instructions for using these medicines. Use all medicines as directed. If you have pain, take medicine as advised by the healthcare provider.    Wash your hands carefully with soap and warm water before and after caring for your eyes.    Apply a warm compress or a warm, moist washcloth to the eyelids for 1 minute, 2 to 3 times a day, to loosen the crust. Then, wipe away scales or crust from the eyelids.    After applying the warm compress, gently scrub the base of the eyelashes for almost 15 seconds per eyelid. To do this, close your eyes and use a moist eyelid cleansing wipe, clean washcloth, or cotton swab. Ask your healthcare provider about products (such as nonirritating baby shampoo) to use to help clean the  eyelids.    You may be instructed to gently massage your eyelids to help unblock the eyelid glands. Follow all instructions given by the healthcare provider.    Unless told otherwise, on a regular basis, with eyes closed, clean your eyelids as directed by the healthcare provider. Blepharitis can be an ongoing problem.    Do not wear eye makeup until the inflammation goes away, or as directed by your healthcare provider.    Unless told otherwise, stop using contact lenses until you complete treatment for the condition.    Wash your hands regularly to help prevent dirt and bacteria from coming in contact with your eyelid.  Follow-up care  Follow up with your healthcare provider, or as advised. Your healthcare provider may refer you to an eye specialist (an optometrist or ophthalmologist) for further evaluation and treatment.  When to seek medical advice  Call your healthcare provider right away if any of these occur:    Increase in redness of the white part of the eye    Increase in swelling, redness, irritation, or pain of the eyelids    Eye pain    Change in vision (trouble seeing or blurring)    Drainage (pus, blood) from the eyelid    Fever of 100.4 F (38 C) or higher, or as directed by your healthcare provider  Date Last Reviewed: 10/9/2015    3360-4335 [employer]. [address] [place] [address], [place] [address]. All rights reserved. This information is not intended as a substitute for professional medical care. Always follow your healthcare professional's instructions.

## 2021-06-18 NOTE — PATIENT INSTRUCTIONS - HE
Patient Instructions by Ryanne Corbett FNP at 7/8/2020  9:05 AM     Author: Ryanne Corbett FNP Service: -- Author Type: Nurse Practitioner    Filed: 7/8/2020  9:32 AM Encounter Date: 7/8/2020 Status: Addendum    : Ryanne Corbett FNP (Nurse Practitioner)    Related Notes: Original Note by Ryanne Corbett FNP (Nurse Practitioner) filed at 7/8/2020  9:27 AM       - Consider adding a once per day Boost or Ensure to help you get enough protein and nutrition per day. Meals on Wheels is an option we discussed but I know you don't love the food.    - You could consider getting a shingles vaccine. This is usually cheapest through the pharmacy, I recommend that you ask about this at the pharmacy. If it is covered, I recommend it    - Sign a consent to communicate form at the  so that we can communicate with your son if it is ever needed         Patient Education   Depression and Suicide in Older Adults  Nearly 2 million older Americans have some type of depression. Sadly, some of them even take their own lives. Yet depression among older adults is often ignored. Learn the warning signs. You may help spare a loved one needless pain. You may also save a life.       What Is Depression?  Depression is a mood disorder that affects the way you think and feel. The most common symptom is a feeling of deep sadness. People who are depressed also may seem tired and listless. And nothing seems to give them pleasure. Its normal to grieve or be sad sometimes. But sadness lessens or passes with time. Depression rarely goes away or improves on its own. Other symptoms of depression are:    Sleeping more or less than normal    Eating more or less than normal    Having headaches, stomachaches, or other pains that dont go away    Feeling nervous, empty, or worthless    Crying a great deal    Thinking or talking about suicide or death    Feeling confused or forgetful  What Causes It?  The causes of  depression arent fully known. Certain chemicals in the brain play a role. Depression does run in families. And life stresses can also trigger depression in some people. That may be the case with older adults. They often face great burdens, such as the death of friends or a spouse. They may have failing health. And they are more likely to be alone, lonely, or poor.  How You Can Help  Often, depressed people may not want to ask for help. When they do, they may be ignored. Or, they may receive the wrong treatment. You can help by showing parents and older friends love and support. If they seem depressed, help them find the right treatment. Talk to your doctor. Or contact a local mental health center, social service agency, or hospital. With modern treatment, no one has to suffer from depression.  Resources:    National [employer] of Mental Health  359.969.7093  www.nimh.nih.gov    National [place] on Mental Illness  142.235.1325  www.madalyn.org    [facility]  148.325.4971  www.[facility].org    National Suicide Hotline  182.911.9988 (800-SUICIDE)      5566-5011 [employer]. [address] [place] [address]. All rights reserved. This information is not intended as a substitute for professional medical care. Always follow your healthcare professional's instructions.           Advance Directive  Patients advance directive was discussed and I am comfortable with the patients wishes.  Patient Education   Personalized Prevention Plan  You are due for the preventive services outlined below.  Your care team is available to assist you in scheduling these services.  If you have already completed any of these items, please share that information with your care team to update in your medical record.  Health Maintenance   Topic Date Due   ? ZOSTER VACCINES (1 of 2) 12/18/1983   ? MEDICARE ANNUAL WELLNESS VISIT  03/28/2020   ? INFLUENZA VACCINE RULE BASED (1) 08/01/2020   ? FALL RISK ASSESSMENT  01/24/2021    ? DXA SCAN  06/29/2021   ? ADVANCE CARE PLANNING  12/13/2021   ? TD 18+ HE  06/13/2027   ? PNEUMOCOCCAL IMMUNIZATION 65+ LOW/MEDIUM RISK  Completed

## 2021-06-18 NOTE — PATIENT INSTRUCTIONS - HE
Patient Instructions by Ryanne Corbett FNP at 7/16/2020 11:10 AM     Author: Ryanne Corbett FNP Service: -- Author Type: Nurse Practitioner    Filed: 7/16/2020 11:37 AM Encounter Date: 7/16/2020 Status: Addendum    : Ryanne Corbett FNP (Nurse Practitioner)    Related Notes: Original Note by Ryanne Corbett FNP (Nurse Practitioner) filed at 7/16/2020 11:32 AM       - Based on our current testing guidelines, we are unable to do a COVID test. If she has any known direct exposure or new symptoms this would be a reason to contact the clinic    - You need to have an appointment with ophthalmology, we will call you to help schedule this       Shingles  Shingles is a viral infection caused by the same virus as chicken pox. Anyone who has had chicken pox may get shingles later in life. The virus stays in the body, but remains dormant (asleep). Shingles often occurs in older persons or persons with lowered immunity. But it can affect anyone at any age.  Shingles starts as a tingling patch of skin on one side of the body. Small, painful blisters may then appear. The rash does not spread to the rest of the body.  Exposure to shingles cannot cause shingles. However, it can cause chicken pox in anyone who has not had chicken pox or has not been vaccinated. The contagious period ends when all blisters have crusted over (generally about 2 weeks after the illness begins).  After the blisters heal, the affected skin may be sensitive or painful for months (neuralgia). This often gradually goes away.  A shingles vaccine is available. This can help prevent shingles or make it less painful. It is generally recommended for adults over the age of 60 who have had chicken pox in the past, but who have never had shingles. Adults over 60 who have had neither chicken pox nor shingles can prevent both diseases with the chicken pox vaccine. Ask your healthcare provider about these vaccines.  Home care    Medicines may  be prescribed to help relieve pain. Take these medicines as directed. Ask your healthcare provider or pharmacist before using over-the-counter medicines for helping treat pain and itching.    In certain cases, antiviral medicines may be prescribed to reduce pain, shorten the illness, and prevent neuralgia. Take these medicines as directed.    Compresses made from a solution of cool water mixed with cornstarch or baking soda may help relieve pain and itching.     Gently wash skin daily with soap and water to help prevent infection.  Be certain to rinse off all of the soap, which can be irritating.    Trim fingernails and try not to scratch. Scratching the sores may leave scars.    Stay home from work or school until all blisters have formed a crust and you are no longer contagious.  Follow-up care  Follow up with your healthcare provider or as directed by our staff.  When to seek medical advice    Fever of 100.4 F (38 C) or higher, or as directed by your healthcare provider    Affected skin is on the face or neck and any of the following occur:  ? Headache  ? Eye pain  ? Changes in vision  ? Sores near the eye  ? Weakness of facial muscles    Pain, redness, or swelling of a joint    Signs of skin infection: colored drainage from the sores, warmth, increasing redness, or increasing pain  Date Last Reviewed: 9/25/2015 2000-2017 [employer]. [address] [place] [address], [place] [address]. All rights reserved. This information is not intended as a substitute for professional medical care. Always follow your healthcare professional's instructions.

## 2021-06-18 NOTE — PROGRESS NOTES
Pt came into infusion clinic for her Nplate as ordered. Pt reports no bleeding. Pt tolerated injection well and left infusion clinic via ambulatory. Pt will RTC as sched

## 2021-06-18 NOTE — PROGRESS NOTES
"Pt arrived ambulatory and labs were drawn - today's plate count is [id number]. Reviewed plan of care and today's treatment - a copy of today's lab results was given and explained to the patient. Administered NPLATE 53.2 mcg SQ in right upper arm - covered site with a folded 2x2 and bandaid. VSS. \"Oh sure, I walked from my apartment to here today - I make about 66001 steps per day.\" Declined AVS. Left unit ambulatory in stable condition at 10:10, and plans to return in one week. Instructed to call with any questions or concerns prior to the next appointment.     Angela Kaur RN  "

## 2021-06-18 NOTE — PROGRESS NOTES
Ora came to chemo infusion following lab and MD visit for her next dose of Nplate.  Her platelets today were 75,000.  Nplate given sq into her right upper arm.  She tolerated the injection well and site was covered with a bandaid.  She will schedule prior to leaving clinic today.  Ora d/c from chemo infusion ambulatory and unaccompanied.

## 2021-06-18 NOTE — PROGRESS NOTES
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  She is getting her injections weekly.  No bleeding or bruising.  We will continue to get with the drug.  She is not having any other cytopenias.  No clinical evidence of marrow fibrosis.  She has now been on her romiplostim for 2 years.    2. Leukocytosis: Stable, mild, chronic. Follow.     ECOG Performance   ECOG Performance Status: 0    Distress Assessment  Distress Assessment Score: No distress    Pain  Currently in Pain: No/denies    Diagnosis:    1. 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 Bone marrow biopsy was unremarkable, November 2014. Thyroid functions were normal. Anticardiolipin antibodies were normal.    2. Right-sided pulmonary embolism: Provoked. Diagnosed February 8, 2016.    Treatment:    She started course of prednisone on November 14, 2014 and finished on January 5, 2015. She had a complete response. Quickly relapsed and treated with Rituxan weekly from March 12 through April 2, 2015. She responded with highest platelet count of 112.     She then relapsed in October, 2015. She was started on Promacta in October. She responded within 2-3 weeks. She then quickly lost response after dose reduction from 50 to 25mg. She was started back on Promacta 50 mg and prednisone 60 mg daily. She did not respond. She received 2 doses of IVIG on December 4 and 5, 2015. She responded briefly with a platelet count of 59 on December 7 but then quickly lost response by December 14. 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 She received 2 more doses of IVIG on December 16 and 17th with minimal response.   Splenectomy was performed on December 20, 2015. She did not respond.   We started Rituxan on December 29, 2015. Steroids were continued. Romiplostim was started on January 5,  2016. 1 dose of WinRho was given on January 6, 2016.  Platelet response noted on January 13.  Her last dose of Rituxan was on January 20th, 2016.    She was restarted on N-plate on July 28, 2016 for relapse.    Interim History:    Myrna returns today for follow-up visit.  Clinically she is doing okay.  No acute complaints.  No bleeding or bruising.  Energy is been good.  She finds it somewhat annoying to come in every week but she is still willing to do it.    Review of Systems:    Constitutional  Constitutional (WDL): All constitutional elements are within defined limits  Neurosensory  Neurosensory (WDL): All neurosensory elements are within defined limits  Cardiovascular  Cardiovascular (WDL): All cardiovascular elements are within defined limits  Pulmonary  Respiratory (WDL): Within Defined Limits  Gastrointestinal  Gastrointestinal (WDL): All gastrointestinal elements are within defined limits  Genitourinary  Genitourinary (WDL): All genitourinary elements are within defined limits  Integumentary  Integumentary (WDL): All integumentary elements are within defined limits  Patient Coping  Patient Coping: Accepting  Accompanied by  Accompanied by: Alone    Past History:    Past Medical History:   Diagnosis Date     Adjustment disorder with mixed anxiety and depressed mood 1/22/2016     Adjustment reaction to chronic stress 1/22/2016     Chronic kidney disease      Hypertension      ITP (idiopathic thrombocytopenic purpura)      Osteoporosis      Paroxysmal atrial fibrillation (H)      Pulmonary embolism (H) 2/8/2016     Thrombocytopenia (H)      Physical Exam:    Recent Vitals 6/20/2018   Weight 117 lbs 5 oz   /56   Pulse 69   Temp 97.7   Temp src 1   SpO2 96   Some recent data might be hidden     General: patient appears stated age of 84 y.o.. Nontoxic and in no distress.   HEENT: Head: atraumatic, normocephalic. Sclerae anicteric.  Chest:  Normal respiratory effort.  Clear to auscultation  bilaterally.  Cardiac:  No edema.  Regular rate and rhythm with no murmur.  Abdomen: abdomen is soft, non-distended  Extremities: normal tone and muscle bulk.   Skin: no lesions or rash. Warm and dry.   CNS: alert and oriented. Grossly non-focal.   Psychiatric: normal mood and affect.     Lab Results:    Recent Results (from the past 168 hour(s))   HM2 (CBC W/O DIFF)   Result Value Ref Range    WBC 11.7 (H) 4.0 - 11.0 thou/uL    RBC 4.34 3.80 - 5.40 mill/uL    Hemoglobin 14.0 12.0 - 16.0 g/dL    Hematocrit 42.5 35.0 - 47.0 %    MCV 98 80 - 100 fL    MCH 32.3 27.0 - 34.0 pg    MCHC 32.9 32.0 - 36.0 g/dL    RDW 15.0 (H) 11.0 - 14.5 %    Platelets 82 (L) 140 - 440 thou/uL    MPV 12.6 (H) 8.5 - 12.5 fL   HM2(CBC w/o Differential)   Result Value Ref Range    WBC 12.8 (H) 4.0 - 11.0 thou/uL    RBC 4.40 3.80 - 5.40 mill/uL    Hemoglobin 14.3 12.0 - 16.0 g/dL    Hematocrit 43.3 35.0 - 47.0 %    MCV 98 80 - 100 fL    MCH 32.5 27.0 - 34.0 pg    MCHC 33.0 32.0 - 36.0 g/dL    RDW 15.1 (H) 11.0 - 14.5 %    Platelets 75 (L) 140 - 440 thou/uL    MPV 12.5 8.5 - 12.5 fL     Imaging:    No results found.      Signed by: Vinny Garcia MD

## 2021-06-18 NOTE — PROGRESS NOTES
Patient arrived ambulatory this am for N-plate injection. Lab results reviewed and Platelet count noted at 109 and within parameters to receive the injection. Tolerated injection with no complaints. Patient discharged to home ambulatory and reports will return next week as scheduled.

## 2021-06-18 NOTE — PROGRESS NOTES
PT here for NPLATE inj. PT platelets today 172,000. Showed Dr Garcia pt's labs and he approved going ahead and give Nplate to pt. Nplate inj given with bandaid to site. Follow up reviewed and pt dc'd steady gait.

## 2021-06-18 NOTE — PROGRESS NOTES
Pt ambulates to infusion center for labs and treatment.  Labs drawn per lab w/results noted.  Nplate injection given SQ as ordered.  Pt left clinic stable to [facility].  Plan RTC as scheduled.

## 2021-06-19 NOTE — PROGRESS NOTES
Ora came to chemo infusion this morning after peripheral lab draw done for her next dose of Nplate.  VSS.  Pt assessed and is feeling well today.  No bleeding or bruising.  Platelet count today was 151.  Nplate was given sq into her right upper arm sq.  She tolerated the injection well and site was covered with a bandaid.  Ora d/c from clinic ambulatory and unaccompanied. She is aware of her future appointment.

## 2021-06-19 NOTE — PROGRESS NOTES
Internal Medicine Office Visit  [facility]  Patient Name: Ora Gonzalez  Patient Age: 84 y.o.  YOB: 1933  MRN: [id number]    Date of Visit: 2018  Reason for Office Visit:   Chief Complaint   Patient presents with     Follow-up           Assessment / Plan / Medical Decision Makin. Prediabetes  - Glycosylated Hemoglobin A1c    2. HLD (hyperlipidemia)  - Lipid Profile  - Treated with diet and exercise only, consider discontinuation of lab checks for lipids     3. Adenomatous Goiter  - Declines additional evaluation for this unless nodule is changing on exam     4. Insomnia  - Use melatonin rather than Tylenol PM      Health Maintenance Review  Health Maintenance   Topic Date Due     ZOSTER VACCINE  1993     FALL RISK ASSESSMENT  2017     DXA SCAN  2018     INFLUENZA VACCINE RULE BASED (1) 2018     ADVANCE DIRECTIVES DISCUSSED WITH PATIENT  2021     TD 18+ HE  2027     PNEUMOCOCCAL POLYSACCHARIDE VACCINE AGE 65 AND OVER  Completed     PNEUMOCOCCAL CONJUGATE VACCINE FOR ADULTS (PCV13 OR PREVNAR)  Completed         I am having Ms. Gonzalez maintain her ACETAMINOPHEN/DIPHENHYDRAMINE (TYLENOL PM EXTRA STRENGTH ORAL) and donepezil.      HPI:  Ora Gonzalez is a 84 y.o. year old who presents to the office today for follow up.     Insomnia reviewed. She takes Tylenol PM occasionally. We reviewed that this is not recommended for someone her age.     She has Alzheimer's disease and sees neurology annually. She is functioning independently at this time although her son provides some oversight.     Her apartment building is changing to a general apartment and not a senior living facility and all units are being updated. Meals are not being served any longer so she does her own meal preparation. For now she is doing well living independently. She is walking 10,000 steps or more per day. Has not had any falls. She had looked into  [employer] but has not decided to get this yet.     History of adenomatous goiter. She is not having any compressive symptoms.  Declines any additional evaluation for this unless she were to develop worsening symptoms such as increased visibility of the goiter or palpable changes in the goiter.    Review of Systems- pertinent positive in bold:  A 10-point ROS is negative except as stated in HPI         Current Scheduled Meds:  Outpatient Encounter Prescriptions as of 2018   Medication Sig Dispense Refill     ACETAMINOPHEN/DIPHENHYDRAMINE (TYLENOL PM EXTRA STRENGTH ORAL) Take 1 tablet by mouth daily as needed.       donepezil (ARICEPT) 10 MG tablet Take 10 mg by mouth at bedtime.        No facility-administered encounter medications on file as of 2018.      Past Medical History:   Diagnosis Date     Adjustment disorder with mixed anxiety and depressed mood 2016     Adjustment reaction to chronic stress 2016     Chronic kidney disease      Hypertension      ITP (idiopathic thrombocytopenic purpura)      Osteoporosis      Paroxysmal atrial fibrillation (H)      Pulmonary embolism (H) 2016     Thrombocytopenia (H)      Past Surgical History:   Procedure Laterality Date     CATARACT EXTRACTION Left 10/2014     ESOPHAGOGASTRODUODENOSCOPY N/A 2017    Procedure: ESOPHAGOGASTRODUODENOSCOPY (EGD);  Surgeon: Juanpablo John MD;  Location: [facility] GI;  Service:      LAPAROSCOPIC SPLENECTOMY N/A 2015    Procedure: LAPAROSCOPIC TO CONVERSION TO OPEN SPLENECTOMY;  Surgeon: Herb Mckeon MD;  Location: [facility] OR;  Service:            [facility]     [facility]  4/15/2017          LA HEMORRHOIDECTOMY INTERNAL RUBBER BAND LIGATIONS      Description: Hemorrhoidectomy;  Recorded: 2008;  Comments: with fissure     Social History   Substance Use Topics     Smoking status: Former Smoker     Packs/day: 0.25     Years: 60.00     Quit date: 2015     Smokeless tobacco: Former User      Alcohol use Yes      Comment: rare, maybe 1-2 drinks per year        Objective / Physical Examination:  Vitals:    08/06/18 0904   BP: 106/64   Pulse: 62   Weight: 116 lb (52.6 kg)     Wt Readings from Last 3 Encounters:   08/06/18 116 lb (52.6 kg)   07/18/18 117 lb 3.2 oz (53.2 kg)   06/20/18 117 lb 4.8 oz (53.2 kg)     Body mass index is 21.22 kg/(m^2).     General Appearance: Alert and oriented, cooperative, affect appropriate, speech clear, in no apparent distress  Neck: Supple, trachea midline. No cervical adenopathy. No carotid bruits. She has a right thyroid nodule to palpation  Lungs: Clear to auscultation bilaterally. Normal inspiratory and expiratory effort  Cardiovascular: Regular rate, normal S1, S2. No murmurs, rubs, or gallops  Abdomen: Bowel sounds active all four quadrants. Soft, non-tender. No hepatomegaly or splenomegaly. No bruits detected.   Extremities: Pulses 2+ and equal throughout. No edema    Orders Placed This Encounter   Procedures     Lipid Profile     Glycosylated Hemoglobin A1c   Followup: Return in about 6 months (around 2/6/2019) for Next scheduled follow up. earlier if needed.        Ryanne Corbett, CNP

## 2021-06-19 NOTE — PROGRESS NOTES
Pt ambulates to infusion center for labs and injection.  Labs drawn per lab w/results noted.  Nplate injection given SQ as ordered without difficulty.  Pt left clinic stable to lobby.  Plan RTC as scheduled.

## 2021-06-19 NOTE — PROGRESS NOTES
Pt here for nplate.  Injection given without incident.  Pt denies new complaint.  Pt d/c ambulatory to lobby alone.  Pt walks home

## 2021-06-19 NOTE — LETTER
Letter by Ryanne Corbett FNP at      Author: Ryanne Corbett FNP Service: -- Author Type: --    Filed:  Encounter Date: 4/1/2019 Status: (Other)         Ora Gonzalez  [address]  [place] [address]             April 1, 2019         Dear Ms. Gonzalez,    Below are the results from your recent visit:    Resulted Orders   Glycosylated Hemoglobin A1c   Result Value Ref Range    Hemoglobin A1c 5.9 4.2 - 6.1 %       Your A1C has increased compared to the last check. This could be related to eating more sweets but also not being able to walk as much outside as you normally do. Keep up the regular exercise and watch the sweets. This is still in a prediabetic range and is on the low side of that prediabetes cut off.     Please call with questions or contact us using [employer].    Sincerely,        Electronically signed by FORTINO Abrams

## 2021-06-19 NOTE — PROGRESS NOTES
Ora came to chemo infusion this morning after peripheral lab draw done for her next dose of Nplate.  VSS.  Pt assessed and is feeling well today.  No bleeding or bruising.  Platelet count today was 221.  Dr Garcia notified and he instructed this RN to proceed with Nplate at current dose.   Nplate was given sq into her right upper arm sq.  She tolerated the injection well and site was covered with a bandaid.  Ora d/c from clinic ambulatory and unaccompanied. She is aware of her future appointment.

## 2021-06-19 NOTE — PROGRESS NOTES
Pt ambulates to infusion center for labs and injection.  Pt voices no new concerns today.  Lab results noted.  Nplate injection given SQ as ordered.  Pt left clinic stable to lobby. Plan RTC as scheduled.

## 2021-06-19 NOTE — PROGRESS NOTES
Pt ambulates to infusion center for labs and injection.  Pt voices no new concerns today.  Labs drawn per lab w/results noted.  Nplate injection given as ordered.  Pt left clinic stable to lobby.  Plan RTC as scheduled.

## 2021-06-20 ENCOUNTER — HEALTH MAINTENANCE LETTER (OUTPATIENT)
Age: 86
End: 2021-06-20

## 2021-06-20 NOTE — LETTER
Letter by Ryanne Corbett FNP at      Author: Ryanne Corbett FNP Service: -- Author Type: --    Filed:  Encounter Date: 7/8/2020 Status: (Other)         Ora Gonzalez  [address]  [place] [address]             July 8, 2020         Dear Ms. Gonzalez,    Below are the results from your recent visit:    Resulted Orders   Glycosylated Hemoglobin A1c   Result Value Ref Range    Hemoglobin A1c 5.4 3.5 - 6.0 %   Comprehensive Metabolic Panel   Result Value Ref Range    Sodium 141 136 - 145 mmol/L    Potassium 4.4 3.5 - 5.0 mmol/L    Chloride 105 98 - 107 mmol/L    CO2 22 22 - 31 mmol/L    Anion Gap, Calculation 14 5 - 18 mmol/L    Glucose 93 70 - 125 mg/dL    BUN 17 8 - 28 mg/dL    Creatinine 0.96 0.60 - 1.10 mg/dL    GFR MDRD Af Amer >60 >60 mL/min/1.73m2    GFR MDRD Non Af Amer 55 (L) >60 mL/min/1.73m2    Bilirubin, Total 0.6 0.0 - 1.0 mg/dL    Calcium 9.3 8.5 - 10.5 mg/dL    Protein, Total 7.4 6.0 - 8.0 g/dL    Albumin 4.0 3.5 - 5.0 g/dL    Alkaline Phosphatase 100 45 - 120 U/L    AST 28 0 - 40 U/L    ALT 19 0 - 45 U/L    Narrative    Fasting Glucose reference range is 70-99 mg/dL per  American Diabetes Association (ADA) guidelines.       A1C is normal, no signs of diabetes or prediabetes!    Your electrolytes and kidney function are normal. Liver function tests are normal.     Please call with questions or contact us using [facility].    Sincerely,        Electronically signed by FORTINO Abrams

## 2021-06-20 NOTE — PROGRESS NOTES
Pt ambulates to infusion center for labs and injection.  Labs drawn w/preliminary results received from lab.  Influenza vaccine survey done and vaccine was administered.  Nplate injection given without difficulty.  Pt left clinic stable to lobby.  Plan RTC as scheduled.

## 2021-06-20 NOTE — PROGRESS NOTES
Pt ambulates to infusion center for lab and injection.  Pt voices no new concerns today.  Lab results noted.  Nplate given SQ as ordered.  Pt left clinic stable to lobby.  Plan RTC as scheduled.

## 2021-06-20 NOTE — PROGRESS NOTES
Pt here for injection which was given SQ in R arm without incident.  Pt d/c ambulatory to lobby alone, to walk home

## 2021-06-20 NOTE — PROGRESS NOTES
Ora's platelet count has remained > 200 x 2 weeks. Currently receiving romiplastim 1 mcg/kg. Discussed guideline recommendation to decrease by 1 mcg/kg which would equate to holding her dose. Dr. Garcia would like to continue treatment this week. Will continue to monitor platelet count weekly.     Yuliana Altman, PharmD

## 2021-06-20 NOTE — PROGRESS NOTES
Pt came into infusion clinic for her NPlate as ordered. Labs reviewed. Pt tolerated injection well and left infusion clinic via ambulatory. Pt will RTC as sched.

## 2021-06-21 NOTE — PROGRESS NOTES
Pt ambulates to infusion center for labs and injection.  Pt voices no new concerns today. Lab results noted.  Nplate injection given as ordered.  Pt left clinic stable to lobby.  Plan RTC as scheduled.

## 2021-06-21 NOTE — PROGRESS NOTES
Pt ambulates to infusion center for labs and injection.  Pt voices no new concerns today. Lab results noted.  Nplate injection given as ordered to right arm.  Pt left clinic stable to lobby.  Plan RTC as scheduled.

## 2021-06-21 NOTE — PROGRESS NOTES
Pt ambulates to infusion center for lab and injection.  Pt voices no new concerns today.  Lab result noted et Nplate injection given as ordered.  Pt left clinic stable to lobby.  Plan RTC as scheduled.

## 2021-06-22 NOTE — PROGRESS NOTES
Pt ambulates to infusion center for lab and treatment.  Nplate injection given as ordered.  Pt left clinic stable to [facility].  Plan RTC as scheduled.

## 2021-06-22 NOTE — PROGRESS NOTES
Pt ambulates to infusion center following MD visit.  Nplate injection given SQ as ordered without difficulty.  Pt left clinic stable to ignacio.  Plan RTC as scheduled.

## 2021-06-22 NOTE — PROGRESS NOTES
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  No evidence of worsening cytopenias.  No bleeding.  We will continue for now.  We briefly discussed some of the newer oral agents but for now with stick with what is working.  Questions were answered.    2. Leukocytosis: Stable, mild, chronic. Follow.     ECOG Performance   ECOG Performance Status: 1    Distress Assessment  Distress Assessment Score: 3    Pain  Currently in Pain: No/denies    Diagnosis:    1. 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 Bone marrow biopsy was unremarkable, November 2014. Thyroid functions were normal. Anticardiolipin antibodies were normal.    2. Right-sided pulmonary embolism: Provoked. Diagnosed February 8, 2016.    Treatment:    She started course of prednisone on November 14, 2014 and finished on January 5, 2015. She had a complete response. Quickly relapsed and treated with Rituxan weekly from March 12 through April 2, 2015. She responded with highest platelet count of 112.     She then relapsed in October, 2015. She was started on Promacta in October. She responded within 2-3 weeks. She then quickly lost response after dose reduction from 50 to 25mg. She was started back on Promacta 50 mg and prednisone 60 mg daily. She did not respond. She received 2 doses of IVIG on December 4 and 5, 2015. She responded briefly with a platelet count of 59 on December 7 but then quickly lost response by December 14. 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 She received 2 more doses of IVIG on December 16 and 17th with minimal response.   Splenectomy was performed on December 20, 2015. She did not respond.   We started Rituxan on December 29, 2015. Steroids were continued. Romiplostim was started on January 5, 2016. 1 dose of WinRho was given on January 6, 2016.  Platelet  response noted on January 13.  Her last dose of Rituxan was on January 20th, 2016.    She was restarted on N-plate on July 28, 2016 for relapse.    Interim History:    Myrna returns today for follow-up visit.  She continues to come in for weekly treatments.  Overall she is doing okay.  No acute complaints today.  No bleeding or bruising.  Energy and appetite are okay.    Review of Systems:    Constitutional  Constitutional (WDL): All constitutional elements are within defined limits  Neurosensory  Neurosensory (WDL): All neurosensory elements are within defined limits  Cardiovascular  Cardiovascular (WDL): All cardiovascular elements are within defined limits  Pulmonary  Respiratory (WDL): Exceptions to WDL  Cough: Mild symptoms, nonprescription intervention indicated  Gastrointestinal  Gastrointestinal (WDL): All gastrointestinal elements are within defined limits  Genitourinary  Genitourinary (WDL): All genitourinary elements are within defined limits  Integumentary  Integumentary (WDL): All integumentary elements are within defined limits  Patient Coping  Patient Coping: Accepting  Accompanied by  Accompanied by: Family Member    Past History:    Past Medical History:   Diagnosis Date     Adjustment disorder with mixed anxiety and depressed mood 1/22/2016     Adjustment reaction to chronic stress 1/22/2016     Chronic kidney disease      Hypertension      ITP (idiopathic thrombocytopenic purpura)      Osteoporosis      Paroxysmal atrial fibrillation (H)      Pulmonary embolism (H) 2/8/2016     Thrombocytopenia (H)      Physical Exam:    Recent Vitals 12/19/2018   Weight 119 lbs   BSA (m2) 1.54 m2   /68   Pulse 65   Temp 97.9   Temp src 1   SpO2 97   Some recent data might be hidden     General: patient appears stated age of 85 y.o.. Nontoxic and in no distress.   HEENT: Head: atraumatic, normocephalic. Sclerae anicteric.  Chest:  Normal respiratory effort.   Cardiac:  No edema.   Abdomen: abdomen is soft,  non-distended  Extremities: normal tone and muscle bulk.   Skin: no lesions or rash. Warm and dry.   CNS: alert and oriented. Grossly non-focal.   Psychiatric: normal mood and affect.     Lab Results:    Recent Results (from the past 168 hour(s))   HM2(CBC w/o Differential)   Result Value Ref Range    WBC 14.7 (H) 4.0 - 11.0 thou/uL    RBC 4.68 3.80 - 5.40 mill/uL    Hemoglobin 15.1 12.0 - 16.0 g/dL    Hematocrit 45.9 35.0 - 47.0 %    MCV 98 80 - 100 fL    MCH 32.3 27.0 - 34.0 pg    MCHC 32.9 32.0 - 36.0 g/dL    RDW 14.6 (H) 11.0 - 14.5 %    Platelets 174 140 - 440 thou/uL    MPV 12.0 8.5 - 12.5 fL     Imaging:    No results found.      Signed by: Vinny Garcia MD

## 2021-06-22 NOTE — PROGRESS NOTES
Pt here today for Nplate injection. Administered into right upper arm. Tolerated well. Left clinic ambulatory with son.

## 2021-06-22 NOTE — PROGRESS NOTES
Pt here for injection which was given without incident.  Pt denies complaint.  No problem with injection and bandaid applied.  Pt d/c ambulatory to lobby to meet her son.

## 2021-06-23 NOTE — PROGRESS NOTES
Ora Gonzalez, female, 85 y.o., arrived to clinic at 1000 for labs and Nplate injection. Plan of care reviewed with patient and she verbalized understanding. Vital signs stable. Labs reviewed, platelet count 206 today. She tolerated injection well and site covered with bandaid.  Ora Gonzalez discharged to [facility] at 1030 alert, ambulatory and in stable condition.

## 2021-06-23 NOTE — PROGRESS NOTES
"Pt arrived ambulatory at 10:05, seated in chair 1. Reviewed plan of care and today's treatment. Labs were drawn. Today's platelet count is 312. Administered NPLATE 54 mcg SQ in the right upper outer arm - covered the site with a band aid. VSS. No new complaints. Patient talks about being moved to a newly renovated apartment on Feb. 1st.   Patient plans to return in one week - \"Yes, I have all my next appointments.\" Left unit ambulatory in stable condition at 11:26.    Angela Kaur RN  "

## 2021-06-23 NOTE — PROGRESS NOTES
Pt ambulates to infusion center for labs and treatment.  Lab results noted and reviewed w/MD.  Nplate given as ordered.  Pt left clinic stable to lobby.  Plan RTC as scheduled.

## 2021-06-24 ENCOUNTER — AMBULATORY - HEALTHEAST (OUTPATIENT)
Dept: INFUSION THERAPY | Facility: HOSPITAL | Age: 86
End: 2021-06-24

## 2021-06-24 DIAGNOSIS — D50.9 IRON DEFICIENCY ANEMIA: ICD-10-CM

## 2021-06-24 NOTE — PROGRESS NOTES
Pt ambulates to infusion center for labs and treatment.  Lab results noted.  nPlate injection given as ordered.  Pt left clinic stable to lobby.  Plan RTC as scheduled.   English

## 2021-06-24 NOTE — PROGRESS NOTES
Pt ambulates to infusion center for labs and injection.  Lab results noted.  Nplate given as ordered.  Pt left clinic stable to lobby.  Plan RTC as scheduled.

## 2021-06-24 NOTE — PROGRESS NOTES
Pt her injection which was administered SQ without incident. Pt d/c ambulatory to lobby to meet her son.

## 2021-06-25 NOTE — PROGRESS NOTES
Ora Gonzalez, 86 y.o., female arrived ambulatory to clinic at 1300 for Nplate injection. Patient assessed and vital signs stable. Administered Nplate SQ into R arm per provider order. Pt tolerated injection well. Ora Gonzalez verbalized understanding of plan of care and return to clinic. Discharged to [facility] at 1400 alert and ambulatory.

## 2021-06-25 NOTE — PROGRESS NOTES
Progress Notes by Stas Mijares MD at 6/5/2017  1:30 PM     Author: Stas Mijares MD Service: -- Author Type: Physician    Filed: 6/5/2017  3:13 PM Encounter Date: 6/5/2017 Status: Signed    : Stas Mijares MD (Physician)           Click to link to [facility] Heart Care     [facility] HEART CARE ELECTROPHYSIOLOGY CONSULTATION    Thank you, Dr. Corbett, for asking the [facility] Heart Care team to see Ms. Ora Gonzalez to evaluate paroxysmal atrial fibrillation.      Assessment/Recommendations   Clinic Problem List:  1. Paroxysmal atrial fibrillation  ECG 12 lead with MUSE   2. Idiopathic thrombocytopenic purpura     3. Cognitive impairment         Assessment:    Paroxysmal atrial fibrillation, single episode documented 4/13/2016 associated with a severe upper GI bleed.  Relative contraindication to anticoagulation with a history of ITP and recent significant upper GI bleed.  History of pulmonary artery embolism secondary to surgery after splenectomy  ITP fairly well compensated  Cognitive impairment noted    Plan:  Stop taking diltiazem, continued donazepril  Cardiac echo  Follow up appointment:   Dr. Mijares if needed  Check your resting pulse if you do not feel well, call Dr. Corbett or me if pulse rate is greater than 100.       History of Present Illness     Ora Gonzalez is a 83 y.o. female with a history of idiopathic thrombocytopenic purpura ITP for approximately 3 years.  She underwent splenectomy in December 2015.  Apparently she developed dyspnea postoperatively while in transitional care unit.  Cardiac echo on January 20, 2016 showed normal left ventricular function but mildly depressed right ventricular function elevated PA pressures and severe tricuspid insufficiency.  Initial VQ scan 1/20/2016 was nondiagnostic with ventilation and perfusion defects were matched.  Follow-up VQ scan 2/8/2016 now showed segmental unmatched perfusion defects consistent with high probability of pulmonary emboli.   She was treated with warfarin for approximately 3 months.  She was admitted to [facility] on 4/12/2017 with melena thought to be related to Aleve.  She subsequently had left gastric artery embolization for severe upper GI bleeding.  It is my understanding she had transfusion with multiple units.  On 4/13/2017 she was noted to be in atrial fibrillation ventricular response of 120 bpm.  She was back in sinus rhythm 2 days later with Q waves possibly suggesting anterior infarction but no acute ST segment changes.  Personnaly reviewed: ECG and echo and nuclear perfusion studies as noted above  ECG today shows normal sinus rhythm rate 63 bpm with rare ventricular premature beats.  There is left axis deviation and an RSR prime in V1. ECG is otherwise unremarkable.  TSH was normal in December 2016 normal recent platelet counts         Physical Examination Review of Systems   Vitals:    06/05/17 1326   BP: 141/63   Pulse: 72   Resp: 20     Body mass index is 19.62 kg/(m^2).  Wt Readings from Last 3 Encounters:   06/05/17 109 lb (49.4 kg)   05/25/17 108 lb 8 oz (49.2 kg)   05/15/17 107 lb 12.8 oz (48.9 kg)        Appearance:   no distress, elderly    HEENT: no scleral icterus, normal conjunctivae    Neck: no carotid bruits or thyromegaly   Chest/Lungs:   lungs are clear to auscultation, no rales or wheezing,      Cardiovascular:   Jugular venous pressure 4 cm, Apical pulse is 66 and quite regular except for rare extrasystole. Normal S1,S2 with no murmurs or gallops,   Abdomen:  no  Hepatosplenomegaly., nontender,  bowel sounds are present   Extremities: no cyanosis or clubbing, No edema   Skin: no xanthelasma, warm.    Neurologic: No gross focal neurologic deficits   Mood/Affect: Alert, cooperative    General: WNL  Eyes: WNL  Ears/Nose/Throat: WNL  Lungs: Cough  Heart: Shortness of Breath with activity, Irregular Heartbeat, Leg Swelling  Stomach: WNL  Bladder: WNL  Muscle/Joints: Joint Pain  Skin: WNL  Nervous  "System: Dizziness  Mental Health: WNL     Blood: Easy Bleeding, Easy Bruising     Medical History  Surgical History Family History Social History   Past Medical History:   Diagnosis Date   ? Adjustment disorder with mixed anxiety and depressed mood 2016   ? Adjustment reaction to chronic stress 2016   ? Chronic kidney disease    ? Hypertension    ? ITP (idiopathic thrombocytopenic purpura)    ? Osteoporosis    ? Pulmonary embolism 2016   ? Thrombocytopenia     Past Surgical History:   Procedure Laterality Date   ? CATARACT EXTRACTION Left 10/2014   ? ESOPHAGOGASTRODUODENOSCOPY N/A 2017    Procedure: ESOPHAGOGASTRODUODENOSCOPY (EGD);  Surgeon: Juanpablo John MD;  Location: [facility] GI;  Service:    ? LAPAROSCOPIC SPLENECTOMY N/A 2015    Procedure: LAPAROSCOPIC TO CONVERSION TO OPEN SPLENECTOMY;  Surgeon: Herb Mckeon MD;  Location: [facility] OR;  Service:    ?       [facility] joe   ? PICC  4/15/2017        ? MT HEMORRHOIDECTOMY INTERNAL RUBBER BAND LIGATIONS      Description: Hemorrhoidectomy;  Recorded: 2008;  Comments: with fissure    Family History   Problem Relation Age of Onset   ? No Medical Problems Mother       90s of \"old age\"   ? No Medical Problems Father    ? Diabetes type II Son    ? Other Other      multiple family members with silicosis / black lung   ? Parkinsonism Daughter    ? Alzheimer's disease Sister    ? No Medical Problems Brother    ? No Medical Problems Sister    ? No Medical Problems Brother    ? No Medical Problems Brother    ? No Medical Problems Son        Social History     Social History   ? Marital status:      Spouse name: N/A   ? Number of children: 3   ? Years of education: N/A     Occupational History   ? Retired      Social History Main Topics   ? Smoking status: Former Smoker     Packs/day: 0.25     Years: 60.00     Quit date: 2015   ? Smokeless tobacco: Former User   ? Alcohol use Yes      Comment: rare, maybe 1-2 " drinks per year    ? Drug use: No   ? Sexual activity: No     Other Topics Concern   ? Not on file     Social History Narrative    11/29/2015: . Lives in house.          Medications  Allergies   Current Outpatient Prescriptions   Medication Sig Dispense Refill   ? ACETAMINOPHEN/DIPHENHYDRAMINE (TYLENOL PM EXTRA STRENGTH ORAL) Take 1 tablet by mouth daily as needed.     ? diltiazem (CARDIZEM CD) 120 MG 24 hr capsule Take 1 capsule (120 mg total) by mouth daily. 30 capsule 1   ? donepezil (ARICEPT) 10 MG tablet   3   ? donepezil (ARICEPT) 5 MG tablet Take 10 mg by mouth at bedtime.   0     No current facility-administered medications for this visit.       No Known Allergies

## 2021-06-25 NOTE — PROGRESS NOTES
Pt ambulates to infusion center for labs and treatment.  Lab results noted et reviewed w/pt.  Nplate injection given as ordered.  Pt left clinic stable to [facility].  Plan RTC as scheduled.

## 2021-06-26 NOTE — PROGRESS NOTES
Ora Gonzalez, 86 y.o., female arrived ambulatory to clinic at 1402 for Nplate injection. Patient assessed and vital signs stable. Administered Nplate SQ into R arm per provider order. Pt tolerated injection well. Ora Gonzalez verbalized understanding of plan of care and return to clinic. Discharged to [facility] at 1420 alert and ambulatory.

## 2021-06-27 DIAGNOSIS — D69.3 IMMUNE THROMBOCYTOPENIC PURPURA (H): Primary | ICD-10-CM

## 2021-06-29 NOTE — PROGRESS NOTES
Progress Notes by Ryanne Corbett FNP at 2020 11:10 AM     Author: Ryanne Corbett FNP Service: -- Author Type: Nurse Practitioner    Filed: 2020  8:54 AM Encounter Date: 2020 Status: Signed    : Ryanne Corbett FNP (Nurse Practitioner)       Internal Medicine Office Visit  [facility]   Patient Name: Ora Gonzalez  Patient Age: 86 y.o.  YOB: 1933  MRN: [id number]    Date of Visit: 2020  Reason for Office Visit:   Chief Complaint   Patient presents with   ? Follow-up           Assessment / Plan / Medical Decision Makin. Herpes zoster with complication  The appearance of the rash today appears consistent with herpes zoster. Given that onset of symptoms was 6 days ago, did not prescribed an antiviral. I am concerned about the possible ophthalmic complication given presence on the nasal bridge and surrounding the right eye although she does not report any visual disturbances. Will refer her to ophthalmology. She is advised to monitor the areas for a secondary infection but none is seen today and she is taking an oral antibiotic  - Ambulatory referral to Ophthalmology    She is advised that she does not currently meet COVID-19 testing parameters    Health Maintenance Review  Health Maintenance   Topic Date Due   ? ZOSTER VACCINES (1 of 2) 1983   ? INFLUENZA VACCINE RULE BASED (1) 2020   ? DXA SCAN  2021   ? MEDICARE ANNUAL WELLNESS VISIT  2021   ? FALL RISK ASSESSMENT  2021   ? ADVANCE CARE PLANNING  2025   ? TD 18+ HE  2027   ? PNEUMOCOCCAL IMMUNIZATION 65+ LOW/MEDIUM RISK  Completed   ? HEPATITIS B VACCINES  Aged Out         I am having Ora Gonzalez maintain her donepeziL, cephalexin, and olopatadine.      HPI:  Ora Gonzalez is a 86 y.o. year old who presents to the office today for follow up of a walk-in care appointment on 7/10. She was seen with right eye watering and facial swelling  "which started the day prior. History and appearance suggested allergic etiology, however, there was some nasal involvement and secondary bacterial infection was considered. She was started on both oral prednisone, ophthalmic antihistamine, and cephalexin. The eye has been \"running\" clear fluid, no eye pain or vision changes.     Her children ask about testing for COVID-19. She has not had any direct exposure to anyone with a known COVID infection. She is asymptomatic.     Review of Systems- pertinent positive in bold:  Pertinent findings as in HPI. Negative for vision changes, eye pain      Current Scheduled Meds:  Outpatient Encounter Medications as of 2020   Medication Sig Dispense Refill   ? cephalexin (KEFLEX) 500 MG capsule Take 1 capsule (500 mg total) by mouth 4 (four) times a day for 10 days. 40 capsule 0   ? donepezil (ARICEPT) 10 MG tablet Take 10 mg by mouth at bedtime.      ? olopatadine (PATANOL) 0.1 % ophthalmic solution Administer 1 drop to both eyes 2 (two) times a day. 5 mL 0   ? [] predniSONE (DELTASONE) 20 MG tablet Take 40 mg by mouth daily for 5 days. 10 tablet 0   ? [] romiPLOStim 51.9 mcg (NPLATE)        No facility-administered encounter medications on file as of 2020.          Past Medical History:   Diagnosis Date   ? Adjustment disorder with mixed anxiety and depressed mood 2016   ? Adjustment reaction to chronic stress 2016   ? Chronic kidney disease    ? Hypertension    ? ITP (idiopathic thrombocytopenic purpura)    ? Osteoporosis    ? Paroxysmal atrial fibrillation (H)    ? Pulmonary embolism (H) 2016   ? Thrombocytopenia (H)      Past Surgical History:   Procedure Laterality Date   ? CATARACT EXTRACTION Left 10/2014   ? ESOPHAGOGASTRODUODENOSCOPY N/A 2017    Procedure: ESOPHAGOGASTRODUODENOSCOPY (EGD);  Surgeon: Juanpablo John MD;  Location: [facility];  Service:    ? LAPAROSCOPIC SPLENECTOMY N/A 2015    Procedure: LAPAROSCOPIC TO " CONVERSION TO OPEN SPLENECTOMY;  Surgeon: Herb Mckeon MD;  Location: [facility] OR;  Service:    ?       [facility] joe   ? PICC  4/15/2017        ? [place] HEMORRHOIDECTOMY INTERNAL RUBBER BAND LIGATIONS      Description: Hemorrhoidectomy;  Recorded: 2008;  Comments: with fissure     Social History     Tobacco Use   ? Smoking status: Former Smoker     Packs/day: 0.25     Years: 60.00     Pack years: 15.00     Last attempt to quit: 2015     Years since quittin.8   ? Smokeless tobacco: Former User   Substance Use Topics   ? Alcohol use: Yes     Comment: rare, maybe 1-2 drinks per year    ? Drug use: No       Objective / Physical Examination:  Vitals:    20 1109   BP: 132/70   Temp: 96.8  F (36  C)   TempSrc: Tympanic   Weight: 113 lb (51.3 kg)     Wt Readings from Last 3 Encounters:   20 113 lb (51.3 kg)   20 114 lb 8 oz (51.9 kg)   20 115 lb (52.2 kg)     Body mass index is 20.67 kg/m .     Constitutional: In no apparent distress  Eyes: PERRL, conjunctivae injected throughout. Non-icteric.   Skin: Warm and dry. Crusted lesions on the bridge of the nose. The lower eyelid is swollen and red. Peripheral vision is intact to confrontation             Orders Placed This Encounter   Procedures   ? Ambulatory referral to Ophthalmology   Followup: Return in about 6 months (around 2021) for Next scheduled follow up. earlier if needed.          Ryanne Corbett, CNP

## 2021-06-29 NOTE — PROGRESS NOTES
Progress Notes by Johnny Daniel MD at 7/10/2020 10:40 AM     Author: Johnny Daniel MD Service: -- Author Type: Physician    Filed: 7/11/2020  9:50 AM Encounter Date: 7/10/2020 Status: Signed    : Johnny Daniel MD (Physician)       [facility] Note                                                        Date of Visit: 7/10/2020     Chief Complaint   Ora Gonzalez is a(n) 86 y.o. White or  female who presents to [facility] with the following complaint(s):  Eye Pain (Rt eye, started yesterday, swelling, watery, not itchy)       Assessment and Plan   1. Facial swelling  - predniSONE (DELTASONE) 20 MG tablet; Take 40 mg by mouth daily for 5 days.  Dispense: 10 tablet; Refill: 0  - cephalexin (KEFLEX) 500 MG capsule; Take 1 capsule (500 mg total) by mouth 4 (four) times a day for 10 days.  Dispense: 40 capsule; Refill: 0  - olopatadine (PATANOL) 0.1 % ophthalmic solution; Administer 1 drop to both eyes 2 (two) times a day.  Dispense: 5 mL; Refill: 0    2. Allergic conjunctivitis, right  - predniSONE (DELTASONE) 20 MG tablet; Take 40 mg by mouth daily for 5 days.  Dispense: 10 tablet; Refill: 0  - olopatadine (PATANOL) 0.1 % ophthalmic solution; Administer 1 drop to both eyes 2 (two) times a day.  Dispense: 5 mL; Refill: 0    3. Allergic blepharitis, right  - predniSONE (DELTASONE) 20 MG tablet; Take 40 mg by mouth daily for 5 days.  Dispense: 10 tablet; Refill: 0    4. Cellulitis, face  - cephalexin (KEFLEX) 500 MG capsule; Take 1 capsule (500 mg total) by mouth 4 (four) times a day for 10 days.  Dispense: 40 capsule; Refill: 0      Elderly patient presenting with pruritus and swelling of the right eye, now with extension of swelling and erythema across the bridge of the nose. No underlying stye or known injury or insect bite / sting. History and appearance is more suggestive of allergic etiology rather than infectious etiology, but nasal involvement is concerning for possible secondary  bacterial infection. Treating for allergic reaction with prednisone and ophthalmic antihistamine drops as listed above rather than systemic antihistamines due to risk of adverse side effects in an elderly patient. Covering for secondary cellulitis with cephalexin as listed above. Recommended use of a probiotic while taking this antibiotic. Recommended application of cool compresses to the affected area of the face at least four times a day.     Counseled patient regarding assessment and plan for evaluation and treatment. Questions were answered. See AVS for the specific written instructions and educational handout(s) regarding facial cellulitis, allergic conjunctivitis, and allergic blepharitis that were provided at the conclusion of the visit.     Discussed signs / symptoms that warrant urgent / emergent medical attention.     Follow up with Primary Care for recheck in 3 days. Return to [facility] as needed in the interim if symptoms fail to improve or actually worsen.      History of Present Illness   Primary symptom: Eye itching and swelling  Laterality: Right  Onset: Yesterday  Progression: Worsening  Conjunctival redness: Yes  Mattering: No  Drainage: Tearing  Irritation/Itching: Yes  Pain: Tender to touch  Foreign body sensation: No  Eyelid swelling: Yes, initially in the right upper eyelid, now on the lower lid, the bridge of the nose as well, and the left upper eyelid as well.   Eyelid erythema: Mild  Blurry vision: No  Vision loss: No  Photophobia: Slightly  Fevers: No  Chills: No  URI symptoms: None  Additional symptoms: None. Denies other areas of swelling. Denies associated headache.   Known trauma: No  Suspected foreign body: No  Exposure to infectious conjunctivitis: No  Contact lens wearer: No  Chronic ophthalmologic conditions: None outside of cataract surgery. Denies history of similar symptoms.   Home therapies utilized: Took acetaminophen yesterday evening. Has not used any wetting drops or  applied compresses.      Review of Systems   Review of Systems   All other systems reviewed and are negative.       Physical Exam   Vitals:    07/10/20 1047   BP: 138/83   Pulse: 94   Resp: 12   Temp: 98.1  F (36.7  C)   TempSrc: Oral   SpO2: 96%     Physical Exam  Vitals signs and nursing note reviewed.   Constitutional:       General: She is not in acute distress.     Appearance: She is well-developed and normal weight. She is not ill-appearing or toxic-appearing.   HENT:      Head: Normocephalic and atraumatic. Right periorbital erythema (and swelling - see photo below) present. No left periorbital erythema (but has mild swelling of the medial upper eyelid - see photo below).      Jaw: There is normal jaw occlusion. No trismus.      Right Ear: Tympanic membrane, ear canal and external ear normal.      Left Ear: Tympanic membrane, ear canal and external ear normal.      Nose: Nasal deformity (swelling - see photo below) present. No mucosal edema or rhinorrhea.      Mouth/Throat:      Mouth: Mucous membranes are moist. No oral lesions.      Pharynx: Uvula midline. No oropharyngeal exudate or posterior oropharyngeal erythema.   Eyes:      General: Vision grossly intact. Gaze aligned appropriately.         Right eye: Discharge (tearing) present. No foreign body or hordeolum.      Extraocular Movements: Extraocular movements intact.      Conjunctiva/sclera:      Right eye: Right conjunctiva is injected.      Pupils: Pupils are equal, round, and reactive to light.   Neck:      Musculoskeletal: Neck supple. No edema or erythema.   Cardiovascular:      Rate and Rhythm: Normal rate and regular rhythm.      Heart sounds: S1 normal and S2 normal. No murmur. No friction rub. No gallop.    Pulmonary:      Effort: Pulmonary effort is normal.      Breath sounds: Normal breath sounds. No stridor. No wheezing, rhonchi or rales.   Lymphadenopathy:      Cervical: No cervical adenopathy.   Skin:     General: Skin is warm and dry.       Coloration: Skin is not pale.      Findings: Erythema (right eye and nose - see photo below) present. No rash.   Neurological:      General: No focal deficit present.      Mental Status: She is alert and oriented to person, place, and time.      Cranial Nerves: Cranial nerves are intact.   Psychiatric:         Mood and Affect: Mood and affect normal.                Diagnostic Studies   Laboratory:  N/A  Radiology:  N/A  Electrocardiogram:  N/A     Procedure Note   N/A     Pertinent History   The following portions of the patient's history were reviewed and updated as appropriate: allergies, current medications, past family history, past medical history, past social history, past surgical history and problem list.    Patient has Osteopenia; Trigger Finger Of The Right Thumb; Adenomatous Goiter; Vision Problems; Idiopathic thrombocytopenic purpura (H); Incidental pulmonary nodule, less than or equal to 3mm; Iron deficiency anemia; Paroxysmal atrial fibrillation (H); Contraindication to anticoagulation therapy; Prediabetes; HLD (hyperlipidemia); Insomnia; Early onset Alzheimer's dementia without behavioral disturbance (H); and History of pulmonary embolism, provoked.  on their problem list.    Patient has a past medical history of Adjustment disorder with mixed anxiety and depressed mood (2016), Adjustment reaction to chronic stress (2016), Chronic kidney disease, Hypertension, ITP (idiopathic thrombocytopenic purpura), Osteoporosis, Paroxysmal atrial fibrillation (H), Pulmonary embolism (H) (2016), and Thrombocytopenia (H).    Patient has a past surgical history that includes pr hemorrhoidectomy internal rubber band ligations; Cataract extraction (Left, 10/2014); Laparoscopic splenectomy (N/A, 2015); ; Esophagogastroduodenoscopy (N/A, 2017); and PICC (4/15/2017).    Patient's family history includes Alzheimer's disease in her sister; Diabetes type II in her son; No Medical Problems in  her brother, brother, brother, father, mother, sister, and son; Other in an other family member; Parkinsonism in her daughter.    Patient reports that she quit smoking about 4 years ago. She has a 15.00 pack-year smoking history. She has quit using smokeless tobacco. She reports current alcohol use. She reports that she does not use drugs.     Portions of this note have been dictated using voice recognition software. Any grammatical or contextual distortions are unintentional and inherent to the software.    Johnny Daniel MD  [facility]

## 2021-07-03 NOTE — ADDENDUM NOTE
Addendum Note by Nabil Marshall CMA at 4/9/2020  9:00 AM     Author: Nabil Marshall CMA Service: -- Author Type: Certified Medical Assistant    Filed: 4/9/2020  9:25 AM Encounter Date: 4/9/2020 Status: Signed    : Nabil Marshall CMA (Certified Medical Assistant)    Addended by: NABIL MARSHALL on: 4/9/2020 09:25 AM        Modules accepted: Orders

## 2021-07-03 NOTE — ADDENDUM NOTE
Addendum Note by Ryanne Corbett FNP at 3/28/2019 12:50 PM     Author: Ryanne Corbett FNP Service: -- Author Type: Nurse Practitioner    Filed: 3/29/2019  8:08 AM Encounter Date: 3/28/2019 Status: Signed    : Ryanne Corbett FNP (Nurse Practitioner)    Addended by: RYANNE CORBETT on: 3/29/2019 08:08 AM        Modules accepted: Orders

## 2021-07-03 NOTE — ADDENDUM NOTE
Addendum Note by Nabil Marshall CMA at 1/24/2019 10:00 AM     Author: Nabil Marshall CMA Service: -- Author Type: Certified Medical Assistant    Filed: 1/24/2019  9:50 AM Encounter Date: 1/24/2019 Status: Signed    : Nabil Marshall CMA (Certified Medical Assistant)    Addended by: NABIL MARSHALL on: 1/24/2019 09:50 AM        Modules accepted: Orders

## 2021-07-13 ENCOUNTER — RECORDS - HEALTHEAST (OUTPATIENT)
Dept: ADMINISTRATIVE | Facility: CLINIC | Age: 86
End: 2021-07-13

## 2021-07-14 PROBLEM — K92.2 ACUTE UPPER GI BLEEDING: Status: RESOLVED | Noted: 2017-04-12 | Resolved: 2017-05-16

## 2021-07-14 PROBLEM — K92.2 GI BLEEDING: Status: RESOLVED | Noted: 2017-04-12 | Resolved: 2017-05-16

## 2021-07-15 ENCOUNTER — INFUSION THERAPY VISIT (OUTPATIENT)
Dept: INFUSION THERAPY | Facility: HOSPITAL | Age: 86
End: 2021-07-15
Attending: INTERNAL MEDICINE
Payer: COMMERCIAL

## 2021-07-15 DIAGNOSIS — D69.3 IMMUNE THROMBOCYTOPENIC PURPURA (H): Primary | ICD-10-CM

## 2021-07-15 PROCEDURE — 96372 THER/PROPH/DIAG INJ SC/IM: CPT | Performed by: NURSE PRACTITIONER

## 2021-07-15 PROCEDURE — 250N000011 HC RX IP 250 OP 636: Performed by: NURSE PRACTITIONER

## 2021-07-15 RX ADMIN — ROMIPLOSTIM 55 MCG: 250 INJECTION, POWDER, LYOPHILIZED, FOR SOLUTION SUBCUTANEOUS at 13:46

## 2021-07-15 NOTE — PROGRESS NOTES
Pt arrived ambulatory to clinic for subcutaneous NPlate injection.  Administered subcutaneous NPlate injection into TRISTA per MD order.  Pt tolerated procedure well, no s/s of bleeding or swelling at site.  Pt verbalized understanding of plan of care and return to clinic.

## 2021-07-21 ENCOUNTER — RECORDS - HEALTHEAST (OUTPATIENT)
Dept: ADMINISTRATIVE | Facility: CLINIC | Age: 86
End: 2021-07-21

## 2021-07-23 ENCOUNTER — LAB (OUTPATIENT)
Dept: INFUSION THERAPY | Facility: HOSPITAL | Age: 86
End: 2021-07-23
Attending: INTERNAL MEDICINE
Payer: COMMERCIAL

## 2021-07-23 ENCOUNTER — ONCOLOGY VISIT (OUTPATIENT)
Dept: ONCOLOGY | Facility: HOSPITAL | Age: 86
End: 2021-07-23
Attending: INTERNAL MEDICINE
Payer: COMMERCIAL

## 2021-07-23 VITALS
OXYGEN SATURATION: 96 % | HEART RATE: 76 BPM | WEIGHT: 122.3 LBS | RESPIRATION RATE: 24 BRPM | SYSTOLIC BLOOD PRESSURE: 150 MMHG | TEMPERATURE: 98.6 F | BODY MASS INDEX: 23.11 KG/M2 | DIASTOLIC BLOOD PRESSURE: 68 MMHG

## 2021-07-23 DIAGNOSIS — D69.3 IMMUNE THROMBOCYTOPENIC PURPURA (H): Primary | ICD-10-CM

## 2021-07-23 LAB
BASOPHILS # BLD MANUAL: 0.1 10E3/UL (ref 0–0.2)
BASOPHILS NFR BLD MANUAL: 1 %
EOSINOPHIL # BLD MANUAL: 0.4 10E3/UL (ref 0–0.7)
EOSINOPHIL NFR BLD MANUAL: 3 %
ERYTHROCYTE [DISTWIDTH] IN BLOOD BY AUTOMATED COUNT: 15.2 % (ref 10–15)
HCT VFR BLD AUTO: 45.3 % (ref 35–47)
HGB BLD-MCNC: 14.5 G/DL (ref 11.7–15.7)
HOWELL-JOLLY BOD BLD QL SMEAR: PRESENT
LYMPHOCYTES # BLD MANUAL: 3.8 10E3/UL (ref 0.8–5.3)
LYMPHOCYTES NFR BLD MANUAL: 27 %
MCH RBC QN AUTO: 31.3 PG (ref 26.5–33)
MCHC RBC AUTO-ENTMCNC: 32 G/DL (ref 31.5–36.5)
MCV RBC AUTO: 98 FL (ref 78–100)
METAMYELOCYTES # BLD MANUAL: 0.1 10E3/UL
METAMYELOCYTES NFR BLD MANUAL: 1 %
MONOCYTES # BLD MANUAL: 1.3 10E3/UL (ref 0–1.3)
MONOCYTES NFR BLD MANUAL: 9 %
NEUTROPHILS # BLD MANUAL: 8.2 10E3/UL (ref 1.6–8.3)
NEUTROPHILS NFR BLD MANUAL: 59 %
PLAT MORPH BLD: ABNORMAL
PLATELET # BLD AUTO: 272 10E3/UL (ref 150–450)
RBC # BLD AUTO: 4.64 10E6/UL (ref 3.8–5.2)
RBC MORPH BLD: ABNORMAL
WBC # BLD AUTO: 13.9 10E3/UL (ref 4–11)

## 2021-07-23 PROCEDURE — 36415 COLL VENOUS BLD VENIPUNCTURE: CPT | Performed by: NURSE PRACTITIONER

## 2021-07-23 PROCEDURE — 99213 OFFICE O/P EST LOW 20 MIN: CPT | Performed by: INTERNAL MEDICINE

## 2021-07-23 PROCEDURE — 85027 COMPLETE CBC AUTOMATED: CPT | Performed by: NURSE PRACTITIONER

## 2021-07-23 PROCEDURE — 96372 THER/PROPH/DIAG INJ SC/IM: CPT | Performed by: NURSE PRACTITIONER

## 2021-07-23 PROCEDURE — G0463 HOSPITAL OUTPT CLINIC VISIT: HCPCS | Mod: 25

## 2021-07-23 PROCEDURE — 250N000011 HC RX IP 250 OP 636: Performed by: NURSE PRACTITIONER

## 2021-07-23 RX ADMIN — ROMIPLOSTIM 55 MCG: 250 INJECTION, POWDER, LYOPHILIZED, FOR SOLUTION SUBCUTANEOUS at 14:44

## 2021-07-23 ASSESSMENT — PAIN SCALES - GENERAL: PAINLEVEL: NO PAIN (0)

## 2021-07-23 NOTE — PROGRESS NOTES
"Oncology Rooming Note    July 23, 2021 1:57 PM   Ora Gonzalez is a 87 year old female who presents for:    Chief Complaint   Patient presents with     Hematology     Initial Vitals: BP (!) 150/68 (BP Location: Right arm)   Pulse 76   Temp 98.6  F (37  C) (Oral)   Resp 24   Wt 55.5 kg (122 lb 4.8 oz)   SpO2 96%   BMI 23.11 kg/m   Estimated body mass index is 23.11 kg/m  as calculated from the following:    Height as of 6/9/21: 1.549 m (5' 1\").    Weight as of this encounter: 55.5 kg (122 lb 4.8 oz). Body surface area is 1.55 meters squared.  No Pain (0) Comment: Data Unavailable   No LMP recorded.  Allergies reviewed: Yes  Medications reviewed: Yes    Medications: Medication refills not needed today.  Pharmacy name entered into [employer]: [id number] - [place] [phone]    Clinical concerns:  No concerns. She remains active walking 4 miles a day.       Alondra Phelps RN              "

## 2021-07-23 NOTE — PROGRESS NOTES
[facility] Hematology and Oncology Progress Note    Patient: Ora Gonzalez  MRN: [id number]  Date of Service: Jul 23, 2021        Assessment and Plan:    1. Immune thrombocytopenia: Platelet count continues to remain normal on her current dose of thromboplastin.  No changes will be made to the dose or schedule.  We will see her back in clinic in 6 months.  Hemoglobin is normal.  No clinical evidence of marrow fibrosis.     2. Leukocytosis: Stable, mild, chronic. Follow.     ECOG Performance  0    Diagnosis:    1. 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 Bone marrow biopsy was unremarkable, November 2014. Thyroid functions were normal. Anticardiolipin antibodies were normal.     2. Right-sided pulmonary embolism: Provoked. Diagnosed February 8, 2016.    Treatment:    She started course of prednisone on November 14, 2014 and finished on January 5, 2015. She had a complete response. Quickly relapsed and treated with Rituxan weekly from March 12 through April 2, 2015. She responded with highest platelet count of 112.     She then relapsed in October, 2015. She was started on Promacta in October. She responded within 2-3 weeks. She then quickly lost response after dose reduction from 50 to 25mg. She was started back on Promacta 50 mg and prednisone 60 mg daily. She did not respond. She received 2 doses of IVIG on December 4 and 5, 2015. She responded briefly with a platelet count of 59 on December 7 but then quickly lost response by December 14. 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 She received 2 more doses of IVIG on December 16 and 17th with minimal response.   Splenectomy was performed on December 20, 2015. She did not respond.   We started Rituxan on December 29, 2015. Steroids were continued. Romiplostim was started on January 5, 2016. 1 dose of WinRho was given on January 6, 2016.  Platelet response noted on January 13.  Her last dose of Rituxan was on January 20th, 2016.     She was  restarted on N-plate on July 28, 2016 for relapse.    Interim History:    Ora comes in today for a follow-up visit.  In general she has been feeling well.  No changes to her health since her last visit.  No bleeding or bruising.    Review of Systems:    As above in the history.     Review of Systems otherwise Negative for:  General: chills, fever or night sweats  Psychological: anxiety or depression  Ophthalmic: blurry vision, double vision or loss of vision, vision change  ENT: epistaxis, oral lesions, hearing changes  Hematological and Lymphatic: bleeding, bruising, jaundice, swollen lymph nodes  Endocrine: hot flashes, unexpected weight changes  Respiratory: cough, hemoptysis, orthopnea or shortness of breath/NOLEN  Cardiovascular: chest pain, edema, palpitations or PND  Gastrointestinal: abdominal pain, blood in stools, change in bowel habits, constipation, diarrhea or nausea/vomiting  Genito-Urinary: change in urinary stream, incontinence, frequency/urgency  Musculoskeletal: joint pain, stiffness, swelling, muscle pain  Neurological: dizziness, headaches, numbness/tingling  Dermatological: lumps and rash    Past History:    Past Medical History:   Diagnosis Date     Adjustment disorder with mixed anxiety and depressed mood 1/22/2016     Adjustment reaction to chronic stress 1/22/2016     Chronic kidney disease      Hypertension      ITP (idiopathic thrombocytopenic purpura)      Osteoporosis      Paroxysmal atrial fibrillation (H)      Pulmonary embolism (H) 2/8/2016     Thrombocytopenia (H)      Physical Exam:    BP (!) 150/68 (BP Location: Right arm)   Pulse 76   Temp 98.6  F (37  C) (Oral)   Resp 24   Wt 55.5 kg (122 lb 4.8 oz)   SpO2 96%   BMI 23.11 kg/m      General: patient appears stated age of 87 year old. Nontoxic and in no distress.   HEENT: Head: atraumatic, normocephalic. Sclerae anicteric.  Chest:  Normal respiratory effort  Cardiac:  No edema.   Abdomen: abdomen is non-distended  Extremities:  normal tone and muscle bulk.  Skin: no lesions or rash on visible skin. Warm and dry.   CNS: alert and oriented. Grossly non-focal.   Psychiatric: normal mood and affect.     Lab Results:    Recent Results (from the past 168 hour(s))   CBC with platelets and differential   Result Value Ref Range    WBC Count 13.9 (H) 4.0 - 11.0 10e3/uL    RBC Count 4.64 3.80 - 5.20 10e6/uL    Hemoglobin 14.5 11.7 - 15.7 g/dL    Hematocrit 45.3 35.0 - 47.0 %    MCV 98 78 - 100 fL    MCH 31.3 26.5 - 33.0 pg    MCHC 32.0 31.5 - 36.5 g/dL    RDW 15.2 (H) 10.0 - 15.0 %    Platelet Count 272 150 - 450 10e3/uL     Imaging:    No results found.      Signed by: Vinny Garcia MD

## 2021-07-23 NOTE — PROGRESS NOTES
Pt arrived ambulatory to clinic for labs, provider visit, and subcutaneous injection.  Administered subcutaneous NPlate injection into TRISTA per MD order.  Pt tolerated procedure well, no s/s of bleeding or swelling at site.  Pt verbalized understanding of plan of care and return to clinic.

## 2021-07-23 NOTE — LETTER
"    7/23/2021         RE: Ora Gonzalez  [address]  [address]  [place] [address]        Dear Colleague,    Thank you for referring your patient, Ora Gonzalez, to the [facility]. Please see a copy of my visit note below.    Oncology Rooming Note    July 23, 2021 1:57 PM   Ora Gonzalez is a 87 year old female who presents for:    Chief Complaint   Patient presents with     Hematology     Initial Vitals: BP (!) 150/68 (BP Location: Right arm)   Pulse 76   Temp 98.6  F (37  C) (Oral)   Resp 24   Wt 55.5 kg (122 lb 4.8 oz)   SpO2 96%   BMI 23.11 kg/m   Estimated body mass index is 23.11 kg/m  as calculated from the following:    Height as of 6/9/21: 1.549 m (5' 1\").    Weight as of this encounter: 55.5 kg (122 lb 4.8 oz). Body surface area is 1.55 meters squared.  No Pain (0) Comment: Data Unavailable   No LMP recorded.  Allergies reviewed: Yes  Medications reviewed: Yes    Medications: Medication refills not needed today.  Pharmacy name entered into [employer]: [id number] - [place] [id number]    Clinical concerns:  No concerns. She remains active walking 4 miles a day.       Alondra Phelps RN                [facility] Hematology and Oncology Progress Note    Patient: Ora Gonzalez  MRN: [id number]  Date of Service: Jul 23, 2021        Assessment and Plan:    1. Immune thrombocytopenia: Platelet count continues to remain normal on her current dose of thromboplastin.  No changes will be made to the dose or schedule.  We will see her back in clinic in 6 months.  Hemoglobin is normal.  No clinical evidence of marrow fibrosis.     2. Leukocytosis: Stable, mild, chronic. Follow.     ECOG Performance  0    Diagnosis:    1. 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 Bone marrow biopsy was unremarkable, November 2014. Thyroid functions were normal. Anticardiolipin antibodies were normal.     2. Right-sided pulmonary embolism: Provoked. Diagnosed " February 8, 2016.    Treatment:    She started course of prednisone on November 14, 2014 and finished on January 5, 2015. She had a complete response. Quickly relapsed and treated with Rituxan weekly from March 12 through April 2, 2015. She responded with highest platelet count of 112.     She then relapsed in October, 2015. She was started on Promacta in October. She responded within 2-3 weeks. She then quickly lost response after dose reduction from 50 to 25mg. She was started back on Promacta 50 mg and prednisone 60 mg daily. She did not respond. She received 2 doses of IVIG on December 4 and 5, 2015. She responded briefly with a platelet count of 59 on December 7 but then quickly lost response by December 14. 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 She received 2 more doses of IVIG on December 16 and 17th with minimal response.   Splenectomy was performed on December 20, 2015. She did not respond.   We started Rituxan on December 29, 2015. Steroids were continued. Romiplostim was started on January 5, 2016. 1 dose of WinRho was given on January 6, 2016.  Platelet response noted on January 13.  Her last dose of Rituxan was on January 20th, 2016.     She was restarted on N-plate on July 28, 2016 for relapse.    Interim History:    Ora comes in today for a follow-up visit.  In general she has been feeling well.  No changes to her health since her last visit.  No bleeding or bruising.    Review of Systems:    As above in the history.     Review of Systems otherwise Negative for:  General: chills, fever or night sweats  Psychological: anxiety or depression  Ophthalmic: blurry vision, double vision or loss of vision, vision change  ENT: epistaxis, oral lesions, hearing changes  Hematological and Lymphatic: bleeding, bruising, jaundice, swollen lymph nodes  Endocrine: hot flashes, unexpected weight changes  Respiratory: cough, hemoptysis, orthopnea or shortness of breath/NOLEN  Cardiovascular: chest pain, edema,  palpitations or PND  Gastrointestinal: abdominal pain, blood in stools, change in bowel habits, constipation, diarrhea or nausea/vomiting  Genito-Urinary: change in urinary stream, incontinence, frequency/urgency  Musculoskeletal: joint pain, stiffness, swelling, muscle pain  Neurological: dizziness, headaches, numbness/tingling  Dermatological: lumps and rash    Past History:    Past Medical History:   Diagnosis Date     Adjustment disorder with mixed anxiety and depressed mood 1/22/2016     Adjustment reaction to chronic stress 1/22/2016     Chronic kidney disease      Hypertension      ITP (idiopathic thrombocytopenic purpura)      Osteoporosis      Paroxysmal atrial fibrillation (H)      Pulmonary embolism (H) 2/8/2016     Thrombocytopenia (H)      Physical Exam:    BP (!) 150/68 (BP Location: Right arm)   Pulse 76   Temp 98.6  F (37  C) (Oral)   Resp 24   Wt 55.5 kg (122 lb 4.8 oz)   SpO2 96%   BMI 23.11 kg/m      General: patient appears stated age of 87 year old. Nontoxic and in no distress.   HEENT: Head: atraumatic, normocephalic. Sclerae anicteric.  Chest:  Normal respiratory effort  Cardiac:  No edema.   Abdomen: abdomen is non-distended  Extremities: normal tone and muscle bulk.  Skin: no lesions or rash on visible skin. Warm and dry.   CNS: alert and oriented. Grossly non-focal.   Psychiatric: normal mood and affect.     Lab Results:    Recent Results (from the past 168 hour(s))   CBC with platelets and differential   Result Value Ref Range    WBC Count 13.9 (H) 4.0 - 11.0 10e3/uL    RBC Count 4.64 3.80 - 5.20 10e6/uL    Hemoglobin 14.5 11.7 - 15.7 g/dL    Hematocrit 45.3 35.0 - 47.0 %    MCV 98 78 - 100 fL    MCH 31.3 26.5 - 33.0 pg    MCHC 32.0 31.5 - 36.5 g/dL    RDW 15.2 (H) 10.0 - 15.0 %    Platelet Count 272 150 - 450 10e3/uL     Imaging:    No results found.      Signed by: Vinny Garcia MD        Again, thank you for allowing me to participate in the care of your patient.         Sincerely,        Vinny Garcia MD

## 2021-07-30 ENCOUNTER — INFUSION THERAPY VISIT (OUTPATIENT)
Dept: INFUSION THERAPY | Facility: HOSPITAL | Age: 86
End: 2021-07-30
Attending: INTERNAL MEDICINE
Payer: COMMERCIAL

## 2021-07-30 VITALS
SYSTOLIC BLOOD PRESSURE: 142 MMHG | HEART RATE: 62 BPM | DIASTOLIC BLOOD PRESSURE: 66 MMHG | TEMPERATURE: 98.7 F | OXYGEN SATURATION: 96 % | RESPIRATION RATE: 16 BRPM

## 2021-07-30 DIAGNOSIS — D69.3 IMMUNE THROMBOCYTOPENIC PURPURA (H): Primary | ICD-10-CM

## 2021-07-30 PROCEDURE — 96372 THER/PROPH/DIAG INJ SC/IM: CPT | Performed by: INTERNAL MEDICINE

## 2021-07-30 PROCEDURE — 250N000011 HC RX IP 250 OP 636: Performed by: INTERNAL MEDICINE

## 2021-07-30 RX ADMIN — ROMIPLOSTIM 55 MCG: 250 INJECTION, POWDER, LYOPHILIZED, FOR SOLUTION SUBCUTANEOUS at 13:56

## 2021-07-30 NOTE — PROGRESS NOTES
Ora came to chemo infusion this afternoon for her next dose of Nplate.  VSS.  PT assessed and is feeling well.  No labs needed today.  Nplate given as ordered and was well tolerated.  Site was covered with a bandaid.  Ora left clinic [facility]ulatory [facility]

## 2021-08-06 ENCOUNTER — INFUSION THERAPY VISIT (OUTPATIENT)
Dept: INFUSION THERAPY | Facility: HOSPITAL | Age: 86
End: 2021-08-06
Attending: INTERNAL MEDICINE
Payer: COMMERCIAL

## 2021-08-06 VITALS — OXYGEN SATURATION: 97 % | DIASTOLIC BLOOD PRESSURE: 70 MMHG | SYSTOLIC BLOOD PRESSURE: 157 MMHG | HEART RATE: 65 BPM

## 2021-08-06 DIAGNOSIS — D69.3 IMMUNE THROMBOCYTOPENIC PURPURA (H): Primary | ICD-10-CM

## 2021-08-06 PROCEDURE — 250N000011 HC RX IP 250 OP 636: Performed by: INTERNAL MEDICINE

## 2021-08-06 PROCEDURE — 96372 THER/PROPH/DIAG INJ SC/IM: CPT | Performed by: INTERNAL MEDICINE

## 2021-08-06 RX ADMIN — ROMIPLOSTIM 55 MCG: 250 INJECTION, POWDER, LYOPHILIZED, FOR SOLUTION SUBCUTANEOUS at 14:11

## 2021-08-06 NOTE — PROGRESS NOTES
Pt ambulates to infusion center for injection.  Pt voices no new concerns today.  Nplate injection given subcutaneous as ordered without difficulty.  Pt left clinic stable to lobby.  Plan RTC as scheduled.

## 2021-08-13 ENCOUNTER — INFUSION THERAPY VISIT (OUTPATIENT)
Dept: INFUSION THERAPY | Facility: HOSPITAL | Age: 86
End: 2021-08-13
Attending: INTERNAL MEDICINE
Payer: COMMERCIAL

## 2021-08-13 VITALS
SYSTOLIC BLOOD PRESSURE: 153 MMHG | RESPIRATION RATE: 18 BRPM | DIASTOLIC BLOOD PRESSURE: 68 MMHG | TEMPERATURE: 97.6 F | HEART RATE: 59 BPM | OXYGEN SATURATION: 97 %

## 2021-08-13 DIAGNOSIS — D69.3 IMMUNE THROMBOCYTOPENIC PURPURA (H): Primary | ICD-10-CM

## 2021-08-13 LAB
BASOPHILS # BLD AUTO: 0.1 10E3/UL (ref 0–0.2)
BASOPHILS NFR BLD AUTO: 1 %
EOSINOPHIL # BLD AUTO: 0.2 10E3/UL (ref 0–0.7)
EOSINOPHIL NFR BLD AUTO: 1 %
ERYTHROCYTE [DISTWIDTH] IN BLOOD BY AUTOMATED COUNT: 15 % (ref 10–15)
HCT VFR BLD AUTO: 45.6 % (ref 35–47)
HGB BLD-MCNC: 14.5 G/DL (ref 11.7–15.7)
IMM GRANULOCYTES # BLD: 0.1 10E3/UL
IMM GRANULOCYTES NFR BLD: 1 %
LYMPHOCYTES # BLD AUTO: 4.9 10E3/UL (ref 0.8–5.3)
LYMPHOCYTES NFR BLD AUTO: 33 %
MCH RBC QN AUTO: 31.6 PG (ref 26.5–33)
MCHC RBC AUTO-ENTMCNC: 31.8 G/DL (ref 31.5–36.5)
MCV RBC AUTO: 99 FL (ref 78–100)
MONOCYTES # BLD AUTO: 1.4 10E3/UL (ref 0–1.3)
MONOCYTES NFR BLD AUTO: 9 %
NEUTROPHILS # BLD AUTO: 8.4 10E3/UL (ref 1.6–8.3)
NEUTROPHILS NFR BLD AUTO: 55 %
NRBC # BLD AUTO: 0 10E3/UL
NRBC BLD AUTO-RTO: 0 /100
PLATELET # BLD AUTO: 354 10E3/UL (ref 150–450)
RBC # BLD AUTO: 4.59 10E6/UL (ref 3.8–5.2)
WBC # BLD AUTO: 14.8 10E3/UL (ref 4–11)

## 2021-08-13 PROCEDURE — 36415 COLL VENOUS BLD VENIPUNCTURE: CPT | Performed by: INTERNAL MEDICINE

## 2021-08-13 PROCEDURE — 96372 THER/PROPH/DIAG INJ SC/IM: CPT | Performed by: INTERNAL MEDICINE

## 2021-08-13 PROCEDURE — 250N000011 HC RX IP 250 OP 636: Performed by: INTERNAL MEDICINE

## 2021-08-13 PROCEDURE — 85025 COMPLETE CBC W/AUTO DIFF WBC: CPT | Performed by: INTERNAL MEDICINE

## 2021-08-13 RX ADMIN — ROMIPLOSTIM 30 MCG: 250 INJECTION, POWDER, LYOPHILIZED, FOR SOLUTION SUBCUTANEOUS at 14:23

## 2021-08-13 ASSESSMENT — PAIN SCALES - GENERAL: PAINLEVEL: NO PAIN (0)

## 2021-08-13 NOTE — PROGRESS NOTES
Ora came to chemo infusion this afternoon for her next dose of Nplate.  VSS.  PT assessed and is feeling well.  She had peripheral lab draw done and results noted.  She met provision for her treatment but dose was adjusted for platelet count.   Nplate given as ordered and was well tolerated.  Site was covered with a bandaid.  Ora left clinic ambulatory to go to [facility] for [employer] mobility .

## 2021-08-15 ENCOUNTER — HEALTH MAINTENANCE LETTER (OUTPATIENT)
Age: 86
End: 2021-08-15

## 2021-08-20 ENCOUNTER — INFUSION THERAPY VISIT (OUTPATIENT)
Dept: INFUSION THERAPY | Facility: HOSPITAL | Age: 86
End: 2021-08-20
Attending: INTERNAL MEDICINE
Payer: COMMERCIAL

## 2021-08-20 VITALS
RESPIRATION RATE: 18 BRPM | SYSTOLIC BLOOD PRESSURE: 166 MMHG | OXYGEN SATURATION: 97 % | HEART RATE: 63 BPM | DIASTOLIC BLOOD PRESSURE: 74 MMHG | TEMPERATURE: 98.5 F

## 2021-08-20 DIAGNOSIS — D69.3 IMMUNE THROMBOCYTOPENIC PURPURA (H): Primary | ICD-10-CM

## 2021-08-20 PROCEDURE — 250N000011 HC RX IP 250 OP 636: Performed by: INTERNAL MEDICINE

## 2021-08-20 PROCEDURE — 96372 THER/PROPH/DIAG INJ SC/IM: CPT | Performed by: INTERNAL MEDICINE

## 2021-08-20 RX ADMIN — ROMIPLOSTIM 55 MCG: 250 INJECTION, POWDER, LYOPHILIZED, FOR SOLUTION SUBCUTANEOUS at 13:59

## 2021-08-20 NOTE — PROGRESS NOTES
PT here ambulatory for nplate injection. Reviewed injection and platelets fall within parameters. N plate administered in right upper arm and bandaid to site. Follow up reviewed and pt dc'd steady gait.

## 2021-08-27 ENCOUNTER — INFUSION THERAPY VISIT (OUTPATIENT)
Dept: INFUSION THERAPY | Facility: HOSPITAL | Age: 86
End: 2021-08-27
Attending: INTERNAL MEDICINE
Payer: COMMERCIAL

## 2021-08-27 VITALS
OXYGEN SATURATION: 97 % | TEMPERATURE: 98.4 F | HEART RATE: 60 BPM | SYSTOLIC BLOOD PRESSURE: 167 MMHG | RESPIRATION RATE: 18 BRPM | DIASTOLIC BLOOD PRESSURE: 67 MMHG

## 2021-08-27 DIAGNOSIS — D69.3 IMMUNE THROMBOCYTOPENIC PURPURA (H): Primary | ICD-10-CM

## 2021-08-27 PROCEDURE — 96372 THER/PROPH/DIAG INJ SC/IM: CPT | Performed by: INTERNAL MEDICINE

## 2021-08-27 PROCEDURE — 250N000011 HC RX IP 250 OP 636: Performed by: INTERNAL MEDICINE

## 2021-08-27 RX ADMIN — ROMIPLOSTIM 55 MCG: 250 INJECTION, POWDER, LYOPHILIZED, FOR SOLUTION SUBCUTANEOUS at 13:53

## 2021-08-27 NOTE — PROGRESS NOTES
Pt here for Nplate injection. Pt states doing well and no concerns. Nplate reviewed and adminsitered as ordered. Pt tolerated without any problems. Follow up reviewed and pt dc'd steady gait

## 2021-09-03 ENCOUNTER — LAB (OUTPATIENT)
Dept: INFUSION THERAPY | Facility: HOSPITAL | Age: 86
End: 2021-09-03
Attending: INTERNAL MEDICINE
Payer: COMMERCIAL

## 2021-09-03 VITALS
DIASTOLIC BLOOD PRESSURE: 50 MMHG | TEMPERATURE: 98.6 F | BODY MASS INDEX: 23 KG/M2 | OXYGEN SATURATION: 97 % | SYSTOLIC BLOOD PRESSURE: 131 MMHG | WEIGHT: 121.7 LBS | HEART RATE: 67 BPM | RESPIRATION RATE: 18 BRPM

## 2021-09-03 DIAGNOSIS — D69.3 IMMUNE THROMBOCYTOPENIC PURPURA (H): Primary | ICD-10-CM

## 2021-09-03 LAB
BASOPHILS # BLD AUTO: 0.1 10E3/UL (ref 0–0.2)
BASOPHILS NFR BLD AUTO: 1 %
EOSINOPHIL # BLD AUTO: 0.2 10E3/UL (ref 0–0.7)
EOSINOPHIL NFR BLD AUTO: 1 %
ERYTHROCYTE [DISTWIDTH] IN BLOOD BY AUTOMATED COUNT: 14.7 % (ref 10–15)
HCT VFR BLD AUTO: 44.9 % (ref 35–47)
HGB BLD-MCNC: 14.4 G/DL (ref 11.7–15.7)
IMM GRANULOCYTES # BLD: 0.1 10E3/UL
IMM GRANULOCYTES NFR BLD: 1 %
LYMPHOCYTES # BLD AUTO: 3.3 10E3/UL (ref 0.8–5.3)
LYMPHOCYTES NFR BLD AUTO: 23 %
MCH RBC QN AUTO: 31.9 PG (ref 26.5–33)
MCHC RBC AUTO-ENTMCNC: 32.1 G/DL (ref 31.5–36.5)
MCV RBC AUTO: 99 FL (ref 78–100)
MONOCYTES # BLD AUTO: 1.1 10E3/UL (ref 0–1.3)
MONOCYTES NFR BLD AUTO: 8 %
NEUTROPHILS # BLD AUTO: 9.6 10E3/UL (ref 1.6–8.3)
NEUTROPHILS NFR BLD AUTO: 66 %
NRBC # BLD AUTO: 0 10E3/UL
NRBC BLD AUTO-RTO: 0 /100
PLATELET # BLD AUTO: 191 10E3/UL (ref 150–450)
RBC # BLD AUTO: 4.52 10E6/UL (ref 3.8–5.2)
WBC # BLD AUTO: 14.3 10E3/UL (ref 4–11)

## 2021-09-03 PROCEDURE — 96372 THER/PROPH/DIAG INJ SC/IM: CPT | Performed by: INTERNAL MEDICINE

## 2021-09-03 PROCEDURE — 250N000011 HC RX IP 250 OP 636: Performed by: INTERNAL MEDICINE

## 2021-09-03 PROCEDURE — 85004 AUTOMATED DIFF WBC COUNT: CPT | Performed by: INTERNAL MEDICINE

## 2021-09-03 PROCEDURE — 36415 COLL VENOUS BLD VENIPUNCTURE: CPT | Performed by: INTERNAL MEDICINE

## 2021-09-03 RX ADMIN — ROMIPLOSTIM 55 MCG: 250 INJECTION, POWDER, LYOPHILIZED, FOR SOLUTION SUBCUTANEOUS at 13:21

## 2021-09-03 ASSESSMENT — PAIN SCALES - GENERAL: PAINLEVEL: NO PAIN (0)

## 2021-09-03 NOTE — PROGRESS NOTES
Ora came to chemo infusion this afternoon following peripheral lab draw for her next dose of Nplate.  VSS.  PT assessed and is feeling well. No signs of bleeding.  Lab results noted.  Nplate given as ordered and was well tolerated.  Site was covered with a bandaid.  Ora left clinic ambulatory to go to [facility] .

## 2021-09-07 ENCOUNTER — OFFICE VISIT (OUTPATIENT)
Dept: INTERNAL MEDICINE | Facility: CLINIC | Age: 86
End: 2021-09-07
Payer: COMMERCIAL

## 2021-09-07 VITALS
HEIGHT: 63 IN | HEART RATE: 68 BPM | DIASTOLIC BLOOD PRESSURE: 74 MMHG | WEIGHT: 119 LBS | OXYGEN SATURATION: 96 % | SYSTOLIC BLOOD PRESSURE: 132 MMHG | BODY MASS INDEX: 21.09 KG/M2

## 2021-09-07 DIAGNOSIS — R53.83 FATIGUE, UNSPECIFIED TYPE: ICD-10-CM

## 2021-09-07 DIAGNOSIS — G30.1 LATE ONSET ALZHEIMER'S DISEASE WITHOUT BEHAVIORAL DISTURBANCE (H): ICD-10-CM

## 2021-09-07 DIAGNOSIS — E55.9 VITAMIN D DEFICIENCY: ICD-10-CM

## 2021-09-07 DIAGNOSIS — Z00.00 ENCOUNTER FOR ANNUAL WELLNESS EXAM IN MEDICARE PATIENT: Primary | ICD-10-CM

## 2021-09-07 DIAGNOSIS — F02.80 LATE ONSET ALZHEIMER'S DISEASE WITHOUT BEHAVIORAL DISTURBANCE (H): ICD-10-CM

## 2021-09-07 PROBLEM — Z86.711 HISTORY OF PULMONARY EMBOLISM: Status: ACTIVE | Noted: 2020-07-08

## 2021-09-07 PROBLEM — Z53.09 CONTRAINDICATION TO ANTICOAGULATION THERAPY: Status: ACTIVE | Noted: 2017-06-05

## 2021-09-07 PROBLEM — G30.0 EARLY ONSET ALZHEIMER'S DEMENTIA WITHOUT BEHAVIORAL DISTURBANCE (H): Status: ACTIVE | Noted: 2020-07-06

## 2021-09-07 PROBLEM — E78.5 HLD (HYPERLIPIDEMIA): Status: ACTIVE | Noted: 2018-08-06

## 2021-09-07 PROBLEM — M94.9 DISORDER OF BONE AND CARTILAGE: Status: ACTIVE | Noted: 2021-09-07

## 2021-09-07 PROBLEM — M89.9 DISORDER OF BONE AND CARTILAGE: Status: ACTIVE | Noted: 2021-09-07

## 2021-09-07 LAB — TSH SERPL DL<=0.005 MIU/L-ACNC: 0.95 UIU/ML (ref 0.3–5)

## 2021-09-07 PROCEDURE — 84443 ASSAY THYROID STIM HORMONE: CPT | Performed by: NURSE PRACTITIONER

## 2021-09-07 PROCEDURE — 99213 OFFICE O/P EST LOW 20 MIN: CPT | Mod: 25 | Performed by: NURSE PRACTITIONER

## 2021-09-07 PROCEDURE — 82607 VITAMIN B-12: CPT | Performed by: NURSE PRACTITIONER

## 2021-09-07 PROCEDURE — 99397 PER PM REEVAL EST PAT 65+ YR: CPT | Performed by: NURSE PRACTITIONER

## 2021-09-07 PROCEDURE — 36415 COLL VENOUS BLD VENIPUNCTURE: CPT | Performed by: NURSE PRACTITIONER

## 2021-09-07 PROCEDURE — 82306 VITAMIN D 25 HYDROXY: CPT | Performed by: NURSE PRACTITIONER

## 2021-09-07 ASSESSMENT — MIFFLIN-ST. JEOR: SCORE: 935.97

## 2021-09-07 ASSESSMENT — PATIENT HEALTH QUESTIONNAIRE - PHQ9
SUM OF ALL RESPONSES TO PHQ QUESTIONS 1-9: 4
SUM OF ALL RESPONSES TO PHQ QUESTIONS 1-9: 4
10. IF YOU CHECKED OFF ANY PROBLEMS, HOW DIFFICULT HAVE THESE PROBLEMS MADE IT FOR YOU TO DO YOUR WORK, TAKE CARE OF THINGS AT HOME, OR GET ALONG WITH OTHER PEOPLE: NOT DIFFICULT AT ALL

## 2021-09-07 ASSESSMENT — ACTIVITIES OF DAILY LIVING (ADL): CURRENT_FUNCTION: NO ASSISTANCE NEEDED

## 2021-09-07 NOTE — LETTER
September 8, 2021      Ora Gonzalez  [address] [place] [id number]        Dear ,    We are writing to inform you of your test results.    Your vitamin B12 and TSH are normal.     Vitamin D is low, some people feel more tired with low vitamin D levels. I recommend a vitamin D supplement of 2000 international unit(s) daily.     Resulted Orders   Vitamin B12   Result Value Ref Range    Vitamin B12 228 213 - 816 pg/mL   Vitamin D Deficiency   Result Value Ref Range    Vitamin D, Total (25-Hydroxy) 23 (L) 30 - 80 ug/L    Narrative    Deficiency <10.0 ug/L  Insufficiency 10.0-29.9 ug/L  Sufficiency 30.0-80.0 ug/L  Toxicity (possible) >100.0 ug/L    TSH with free T4 reflex   Result Value Ref Range    TSH 0.95 0.30 - 5.00 uIU/mL       If you have any questions or concerns, please call the clinic at the number listed above.       Sincerely,      Ryanne Corbett NP

## 2021-09-07 NOTE — ASSESSMENT & PLAN NOTE
Followed by neurology. Continue Aricept   Still manages well in an independent living facility with help from her daughter, Myrna, and son, Jensen

## 2021-09-07 NOTE — PATIENT INSTRUCTIONS
You could try melatonin to help with sleep, 3-9 mg about 1-1.5 hours before bedtime     Okay to schedule a dentist appointment       Patient Education   Personalized Prevention Plan  You are due for the preventive services outlined below.  Your care team is available to assist you in scheduling these services.  If you have already completed any of these items, please share that information with your care team to update in your medical record.  Health Maintenance Due   Topic Date Due     ANNUAL REVIEW OF HM ORDERS  Never done     Flu Vaccine (1) 09/01/2021       Signs of Hearing Loss      Hearing much better with one ear can be a sign of hearing loss.   Hearing loss is a problem shared by many people. In fact, it is one of the most common health problems, particularly as people age. Most people age 65 and older have some hearing loss. By age 80, almost everyone does. Hearing loss often occurs slowly over the years. So you may not realize your hearing has gotten worse.  Have your hearing checked  Call your healthcare provider if you:    Have to strain to hear normal conversation    Have to watch other people s faces very carefully to follow what they re saying    Need to ask people to repeat what they ve said    Often misunderstand what people are saying    Turn the volume of the television or radio up so high that others complain    Feel that people are mumbling when they re talking to you    Find that the effort to hear leaves you feeling tired and irritated    Notice, when using the phone, that you hear better with one ear than the other  [employer] last reviewed this educational content on 1/1/2020 2000-2021 [employer]. All rights reserved. This information is not intended as a substitute for professional medical care. Always follow your healthcare professional's instructions.

## 2021-09-07 NOTE — PROGRESS NOTES
"SUBJECTIVE:   Ora Gonzalez is a 87 year old female who presents for Preventive Visit.      Patient has been advised of split billing requirements and indicates understanding: Yes   Are you in the first 12 months of your Medicare coverage?  No    Healthy Habits:     In general, how would you rate your overall health?  Good    Frequency of exercise:  6-7 days/week    Duration of exercise:  Greater than 60 minutes    Do you usually eat at least 4 servings of fruit and vegetables a day, include whole grains    & fiber and avoid regularly eating high fat or \"junk\" foods?  Yes    Taking medications regularly:  Yes    Medication side effects:  None    Ability to successfully perform activities of daily living:  No assistance needed    Home Safety:  No safety concerns identified    Hearing Impairment:  Difficulty understanding soft or whispered speech    In the past 6 months, have you been bothered by leaking of urine?  No    In general, how would you rate your overall mental or emotional health?  Good      PHQ-2 Total Score: 3    Additional concerns today:  Yes    Do you feel safe in your environment? Yes      Have you ever done Advance Care Planning? (For example, a Health Directive, POLST, or a discussion with a medical provider or your loved ones about your wishes): Yes, advance care planning is on file.       Fall risk  Fallen 2 or more times in the past year?: No  Any fall with injury in the past year?: No    Cognitive Screening Not appropriate due to known dementia    Do you have sleep apnea, excessive snoring or daytime drowsiness?: yes    Reviewed and updated as needed this visit by clinical staff  Tobacco  Allergies  Meds  Problems  Med Hx  Surg Hx  Fam Hx          Reviewed and updated as needed this visit by Provider  Tobacco  Allergies  Meds  Problems  Med Hx  Surg Hx  Fam Hx         Social History     Tobacco Use     Smoking status: Former Smoker     Types: Cigarettes     Smokeless tobacco: Never " "Used   Substance Use Topics     Alcohol use: Not Currently       Alcohol Use 9/7/2021   Prescreen: >3 drinks/day or >7 drinks/week? No           Current providers sharing in care for this patient include:   Patient Care Team:  Ryanne Corbett NP as PCP - General  Vinny Garcia MD as MD (Hematology & Oncology)  Herb Dunn MD as MD (Neurology)  Herb Dunn MD as Assigned Neuroscience Provider  Vinny Garcia MD as Assigned Cancer Care Provider  Ryanne Corbett NP as Assigned PCP  Lilibeth Aguayo RN as Specialty Care Coordinator (Hematology & Oncology)    The following health maintenance items are reviewed in Epic and correct as of today:  Health Maintenance Due   Topic Date Due     ANNUAL REVIEW OF  ORDERS  Never done     INFLUENZA VACCINE (1) 09/01/2021     She is here with her daughter, Myrna. Myrna expresses concern that Ora reports she is tired frequently. She awakens frequently, sometimes she gets a snack then she goes back to bed. She sometimes naps during the week, maybe once per week. Her daughter reports that she snores loudly. She awakens feeling tired. Ora doesn't think the fatigue is an issue. She walks about 3.5 miles daily and does not have SOB or fatigue associated with exercise.     She is followed by hematology for ITP. Her platelet count is normal, no anemia.       OBJECTIVE:   /74   Pulse 68   Ht 1.588 m (5' 2.5\")   Wt 54 kg (119 lb)   SpO2 96%   BMI 21.42 kg/m   Estimated body mass index is 21.42 kg/m  as calculated from the following:    Height as of this encounter: 1.588 m (5' 2.5\").    Weight as of this encounter: 54 kg (119 lb).     Wt Readings from Last 5 Encounters:   09/07/21 54 kg (119 lb)   09/03/21 55.2 kg (121 lb 11.2 oz)   07/23/21 55.5 kg (122 lb 4.8 oz)   07/01/21 55.2 kg (121 lb 11.2 oz)   06/10/21 55.4 kg (122 lb 3.2 oz)         Physical Exam  Gen: Well developed, well nourished, no acute distress.  Lungs: clear bilaterally  Heart: regular rate and rhythm, no " "murmurs/gallops/rubs. No edema         ASSESSMENT / PLAN:     Problem List Items Addressed This Visit        Nervous and Auditory    Late onset Alzheimer disease (H)     Followed by neurology. Continue Aricept   Still manages well in an independent living facility with help from her daughter, Myrna, and son, Jensen            Other Visit Diagnoses     Encounter for annual wellness exam in Medicare patient    -  Primary    Fatigue, unspecified type        nonspecific symptom. No SOB/chest pain/decreased stamina. +loud snoring. She declines a sleep eval. Will check labs for organic cause. If negative, advised melatonin to help with sleep initiation/maintenance. She should follow up if this symptom worsens     Relevant Orders    Vitamin B12    TSH with free T4 reflex    Vitamin D deficiency        Relevant Orders    Vitamin D Deficiency            COUNSELING:  Reviewed preventive health counseling, as reflected in patient instructions    Estimated body mass index is 21.42 kg/m  as calculated from the following:    Height as of this encounter: 1.588 m (5' 2.5\").    Weight as of this encounter: 54 kg (119 lb).        She reports that she has quit smoking. Her smoking use included cigarettes. She has never used smokeless tobacco.      Appropriate preventive services were discussed with this patient, including applicable screening as appropriate for cardiovascular disease, diabetes, osteopenia/osteoporosis, and glaucoma.  As appropriate for age/gender, discussed screening for colorectal cancer, prostate cancer, breast cancer, and cervical cancer. Checklist reviewing preventive services available has been given to the patient.    Reviewed patients plan of care and provided an AVS. The Basic Care Plan (routine screening as documented in Health Maintenance) for Ora meets the Care Plan requirement. This Care Plan has been established and reviewed with the Patient.    Counseling Resources:  ATP IV Guidelines  Pooled Cohorts " Equation Calculator  Breast Cancer Risk Calculator  Breast Cancer: Medication to Reduce Risk  FRAX Risk Assessment  ICSI Preventive Guidelines  Dietary Guidelines for Americans, 2010  [employer]'s MyPlate  ASA Prophylaxis  Lung CA Screening    Ryanne Corbett NP  [facility]    Identified Health Risks:  Answers for HPI/ROS submitted by the patient on 9/7/2021  If you checked off any problems, how difficult have these problems made it for you to do your work, take care of things at home, or get along with other people?: Not difficult at all  PHQ9 TOTAL SCORE: 4        The patient was provided with written information regarding signs of hearing loss.

## 2021-09-08 LAB
DEPRECATED CALCIDIOL+CALCIFEROL SERPL-MC: 23 UG/L (ref 30–80)
VIT B12 SERPL-MCNC: 228 PG/ML (ref 213–816)

## 2021-09-08 ASSESSMENT — PATIENT HEALTH QUESTIONNAIRE - PHQ9: SUM OF ALL RESPONSES TO PHQ QUESTIONS 1-9: 4

## 2021-09-10 ENCOUNTER — INFUSION THERAPY VISIT (OUTPATIENT)
Dept: INFUSION THERAPY | Facility: HOSPITAL | Age: 86
End: 2021-09-10
Attending: INTERNAL MEDICINE
Payer: COMMERCIAL

## 2021-09-10 VITALS
HEART RATE: 64 BPM | OXYGEN SATURATION: 98 % | RESPIRATION RATE: 16 BRPM | DIASTOLIC BLOOD PRESSURE: 58 MMHG | TEMPERATURE: 98.8 F | SYSTOLIC BLOOD PRESSURE: 119 MMHG

## 2021-09-10 DIAGNOSIS — D69.3 IMMUNE THROMBOCYTOPENIC PURPURA (H): Primary | ICD-10-CM

## 2021-09-10 PROCEDURE — 96372 THER/PROPH/DIAG INJ SC/IM: CPT | Performed by: INTERNAL MEDICINE

## 2021-09-10 PROCEDURE — 250N000011 HC RX IP 250 OP 636: Performed by: INTERNAL MEDICINE

## 2021-09-10 RX ADMIN — ROMIPLOSTIM 55 MCG: 250 INJECTION, POWDER, LYOPHILIZED, FOR SOLUTION SUBCUTANEOUS at 13:20

## 2021-09-10 NOTE — PROGRESS NOTES
PT here ambulatory for n plate injection. Injection reviewed and administered in right upper arm. Bandaid to site. Follow up reviewed and pt dc'd steady gait.

## 2021-09-17 ENCOUNTER — INFUSION THERAPY VISIT (OUTPATIENT)
Dept: INFUSION THERAPY | Facility: HOSPITAL | Age: 86
End: 2021-09-17
Attending: INTERNAL MEDICINE
Payer: COMMERCIAL

## 2021-09-17 VITALS
TEMPERATURE: 98.7 F | DIASTOLIC BLOOD PRESSURE: 54 MMHG | OXYGEN SATURATION: 98 % | RESPIRATION RATE: 16 BRPM | SYSTOLIC BLOOD PRESSURE: 115 MMHG | HEART RATE: 63 BPM

## 2021-09-17 DIAGNOSIS — D69.3 IMMUNE THROMBOCYTOPENIC PURPURA (H): Primary | ICD-10-CM

## 2021-09-17 PROCEDURE — 96372 THER/PROPH/DIAG INJ SC/IM: CPT | Performed by: INTERNAL MEDICINE

## 2021-09-17 PROCEDURE — 250N000011 HC RX IP 250 OP 636: Performed by: INTERNAL MEDICINE

## 2021-09-17 RX ADMIN — ROMIPLOSTIM 55 MCG: 250 INJECTION, POWDER, LYOPHILIZED, FOR SOLUTION SUBCUTANEOUS at 13:21

## 2021-09-24 ENCOUNTER — LAB (OUTPATIENT)
Dept: INFUSION THERAPY | Facility: HOSPITAL | Age: 86
End: 2021-09-24
Attending: INTERNAL MEDICINE
Payer: COMMERCIAL

## 2021-09-24 VITALS
SYSTOLIC BLOOD PRESSURE: 135 MMHG | RESPIRATION RATE: 16 BRPM | OXYGEN SATURATION: 95 % | HEART RATE: 67 BPM | DIASTOLIC BLOOD PRESSURE: 65 MMHG

## 2021-09-24 DIAGNOSIS — D69.3 IMMUNE THROMBOCYTOPENIC PURPURA (H): Primary | ICD-10-CM

## 2021-09-24 LAB
BASOPHILS # BLD AUTO: 0.1 10E3/UL (ref 0–0.2)
BASOPHILS NFR BLD AUTO: 1 %
EOSINOPHIL # BLD AUTO: 0.2 10E3/UL (ref 0–0.7)
EOSINOPHIL NFR BLD AUTO: 1 %
ERYTHROCYTE [DISTWIDTH] IN BLOOD BY AUTOMATED COUNT: 14.9 % (ref 10–15)
HCT VFR BLD AUTO: 46.7 % (ref 35–47)
HGB BLD-MCNC: 14.8 G/DL (ref 11.7–15.7)
IMM GRANULOCYTES # BLD: 0.2 10E3/UL
IMM GRANULOCYTES NFR BLD: 1 %
LYMPHOCYTES # BLD AUTO: 4.9 10E3/UL (ref 0.8–5.3)
LYMPHOCYTES NFR BLD AUTO: 32 %
MCH RBC QN AUTO: 31.6 PG (ref 26.5–33)
MCHC RBC AUTO-ENTMCNC: 31.7 G/DL (ref 31.5–36.5)
MCV RBC AUTO: 100 FL (ref 78–100)
MONOCYTES # BLD AUTO: 1.2 10E3/UL (ref 0–1.3)
MONOCYTES NFR BLD AUTO: 8 %
NEUTROPHILS # BLD AUTO: 9.2 10E3/UL (ref 1.6–8.3)
NEUTROPHILS NFR BLD AUTO: 57 %
NRBC # BLD AUTO: 0 10E3/UL
NRBC BLD AUTO-RTO: 0 /100
PLATELET # BLD AUTO: 282 10E3/UL (ref 150–450)
RBC # BLD AUTO: 4.68 10E6/UL (ref 3.8–5.2)
WBC # BLD AUTO: 15.5 10E3/UL (ref 4–11)

## 2021-09-24 PROCEDURE — 250N000011 HC RX IP 250 OP 636: Performed by: INTERNAL MEDICINE

## 2021-09-24 PROCEDURE — 85004 AUTOMATED DIFF WBC COUNT: CPT | Performed by: INTERNAL MEDICINE

## 2021-09-24 PROCEDURE — 36415 COLL VENOUS BLD VENIPUNCTURE: CPT | Performed by: INTERNAL MEDICINE

## 2021-09-24 PROCEDURE — 96372 THER/PROPH/DIAG INJ SC/IM: CPT | Performed by: INTERNAL MEDICINE

## 2021-09-24 RX ADMIN — ROMIPLOSTIM 55 MCG: 250 INJECTION, POWDER, LYOPHILIZED, FOR SOLUTION SUBCUTANEOUS at 14:29

## 2021-09-24 NOTE — PROGRESS NOTES
Infusion Nursing Note:  Ora Gonzalez presents today for Nplate injection.    Patient seen by provider today: No   present during visit today: Not Applicable.    Note: /65 (BP Location: Left arm, Patient Position: Sitting)   Pulse 67   Resp 16   SpO2 95% .      Intravenous Access:  Labs drawn without difficulty.    Treatment Conditions:  Results reviewed, labs MET treatment parameters, ok to proceed with treatment.      Post Infusion Assessment:  Nplate injection given.  Patient tolerated injection without incident.       Discharge Plan:   Patient and/or family verbalized understanding of discharge instructions and all questions answered.  Patient discharged in stable condition accompanied by: self.  Departure Mode: Ambulatory.      Analy MCKEON RN

## 2021-10-01 ENCOUNTER — INFUSION THERAPY VISIT (OUTPATIENT)
Dept: INFUSION THERAPY | Facility: HOSPITAL | Age: 86
End: 2021-10-01
Attending: INTERNAL MEDICINE
Payer: COMMERCIAL

## 2021-10-01 VITALS
OXYGEN SATURATION: 98 % | DIASTOLIC BLOOD PRESSURE: 57 MMHG | HEART RATE: 55 BPM | SYSTOLIC BLOOD PRESSURE: 133 MMHG | RESPIRATION RATE: 16 BRPM

## 2021-10-01 DIAGNOSIS — D69.3 IMMUNE THROMBOCYTOPENIC PURPURA (H): Primary | ICD-10-CM

## 2021-10-01 PROCEDURE — 96372 THER/PROPH/DIAG INJ SC/IM: CPT | Performed by: INTERNAL MEDICINE

## 2021-10-01 PROCEDURE — 250N000011 HC RX IP 250 OP 636: Performed by: INTERNAL MEDICINE

## 2021-10-01 RX ADMIN — ROMIPLOSTIM 55 MCG: 250 INJECTION, POWDER, LYOPHILIZED, FOR SOLUTION SUBCUTANEOUS at 13:35

## 2021-10-05 DIAGNOSIS — D69.3 IMMUNE THROMBOCYTOPENIC PURPURA (H): Primary | ICD-10-CM

## 2021-10-08 ENCOUNTER — INFUSION THERAPY VISIT (OUTPATIENT)
Dept: INFUSION THERAPY | Facility: HOSPITAL | Age: 86
End: 2021-10-08
Attending: INTERNAL MEDICINE
Payer: COMMERCIAL

## 2021-10-08 VITALS
DIASTOLIC BLOOD PRESSURE: 76 MMHG | HEART RATE: 64 BPM | SYSTOLIC BLOOD PRESSURE: 147 MMHG | TEMPERATURE: 97.9 F | OXYGEN SATURATION: 97 % | RESPIRATION RATE: 18 BRPM

## 2021-10-08 DIAGNOSIS — Z23 NEED FOR PROPHYLACTIC VACCINATION AND INOCULATION AGAINST INFLUENZA: ICD-10-CM

## 2021-10-08 DIAGNOSIS — D69.3 IMMUNE THROMBOCYTOPENIC PURPURA (H): Primary | ICD-10-CM

## 2021-10-08 PROCEDURE — 250N000011 HC RX IP 250 OP 636: Performed by: NURSE PRACTITIONER

## 2021-10-08 PROCEDURE — 90662 IIV NO PRSV INCREASED AG IM: CPT

## 2021-10-08 PROCEDURE — 96372 THER/PROPH/DIAG INJ SC/IM: CPT | Performed by: NURSE PRACTITIONER

## 2021-10-08 PROCEDURE — G0008 ADMIN INFLUENZA VIRUS VAC: HCPCS

## 2021-10-08 RX ADMIN — ROMIPLOSTIM 55 MCG: 250 INJECTION, POWDER, LYOPHILIZED, FOR SOLUTION SUBCUTANEOUS at 13:47

## 2021-10-08 NOTE — PROGRESS NOTES
PT here ambulatory for Nplate inj. PT states doing well and no concerns. Platelets on 9/24 282,000. Reviewed inj with pt and administered in right upper arm/ bandaid to site. Flu vaccine administered in right deltoid and bandaid to site. PT observed and had no problems post injections.. Follow up reviewed and pt dc'd steady gait

## 2021-10-15 ENCOUNTER — INFUSION THERAPY VISIT (OUTPATIENT)
Dept: INFUSION THERAPY | Facility: HOSPITAL | Age: 86
End: 2021-10-15
Attending: INTERNAL MEDICINE
Payer: COMMERCIAL

## 2021-10-15 VITALS
RESPIRATION RATE: 16 BRPM | DIASTOLIC BLOOD PRESSURE: 68 MMHG | WEIGHT: 123.3 LBS | OXYGEN SATURATION: 96 % | HEART RATE: 62 BPM | BODY MASS INDEX: 22.19 KG/M2 | SYSTOLIC BLOOD PRESSURE: 171 MMHG

## 2021-10-15 DIAGNOSIS — D69.3 IMMUNE THROMBOCYTOPENIC PURPURA (H): Primary | ICD-10-CM

## 2021-10-15 PROCEDURE — 250N000011 HC RX IP 250 OP 636: Performed by: NURSE PRACTITIONER

## 2021-10-15 PROCEDURE — 96372 THER/PROPH/DIAG INJ SC/IM: CPT | Performed by: NURSE PRACTITIONER

## 2021-10-15 RX ADMIN — ROMIPLOSTIM 55 MCG: 250 INJECTION, POWDER, LYOPHILIZED, FOR SOLUTION SUBCUTANEOUS at 13:20

## 2021-10-22 ENCOUNTER — INFUSION THERAPY VISIT (OUTPATIENT)
Dept: INFUSION THERAPY | Facility: HOSPITAL | Age: 86
End: 2021-10-22
Attending: INTERNAL MEDICINE
Payer: COMMERCIAL

## 2021-10-22 VITALS
HEART RATE: 62 BPM | TEMPERATURE: 98.4 F | DIASTOLIC BLOOD PRESSURE: 65 MMHG | SYSTOLIC BLOOD PRESSURE: 143 MMHG | RESPIRATION RATE: 16 BRPM | OXYGEN SATURATION: 98 %

## 2021-10-22 DIAGNOSIS — D69.3 IMMUNE THROMBOCYTOPENIC PURPURA (H): Primary | ICD-10-CM

## 2021-10-22 LAB
BASOPHILS # BLD AUTO: 0.1 10E3/UL (ref 0–0.2)
BASOPHILS NFR BLD AUTO: 1 %
EOSINOPHIL # BLD AUTO: 0.1 10E3/UL (ref 0–0.7)
EOSINOPHIL NFR BLD AUTO: 1 %
ERYTHROCYTE [DISTWIDTH] IN BLOOD BY AUTOMATED COUNT: 15.1 % (ref 10–15)
HCT VFR BLD AUTO: 43.9 % (ref 35–47)
HGB BLD-MCNC: 13.7 G/DL (ref 11.7–15.7)
IMM GRANULOCYTES # BLD: 0.1 10E3/UL
IMM GRANULOCYTES NFR BLD: 1 %
LYMPHOCYTES # BLD AUTO: 4.1 10E3/UL (ref 0.8–5.3)
LYMPHOCYTES NFR BLD AUTO: 29 %
MCH RBC QN AUTO: 31.2 PG (ref 26.5–33)
MCHC RBC AUTO-ENTMCNC: 31.2 G/DL (ref 31.5–36.5)
MCV RBC AUTO: 100 FL (ref 78–100)
MONOCYTES # BLD AUTO: 1.3 10E3/UL (ref 0–1.3)
MONOCYTES NFR BLD AUTO: 9 %
NEUTROPHILS # BLD AUTO: 8.2 10E3/UL (ref 1.6–8.3)
NEUTROPHILS NFR BLD AUTO: 59 %
NRBC # BLD AUTO: 0 10E3/UL
NRBC BLD AUTO-RTO: 0 /100
PLATELET # BLD AUTO: 239 10E3/UL (ref 150–450)
RBC # BLD AUTO: 4.39 10E6/UL (ref 3.8–5.2)
WBC # BLD AUTO: 13.8 10E3/UL (ref 4–11)

## 2021-10-22 PROCEDURE — 96372 THER/PROPH/DIAG INJ SC/IM: CPT | Performed by: NURSE PRACTITIONER

## 2021-10-22 PROCEDURE — 250N000011 HC RX IP 250 OP 636: Performed by: NURSE PRACTITIONER

## 2021-10-22 PROCEDURE — 36415 COLL VENOUS BLD VENIPUNCTURE: CPT

## 2021-10-22 PROCEDURE — 85025 COMPLETE CBC W/AUTO DIFF WBC: CPT

## 2021-10-22 RX ADMIN — ROMIPLOSTIM 55 MCG: 250 INJECTION, POWDER, LYOPHILIZED, FOR SOLUTION SUBCUTANEOUS at 13:17

## 2021-10-22 NOTE — PROGRESS NOTES
Pt here ambulatory for nplate injection. Injection reviewed and administered in right upper arm/bandaid to site. Follow up reviewed/ pt tolerated inj without any problems. Pt dc'd steady gait

## 2021-10-29 ENCOUNTER — INFUSION THERAPY VISIT (OUTPATIENT)
Dept: INFUSION THERAPY | Facility: HOSPITAL | Age: 86
End: 2021-10-29
Attending: INTERNAL MEDICINE
Payer: COMMERCIAL

## 2021-10-29 VITALS
DIASTOLIC BLOOD PRESSURE: 70 MMHG | RESPIRATION RATE: 16 BRPM | HEART RATE: 74 BPM | SYSTOLIC BLOOD PRESSURE: 150 MMHG | TEMPERATURE: 98.4 F | BODY MASS INDEX: 22.28 KG/M2 | WEIGHT: 123.8 LBS | OXYGEN SATURATION: 98 %

## 2021-10-29 DIAGNOSIS — D69.3 IMMUNE THROMBOCYTOPENIC PURPURA (H): Primary | ICD-10-CM

## 2021-10-29 PROCEDURE — 96372 THER/PROPH/DIAG INJ SC/IM: CPT | Performed by: NURSE PRACTITIONER

## 2021-10-29 PROCEDURE — 250N000011 HC RX IP 250 OP 636: Performed by: NURSE PRACTITIONER

## 2021-10-29 RX ADMIN — ROMIPLOSTIM 55 MCG: 250 INJECTION, POWDER, LYOPHILIZED, FOR SOLUTION SUBCUTANEOUS at 12:54

## 2021-10-29 ASSESSMENT — PAIN SCALES - GENERAL: PAINLEVEL: NO PAIN (0)

## 2021-10-29 NOTE — PROGRESS NOTES
Pt ambulates to infusion center for treatment.  Pt voices no new concerns today.  Nplate injection given as ordered without difficulty.  Pt left clinic stable to lobby.  Plan RTC as scheduled.

## 2021-11-05 ENCOUNTER — INFUSION THERAPY VISIT (OUTPATIENT)
Dept: INFUSION THERAPY | Facility: HOSPITAL | Age: 86
End: 2021-11-05
Attending: INTERNAL MEDICINE
Payer: COMMERCIAL

## 2021-11-05 VITALS
SYSTOLIC BLOOD PRESSURE: 140 MMHG | HEART RATE: 82 BPM | OXYGEN SATURATION: 95 % | DIASTOLIC BLOOD PRESSURE: 61 MMHG | RESPIRATION RATE: 16 BRPM | TEMPERATURE: 98.3 F

## 2021-11-05 DIAGNOSIS — D69.3 IMMUNE THROMBOCYTOPENIC PURPURA (H): Primary | ICD-10-CM

## 2021-11-05 PROCEDURE — 250N000011 HC RX IP 250 OP 636: Performed by: NURSE PRACTITIONER

## 2021-11-05 PROCEDURE — 96372 THER/PROPH/DIAG INJ SC/IM: CPT | Performed by: NURSE PRACTITIONER

## 2021-11-05 RX ADMIN — ROMIPLOSTIM 55 MCG: 250 INJECTION, POWDER, LYOPHILIZED, FOR SOLUTION SUBCUTANEOUS at 13:11

## 2021-11-05 NOTE — PROGRESS NOTES
Pt arrived ambulatory to clinic for subcutaneous NPlate injection.  Administered subcutaneous NPlate injection into TIRSTA per MD order.  Pt tolerated procedure well, no s/s of bleeding or swelling at site.  Pt verbalized understanding of plan of care and return to clinic.

## 2021-11-12 ENCOUNTER — INFUSION THERAPY VISIT (OUTPATIENT)
Dept: INFUSION THERAPY | Facility: HOSPITAL | Age: 86
End: 2021-11-12
Attending: INTERNAL MEDICINE
Payer: COMMERCIAL

## 2021-11-12 VITALS
HEART RATE: 65 BPM | DIASTOLIC BLOOD PRESSURE: 63 MMHG | SYSTOLIC BLOOD PRESSURE: 141 MMHG | TEMPERATURE: 98 F | RESPIRATION RATE: 18 BRPM | OXYGEN SATURATION: 95 %

## 2021-11-12 DIAGNOSIS — D69.3 IMMUNE THROMBOCYTOPENIC PURPURA (H): Primary | ICD-10-CM

## 2021-11-12 PROCEDURE — 96372 THER/PROPH/DIAG INJ SC/IM: CPT | Performed by: NURSE PRACTITIONER

## 2021-11-12 PROCEDURE — 250N000011 HC RX IP 250 OP 636: Performed by: NURSE PRACTITIONER

## 2021-11-12 RX ADMIN — ROMIPLOSTIM 55 MCG: 250 INJECTION, POWDER, LYOPHILIZED, FOR SOLUTION SUBCUTANEOUS at 13:00

## 2021-11-12 NOTE — PROGRESS NOTES
Infusion Nursing Note:  Ora Gonzalez presents today for Nplate injection.    Patient seen by provider today: No   present during visit today: Not Applicable.    Note: Pt arrived ambulatory to clinic for subcutaneous NPlate injection.  Administered subcutaneous NPlate injection into TRISTA per MD order.  Pt tolerated procedure well, no s/s of bleeding or swelling at site.  Pt verbalized understanding of plan of care and return to clinic. .      Intravenous Access:  No Intravenous access/labs at this visit.    Treatment Conditions:  Not Applicable.      Post Infusion Assessment:  Patient tolerated injection without incident.       Discharge Plan:   Patient discharged in stable condition accompanied by: self.  Departure Mode: Ambulatory.      CORNELIO HICKS RN

## 2021-11-19 ENCOUNTER — INFUSION THERAPY VISIT (OUTPATIENT)
Dept: INFUSION THERAPY | Facility: HOSPITAL | Age: 86
End: 2021-11-19
Attending: INTERNAL MEDICINE
Payer: COMMERCIAL

## 2021-11-19 VITALS
WEIGHT: 124.3 LBS | DIASTOLIC BLOOD PRESSURE: 69 MMHG | SYSTOLIC BLOOD PRESSURE: 153 MMHG | RESPIRATION RATE: 16 BRPM | TEMPERATURE: 98.4 F | OXYGEN SATURATION: 96 % | HEART RATE: 67 BPM | BODY MASS INDEX: 22.37 KG/M2

## 2021-11-19 DIAGNOSIS — D69.3 IMMUNE THROMBOCYTOPENIC PURPURA (H): Primary | ICD-10-CM

## 2021-11-19 LAB
BASOPHILS # BLD AUTO: 0.2 10E3/UL (ref 0–0.2)
BASOPHILS NFR BLD AUTO: 1 %
EOSINOPHIL # BLD AUTO: 0.2 10E3/UL (ref 0–0.7)
EOSINOPHIL NFR BLD AUTO: 2 %
ERYTHROCYTE [DISTWIDTH] IN BLOOD BY AUTOMATED COUNT: 14.8 % (ref 10–15)
HCT VFR BLD AUTO: 45.1 % (ref 35–47)
HGB BLD-MCNC: 14.6 G/DL (ref 11.7–15.7)
IMM GRANULOCYTES # BLD: 0.1 10E3/UL
IMM GRANULOCYTES NFR BLD: 1 %
LYMPHOCYTES # BLD AUTO: 4.5 10E3/UL (ref 0.8–5.3)
LYMPHOCYTES NFR BLD AUTO: 29 %
MCH RBC QN AUTO: 31.9 PG (ref 26.5–33)
MCHC RBC AUTO-ENTMCNC: 32.4 G/DL (ref 31.5–36.5)
MCV RBC AUTO: 99 FL (ref 78–100)
MONOCYTES # BLD AUTO: 1.3 10E3/UL (ref 0–1.3)
MONOCYTES NFR BLD AUTO: 9 %
NEUTROPHILS # BLD AUTO: 9.3 10E3/UL (ref 1.6–8.3)
NEUTROPHILS NFR BLD AUTO: 58 %
NRBC # BLD AUTO: 0 10E3/UL
NRBC BLD AUTO-RTO: 0 /100
PLATELET # BLD AUTO: 238 10E3/UL (ref 150–450)
RBC # BLD AUTO: 4.57 10E6/UL (ref 3.8–5.2)
WBC # BLD AUTO: 15.4 10E3/UL (ref 4–11)

## 2021-11-19 PROCEDURE — 85025 COMPLETE CBC W/AUTO DIFF WBC: CPT

## 2021-11-19 PROCEDURE — 96372 THER/PROPH/DIAG INJ SC/IM: CPT | Performed by: NURSE PRACTITIONER

## 2021-11-19 PROCEDURE — 36415 COLL VENOUS BLD VENIPUNCTURE: CPT

## 2021-11-19 PROCEDURE — 250N000011 HC RX IP 250 OP 636: Performed by: NURSE PRACTITIONER

## 2021-11-19 RX ADMIN — ROMIPLOSTIM 55 MCG: 250 INJECTION, POWDER, LYOPHILIZED, FOR SOLUTION SUBCUTANEOUS at 13:34

## 2021-11-19 ASSESSMENT — PAIN SCALES - GENERAL: PAINLEVEL: NO PAIN (0)

## 2021-11-19 NOTE — PROGRESS NOTES
Ora came to chemo infusion this afternoon following peripheral lab draw for her next dose of Nplate.  VSS.  PT assessed and is feeling well. No signs of bleeding.  Lab results noted.  Nplate given as ordered and was well tolerated.  Site was covered with a bandaid.  Ora left clinic ambulatory to go to [facility] for [employer] mobility . Platelet count today was 238.

## 2021-11-26 ENCOUNTER — INFUSION THERAPY VISIT (OUTPATIENT)
Dept: INFUSION THERAPY | Facility: HOSPITAL | Age: 86
End: 2021-11-26
Attending: INTERNAL MEDICINE
Payer: COMMERCIAL

## 2021-11-26 DIAGNOSIS — D69.3 IMMUNE THROMBOCYTOPENIC PURPURA (H): Primary | ICD-10-CM

## 2021-11-26 PROCEDURE — 96372 THER/PROPH/DIAG INJ SC/IM: CPT | Performed by: NURSE PRACTITIONER

## 2021-11-26 PROCEDURE — 250N000011 HC RX IP 250 OP 636: Performed by: NURSE PRACTITIONER

## 2021-11-26 RX ADMIN — ROMIPLOSTIM 55 MCG: 250 INJECTION, POWDER, LYOPHILIZED, FOR SOLUTION SUBCUTANEOUS at 13:21

## 2021-12-03 ENCOUNTER — INFUSION THERAPY VISIT (OUTPATIENT)
Dept: INFUSION THERAPY | Facility: HOSPITAL | Age: 86
End: 2021-12-03
Attending: INTERNAL MEDICINE
Payer: COMMERCIAL

## 2021-12-03 VITALS
RESPIRATION RATE: 16 BRPM | OXYGEN SATURATION: 98 % | SYSTOLIC BLOOD PRESSURE: 151 MMHG | DIASTOLIC BLOOD PRESSURE: 74 MMHG | TEMPERATURE: 98.4 F | HEART RATE: 62 BPM

## 2021-12-03 DIAGNOSIS — D69.3 IMMUNE THROMBOCYTOPENIC PURPURA (H): Primary | ICD-10-CM

## 2021-12-03 PROCEDURE — 96372 THER/PROPH/DIAG INJ SC/IM: CPT | Performed by: NURSE PRACTITIONER

## 2021-12-03 PROCEDURE — 250N000011 HC RX IP 250 OP 636: Performed by: NURSE PRACTITIONER

## 2021-12-03 RX ADMIN — ROMIPLOSTIM 55 MCG: 250 INJECTION, POWDER, LYOPHILIZED, FOR SOLUTION SUBCUTANEOUS at 14:03

## 2021-12-03 ASSESSMENT — PAIN SCALES - GENERAL: PAINLEVEL: NO PAIN (0)

## 2021-12-03 NOTE — PROGRESS NOTES
Ora came to chemo infusion this afternoon for her next dose of Nplate.  VSS.  PT assessed and is feeling well. No signs of bleeding. Nplate given as ordered and was well tolerated.  Site was covered with a bandaid.  Ora left clinic ambulatory to [facility] .

## 2021-12-06 NOTE — PROGRESS NOTES
12/06/21      To the Parent/Guardian of Cliff Grimes  [address] [place] [address]      Thank you for taking the time to speak with me. This letter is a review of my role as a care manager.     Care Management is a free, confidential service that helps your family better manage your child’s healthcare needs. When we spoke, I talked with you about your child’s healthcare needs.       As your child’s care manager, I work with you and Dr. Jl Fierro MD to help you get the most helpful treatments for your child’s health. My job is to:     • Help you understand your child’s care plan    • Teach you about your child’s treatment and/or choices   • Make sure you know where you can find the treatments your child may need   • Help your family in finding support to meet your child’s healthcare needs   • Keep your child’s doctor apprised of any concerns you may have   • Help you with maximizing your child’s health care benefits     If you have any questions or if I can help you in any way, please call me at     [phone]  8:00 AM to 5:00 PM [facility] (Monday-Friday)    If I’m not able to take your call, you can leave a private, confidential voicemail message and I will call you back within one business day. Please let me know the best day and time to reach you. We take our patient’s privacy very seriously.     During the care coordination process, I may talk to your child's doctors and your medical insurance about your benefits. You do have the right not to take part in care management by simply informing me at any time that you do not want my support.     I look forward to working with you on your child’s health and well-being.     Sincerely,    Ester Flor RN  Outpatient?Care?Manager?   [facility]   Pt ambulates to infusion center for lab and injection.  Lab results noted.  Nplate injection given as ordered.  Pt left clinic stable to [facility].  Plan RTC as scheduled.

## 2021-12-10 ENCOUNTER — INFUSION THERAPY VISIT (OUTPATIENT)
Dept: INFUSION THERAPY | Facility: HOSPITAL | Age: 86
End: 2021-12-10
Attending: INTERNAL MEDICINE
Payer: COMMERCIAL

## 2021-12-10 VITALS
HEART RATE: 71 BPM | TEMPERATURE: 98.1 F | SYSTOLIC BLOOD PRESSURE: 142 MMHG | OXYGEN SATURATION: 95 % | RESPIRATION RATE: 16 BRPM | DIASTOLIC BLOOD PRESSURE: 57 MMHG

## 2021-12-10 DIAGNOSIS — D69.3 IMMUNE THROMBOCYTOPENIC PURPURA (H): Primary | ICD-10-CM

## 2021-12-10 PROCEDURE — 96372 THER/PROPH/DIAG INJ SC/IM: CPT | Performed by: NURSE PRACTITIONER

## 2021-12-10 PROCEDURE — 250N000011 HC RX IP 250 OP 636: Performed by: NURSE PRACTITIONER

## 2021-12-10 RX ADMIN — ROMIPLOSTIM 55 MCG: 250 INJECTION, POWDER, LYOPHILIZED, FOR SOLUTION SUBCUTANEOUS at 13:16

## 2021-12-10 NOTE — PROGRESS NOTES
Pt here ambulatory for Nplate inj. PT states doing well and has no concerns. Nplate administered in right upper arm/bandaid to site. PT tolerated without any problems. Follow up reviewed and pt dc'd steady gait

## 2021-12-17 ENCOUNTER — INFUSION THERAPY VISIT (OUTPATIENT)
Dept: INFUSION THERAPY | Facility: HOSPITAL | Age: 86
End: 2021-12-17
Attending: INTERNAL MEDICINE
Payer: COMMERCIAL

## 2021-12-17 ENCOUNTER — ONCOLOGY VISIT (OUTPATIENT)
Dept: ONCOLOGY | Facility: HOSPITAL | Age: 86
End: 2021-12-17
Attending: INTERNAL MEDICINE
Payer: COMMERCIAL

## 2021-12-17 VITALS
DIASTOLIC BLOOD PRESSURE: 71 MMHG | TEMPERATURE: 98.8 F | SYSTOLIC BLOOD PRESSURE: 165 MMHG | WEIGHT: 125 LBS | HEIGHT: 63 IN | RESPIRATION RATE: 16 BRPM | OXYGEN SATURATION: 98 % | HEART RATE: 60 BPM | BODY MASS INDEX: 22.15 KG/M2

## 2021-12-17 DIAGNOSIS — D69.3 IMMUNE THROMBOCYTOPENIC PURPURA (H): ICD-10-CM

## 2021-12-17 DIAGNOSIS — D69.3 IDIOPATHIC THROMBOCYTOPENIC PURPURA (H): Primary | ICD-10-CM

## 2021-12-17 DIAGNOSIS — D69.3 IMMUNE THROMBOCYTOPENIC PURPURA (H): Primary | ICD-10-CM

## 2021-12-17 LAB
ERYTHROCYTE [DISTWIDTH] IN BLOOD BY AUTOMATED COUNT: 15 % (ref 10–15)
HCT VFR BLD AUTO: 44.5 % (ref 35–47)
HGB BLD-MCNC: 14.3 G/DL (ref 11.7–15.7)
MCH RBC QN AUTO: 31.9 PG (ref 26.5–33)
MCHC RBC AUTO-ENTMCNC: 32.1 G/DL (ref 31.5–36.5)
MCV RBC AUTO: 99 FL (ref 78–100)
PLATELET # BLD AUTO: 186 10E3/UL (ref 150–450)
RBC # BLD AUTO: 4.48 10E6/UL (ref 3.8–5.2)
WBC # BLD AUTO: 14 10E3/UL (ref 4–11)

## 2021-12-17 PROCEDURE — 36415 COLL VENOUS BLD VENIPUNCTURE: CPT

## 2021-12-17 PROCEDURE — 99213 OFFICE O/P EST LOW 20 MIN: CPT | Performed by: INTERNAL MEDICINE

## 2021-12-17 PROCEDURE — G0463 HOSPITAL OUTPT CLINIC VISIT: HCPCS | Mod: 25

## 2021-12-17 PROCEDURE — 96372 THER/PROPH/DIAG INJ SC/IM: CPT | Performed by: NURSE PRACTITIONER

## 2021-12-17 PROCEDURE — 85027 COMPLETE CBC AUTOMATED: CPT

## 2021-12-17 PROCEDURE — 250N000011 HC RX IP 250 OP 636: Performed by: NURSE PRACTITIONER

## 2021-12-17 RX ADMIN — ROMIPLOSTIM 55 MCG: 250 INJECTION, POWDER, LYOPHILIZED, FOR SOLUTION SUBCUTANEOUS at 14:41

## 2021-12-17 ASSESSMENT — PAIN SCALES - GENERAL: PAINLEVEL: NO PAIN (0)

## 2021-12-17 ASSESSMENT — MIFFLIN-ST. JEOR: SCORE: 963.51

## 2021-12-17 NOTE — LETTER
"    12/17/2021         RE: Ora Gonzalez  [address] [facility] [address]        Dear Colleague,    Thank you for referring your patient, Ora Gonzalez, to the [facility]. Please see a copy of my visit note below.    Oncology Rooming Note    December 17, 2021 1:25 PM   Ora Gonzalez is a 87 year old female who presents for:    Chief Complaint   Patient presents with     Hematology     Immune thrombocytopenic purpura, review labs     Initial Vitals: BP (!) 165/71 (BP Location: Left arm, Patient Position: Sitting, Cuff Size: Adult Regular)   Pulse 60   Temp 98.8  F (37.1  C) (Oral)   Resp 16   Ht 1.588 m (5' 2.52\")   Wt 56.7 kg (125 lb)   SpO2 98%   BMI 22.48 kg/m   Estimated body mass index is 22.48 kg/m  as calculated from the following:    Height as of this encounter: 1.588 m (5' 2.52\").    Weight as of this encounter: 56.7 kg (125 lb). Body surface area is 1.58 meters squared.  No Pain (0) Comment: Data Unavailable   No LMP recorded.  Allergies reviewed: Yes  Medications reviewed: Yes    Medications: Medication refills not needed today.  Pharmacy name entered into [employer]: [id number] [place] [id number]    Clinical concerns:  Immune thrombocytopenic purpura, review labs.       Cherelle Colón CMA                [facility] Hematology and Oncology Progress Note    Patient: Ora Gonzalez  MRN: [id number]  Date of Service: Dec 17, 2021        Assessment and Plan:    1. Immune thrombocytopenia: Platelet count continues to remain normal on her current dose of thromboplastin.  She is getting worn out from coming in every week.  She has been doing it for years.  Going to see if we can get eltrombopag covered for her at a dose of 100 mg daily.  If we can then we will initiate this and watch her platelet count.  If her platelet count rises then we will titrate down the Romiplostim with the plan of trying to stop it completely.  She will continue to " come in weekly.  We will see her back in clinic in 6 months.     2. Leukocytosis: Stable, mild, chronic. Follow.     ECOG Performance  0    Diagnosis:    1. 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 Bone marrow biopsy was unremarkable, November 2014. Thyroid functions were normal. Anticardiolipin antibodies were normal.     2. Right-sided pulmonary embolism: Provoked. Diagnosed February 8, 2016.    Treatment:    She started course of prednisone on November 14, 2014 and finished on January 5, 2015. She had a complete response. Quickly relapsed and treated with Rituxan weekly from March 12 through April 2, 2015. She responded with highest platelet count of 112.     She then relapsed in October, 2015. She was started on Promacta in October. She responded within 2-3 weeks. She then quickly lost response after dose reduction from 50 to 25mg. She was started back on Promacta 50 mg and prednisone 60 mg daily. She did not respond. She received 2 doses of IVIG on December 4 and 5, 2015. She responded briefly with a platelet count of 59 on December 7 but then quickly lost response by December 14. 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 She received 2 more doses of IVIG on December 16 and 17th with minimal response.   Splenectomy was performed on December 20, 2015. She did not respond.   We started Rituxan on December 29, 2015. Steroids were continued. Romiplostim was started on January 5, 2016. 1 dose of WinRho was given on January 6, 2016.  Platelet response noted on January 13.  Her last dose of Rituxan was on January 20th, 2016.     She was restarted on N-plate on July 28, 2016 for relapse.    Interim History:    Ora comes in today for a follow-up visit.  In general she has been feeling well.  No changes to her health since her last visit.  No bleeding or bruising.  She is wondering if there are any new oral medications or other oral options available for her.    Review of Systems:    As above in the  "history.     Review of Systems otherwise Negative for:  General: chills, fever or night sweats  Psychological: anxiety or depression  Ophthalmic: blurry vision, double vision or loss of vision, vision change  ENT: epistaxis, oral lesions, hearing changes  Hematological and Lymphatic: bleeding, bruising, jaundice, swollen lymph nodes  Endocrine: hot flashes, unexpected weight changes  Respiratory: cough, hemoptysis, orthopnea or shortness of breath/NOLEN  Cardiovascular: chest pain, edema, palpitations or PND  Gastrointestinal: abdominal pain, blood in stools, change in bowel habits, constipation, diarrhea or nausea/vomiting  Genito-Urinary: change in urinary stream, incontinence, frequency/urgency  Musculoskeletal: joint pain, stiffness, swelling, muscle pain  Neurological: dizziness, headaches, numbness/tingling  Dermatological: lumps and rash    Past History:    Past Medical History:   Diagnosis Date     Adjustment disorder with mixed anxiety and depressed mood 1/22/2016     Adjustment reaction to chronic stress 1/22/2016     Chronic kidney disease      Hypertension      ITP (idiopathic thrombocytopenic purpura)      Osteoporosis      Paroxysmal atrial fibrillation (H)      Pulmonary embolism (H) 2/8/2016     Thrombocytopenia (H)      Physical Exam:    BP (!) 165/71 (BP Location: Left arm, Patient Position: Sitting, Cuff Size: Adult Regular)   Pulse 60   Temp 98.8  F (37.1  C) (Oral)   Resp 16   Ht 1.588 m (5' 2.52\")   Wt 56.7 kg (125 lb)   SpO2 98%   BMI 22.48 kg/m      General: patient appears stated age of 87 year old. Nontoxic and in no distress.   HEENT: Head: atraumatic, normocephalic. Sclerae anicteric.  Chest:  Normal respiratory effort  Cardiac:  No edema.   Abdomen: abdomen is non-distended  Extremities: normal tone and muscle bulk.  Skin: no lesions or rash on visible skin. Warm and dry.   CNS: alert and oriented. Grossly non-focal.   Psychiatric: normal mood and affect.     Lab Results:    Recent " Results (from the past 168 hour(s))   CBC with platelets   Result Value Ref Range    WBC Count 14.0 (H) 4.0 - 11.0 10e3/uL    RBC Count 4.48 3.80 - 5.20 10e6/uL    Hemoglobin 14.3 11.7 - 15.7 g/dL    Hematocrit 44.5 35.0 - 47.0 %    MCV 99 78 - 100 fL    MCH 31.9 26.5 - 33.0 pg    MCHC 32.1 31.5 - 36.5 g/dL    RDW 15.0 10.0 - 15.0 %    Platelet Count 186 150 - 450 10e3/uL     Imaging:    No results found.      Signed by: Vinny Garcia MD        Again, thank you for allowing me to participate in the care of your patient.        Sincerely,        Vinny Garcia MD

## 2021-12-17 NOTE — PROGRESS NOTES
Infusion Nursing Note:  Ora Gonzalez presents today for NPlate injection.    Patient seen by provider today: Yes: Dr Garcia   present during visit today: Not Applicable.    Note: Patient arrives to chemotherapy area via ambulatory for her NPLate injection.  Patient is alert and oriented x 3 and aware of her treatment plan.  .      Intravenous Access:  No Intravenous access at this visit.    Treatment Conditions:  Not Applicable.      Post Infusion Assessment:  Injection given in the right upper arm subcutaneously.  Patient tolerated injection without incident.  Site patent and intact, free from redness, edema or discomfort.       Discharge Plan:   Discharge instructions reviewed with: Patient.  Patient and/or family verbalized understanding of discharge instructions and all questions answered.  AVS to patient via [employer].  Patient will return in 1 week for next appointment.   Patient discharged in stable condition accompanied by: self.  Departure Mode: Ambulatory.      JANUSZ JACOB RN

## 2021-12-17 NOTE — PROGRESS NOTES
"Oncology Rooming Note    December 17, 2021 1:25 PM   Ora Gonzalez is a 87 year old female who presents for:    Chief Complaint   Patient presents with     Hematology     Immune thrombocytopenic purpura, review labs     Initial Vitals: BP (!) 165/71 (BP Location: Left arm, Patient Position: Sitting, Cuff Size: Adult Regular)   Pulse 60   Temp 98.8  F (37.1  C) (Oral)   Resp 16   Ht 1.588 m (5' 2.52\")   Wt 56.7 kg (125 lb)   SpO2 98%   BMI 22.48 kg/m   Estimated body mass index is 22.48 kg/m  as calculated from the following:    Height as of this encounter: 1.588 m (5' 2.52\").    Weight as of this encounter: 56.7 kg (125 lb). Body surface area is 1.58 meters squared.  No Pain (0) Comment: Data Unavailable   No LMP recorded.  Allergies reviewed: Yes  Medications reviewed: Yes    Medications: Medication refills not needed today.  Pharmacy name entered into [address]: [id number] - [place] [id number]    Clinical concerns:  Immune thrombocytopenic purpura, review labs.       Cherelle Colón, AGATHA              "

## 2021-12-20 NOTE — PROGRESS NOTES
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  She is getting worn out from coming in every week.  She has been doing it for years.  Going to see if we can get eltrombopag covered for her at a dose of 100 mg daily.  If we can then we will initiate this and watch her platelet count.  If her platelet count rises then we will titrate down the Romiplostim with the plan of trying to stop it completely.  She will continue to come in weekly.  We will see her back in clinic in 6 months.     2. Leukocytosis: Stable, mild, chronic. Follow.     ECOG Performance  0    Diagnosis:    1. 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 Bone marrow biopsy was unremarkable, November 2014. Thyroid functions were normal. Anticardiolipin antibodies were normal.     2. Right-sided pulmonary embolism: Provoked. Diagnosed February 8, 2016.    Treatment:    She started course of prednisone on November 14, 2014 and finished on January 5, 2015. She had a complete response. Quickly relapsed and treated with Rituxan weekly from March 12 through April 2, 2015. She responded with highest platelet count of 112.     She then relapsed in October, 2015. She was started on Promacta in October. She responded within 2-3 weeks. She then quickly lost response after dose reduction from 50 to 25mg. She was started back on Promacta 50 mg and prednisone 60 mg daily. She did not respond. She received 2 doses of IVIG on December 4 and 5, 2015. She responded briefly with a platelet count of 59 on December 7 but then quickly lost response by December 14. 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 She received 2 more doses of IVIG on December 16 and 17th with minimal response.   Splenectomy was performed on December 20, 2015. She did not  respond.   We started Rituxan on December 29, 2015. Steroids were continued. Romiplostim was started on January 5, 2016. 1 dose of WinRho was given on January 6, 2016.  Platelet response noted on January 13.  Her last dose of Rituxan was on January 20th, 2016.     She was restarted on N-plate on July 28, 2016 for relapse.    Interim History:    Ora comes in today for a follow-up visit.  In general she has been feeling well.  No changes to her health since her last visit.  No bleeding or bruising.  She is wondering if there are any new oral medications or other oral options available for her.    Review of Systems:    As above in the history.     Review of Systems otherwise Negative for:  General: chills, fever or night sweats  Psychological: anxiety or depression  Ophthalmic: blurry vision, double vision or loss of vision, vision change  ENT: epistaxis, oral lesions, hearing changes  Hematological and Lymphatic: bleeding, bruising, jaundice, swollen lymph nodes  Endocrine: hot flashes, unexpected weight changes  Respiratory: cough, hemoptysis, orthopnea or shortness of breath/NOLEN  Cardiovascular: chest pain, edema, palpitations or PND  Gastrointestinal: abdominal pain, blood in stools, change in bowel habits, constipation, diarrhea or nausea/vomiting  Genito-Urinary: change in urinary stream, incontinence, frequency/urgency  Musculoskeletal: joint pain, stiffness, swelling, muscle pain  Neurological: dizziness, headaches, numbness/tingling  Dermatological: lumps and rash    Past History:    Past Medical History:   Diagnosis Date     Adjustment disorder with mixed anxiety and depressed mood 1/22/2016     Adjustment reaction to chronic stress 1/22/2016     Chronic kidney disease      Hypertension      ITP (idiopathic thrombocytopenic purpura)      Osteoporosis      Paroxysmal atrial fibrillation (H)      Pulmonary embolism (H) 2/8/2016     Thrombocytopenia (H)      Physical Exam:    BP (!) 165/71 (BP Location: Left  "arm, Patient Position: Sitting, Cuff Size: Adult Regular)   Pulse 60   Temp 98.8  F (37.1  C) (Oral)   Resp 16   Ht 1.588 m (5' 2.52\")   Wt 56.7 kg (125 lb)   SpO2 98%   BMI 22.48 kg/m      General: patient appears stated age of 87 year old. Nontoxic and in no distress.   HEENT: Head: atraumatic, normocephalic. Sclerae anicteric.  Chest:  Normal respiratory effort  Cardiac:  No edema.   Abdomen: abdomen is non-distended  Extremities: normal tone and muscle bulk.  Skin: no lesions or rash on visible skin. Warm and dry.   CNS: alert and oriented. Grossly non-focal.   Psychiatric: normal mood and affect.     Lab Results:    Recent Results (from the past 168 hour(s))   CBC with platelets   Result Value Ref Range    WBC Count 14.0 (H) 4.0 - 11.0 10e3/uL    RBC Count 4.48 3.80 - 5.20 10e6/uL    Hemoglobin 14.3 11.7 - 15.7 g/dL    Hematocrit 44.5 35.0 - 47.0 %    MCV 99 78 - 100 fL    MCH 31.9 26.5 - 33.0 pg    MCHC 32.1 31.5 - 36.5 g/dL    RDW 15.0 10.0 - 15.0 %    Platelet Count 186 150 - 450 10e3/uL     Imaging:    No results found.      Signed by: Vinny Garcia MD    "

## 2021-12-24 ENCOUNTER — INFUSION THERAPY VISIT (OUTPATIENT)
Dept: INFUSION THERAPY | Facility: HOSPITAL | Age: 86
End: 2021-12-24
Attending: INTERNAL MEDICINE
Payer: COMMERCIAL

## 2021-12-24 VITALS
SYSTOLIC BLOOD PRESSURE: 142 MMHG | HEART RATE: 63 BPM | RESPIRATION RATE: 16 BRPM | TEMPERATURE: 98.7 F | DIASTOLIC BLOOD PRESSURE: 64 MMHG | OXYGEN SATURATION: 97 %

## 2021-12-24 DIAGNOSIS — D69.3 IMMUNE THROMBOCYTOPENIC PURPURA (H): Primary | ICD-10-CM

## 2021-12-24 PROCEDURE — 96372 THER/PROPH/DIAG INJ SC/IM: CPT | Performed by: NURSE PRACTITIONER

## 2021-12-24 PROCEDURE — 250N000011 HC RX IP 250 OP 636: Performed by: NURSE PRACTITIONER

## 2021-12-24 RX ADMIN — ROMIPLOSTIM 55 MCG: 250 INJECTION, POWDER, LYOPHILIZED, FOR SOLUTION SUBCUTANEOUS at 12:49

## 2021-12-24 ASSESSMENT — PAIN SCALES - GENERAL: PAINLEVEL: NO PAIN (0)

## 2021-12-24 NOTE — PROGRESS NOTES
Ora came to chemo infusion this afternoon for her next dose of Nplate.  VSS.  She was assessed and is feeling well. No signs of bleeding. Nplate given as ordered and was well tolerated.  Site was covered with a bandaid.  Ora left clinic ambulatory [facility] .

## 2021-12-26 DIAGNOSIS — D69.3 IMMUNE THROMBOCYTOPENIC PURPURA (H): Primary | ICD-10-CM

## 2021-12-27 ENCOUNTER — TELEPHONE (OUTPATIENT)
Dept: ONCOLOGY | Facility: HOSPITAL | Age: 86
End: 2021-12-27
Payer: COMMERCIAL

## 2021-12-27 NOTE — TELEPHONE ENCOUNTER
I called Ora and she hung up on me,I think she was confused. So I called her son and left him a voicemail to call me back -- her promacta (new script) is very expensive and I would like to help her find copay assistance. Looks like she gave a verbal ok to call her son.    Thank You!  Denisse Christensen [facility]  Oral Oncology Pharmacy [facility]   ([facility], and [facility])  Phone# [phone]  Fax# [phone]

## 2021-12-27 NOTE — TELEPHONE ENCOUNTER
Prior Authorization Approval    Authorization Effective Date:  11/27/2021  Authorization Expiration Date:  12/26/24  Medication: promacta  Approved Dose/Quantity: 60 for 30 days  Reference #:     Insurance Company:  [employer]  Expected CoPay:   [id number]    CoPay Card Available:      Foundation Assistance Needed:  n/a  Which Pharmacy is filling the prescription (Not needed for infusion/clinic administered):n/a    Pharmacy Notified:  yes  Patient Notified:  Yes

## 2021-12-30 ENCOUNTER — INFUSION THERAPY VISIT (OUTPATIENT)
Dept: INFUSION THERAPY | Facility: HOSPITAL | Age: 86
End: 2021-12-30
Attending: INTERNAL MEDICINE
Payer: COMMERCIAL

## 2021-12-30 VITALS
DIASTOLIC BLOOD PRESSURE: 63 MMHG | OXYGEN SATURATION: 97 % | TEMPERATURE: 98 F | RESPIRATION RATE: 18 BRPM | HEART RATE: 66 BPM | SYSTOLIC BLOOD PRESSURE: 157 MMHG

## 2021-12-30 DIAGNOSIS — D69.3 IMMUNE THROMBOCYTOPENIC PURPURA (H): Primary | ICD-10-CM

## 2021-12-30 PROCEDURE — 96372 THER/PROPH/DIAG INJ SC/IM: CPT | Performed by: NURSE PRACTITIONER

## 2021-12-30 PROCEDURE — 250N000011 HC RX IP 250 OP 636: Performed by: NURSE PRACTITIONER

## 2021-12-30 RX ADMIN — ROMIPLOSTIM 55 MCG: 250 INJECTION, POWDER, LYOPHILIZED, FOR SOLUTION SUBCUTANEOUS at 13:38

## 2021-12-30 ASSESSMENT — PAIN SCALES - GENERAL: PAINLEVEL: NO PAIN (0)

## 2022-01-07 ENCOUNTER — INFUSION THERAPY VISIT (OUTPATIENT)
Dept: INFUSION THERAPY | Facility: HOSPITAL | Age: 87
End: 2022-01-07
Attending: INTERNAL MEDICINE
Payer: COMMERCIAL

## 2022-01-07 VITALS
OXYGEN SATURATION: 95 % | DIASTOLIC BLOOD PRESSURE: 66 MMHG | RESPIRATION RATE: 16 BRPM | HEART RATE: 68 BPM | TEMPERATURE: 98.4 F | SYSTOLIC BLOOD PRESSURE: 153 MMHG

## 2022-01-07 DIAGNOSIS — D69.3 IMMUNE THROMBOCYTOPENIC PURPURA (H): Primary | ICD-10-CM

## 2022-01-07 PROCEDURE — 250N000011 HC RX IP 250 OP 636: Performed by: NURSE PRACTITIONER

## 2022-01-07 PROCEDURE — 96372 THER/PROPH/DIAG INJ SC/IM: CPT | Performed by: NURSE PRACTITIONER

## 2022-01-07 RX ADMIN — ROMIPLOSTIM 55 MCG: 250 INJECTION, POWDER, LYOPHILIZED, FOR SOLUTION SUBCUTANEOUS at 13:14

## 2022-01-07 NOTE — PROGRESS NOTES
Infusion Nursing Note:  Ora Gonzalez presents today for her next dose of Romiplostim.  Patient seen by provider today: No   present during visit today: Not Applicable.    Note: VSS.  Pt assessed and is feeling well without any signs of bleeding.  Pt concerned as Dr Garcia spoke of starting her on a oral drug instead but she found out that her financial responsibility for this is not within her means and this was discussed.  In basket message sent to Dr Garcia.      Intravenous Access:  No Intravenous access/labs at this visit.    Treatment Conditions:  Not Applicable. Lab done 12/17 noted with platelets of 186.      Post Infusion Assessment:  Patient tolerated injection without incident.       Discharge Plan:   Patient discharged in stable condition accompanied by: self.      Carole Barrett RN

## 2022-01-14 ENCOUNTER — INFUSION THERAPY VISIT (OUTPATIENT)
Dept: INFUSION THERAPY | Facility: HOSPITAL | Age: 87
End: 2022-01-14
Attending: INTERNAL MEDICINE
Payer: COMMERCIAL

## 2022-01-14 VITALS
DIASTOLIC BLOOD PRESSURE: 64 MMHG | SYSTOLIC BLOOD PRESSURE: 143 MMHG | TEMPERATURE: 97.7 F | HEART RATE: 59 BPM | RESPIRATION RATE: 16 BRPM | OXYGEN SATURATION: 96 %

## 2022-01-14 DIAGNOSIS — D69.3 IMMUNE THROMBOCYTOPENIC PURPURA (H): ICD-10-CM

## 2022-01-14 DIAGNOSIS — D69.3 IMMUNE THROMBOCYTOPENIC PURPURA (H): Primary | ICD-10-CM

## 2022-01-14 LAB
ERYTHROCYTE [DISTWIDTH] IN BLOOD BY AUTOMATED COUNT: 15.2 % (ref 10–15)
HCT VFR BLD AUTO: 42.2 % (ref 35–47)
HGB BLD-MCNC: 13.8 G/DL (ref 11.7–15.7)
MCH RBC QN AUTO: 32.2 PG (ref 26.5–33)
MCHC RBC AUTO-ENTMCNC: 32.7 G/DL (ref 31.5–36.5)
MCV RBC AUTO: 99 FL (ref 78–100)
PLATELET # BLD AUTO: 157 10E3/UL (ref 150–450)
RBC # BLD AUTO: 4.28 10E6/UL (ref 3.8–5.2)
WBC # BLD AUTO: 14.3 10E3/UL (ref 4–11)

## 2022-01-14 PROCEDURE — 250N000011 HC RX IP 250 OP 636: Performed by: INTERNAL MEDICINE

## 2022-01-14 PROCEDURE — 96372 THER/PROPH/DIAG INJ SC/IM: CPT | Performed by: INTERNAL MEDICINE

## 2022-01-14 PROCEDURE — 36415 COLL VENOUS BLD VENIPUNCTURE: CPT

## 2022-01-14 PROCEDURE — 85027 COMPLETE CBC AUTOMATED: CPT

## 2022-01-14 RX ADMIN — ROMIPLOSTIM 55 MCG: 250 INJECTION, POWDER, LYOPHILIZED, FOR SOLUTION SUBCUTANEOUS at 13:58

## 2022-01-14 NOTE — PROGRESS NOTES
Infusion Nursing Note:  Ora Gonzalez presents today for NPLATE injection.    Patient seen by provider today: No   present during visit today: Not Applicable.    Note: NPLATE administered subcutaneous as ordered in right upper arm per patient request.    Intravenous Access:  No Intravenous access/labs drawn per CONRAD Bernardo.    Treatment Conditions:  Lab Results   Component Value Date    HGB 13.8 01/14/2022    WBC 14.3 (H) 01/14/2022    ANEU 8.2 07/23/2021    ANEUTAUTO 9.3 (H) 11/19/2021     01/14/2022      Results reviewed, labs MET treatment parameters, ok to proceed with treatment.    Post Infusion Assessment:  Patient tolerated injection without incident.     Discharge Plan:   Patient discharged in stable condition accompanied by: self.  Departure Mode: Ambulatory.  Ora will return on Jan 21st as scheduled.    Rosa Arceo RN

## 2022-01-18 VITALS
WEIGHT: 117.3 LBS | HEART RATE: 63 BPM | OXYGEN SATURATION: 97 % | TEMPERATURE: 97.7 F | SYSTOLIC BLOOD PRESSURE: 128 MMHG | DIASTOLIC BLOOD PRESSURE: 57 MMHG | BODY MASS INDEX: 21.45 KG/M2

## 2022-01-18 VITALS
HEART RATE: 59 BPM | SYSTOLIC BLOOD PRESSURE: 140 MMHG | TEMPERATURE: 97.8 F | OXYGEN SATURATION: 98 % | TEMPERATURE: 97.9 F | BODY MASS INDEX: 21.22 KG/M2 | DIASTOLIC BLOOD PRESSURE: 63 MMHG | TEMPERATURE: 98.4 F | HEART RATE: 58 BPM | SYSTOLIC BLOOD PRESSURE: 153 MMHG | HEART RATE: 56 BPM | OXYGEN SATURATION: 97 % | WEIGHT: 116 LBS | OXYGEN SATURATION: 99 % | RESPIRATION RATE: 16 BRPM | SYSTOLIC BLOOD PRESSURE: 139 MMHG | SYSTOLIC BLOOD PRESSURE: 135 MMHG | TEMPERATURE: 97.5 F | TEMPERATURE: 97.8 F | DIASTOLIC BLOOD PRESSURE: 62 MMHG | DIASTOLIC BLOOD PRESSURE: 59 MMHG | DIASTOLIC BLOOD PRESSURE: 65 MMHG | OXYGEN SATURATION: 97 % | HEART RATE: 67 BPM | OXYGEN SATURATION: 98 % | DIASTOLIC BLOOD PRESSURE: 70 MMHG | SYSTOLIC BLOOD PRESSURE: 138 MMHG

## 2022-01-18 VITALS
OXYGEN SATURATION: 97 % | HEART RATE: 68 BPM | HEART RATE: 58 BPM | TEMPERATURE: 97.8 F | DIASTOLIC BLOOD PRESSURE: 69 MMHG | TEMPERATURE: 97.8 F | HEART RATE: 60 BPM | OXYGEN SATURATION: 98 % | OXYGEN SATURATION: 98 % | SYSTOLIC BLOOD PRESSURE: 147 MMHG | SYSTOLIC BLOOD PRESSURE: 153 MMHG | DIASTOLIC BLOOD PRESSURE: 66 MMHG | DIASTOLIC BLOOD PRESSURE: 67 MMHG | SYSTOLIC BLOOD PRESSURE: 146 MMHG | TEMPERATURE: 97.9 F

## 2022-01-18 VITALS
TEMPERATURE: 96.8 F | OXYGEN SATURATION: 97 % | HEART RATE: 68 BPM | DIASTOLIC BLOOD PRESSURE: 70 MMHG | DIASTOLIC BLOOD PRESSURE: 78 MMHG | WEIGHT: 113 LBS | BODY MASS INDEX: 20.67 KG/M2 | HEART RATE: 67 BPM | SYSTOLIC BLOOD PRESSURE: 132 MMHG | OXYGEN SATURATION: 99 % | SYSTOLIC BLOOD PRESSURE: 186 MMHG | TEMPERATURE: 97.8 F | DIASTOLIC BLOOD PRESSURE: 70 MMHG | SYSTOLIC BLOOD PRESSURE: 150 MMHG | TEMPERATURE: 98.1 F

## 2022-01-18 VITALS
TEMPERATURE: 97.4 F | DIASTOLIC BLOOD PRESSURE: 63 MMHG | SYSTOLIC BLOOD PRESSURE: 144 MMHG | OXYGEN SATURATION: 97 % | HEART RATE: 73 BPM

## 2022-01-18 VITALS
TEMPERATURE: 98 F | TEMPERATURE: 98.1 F | OXYGEN SATURATION: 97 % | HEART RATE: 57 BPM | BODY MASS INDEX: 21.61 KG/M2 | OXYGEN SATURATION: 97 % | SYSTOLIC BLOOD PRESSURE: 150 MMHG | WEIGHT: 118.17 LBS | DIASTOLIC BLOOD PRESSURE: 63 MMHG | HEART RATE: 65 BPM | SYSTOLIC BLOOD PRESSURE: 137 MMHG | DIASTOLIC BLOOD PRESSURE: 56 MMHG

## 2022-01-18 VITALS
WEIGHT: 118.8 LBS | DIASTOLIC BLOOD PRESSURE: 65 MMHG | DIASTOLIC BLOOD PRESSURE: 62 MMHG | OXYGEN SATURATION: 96 % | HEART RATE: 72 BPM | SYSTOLIC BLOOD PRESSURE: 129 MMHG | OXYGEN SATURATION: 97 % | TEMPERATURE: 98 F | SYSTOLIC BLOOD PRESSURE: 107 MMHG | HEART RATE: 60 BPM | BODY MASS INDEX: 21.73 KG/M2 | TEMPERATURE: 98.2 F

## 2022-01-18 VITALS
TEMPERATURE: 98.3 F | HEART RATE: 67 BPM | DIASTOLIC BLOOD PRESSURE: 58 MMHG | OXYGEN SATURATION: 95 % | SYSTOLIC BLOOD PRESSURE: 99 MMHG

## 2022-01-18 VITALS
OXYGEN SATURATION: 96 % | DIASTOLIC BLOOD PRESSURE: 59 MMHG | TEMPERATURE: 98.1 F | RESPIRATION RATE: 12 BRPM | SYSTOLIC BLOOD PRESSURE: 138 MMHG | OXYGEN SATURATION: 96 % | SYSTOLIC BLOOD PRESSURE: 134 MMHG | TEMPERATURE: 97.9 F | HEART RATE: 75 BPM | HEART RATE: 94 BPM | DIASTOLIC BLOOD PRESSURE: 83 MMHG

## 2022-01-18 VITALS
DIASTOLIC BLOOD PRESSURE: 58 MMHG | HEART RATE: 65 BPM | OXYGEN SATURATION: 97 % | SYSTOLIC BLOOD PRESSURE: 140 MMHG | TEMPERATURE: 97.9 F

## 2022-01-18 VITALS
TEMPERATURE: 98.9 F | OXYGEN SATURATION: 97 % | DIASTOLIC BLOOD PRESSURE: 69 MMHG | HEART RATE: 89 BPM | SYSTOLIC BLOOD PRESSURE: 137 MMHG

## 2022-01-18 VITALS
TEMPERATURE: 98.6 F | DIASTOLIC BLOOD PRESSURE: 61 MMHG | OXYGEN SATURATION: 98 % | HEART RATE: 58 BPM | SYSTOLIC BLOOD PRESSURE: 140 MMHG

## 2022-01-18 VITALS
WEIGHT: 122.2 LBS | OXYGEN SATURATION: 96 % | BODY MASS INDEX: 22.35 KG/M2 | DIASTOLIC BLOOD PRESSURE: 65 MMHG | SYSTOLIC BLOOD PRESSURE: 138 MMHG | TEMPERATURE: 98.4 F | TEMPERATURE: 98.1 F | HEART RATE: 62 BPM | DIASTOLIC BLOOD PRESSURE: 61 MMHG | HEART RATE: 67 BPM | SYSTOLIC BLOOD PRESSURE: 149 MMHG

## 2022-01-18 VITALS — SYSTOLIC BLOOD PRESSURE: 140 MMHG | DIASTOLIC BLOOD PRESSURE: 63 MMHG | OXYGEN SATURATION: 97 % | HEART RATE: 67 BPM

## 2022-01-18 VITALS
TEMPERATURE: 97.8 F | DIASTOLIC BLOOD PRESSURE: 60 MMHG | HEART RATE: 66 BPM | SYSTOLIC BLOOD PRESSURE: 142 MMHG | OXYGEN SATURATION: 97 %

## 2022-01-18 VITALS
TEMPERATURE: 98.6 F | SYSTOLIC BLOOD PRESSURE: 151 MMHG | SYSTOLIC BLOOD PRESSURE: 135 MMHG | DIASTOLIC BLOOD PRESSURE: 67 MMHG | HEART RATE: 70 BPM | SYSTOLIC BLOOD PRESSURE: 115 MMHG | TEMPERATURE: 98.7 F | HEART RATE: 56 BPM | HEART RATE: 65 BPM | OXYGEN SATURATION: 97 % | WEIGHT: 118.7 LBS | SYSTOLIC BLOOD PRESSURE: 158 MMHG | DIASTOLIC BLOOD PRESSURE: 58 MMHG | DIASTOLIC BLOOD PRESSURE: 69 MMHG | BODY MASS INDEX: 21.02 KG/M2 | BODY MASS INDEX: 21.71 KG/M2 | OXYGEN SATURATION: 97 % | WEIGHT: 114.9 LBS | HEART RATE: 97 BPM | OXYGEN SATURATION: 95 % | TEMPERATURE: 98.8 F | OXYGEN SATURATION: 97 % | DIASTOLIC BLOOD PRESSURE: 63 MMHG

## 2022-01-18 VITALS
SYSTOLIC BLOOD PRESSURE: 133 MMHG | TEMPERATURE: 97.9 F | DIASTOLIC BLOOD PRESSURE: 82 MMHG | OXYGEN SATURATION: 98 % | HEART RATE: 65 BPM

## 2022-01-18 VITALS
WEIGHT: 118.2 LBS | HEART RATE: 59 BPM | DIASTOLIC BLOOD PRESSURE: 62 MMHG | OXYGEN SATURATION: 98 % | HEART RATE: 65 BPM | HEART RATE: 58 BPM | OXYGEN SATURATION: 98 % | DIASTOLIC BLOOD PRESSURE: 63 MMHG | SYSTOLIC BLOOD PRESSURE: 139 MMHG | BODY MASS INDEX: 21.62 KG/M2 | TEMPERATURE: 97.9 F | SYSTOLIC BLOOD PRESSURE: 120 MMHG | OXYGEN SATURATION: 99 % | TEMPERATURE: 97.6 F | TEMPERATURE: 98.1 F | TEMPERATURE: 97.8 F | OXYGEN SATURATION: 98 % | DIASTOLIC BLOOD PRESSURE: 60 MMHG | HEART RATE: 56 BPM | SYSTOLIC BLOOD PRESSURE: 132 MMHG | SYSTOLIC BLOOD PRESSURE: 129 MMHG | DIASTOLIC BLOOD PRESSURE: 62 MMHG

## 2022-01-18 VITALS
DIASTOLIC BLOOD PRESSURE: 65 MMHG | BODY MASS INDEX: 22.15 KG/M2 | SYSTOLIC BLOOD PRESSURE: 139 MMHG | WEIGHT: 121.1 LBS | OXYGEN SATURATION: 96 % | TEMPERATURE: 98.7 F | HEART RATE: 68 BPM

## 2022-01-18 VITALS
SYSTOLIC BLOOD PRESSURE: 137 MMHG | HEART RATE: 67 BPM | OXYGEN SATURATION: 97 % | TEMPERATURE: 98.3 F | DIASTOLIC BLOOD PRESSURE: 62 MMHG

## 2022-01-18 VITALS
BODY MASS INDEX: 22.02 KG/M2 | HEART RATE: 75 BPM | SYSTOLIC BLOOD PRESSURE: 156 MMHG | DIASTOLIC BLOOD PRESSURE: 68 MMHG | WEIGHT: 120.4 LBS | OXYGEN SATURATION: 96 % | TEMPERATURE: 98.2 F | DIASTOLIC BLOOD PRESSURE: 64 MMHG | HEART RATE: 64 BPM | TEMPERATURE: 97.9 F | OXYGEN SATURATION: 96 % | SYSTOLIC BLOOD PRESSURE: 117 MMHG

## 2022-01-18 VITALS
BODY MASS INDEX: 21.47 KG/M2 | HEART RATE: 60 BPM | TEMPERATURE: 98.3 F | DIASTOLIC BLOOD PRESSURE: 58 MMHG | SYSTOLIC BLOOD PRESSURE: 119 MMHG | OXYGEN SATURATION: 95 % | HEART RATE: 69 BPM | DIASTOLIC BLOOD PRESSURE: 67 MMHG | OXYGEN SATURATION: 97 % | WEIGHT: 117.4 LBS | TEMPERATURE: 98.1 F | SYSTOLIC BLOOD PRESSURE: 135 MMHG

## 2022-01-18 VITALS
WEIGHT: 114 LBS | DIASTOLIC BLOOD PRESSURE: 63 MMHG | DIASTOLIC BLOOD PRESSURE: 56 MMHG | BODY MASS INDEX: 20.85 KG/M2 | OXYGEN SATURATION: 96 % | SYSTOLIC BLOOD PRESSURE: 141 MMHG | TEMPERATURE: 99.2 F | HEART RATE: 78 BPM | TEMPERATURE: 98.4 F | OXYGEN SATURATION: 98 % | HEART RATE: 66 BPM | SYSTOLIC BLOOD PRESSURE: 132 MMHG

## 2022-01-18 VITALS
TEMPERATURE: 98.4 F | OXYGEN SATURATION: 97 % | OXYGEN SATURATION: 97 % | HEART RATE: 63 BPM | HEART RATE: 67 BPM | DIASTOLIC BLOOD PRESSURE: 67 MMHG | SYSTOLIC BLOOD PRESSURE: 142 MMHG | DIASTOLIC BLOOD PRESSURE: 58 MMHG | OXYGEN SATURATION: 95 % | HEART RATE: 63 BPM | DIASTOLIC BLOOD PRESSURE: 65 MMHG | TEMPERATURE: 98 F | SYSTOLIC BLOOD PRESSURE: 150 MMHG | TEMPERATURE: 98 F | SYSTOLIC BLOOD PRESSURE: 120 MMHG

## 2022-01-18 VITALS
DIASTOLIC BLOOD PRESSURE: 50 MMHG | TEMPERATURE: 98.2 F | SYSTOLIC BLOOD PRESSURE: 171 MMHG | HEART RATE: 77 BPM | HEART RATE: 60 BPM | DIASTOLIC BLOOD PRESSURE: 69 MMHG | SYSTOLIC BLOOD PRESSURE: 119 MMHG | OXYGEN SATURATION: 96 % | OXYGEN SATURATION: 96 % | TEMPERATURE: 98.5 F

## 2022-01-18 VITALS
DIASTOLIC BLOOD PRESSURE: 65 MMHG | TEMPERATURE: 98.7 F | OXYGEN SATURATION: 98 % | SYSTOLIC BLOOD PRESSURE: 136 MMHG | OXYGEN SATURATION: 98 % | WEIGHT: 116.4 LBS | HEART RATE: 70 BPM | BODY MASS INDEX: 21.29 KG/M2 | DIASTOLIC BLOOD PRESSURE: 80 MMHG | TEMPERATURE: 97.9 F | HEART RATE: 65 BPM | SYSTOLIC BLOOD PRESSURE: 143 MMHG

## 2022-01-18 VITALS
SYSTOLIC BLOOD PRESSURE: 129 MMHG | TEMPERATURE: 98.5 F | DIASTOLIC BLOOD PRESSURE: 61 MMHG | HEART RATE: 65 BPM | OXYGEN SATURATION: 97 %

## 2022-01-18 VITALS
OXYGEN SATURATION: 98 % | DIASTOLIC BLOOD PRESSURE: 62 MMHG | OXYGEN SATURATION: 97 % | WEIGHT: 119 LBS | HEART RATE: 80 BPM | TEMPERATURE: 97.6 F | HEIGHT: 62 IN | BODY MASS INDEX: 21.12 KG/M2 | SYSTOLIC BLOOD PRESSURE: 138 MMHG | BODY MASS INDEX: 21.77 KG/M2 | WEIGHT: 114.8 LBS | HEART RATE: 66 BPM | DIASTOLIC BLOOD PRESSURE: 64 MMHG | SYSTOLIC BLOOD PRESSURE: 141 MMHG

## 2022-01-18 VITALS
BODY MASS INDEX: 21.75 KG/M2 | WEIGHT: 113 LBS | HEART RATE: 59 BPM | DIASTOLIC BLOOD PRESSURE: 70 MMHG | HEART RATE: 67 BPM | SYSTOLIC BLOOD PRESSURE: 135 MMHG | WEIGHT: 118.9 LBS | TEMPERATURE: 98.5 F | OXYGEN SATURATION: 93 % | TEMPERATURE: 97.9 F | OXYGEN SATURATION: 96 % | DIASTOLIC BLOOD PRESSURE: 68 MMHG | DIASTOLIC BLOOD PRESSURE: 63 MMHG | HEART RATE: 73 BPM | OXYGEN SATURATION: 96 % | BODY MASS INDEX: 20.67 KG/M2 | TEMPERATURE: 97.5 F | SYSTOLIC BLOOD PRESSURE: 169 MMHG | SYSTOLIC BLOOD PRESSURE: 148 MMHG

## 2022-01-18 VITALS
DIASTOLIC BLOOD PRESSURE: 63 MMHG | SYSTOLIC BLOOD PRESSURE: 142 MMHG | HEART RATE: 65 BPM | OXYGEN SATURATION: 96 % | BODY MASS INDEX: 23.09 KG/M2 | WEIGHT: 122.2 LBS

## 2022-01-18 VITALS
SYSTOLIC BLOOD PRESSURE: 157 MMHG | SYSTOLIC BLOOD PRESSURE: 105 MMHG | HEART RATE: 61 BPM | OXYGEN SATURATION: 96 % | DIASTOLIC BLOOD PRESSURE: 68 MMHG | DIASTOLIC BLOOD PRESSURE: 52 MMHG | OXYGEN SATURATION: 96 % | HEART RATE: 67 BPM | TEMPERATURE: 97.9 F

## 2022-01-18 VITALS
SYSTOLIC BLOOD PRESSURE: 166 MMHG | HEART RATE: 75 BPM | WEIGHT: 114.5 LBS | OXYGEN SATURATION: 96 % | TEMPERATURE: 98.2 F | BODY MASS INDEX: 20.94 KG/M2 | DIASTOLIC BLOOD PRESSURE: 87 MMHG

## 2022-01-18 VITALS
TEMPERATURE: 98.3 F | OXYGEN SATURATION: 97 % | DIASTOLIC BLOOD PRESSURE: 71 MMHG | HEART RATE: 62 BPM | OXYGEN SATURATION: 96 % | DIASTOLIC BLOOD PRESSURE: 60 MMHG | SYSTOLIC BLOOD PRESSURE: 133 MMHG | HEART RATE: 71 BPM | SYSTOLIC BLOOD PRESSURE: 144 MMHG | TEMPERATURE: 99.4 F

## 2022-01-18 VITALS
HEART RATE: 62 BPM | HEART RATE: 72 BPM | WEIGHT: 120.8 LBS | SYSTOLIC BLOOD PRESSURE: 117 MMHG | DIASTOLIC BLOOD PRESSURE: 64 MMHG | DIASTOLIC BLOOD PRESSURE: 76 MMHG | SYSTOLIC BLOOD PRESSURE: 145 MMHG | BODY MASS INDEX: 22.09 KG/M2 | TEMPERATURE: 98 F | OXYGEN SATURATION: 99 % | OXYGEN SATURATION: 98 %

## 2022-01-18 VITALS — WEIGHT: 121 LBS | BODY MASS INDEX: 22.13 KG/M2

## 2022-01-18 VITALS
HEART RATE: 69 BPM | SYSTOLIC BLOOD PRESSURE: 141 MMHG | TEMPERATURE: 98.3 F | OXYGEN SATURATION: 96 % | DIASTOLIC BLOOD PRESSURE: 64 MMHG

## 2022-01-18 VITALS
HEART RATE: 69 BPM | TEMPERATURE: 97.8 F | OXYGEN SATURATION: 98 % | BODY MASS INDEX: 21.16 KG/M2 | DIASTOLIC BLOOD PRESSURE: 60 MMHG | RESPIRATION RATE: 18 BRPM | DIASTOLIC BLOOD PRESSURE: 54 MMHG | SYSTOLIC BLOOD PRESSURE: 116 MMHG | HEIGHT: 62 IN | SYSTOLIC BLOOD PRESSURE: 129 MMHG | WEIGHT: 115 LBS | HEART RATE: 60 BPM

## 2022-01-18 VITALS
DIASTOLIC BLOOD PRESSURE: 72 MMHG | WEIGHT: 121 LBS | HEART RATE: 73 BPM | OXYGEN SATURATION: 97 % | TEMPERATURE: 98.1 F | BODY MASS INDEX: 22.13 KG/M2 | SYSTOLIC BLOOD PRESSURE: 148 MMHG

## 2022-01-18 VITALS
SYSTOLIC BLOOD PRESSURE: 139 MMHG | HEART RATE: 68 BPM | TEMPERATURE: 98.2 F | OXYGEN SATURATION: 97 % | DIASTOLIC BLOOD PRESSURE: 62 MMHG

## 2022-01-18 VITALS
HEART RATE: 61 BPM | OXYGEN SATURATION: 96 % | DIASTOLIC BLOOD PRESSURE: 60 MMHG | SYSTOLIC BLOOD PRESSURE: 133 MMHG | TEMPERATURE: 97.7 F

## 2022-01-18 VITALS
TEMPERATURE: 98.1 F | OXYGEN SATURATION: 99 % | HEART RATE: 61 BPM | SYSTOLIC BLOOD PRESSURE: 151 MMHG | DIASTOLIC BLOOD PRESSURE: 67 MMHG

## 2022-01-18 VITALS
OXYGEN SATURATION: 95 % | DIASTOLIC BLOOD PRESSURE: 60 MMHG | SYSTOLIC BLOOD PRESSURE: 154 MMHG | TEMPERATURE: 98.1 F | HEART RATE: 70 BPM | SYSTOLIC BLOOD PRESSURE: 126 MMHG | DIASTOLIC BLOOD PRESSURE: 68 MMHG | OXYGEN SATURATION: 97 % | HEART RATE: 69 BPM

## 2022-01-18 VITALS
BODY MASS INDEX: 22.55 KG/M2 | SYSTOLIC BLOOD PRESSURE: 119 MMHG | DIASTOLIC BLOOD PRESSURE: 69 MMHG | WEIGHT: 123.3 LBS | TEMPERATURE: 98.3 F | OXYGEN SATURATION: 97 % | HEART RATE: 72 BPM

## 2022-01-18 VITALS
HEART RATE: 68 BPM | DIASTOLIC BLOOD PRESSURE: 60 MMHG | TEMPERATURE: 98.2 F | SYSTOLIC BLOOD PRESSURE: 129 MMHG | OXYGEN SATURATION: 95 %

## 2022-01-18 VITALS — BODY MASS INDEX: 22.3 KG/M2 | WEIGHT: 121.9 LBS

## 2022-01-18 VITALS
TEMPERATURE: 98.2 F | DIASTOLIC BLOOD PRESSURE: 70 MMHG | OXYGEN SATURATION: 99 % | SYSTOLIC BLOOD PRESSURE: 152 MMHG | HEART RATE: 67 BPM

## 2022-01-18 ASSESSMENT — PATIENT HEALTH QUESTIONNAIRE - PHQ9: SUM OF ALL RESPONSES TO PHQ QUESTIONS 1-9: 0

## 2022-01-21 ENCOUNTER — INFUSION THERAPY VISIT (OUTPATIENT)
Dept: INFUSION THERAPY | Facility: HOSPITAL | Age: 87
End: 2022-01-21
Attending: INTERNAL MEDICINE
Payer: COMMERCIAL

## 2022-01-21 VITALS
OXYGEN SATURATION: 96 % | RESPIRATION RATE: 16 BRPM | SYSTOLIC BLOOD PRESSURE: 165 MMHG | DIASTOLIC BLOOD PRESSURE: 54 MMHG | TEMPERATURE: 98.1 F | HEART RATE: 65 BPM

## 2022-01-21 DIAGNOSIS — D69.3 IMMUNE THROMBOCYTOPENIC PURPURA (H): Primary | ICD-10-CM

## 2022-01-21 PROCEDURE — 96372 THER/PROPH/DIAG INJ SC/IM: CPT | Performed by: INTERNAL MEDICINE

## 2022-01-21 PROCEDURE — 250N000011 HC RX IP 250 OP 636: Performed by: INTERNAL MEDICINE

## 2022-01-21 RX ADMIN — ROMIPLOSTIM 55 MCG: 250 INJECTION, POWDER, LYOPHILIZED, FOR SOLUTION SUBCUTANEOUS at 13:19

## 2022-01-21 ASSESSMENT — PAIN SCALES - GENERAL: PAINLEVEL: NO PAIN (0)

## 2022-01-21 NOTE — PROGRESS NOTES
Infusion Nursing Note:  Ora Gonzalez presents today for her next dose of Romiplostim.    Patient seen by provider today: No   present during visit today: Not Applicable.    Note: Myrna is well educated on her drug.  VSS.  Pt assessed.  No evidence of bleeding. Romiplostim was given subcutaneous into her left upper arm.  Site was covered with a bandaid.       Intravenous Access:  No Intravenous access/labs at this visit.  Labs from 1/14 noted.    Treatment Conditions:  Not Applicable.      Post Infusion Assessment:  Patient tolerated injection without incident.       Discharge Plan:   Patient discharged in stable condition accompanied by: self.      Carole Barrett RN

## 2022-01-28 ENCOUNTER — INFUSION THERAPY VISIT (OUTPATIENT)
Dept: INFUSION THERAPY | Facility: HOSPITAL | Age: 87
End: 2022-01-28
Attending: INTERNAL MEDICINE
Payer: COMMERCIAL

## 2022-01-28 VITALS
SYSTOLIC BLOOD PRESSURE: 148 MMHG | HEART RATE: 70 BPM | OXYGEN SATURATION: 97 % | DIASTOLIC BLOOD PRESSURE: 68 MMHG | TEMPERATURE: 98.2 F | RESPIRATION RATE: 16 BRPM

## 2022-01-28 DIAGNOSIS — D69.3 IMMUNE THROMBOCYTOPENIC PURPURA (H): Primary | ICD-10-CM

## 2022-01-28 PROCEDURE — 96372 THER/PROPH/DIAG INJ SC/IM: CPT | Performed by: INTERNAL MEDICINE

## 2022-01-28 PROCEDURE — 250N000011 HC RX IP 250 OP 636: Performed by: INTERNAL MEDICINE

## 2022-01-28 RX ADMIN — ROMIPLOSTIM 55 MCG: 250 INJECTION, POWDER, LYOPHILIZED, FOR SOLUTION SUBCUTANEOUS at 13:17

## 2022-02-04 ENCOUNTER — INFUSION THERAPY VISIT (OUTPATIENT)
Dept: INFUSION THERAPY | Facility: HOSPITAL | Age: 87
End: 2022-02-04
Attending: INTERNAL MEDICINE
Payer: COMMERCIAL

## 2022-02-04 VITALS
RESPIRATION RATE: 16 BRPM | OXYGEN SATURATION: 98 % | HEART RATE: 68 BPM | TEMPERATURE: 98 F | SYSTOLIC BLOOD PRESSURE: 140 MMHG | DIASTOLIC BLOOD PRESSURE: 58 MMHG

## 2022-02-04 DIAGNOSIS — D69.3 IMMUNE THROMBOCYTOPENIC PURPURA (H): Primary | ICD-10-CM

## 2022-02-04 PROCEDURE — 250N000011 HC RX IP 250 OP 636: Performed by: INTERNAL MEDICINE

## 2022-02-04 PROCEDURE — 96372 THER/PROPH/DIAG INJ SC/IM: CPT | Performed by: INTERNAL MEDICINE

## 2022-02-04 RX ADMIN — ROMIPLOSTIM 55 MCG: 250 INJECTION, POWDER, LYOPHILIZED, FOR SOLUTION SUBCUTANEOUS at 13:15

## 2022-02-04 ASSESSMENT — PAIN SCALES - GENERAL: PAINLEVEL: NO PAIN (0)

## 2022-02-11 ENCOUNTER — LAB (OUTPATIENT)
Dept: INFUSION THERAPY | Facility: HOSPITAL | Age: 87
End: 2022-02-11
Attending: INTERNAL MEDICINE
Payer: COMMERCIAL

## 2022-02-11 VITALS
DIASTOLIC BLOOD PRESSURE: 59 MMHG | HEART RATE: 63 BPM | TEMPERATURE: 98.2 F | SYSTOLIC BLOOD PRESSURE: 129 MMHG | OXYGEN SATURATION: 96 % | RESPIRATION RATE: 16 BRPM

## 2022-02-11 DIAGNOSIS — D69.3 IMMUNE THROMBOCYTOPENIC PURPURA (H): Primary | ICD-10-CM

## 2022-02-11 DIAGNOSIS — D69.3 IMMUNE THROMBOCYTOPENIC PURPURA (H): ICD-10-CM

## 2022-02-11 LAB
ERYTHROCYTE [DISTWIDTH] IN BLOOD BY AUTOMATED COUNT: 14.8 % (ref 10–15)
HCT VFR BLD AUTO: 46.3 % (ref 35–47)
HGB BLD-MCNC: 14.9 G/DL (ref 11.7–15.7)
MCH RBC QN AUTO: 32.3 PG (ref 26.5–33)
MCHC RBC AUTO-ENTMCNC: 32.2 G/DL (ref 31.5–36.5)
MCV RBC AUTO: 100 FL (ref 78–100)
PLATELET # BLD AUTO: 129 10E3/UL (ref 150–450)
RBC # BLD AUTO: 4.61 10E6/UL (ref 3.8–5.2)
WBC # BLD AUTO: 15.2 10E3/UL (ref 4–11)

## 2022-02-11 PROCEDURE — 36415 COLL VENOUS BLD VENIPUNCTURE: CPT

## 2022-02-11 PROCEDURE — 96372 THER/PROPH/DIAG INJ SC/IM: CPT | Performed by: INTERNAL MEDICINE

## 2022-02-11 PROCEDURE — 250N000011 HC RX IP 250 OP 636: Performed by: INTERNAL MEDICINE

## 2022-02-11 PROCEDURE — 85027 COMPLETE CBC AUTOMATED: CPT

## 2022-02-11 RX ADMIN — ROMIPLOSTIM 55 MCG: 250 INJECTION, POWDER, LYOPHILIZED, FOR SOLUTION SUBCUTANEOUS at 14:04

## 2022-02-11 NOTE — PROGRESS NOTES
Pt arrived ambulatory to clinic for labs and subcutaneous NPlate injection.  Labs were reviewed, pt met parameters for treatment.  Administered subcutaneous NPlate injection into TRISTA per MD order.  Pt tolerated procedure well, no s/s of bleeding or swelling at site.  Pt verbalized understanding of plan of care and return to clinic.

## 2022-02-18 ENCOUNTER — INFUSION THERAPY VISIT (OUTPATIENT)
Dept: INFUSION THERAPY | Facility: HOSPITAL | Age: 87
End: 2022-02-18
Attending: INTERNAL MEDICINE
Payer: COMMERCIAL

## 2022-02-18 VITALS
RESPIRATION RATE: 18 BRPM | HEART RATE: 68 BPM | DIASTOLIC BLOOD PRESSURE: 65 MMHG | TEMPERATURE: 98.6 F | SYSTOLIC BLOOD PRESSURE: 139 MMHG | OXYGEN SATURATION: 98 %

## 2022-02-18 DIAGNOSIS — D69.3 IMMUNE THROMBOCYTOPENIC PURPURA (H): Primary | ICD-10-CM

## 2022-02-18 PROCEDURE — 96372 THER/PROPH/DIAG INJ SC/IM: CPT | Performed by: INTERNAL MEDICINE

## 2022-02-18 PROCEDURE — 250N000011 HC RX IP 250 OP 636: Performed by: INTERNAL MEDICINE

## 2022-02-18 RX ADMIN — ROMIPLOSTIM 55 MCG: 250 INJECTION, POWDER, LYOPHILIZED, FOR SOLUTION SUBCUTANEOUS at 13:12

## 2022-02-18 NOTE — PROGRESS NOTES
Pt here ambulatory for Nplate inj. Pt states feeling good and doing good. N plate administered to right upper arm/bandaid to site. Follow up reviewed and pt dc'd steady gait.

## 2022-02-25 ENCOUNTER — INFUSION THERAPY VISIT (OUTPATIENT)
Dept: INFUSION THERAPY | Facility: HOSPITAL | Age: 87
End: 2022-02-25
Attending: INTERNAL MEDICINE
Payer: COMMERCIAL

## 2022-02-25 VITALS
DIASTOLIC BLOOD PRESSURE: 65 MMHG | SYSTOLIC BLOOD PRESSURE: 130 MMHG | TEMPERATURE: 98.2 F | OXYGEN SATURATION: 100 % | RESPIRATION RATE: 16 BRPM | HEART RATE: 68 BPM

## 2022-02-25 DIAGNOSIS — D69.3 IMMUNE THROMBOCYTOPENIC PURPURA (H): Primary | ICD-10-CM

## 2022-02-25 PROCEDURE — 250N000011 HC RX IP 250 OP 636: Performed by: INTERNAL MEDICINE

## 2022-02-25 PROCEDURE — 96372 THER/PROPH/DIAG INJ SC/IM: CPT | Performed by: INTERNAL MEDICINE

## 2022-02-25 RX ADMIN — ROMIPLOSTIM 55 MCG: 250 INJECTION, POWDER, LYOPHILIZED, FOR SOLUTION SUBCUTANEOUS at 13:47

## 2022-03-04 ENCOUNTER — INFUSION THERAPY VISIT (OUTPATIENT)
Dept: INFUSION THERAPY | Facility: HOSPITAL | Age: 87
End: 2022-03-04
Attending: INTERNAL MEDICINE
Payer: COMMERCIAL

## 2022-03-04 VITALS
DIASTOLIC BLOOD PRESSURE: 60 MMHG | HEART RATE: 63 BPM | OXYGEN SATURATION: 98 % | RESPIRATION RATE: 16 BRPM | TEMPERATURE: 98.1 F | SYSTOLIC BLOOD PRESSURE: 158 MMHG

## 2022-03-04 DIAGNOSIS — D69.3 IMMUNE THROMBOCYTOPENIC PURPURA (H): Primary | ICD-10-CM

## 2022-03-04 PROCEDURE — 96372 THER/PROPH/DIAG INJ SC/IM: CPT | Performed by: INTERNAL MEDICINE

## 2022-03-04 PROCEDURE — 250N000011 HC RX IP 250 OP 636: Performed by: INTERNAL MEDICINE

## 2022-03-04 RX ADMIN — ROMIPLOSTIM 55 MCG: 250 INJECTION, POWDER, LYOPHILIZED, FOR SOLUTION SUBCUTANEOUS at 13:10

## 2022-03-04 ASSESSMENT — PAIN SCALES - GENERAL: PAINLEVEL: NO PAIN (0)

## 2022-03-04 NOTE — PROGRESS NOTES
Infusion Nursing Note:  Ora Gonzalez presents today for her next dose of Nplate.    Patient seen by provider today: No   present during visit today: Not Applicable.    Note: She is well educated on her treatment.  VSS.  Pt assessed.  She has not signs of bleeding. Nplate given into her right upper arm.      Intravenous Access:  Not applicable.    Treatment Conditions:  Not Applicable. Labs due next week.       Post Infusion Assessment:  Patient tolerated injection without incident.       Discharge Plan:   Patient discharged in stable condition accompanied by: self.      Carole Barrett RN

## 2022-03-11 ENCOUNTER — INFUSION THERAPY VISIT (OUTPATIENT)
Dept: INFUSION THERAPY | Facility: HOSPITAL | Age: 87
End: 2022-03-11
Attending: INTERNAL MEDICINE
Payer: COMMERCIAL

## 2022-03-11 VITALS
DIASTOLIC BLOOD PRESSURE: 63 MMHG | RESPIRATION RATE: 16 BRPM | SYSTOLIC BLOOD PRESSURE: 135 MMHG | OXYGEN SATURATION: 96 % | HEART RATE: 64 BPM | TEMPERATURE: 98 F

## 2022-03-11 DIAGNOSIS — D69.3 IMMUNE THROMBOCYTOPENIC PURPURA (H): ICD-10-CM

## 2022-03-11 DIAGNOSIS — D69.3 IMMUNE THROMBOCYTOPENIC PURPURA (H): Primary | ICD-10-CM

## 2022-03-11 LAB
ERYTHROCYTE [DISTWIDTH] IN BLOOD BY AUTOMATED COUNT: 14.5 % (ref 10–15)
HCT VFR BLD AUTO: 44.8 % (ref 35–47)
HGB BLD-MCNC: 14.4 G/DL (ref 11.7–15.7)
MCH RBC QN AUTO: 32.5 PG (ref 26.5–33)
MCHC RBC AUTO-ENTMCNC: 32.1 G/DL (ref 31.5–36.5)
MCV RBC AUTO: 101 FL (ref 78–100)
PLATELET # BLD AUTO: 186 10E3/UL (ref 150–450)
RBC # BLD AUTO: 4.43 10E6/UL (ref 3.8–5.2)
WBC # BLD AUTO: 15.3 10E3/UL (ref 4–11)

## 2022-03-11 PROCEDURE — 96372 THER/PROPH/DIAG INJ SC/IM: CPT | Performed by: INTERNAL MEDICINE

## 2022-03-11 PROCEDURE — 36415 COLL VENOUS BLD VENIPUNCTURE: CPT

## 2022-03-11 PROCEDURE — 85014 HEMATOCRIT: CPT

## 2022-03-11 PROCEDURE — 250N000011 HC RX IP 250 OP 636: Performed by: INTERNAL MEDICINE

## 2022-03-11 RX ADMIN — ROMIPLOSTIM 55 MCG: 250 INJECTION, POWDER, LYOPHILIZED, FOR SOLUTION SUBCUTANEOUS at 13:46

## 2022-03-11 NOTE — PROGRESS NOTES
Infusion Nursing Note:  Ora Gonzalez presents today for labs and Nplate.    Patient seen by provider today: No   present during visit today: Not Applicable.    Note: Nplate administered subcutaneous as ordered in right upper arm per patient request. No swelling or bleeding noted. Bandaide placed over injection site.    Intravenous Access:  No Intravenous access this visit. Labs drawn per BRYON Borja.    Treatment Conditions:  Lab Results   Component Value Date    HGB 14.4 03/11/2022    WBC 15.3 (H) 03/11/2022    ANEU 8.2 07/23/2021    ANEUTAUTO 9.3 (H) 11/19/2021     03/11/2022      Results reviewed, labs MET treatment parameters, ok to proceed with treatment.    Post Infusion Assessment:  Patient tolerated injection without incident.     Discharge Plan:   Patient will return March 18th for next appointment.   Patient discharged in stable condition accompanied by: self.  Departure Mode: Ambulatory.      Rosa Arceo RN

## 2022-03-18 ENCOUNTER — INFUSION THERAPY VISIT (OUTPATIENT)
Dept: INFUSION THERAPY | Facility: HOSPITAL | Age: 87
End: 2022-03-18
Attending: INTERNAL MEDICINE
Payer: COMMERCIAL

## 2022-03-18 VITALS
DIASTOLIC BLOOD PRESSURE: 57 MMHG | OXYGEN SATURATION: 98 % | RESPIRATION RATE: 16 BRPM | HEART RATE: 66 BPM | SYSTOLIC BLOOD PRESSURE: 126 MMHG

## 2022-03-18 DIAGNOSIS — D69.3 IMMUNE THROMBOCYTOPENIC PURPURA (H): Primary | ICD-10-CM

## 2022-03-18 PROCEDURE — 96372 THER/PROPH/DIAG INJ SC/IM: CPT | Performed by: INTERNAL MEDICINE

## 2022-03-18 PROCEDURE — 250N000011 HC RX IP 250 OP 636: Performed by: INTERNAL MEDICINE

## 2022-03-18 RX ADMIN — ROMIPLOSTIM 55 MCG: 250 INJECTION, POWDER, LYOPHILIZED, FOR SOLUTION SUBCUTANEOUS at 13:36

## 2022-03-25 ENCOUNTER — INFUSION THERAPY VISIT (OUTPATIENT)
Dept: INFUSION THERAPY | Facility: HOSPITAL | Age: 87
End: 2022-03-25
Attending: INTERNAL MEDICINE
Payer: COMMERCIAL

## 2022-03-25 VITALS
RESPIRATION RATE: 16 BRPM | TEMPERATURE: 98.3 F | SYSTOLIC BLOOD PRESSURE: 149 MMHG | DIASTOLIC BLOOD PRESSURE: 66 MMHG | OXYGEN SATURATION: 95 % | HEART RATE: 61 BPM

## 2022-03-25 DIAGNOSIS — D69.3 IMMUNE THROMBOCYTOPENIC PURPURA (H): Primary | ICD-10-CM

## 2022-03-25 PROCEDURE — 96372 THER/PROPH/DIAG INJ SC/IM: CPT | Performed by: INTERNAL MEDICINE

## 2022-03-25 PROCEDURE — 250N000011 HC RX IP 250 OP 636: Performed by: INTERNAL MEDICINE

## 2022-03-25 RX ADMIN — ROMIPLOSTIM 55 MCG: 250 INJECTION, POWDER, LYOPHILIZED, FOR SOLUTION SUBCUTANEOUS at 13:35

## 2022-03-25 NOTE — PROGRESS NOTES
PT here ambulatory for nplate inj. Reviewed inj with pt. Pt states she is feeling 'great' and has no concerns. N Plate inj to right upper arm and bandaid to site.Follow up reviewed and pt dc'd steady gait.

## 2022-04-01 ENCOUNTER — INFUSION THERAPY VISIT (OUTPATIENT)
Dept: INFUSION THERAPY | Facility: HOSPITAL | Age: 87
End: 2022-04-01
Attending: INTERNAL MEDICINE
Payer: COMMERCIAL

## 2022-04-01 VITALS
HEART RATE: 63 BPM | RESPIRATION RATE: 16 BRPM | SYSTOLIC BLOOD PRESSURE: 138 MMHG | TEMPERATURE: 97.9 F | OXYGEN SATURATION: 96 % | DIASTOLIC BLOOD PRESSURE: 63 MMHG

## 2022-04-01 DIAGNOSIS — D69.3 IMMUNE THROMBOCYTOPENIC PURPURA (H): Primary | ICD-10-CM

## 2022-04-01 PROCEDURE — 96372 THER/PROPH/DIAG INJ SC/IM: CPT | Performed by: INTERNAL MEDICINE

## 2022-04-01 PROCEDURE — 250N000011 HC RX IP 250 OP 636: Performed by: INTERNAL MEDICINE

## 2022-04-01 RX ADMIN — ROMIPLOSTIM 55 MCG: 250 INJECTION, POWDER, LYOPHILIZED, FOR SOLUTION SUBCUTANEOUS at 13:29

## 2022-04-01 NOTE — PROGRESS NOTES
Pt here today for injection which was given subcutaneous right arm without incident. Pt denies complaint and d.c ambulatory to lobby alone to meet her ride.

## 2022-04-08 ENCOUNTER — INFUSION THERAPY VISIT (OUTPATIENT)
Dept: INFUSION THERAPY | Facility: HOSPITAL | Age: 87
End: 2022-04-08
Attending: INTERNAL MEDICINE
Payer: COMMERCIAL

## 2022-04-08 VITALS
OXYGEN SATURATION: 97 % | RESPIRATION RATE: 12 BRPM | DIASTOLIC BLOOD PRESSURE: 70 MMHG | SYSTOLIC BLOOD PRESSURE: 132 MMHG | TEMPERATURE: 98.2 F | HEART RATE: 64 BPM

## 2022-04-08 DIAGNOSIS — D69.3 IMMUNE THROMBOCYTOPENIC PURPURA (H): ICD-10-CM

## 2022-04-08 DIAGNOSIS — D69.3 IMMUNE THROMBOCYTOPENIC PURPURA (H): Primary | ICD-10-CM

## 2022-04-08 LAB
ERYTHROCYTE [DISTWIDTH] IN BLOOD BY AUTOMATED COUNT: 14.4 % (ref 10–15)
HCT VFR BLD AUTO: 45 % (ref 35–47)
HGB BLD-MCNC: 14.2 G/DL (ref 11.7–15.7)
MCH RBC QN AUTO: 32.1 PG (ref 26.5–33)
MCHC RBC AUTO-ENTMCNC: 31.6 G/DL (ref 31.5–36.5)
MCV RBC AUTO: 102 FL (ref 78–100)
PLATELET # BLD AUTO: 235 10E3/UL (ref 150–450)
RBC # BLD AUTO: 4.43 10E6/UL (ref 3.8–5.2)
WBC # BLD AUTO: 12.3 10E3/UL (ref 4–11)

## 2022-04-08 PROCEDURE — 250N000011 HC RX IP 250 OP 636: Performed by: INTERNAL MEDICINE

## 2022-04-08 PROCEDURE — 85027 COMPLETE CBC AUTOMATED: CPT

## 2022-04-08 PROCEDURE — 96372 THER/PROPH/DIAG INJ SC/IM: CPT | Performed by: INTERNAL MEDICINE

## 2022-04-08 PROCEDURE — 36415 COLL VENOUS BLD VENIPUNCTURE: CPT

## 2022-04-08 RX ADMIN — ROMIPLOSTIM 55 MCG: 250 INJECTION, POWDER, LYOPHILIZED, FOR SOLUTION SUBCUTANEOUS at 14:46

## 2022-04-08 ASSESSMENT — PAIN SCALES - GENERAL: PAINLEVEL: NO PAIN (0)

## 2022-04-08 NOTE — PROGRESS NOTES
Pt arrived for labs and Nplate injection.  Platelets 235 today.  Injection given into right upper arm and bandaid applied.  Pt left ambulatory.

## 2022-04-15 ENCOUNTER — INFUSION THERAPY VISIT (OUTPATIENT)
Dept: INFUSION THERAPY | Facility: HOSPITAL | Age: 87
End: 2022-04-15
Attending: INTERNAL MEDICINE
Payer: COMMERCIAL

## 2022-04-15 VITALS
SYSTOLIC BLOOD PRESSURE: 152 MMHG | OXYGEN SATURATION: 98 % | RESPIRATION RATE: 60 BRPM | HEART RATE: 61 BPM | DIASTOLIC BLOOD PRESSURE: 68 MMHG | TEMPERATURE: 98.1 F

## 2022-04-15 DIAGNOSIS — D69.3 IMMUNE THROMBOCYTOPENIC PURPURA (H): Primary | ICD-10-CM

## 2022-04-15 PROCEDURE — 96372 THER/PROPH/DIAG INJ SC/IM: CPT | Performed by: INTERNAL MEDICINE

## 2022-04-15 PROCEDURE — 250N000011 HC RX IP 250 OP 636: Performed by: INTERNAL MEDICINE

## 2022-04-15 RX ADMIN — ROMIPLOSTIM 55 MCG: 250 INJECTION, POWDER, LYOPHILIZED, FOR SOLUTION SUBCUTANEOUS at 13:05

## 2022-04-15 ASSESSMENT — PAIN SCALES - GENERAL: PAINLEVEL: NO PAIN (0)

## 2022-04-15 NOTE — PROGRESS NOTES
Infusion Nursing Note:  Ora Gonzalez presents today for Nplate injection.    Patient seen by provider today: No   present during visit today: Not Applicable.    Note: N/A.      Intravenous Access:  No Intravenous access/labs at this visit.    Treatment Conditions:  Not Applicable.      Post Infusion Assessment:  Patient tolerated injection without incident.       Discharge Plan:   Patient discharged in stable condition accompanied by: self.      Tova Chang RN                       Yes

## 2022-04-22 ENCOUNTER — INFUSION THERAPY VISIT (OUTPATIENT)
Dept: INFUSION THERAPY | Facility: HOSPITAL | Age: 87
End: 2022-04-22
Attending: INTERNAL MEDICINE
Payer: COMMERCIAL

## 2022-04-22 VITALS
TEMPERATURE: 98.4 F | RESPIRATION RATE: 16 BRPM | DIASTOLIC BLOOD PRESSURE: 87 MMHG | HEART RATE: 64 BPM | OXYGEN SATURATION: 97 % | SYSTOLIC BLOOD PRESSURE: 160 MMHG

## 2022-04-22 DIAGNOSIS — D69.3 IMMUNE THROMBOCYTOPENIC PURPURA (H): Primary | ICD-10-CM

## 2022-04-22 PROCEDURE — 250N000011 HC RX IP 250 OP 636: Performed by: INTERNAL MEDICINE

## 2022-04-22 PROCEDURE — 96372 THER/PROPH/DIAG INJ SC/IM: CPT | Performed by: INTERNAL MEDICINE

## 2022-04-22 RX ADMIN — ROMIPLOSTIM 55 MCG: 250 INJECTION, POWDER, LYOPHILIZED, FOR SOLUTION SUBCUTANEOUS at 13:59

## 2022-04-22 NOTE — PROGRESS NOTES
Infusion Nursing Note:  Ora Gonzalez presents today for Nplate.    Patient seen by provider today: No   present during visit today: Not Applicable.    Note: VSS.  Pt assessed and is feeling well today.  No signs of bleeding. Nplate given into her right upper arm.      Intravenous Access:  No Intravenous access/labs at this visit.    Treatment Conditions:  Not Applicable.      Post Infusion Assessment:  Patient tolerated injection without incident.       Discharge Plan:   Patient discharged in stable condition accompanied by: self.      Carole Barrett RN

## 2022-04-29 ENCOUNTER — INFUSION THERAPY VISIT (OUTPATIENT)
Dept: INFUSION THERAPY | Facility: HOSPITAL | Age: 87
End: 2022-04-29
Attending: INTERNAL MEDICINE
Payer: COMMERCIAL

## 2022-04-29 VITALS
TEMPERATURE: 98 F | DIASTOLIC BLOOD PRESSURE: 71 MMHG | SYSTOLIC BLOOD PRESSURE: 176 MMHG | RESPIRATION RATE: 18 BRPM | OXYGEN SATURATION: 96 % | HEART RATE: 63 BPM

## 2022-04-29 DIAGNOSIS — D69.3 IMMUNE THROMBOCYTOPENIC PURPURA (H): Primary | ICD-10-CM

## 2022-04-29 PROCEDURE — 250N000011 HC RX IP 250 OP 636: Performed by: NURSE PRACTITIONER

## 2022-04-29 PROCEDURE — 96372 THER/PROPH/DIAG INJ SC/IM: CPT | Performed by: NURSE PRACTITIONER

## 2022-04-29 RX ADMIN — ROMIPLOSTIM 55 MCG: 250 INJECTION, POWDER, LYOPHILIZED, FOR SOLUTION SUBCUTANEOUS at 13:41

## 2022-04-29 NOTE — PROGRESS NOTES
PT here for nplate inj. PT states doing well and has no concerns. Nplate inj to right upper arm and bandaid to site.Follow up reviewed and pt dc'd steady gait.

## 2022-05-06 ENCOUNTER — INFUSION THERAPY VISIT (OUTPATIENT)
Dept: INFUSION THERAPY | Facility: HOSPITAL | Age: 87
End: 2022-05-06
Attending: INTERNAL MEDICINE
Payer: COMMERCIAL

## 2022-05-06 VITALS
DIASTOLIC BLOOD PRESSURE: 69 MMHG | RESPIRATION RATE: 16 BRPM | HEART RATE: 70 BPM | TEMPERATURE: 98.1 F | OXYGEN SATURATION: 98 % | SYSTOLIC BLOOD PRESSURE: 142 MMHG

## 2022-05-06 DIAGNOSIS — D69.3 IMMUNE THROMBOCYTOPENIC PURPURA (H): Primary | ICD-10-CM

## 2022-05-06 DIAGNOSIS — D69.3 IMMUNE THROMBOCYTOPENIC PURPURA (H): ICD-10-CM

## 2022-05-06 LAB
ERYTHROCYTE [DISTWIDTH] IN BLOOD BY AUTOMATED COUNT: 14.1 % (ref 10–15)
HCT VFR BLD AUTO: 47.2 % (ref 35–47)
HGB BLD-MCNC: 15.3 G/DL (ref 11.7–15.7)
MCH RBC QN AUTO: 32.6 PG (ref 26.5–33)
MCHC RBC AUTO-ENTMCNC: 32.4 G/DL (ref 31.5–36.5)
MCV RBC AUTO: 100 FL (ref 78–100)
PLATELET # BLD AUTO: 266 10E3/UL (ref 150–450)
RBC # BLD AUTO: 4.7 10E6/UL (ref 3.8–5.2)
WBC # BLD AUTO: 14.6 10E3/UL (ref 4–11)

## 2022-05-06 PROCEDURE — 96372 THER/PROPH/DIAG INJ SC/IM: CPT | Performed by: NURSE PRACTITIONER

## 2022-05-06 PROCEDURE — 250N000011 HC RX IP 250 OP 636: Performed by: NURSE PRACTITIONER

## 2022-05-06 PROCEDURE — 36415 COLL VENOUS BLD VENIPUNCTURE: CPT

## 2022-05-06 PROCEDURE — 85027 COMPLETE CBC AUTOMATED: CPT

## 2022-05-06 RX ADMIN — ROMIPLOSTIM 55 MCG: 250 INJECTION, POWDER, LYOPHILIZED, FOR SOLUTION SUBCUTANEOUS at 13:40

## 2022-05-06 ASSESSMENT — PAIN SCALES - GENERAL: PAINLEVEL: NO PAIN (0)

## 2022-05-06 NOTE — PROGRESS NOTES
Infusion Nursing Note:  Ora Gonzalez presents today for C43 D1 weekly Nplate .    Patient seen by provider today: No   present during visit today: Not Applicable.    Note: Pt arrives ambulatory to [facility]. Pt reports no changes in baseline. BP (!) 142/69   Pulse 70   Temp 98.1  F (36.7  C)   Resp 16   SpO2 98%     Intravenous Access:  Labs drawn without difficulty.    Treatment Conditions:  Lab Results   Component Value Date    HGB 15.3 05/06/2022    WBC 14.6 (H) 05/06/2022    ANEU 8.2 07/23/2021    ANEUTAUTO 9.3 (H) 11/19/2021     05/06/2022        Post Infusion Assessment:  Patient tolerated subcutaneous injection in right arm without incident.     Discharge Plan:   Patient discharged in stable condition accompanied by: self.  Departure Mode: Ambulatory.      Tiffany Basurto RN

## 2022-05-09 NOTE — PROGRESS NOTES
Pt ambulates to infusion center for labs and treatment.  Lab results noted and Nplate given SQ as ordered.  [facility]  Plan RTC as scheduled.   Complex Repair And Rotation Flap Text: The defect edges were debeveled with a #15 scalpel blade.  The primary defect was closed partially with a complex linear closure.  Given the location of the remaining defect, shape of the defect and the proximity to free margins a rotation flap was deemed most appropriate for complete closure of the defect.  Using a sterile surgical marker, an appropriate advancement flap was drawn incorporating the defect and placing the expected incisions within the relaxed skin tension lines where possible.    The area thus outlined was incised deep to adipose tissue with a #15 scalpel blade.  The skin margins were undermined to an appropriate distance in all directions utilizing iris scissors.

## 2022-05-13 ENCOUNTER — INFUSION THERAPY VISIT (OUTPATIENT)
Dept: INFUSION THERAPY | Facility: HOSPITAL | Age: 87
End: 2022-05-13
Attending: INTERNAL MEDICINE
Payer: COMMERCIAL

## 2022-05-13 VITALS
DIASTOLIC BLOOD PRESSURE: 62 MMHG | RESPIRATION RATE: 16 BRPM | SYSTOLIC BLOOD PRESSURE: 122 MMHG | HEART RATE: 66 BPM | OXYGEN SATURATION: 97 %

## 2022-05-13 DIAGNOSIS — D69.3 IMMUNE THROMBOCYTOPENIC PURPURA (H): Primary | ICD-10-CM

## 2022-05-13 PROCEDURE — 96372 THER/PROPH/DIAG INJ SC/IM: CPT | Performed by: NURSE PRACTITIONER

## 2022-05-13 PROCEDURE — 250N000011 HC RX IP 250 OP 636: Performed by: NURSE PRACTITIONER

## 2022-05-13 RX ADMIN — ROMIPLOSTIM 55 MCG: 250 INJECTION, POWDER, LYOPHILIZED, FOR SOLUTION SUBCUTANEOUS at 13:32

## 2022-05-13 NOTE — PROGRESS NOTES
Infusion Nursing Note:  Ora Gonzalez presents today for nplate injection.    Patient seen by provider today: No   present during visit today: Not Applicable.    Note: N/A.      Intravenous Access:  No Intravenous access/labs at this visit.    Treatment Conditions:  Not Applicable.      Post Infusion Assessment:  Patient tolerated injection without incident.       Discharge Plan:   Patient discharged in stable condition accompanied by: self.  Departure Mode: Ambulatory.      Tova Chang RN

## 2022-05-20 ENCOUNTER — INFUSION THERAPY VISIT (OUTPATIENT)
Dept: INFUSION THERAPY | Facility: HOSPITAL | Age: 87
End: 2022-05-20
Attending: INTERNAL MEDICINE
Payer: COMMERCIAL

## 2022-05-20 VITALS
HEART RATE: 69 BPM | SYSTOLIC BLOOD PRESSURE: 148 MMHG | DIASTOLIC BLOOD PRESSURE: 62 MMHG | RESPIRATION RATE: 18 BRPM | OXYGEN SATURATION: 98 % | TEMPERATURE: 97.8 F

## 2022-05-20 DIAGNOSIS — D69.3 IMMUNE THROMBOCYTOPENIC PURPURA (H): Primary | ICD-10-CM

## 2022-05-20 PROCEDURE — 250N000011 HC RX IP 250 OP 636: Performed by: NURSE PRACTITIONER

## 2022-05-20 PROCEDURE — 96372 THER/PROPH/DIAG INJ SC/IM: CPT | Performed by: NURSE PRACTITIONER

## 2022-05-20 RX ADMIN — ROMIPLOSTIM 55 MCG: 250 INJECTION, POWDER, LYOPHILIZED, FOR SOLUTION SUBCUTANEOUS at 13:29

## 2022-05-20 NOTE — PROGRESS NOTES
PT here ambulatory for Nplate inj. PT states doing well and has no concerns. Nplated administered in right upper arm and bandaid to inj site. Follow up reviewed and pt dc'd steady gait.

## 2022-05-27 ENCOUNTER — INFUSION THERAPY VISIT (OUTPATIENT)
Dept: INFUSION THERAPY | Facility: HOSPITAL | Age: 87
End: 2022-05-27
Attending: INTERNAL MEDICINE
Payer: COMMERCIAL

## 2022-05-27 VITALS
HEART RATE: 66 BPM | BODY MASS INDEX: 22.09 KG/M2 | OXYGEN SATURATION: 97 % | WEIGHT: 122.8 LBS | SYSTOLIC BLOOD PRESSURE: 134 MMHG | RESPIRATION RATE: 16 BRPM | DIASTOLIC BLOOD PRESSURE: 61 MMHG | TEMPERATURE: 98.4 F

## 2022-05-27 DIAGNOSIS — D69.3 IMMUNE THROMBOCYTOPENIC PURPURA (H): Primary | ICD-10-CM

## 2022-05-27 PROCEDURE — 250N000011 HC RX IP 250 OP 636: Performed by: NURSE PRACTITIONER

## 2022-05-27 PROCEDURE — 96372 THER/PROPH/DIAG INJ SC/IM: CPT | Performed by: NURSE PRACTITIONER

## 2022-05-27 RX ADMIN — ROMIPLOSTIM 55 MCG: 250 INJECTION, POWDER, LYOPHILIZED, FOR SOLUTION SUBCUTANEOUS at 13:38

## 2022-05-27 NOTE — PROGRESS NOTES
PT here ambulatory for Nplate inj. Pt states doing well and has no concerns. Nplate reviewed and administered in right upper arm/bandaid to site. Follow up reviewed and pt dc'd steady gait.

## 2022-06-03 ENCOUNTER — LAB (OUTPATIENT)
Dept: INFUSION THERAPY | Facility: HOSPITAL | Age: 87
End: 2022-06-03
Attending: INTERNAL MEDICINE
Payer: COMMERCIAL

## 2022-06-03 VITALS
OXYGEN SATURATION: 96 % | TEMPERATURE: 98.8 F | DIASTOLIC BLOOD PRESSURE: 68 MMHG | RESPIRATION RATE: 16 BRPM | SYSTOLIC BLOOD PRESSURE: 139 MMHG | HEART RATE: 56 BPM

## 2022-06-03 DIAGNOSIS — D69.3 IMMUNE THROMBOCYTOPENIC PURPURA (H): Primary | ICD-10-CM

## 2022-06-03 DIAGNOSIS — D69.3 IMMUNE THROMBOCYTOPENIC PURPURA (H): ICD-10-CM

## 2022-06-03 LAB
ERYTHROCYTE [DISTWIDTH] IN BLOOD BY AUTOMATED COUNT: 14.5 % (ref 10–15)
HCT VFR BLD AUTO: 46 % (ref 35–47)
HGB BLD-MCNC: 14.7 G/DL (ref 11.7–15.7)
MCH RBC QN AUTO: 32.1 PG (ref 26.5–33)
MCHC RBC AUTO-ENTMCNC: 32 G/DL (ref 31.5–36.5)
MCV RBC AUTO: 100 FL (ref 78–100)
PLATELET # BLD AUTO: 232 10E3/UL (ref 150–450)
RBC # BLD AUTO: 4.58 10E6/UL (ref 3.8–5.2)
WBC # BLD AUTO: 13.7 10E3/UL (ref 4–11)

## 2022-06-03 PROCEDURE — 250N000011 HC RX IP 250 OP 636: Performed by: NURSE PRACTITIONER

## 2022-06-03 PROCEDURE — 85027 COMPLETE CBC AUTOMATED: CPT

## 2022-06-03 PROCEDURE — 96372 THER/PROPH/DIAG INJ SC/IM: CPT | Performed by: NURSE PRACTITIONER

## 2022-06-03 PROCEDURE — 36415 COLL VENOUS BLD VENIPUNCTURE: CPT

## 2022-06-03 RX ADMIN — ROMIPLOSTIM 55 MCG: 250 INJECTION, POWDER, LYOPHILIZED, FOR SOLUTION SUBCUTANEOUS at 13:39

## 2022-06-03 ASSESSMENT — PAIN SCALES - GENERAL: PAINLEVEL: NO PAIN (0)

## 2022-06-03 NOTE — PROGRESS NOTES
Infusion Nursing Note:  Ora Gonzalez presents today for NPLATE    Patient seen by provider today: No   present during visit today: Not Applicable.    Note: N/A.      Intravenous Access:  No Intravenous access/labs at this visit.    Treatment Conditions:  Lab Results   Component Value Date    HGB 14.7 06/03/2022    WBC 13.7 (H) 06/03/2022    ANEU 8.2 07/23/2021    ANEUTAUTO 9.3 (H) 11/19/2021     06/03/2022          Post Infusion Assessment:  Patient tolerated injection without incident.       Discharge Plan:   Patient discharged in stable condition accompanied by: self.  Departure Mode: Ambulatory.      Tova Morrow RN

## 2022-06-07 DIAGNOSIS — F02.80 LATE ONSET ALZHEIMER'S DISEASE WITHOUT BEHAVIORAL DISTURBANCE (H): ICD-10-CM

## 2022-06-07 DIAGNOSIS — G30.1 LATE ONSET ALZHEIMER'S DISEASE WITHOUT BEHAVIORAL DISTURBANCE (H): ICD-10-CM

## 2022-06-07 RX ORDER — DONEPEZIL HYDROCHLORIDE 10 MG/1
10 TABLET, FILM COATED ORAL AT BEDTIME
Qty: 30 TABLET | Refills: 0 | Status: SHIPPED | OUTPATIENT
Start: 2022-06-07 | End: 2022-06-30

## 2022-06-07 NOTE — TELEPHONE ENCOUNTER
Refill request for Aricept. Pt last seen 6/9/21 by Dr. Dunn and is currently overdue for follow up. Will send letter regarding need for follow up. Will also send 30 day supply with zero refills.     Gisela Vargas RN on 6/7/2022 at 8:52 AM

## 2022-06-07 NOTE — LETTER
6/7/2022        RE: Ora Kuarlund  [address] [place] [id number]          Dear Ms. Lisa,       We recently provided you with medication refills.  Many medications require routine follow-up with your doctor.    Your prescription(s) have been refilled for 30 days so you may have time for the above noted follow-up. Please call to schedule soon so we can assure you have an appointment before your next refills are needed. If you have already made a follow up appointment, please disregard this letter.         Sincerely,      [facility]     (Formerly known as [facility])

## 2022-06-10 ENCOUNTER — INFUSION THERAPY VISIT (OUTPATIENT)
Dept: INFUSION THERAPY | Facility: HOSPITAL | Age: 87
End: 2022-06-10
Attending: INTERNAL MEDICINE
Payer: COMMERCIAL

## 2022-06-10 VITALS
DIASTOLIC BLOOD PRESSURE: 83 MMHG | HEART RATE: 60 BPM | SYSTOLIC BLOOD PRESSURE: 112 MMHG | RESPIRATION RATE: 16 BRPM | TEMPERATURE: 97.6 F | OXYGEN SATURATION: 97 %

## 2022-06-10 DIAGNOSIS — D69.3 IMMUNE THROMBOCYTOPENIC PURPURA (H): Primary | ICD-10-CM

## 2022-06-10 PROCEDURE — 250N000011 HC RX IP 250 OP 636: Performed by: NURSE PRACTITIONER

## 2022-06-10 PROCEDURE — 96372 THER/PROPH/DIAG INJ SC/IM: CPT | Performed by: NURSE PRACTITIONER

## 2022-06-10 RX ADMIN — ROMIPLOSTIM 55 MCG: 250 INJECTION, POWDER, LYOPHILIZED, FOR SOLUTION SUBCUTANEOUS at 13:47

## 2022-06-10 NOTE — PROGRESS NOTES
Infusion Nursing Note:  Oar Lisa presents today for nplate.    Patient seen by provider today: No   present during visit today: Not Applicable.    Note: N/A.    Intravenous Access:  No Intravenous access/labs at this visit.    Treatment Conditions:  Not Applicable.    Post Infusion Assessment:  Patient tolerated injection without incident.     Discharge Plan:   Patient discharged in stable condition accompanied by: self.  Departure Mode: Ambulatory.      Tova Chang RN

## 2022-06-17 ENCOUNTER — INFUSION THERAPY VISIT (OUTPATIENT)
Dept: INFUSION THERAPY | Facility: HOSPITAL | Age: 87
End: 2022-06-17
Attending: INTERNAL MEDICINE
Payer: COMMERCIAL

## 2022-06-17 VITALS
HEART RATE: 59 BPM | OXYGEN SATURATION: 98 % | RESPIRATION RATE: 18 BRPM | TEMPERATURE: 97.8 F | SYSTOLIC BLOOD PRESSURE: 177 MMHG | DIASTOLIC BLOOD PRESSURE: 86 MMHG

## 2022-06-17 DIAGNOSIS — D69.3 IMMUNE THROMBOCYTOPENIC PURPURA (H): Primary | ICD-10-CM

## 2022-06-17 PROCEDURE — 96372 THER/PROPH/DIAG INJ SC/IM: CPT | Performed by: NURSE PRACTITIONER

## 2022-06-17 PROCEDURE — 250N000011 HC RX IP 250 OP 636: Performed by: NURSE PRACTITIONER

## 2022-06-17 RX ADMIN — ROMIPLOSTIM 55 MCG: 250 INJECTION, POWDER, LYOPHILIZED, FOR SOLUTION SUBCUTANEOUS at 13:38

## 2022-06-17 NOTE — PROGRESS NOTES
PT here ambulatory for Nplate inj. PT states feeling good and has no concerns. N plate administered and pt tolerated without any problems. Bandaid to site to right arm. Follow up reviewed and pt dc'd steady gait.

## 2022-06-24 ENCOUNTER — INFUSION THERAPY VISIT (OUTPATIENT)
Dept: INFUSION THERAPY | Facility: HOSPITAL | Age: 87
End: 2022-06-24
Attending: INTERNAL MEDICINE
Payer: COMMERCIAL

## 2022-06-24 VITALS
RESPIRATION RATE: 16 BRPM | SYSTOLIC BLOOD PRESSURE: 163 MMHG | OXYGEN SATURATION: 97 % | DIASTOLIC BLOOD PRESSURE: 68 MMHG | HEART RATE: 62 BPM

## 2022-06-24 DIAGNOSIS — D69.3 IMMUNE THROMBOCYTOPENIC PURPURA (H): Primary | ICD-10-CM

## 2022-06-24 PROCEDURE — 96372 THER/PROPH/DIAG INJ SC/IM: CPT | Performed by: NURSE PRACTITIONER

## 2022-06-24 PROCEDURE — 250N000011 HC RX IP 250 OP 636: Performed by: NURSE PRACTITIONER

## 2022-06-24 RX ADMIN — ROMIPLOSTIM 55 MCG: 250 INJECTION, POWDER, LYOPHILIZED, FOR SOLUTION SUBCUTANEOUS at 13:46

## 2022-06-24 NOTE — PROGRESS NOTES
Infusion Nursing Note:  Ora Lisa presents today for nplate.    Patient seen by provider today: No   present during visit today: Not Applicable.    Note: N/A.    Intravenous Access:  No Intravenous access/labs at this visit.    Treatment Conditions:  Not Applicable.    Post Infusion Assessment:  Patient tolerated injection without difficulty.     Discharge Plan:   Patient discharged in stable condition accompanied by: self.  Departure Mode: Ambulatory.      Tova Chang RN

## 2022-06-30 DIAGNOSIS — G30.1 LATE ONSET ALZHEIMER'S DISEASE WITHOUT BEHAVIORAL DISTURBANCE (H): ICD-10-CM

## 2022-06-30 DIAGNOSIS — F02.80 LATE ONSET ALZHEIMER'S DISEASE WITHOUT BEHAVIORAL DISTURBANCE (H): ICD-10-CM

## 2022-06-30 RX ORDER — DONEPEZIL HYDROCHLORIDE 10 MG/1
10 TABLET, FILM COATED ORAL AT BEDTIME
Qty: 90 TABLET | Refills: 1 | Status: SHIPPED | OUTPATIENT
Start: 2022-06-30 | End: 2022-11-08

## 2022-06-30 NOTE — TELEPHONE ENCOUNTER
Refill request for Aricept.  Pt last seen 6/9/21 by Dr. Dunn and has follow up scheduled for 11/8/22 with Dr. Mack. Will send in refills.     Gisela Vargas RN on 6/30/2022 at 9:46 AM

## 2022-07-01 ENCOUNTER — LAB (OUTPATIENT)
Dept: INFUSION THERAPY | Facility: HOSPITAL | Age: 87
End: 2022-07-01
Attending: INTERNAL MEDICINE
Payer: COMMERCIAL

## 2022-07-01 VITALS
RESPIRATION RATE: 16 BRPM | TEMPERATURE: 98.5 F | DIASTOLIC BLOOD PRESSURE: 66 MMHG | HEART RATE: 68 BPM | SYSTOLIC BLOOD PRESSURE: 132 MMHG | OXYGEN SATURATION: 98 %

## 2022-07-01 DIAGNOSIS — D69.3 IMMUNE THROMBOCYTOPENIC PURPURA (H): ICD-10-CM

## 2022-07-01 DIAGNOSIS — D69.3 IMMUNE THROMBOCYTOPENIC PURPURA (H): Primary | ICD-10-CM

## 2022-07-01 LAB
ERYTHROCYTE [DISTWIDTH] IN BLOOD BY AUTOMATED COUNT: 14.5 % (ref 10–15)
HCT VFR BLD AUTO: 47.9 % (ref 35–47)
HGB BLD-MCNC: 15.5 G/DL (ref 11.7–15.7)
MCH RBC QN AUTO: 32.2 PG (ref 26.5–33)
MCHC RBC AUTO-ENTMCNC: 32.4 G/DL (ref 31.5–36.5)
MCV RBC AUTO: 99 FL (ref 78–100)
PLATELET # BLD AUTO: 301 10E3/UL (ref 150–450)
RBC # BLD AUTO: 4.82 10E6/UL (ref 3.8–5.2)
WBC # BLD AUTO: 15.8 10E3/UL (ref 4–11)

## 2022-07-01 PROCEDURE — 96372 THER/PROPH/DIAG INJ SC/IM: CPT | Performed by: NURSE PRACTITIONER

## 2022-07-01 PROCEDURE — 36415 COLL VENOUS BLD VENIPUNCTURE: CPT

## 2022-07-01 PROCEDURE — 250N000011 HC RX IP 250 OP 636: Performed by: NURSE PRACTITIONER

## 2022-07-01 PROCEDURE — 85027 COMPLETE CBC AUTOMATED: CPT

## 2022-07-01 RX ADMIN — ROMIPLOSTIM 55 MCG: 250 INJECTION, POWDER, LYOPHILIZED, FOR SOLUTION SUBCUTANEOUS at 14:18

## 2022-07-01 ASSESSMENT — PAIN SCALES - GENERAL: PAINLEVEL: NO PAIN (0)

## 2022-07-01 NOTE — PROGRESS NOTES
Infusion Nursing Note:  Ora Gonzalez presents today for her next dose of Nplate.    Patient seen by provider today: No   present during visit today: Not Applicable.    Note: VSS.  Pt assessed and is feeling well.  No signs of bleeding.  Platelet count today is 301.  Nplate was given as ordered.    Intravenous Access:  n/a.    Treatment Conditions:  Results reviewed, labs MET treatment parameters, ok to proceed with treatment.    Post Infusion Assessment:  Patient tolerated injection without incident.    Discharge Plan:   Patient discharged in stable condition accompanied by: self.      Carole Barrett RN

## 2022-07-08 ENCOUNTER — INFUSION THERAPY VISIT (OUTPATIENT)
Dept: INFUSION THERAPY | Facility: HOSPITAL | Age: 87
End: 2022-07-08
Attending: INTERNAL MEDICINE
Payer: COMMERCIAL

## 2022-07-08 VITALS
DIASTOLIC BLOOD PRESSURE: 60 MMHG | TEMPERATURE: 97.9 F | HEART RATE: 67 BPM | RESPIRATION RATE: 16 BRPM | SYSTOLIC BLOOD PRESSURE: 129 MMHG | OXYGEN SATURATION: 95 %

## 2022-07-08 DIAGNOSIS — D69.3 IMMUNE THROMBOCYTOPENIC PURPURA (H): Primary | ICD-10-CM

## 2022-07-08 PROCEDURE — 96372 THER/PROPH/DIAG INJ SC/IM: CPT | Performed by: NURSE PRACTITIONER

## 2022-07-08 PROCEDURE — 250N000011 HC RX IP 250 OP 636: Performed by: NURSE PRACTITIONER

## 2022-07-08 RX ADMIN — ROMIPLOSTIM 55 MCG: 250 INJECTION, POWDER, LYOPHILIZED, FOR SOLUTION SUBCUTANEOUS at 13:01

## 2022-07-15 ENCOUNTER — INFUSION THERAPY VISIT (OUTPATIENT)
Dept: INFUSION THERAPY | Facility: HOSPITAL | Age: 87
End: 2022-07-15
Attending: INTERNAL MEDICINE
Payer: COMMERCIAL

## 2022-07-15 VITALS
OXYGEN SATURATION: 96 % | HEART RATE: 58 BPM | SYSTOLIC BLOOD PRESSURE: 178 MMHG | TEMPERATURE: 97.6 F | DIASTOLIC BLOOD PRESSURE: 69 MMHG | RESPIRATION RATE: 16 BRPM

## 2022-07-15 DIAGNOSIS — D69.3 IMMUNE THROMBOCYTOPENIC PURPURA (H): Primary | ICD-10-CM

## 2022-07-15 PROCEDURE — 96372 THER/PROPH/DIAG INJ SC/IM: CPT | Performed by: NURSE PRACTITIONER

## 2022-07-15 PROCEDURE — 250N000011 HC RX IP 250 OP 636: Performed by: NURSE PRACTITIONER

## 2022-07-15 RX ADMIN — ROMIPLOSTIM 55 MCG: 250 INJECTION, POWDER, LYOPHILIZED, FOR SOLUTION SUBCUTANEOUS at 13:29

## 2022-07-15 ASSESSMENT — PAIN SCALES - GENERAL: PAINLEVEL: NO PAIN (0)

## 2022-07-15 NOTE — PROGRESS NOTES
Infusion Nursing Note:  Ora Gonzalez presents today for C53 D1 nplate (weekly).    Patient seen by provider today: No    Note: Pt arrives ambulatory to [facility] infusion. Pt reports no changes in current baseline.    BP (!) 178/69   Pulse 58   Temp 97.6  F (36.4  C) (Oral)   Resp 16   SpO2 96%  Pt denies any headache or vision changes. Pt reports that her BP fluctuates. Pt reports that she continues to walk 3miles daily.    Treatment Conditions:  No labs to evaluate for this visit.    Post Infusion Assessment:  Patient tolerated subcutaneous injection without incident in right arm.     Discharge Plan:   Patient discharged in stable condition accompanied by: self.  Departure Mode: Ambulatory.      Tiffany Basurto RN

## 2022-07-22 ENCOUNTER — INFUSION THERAPY VISIT (OUTPATIENT)
Dept: INFUSION THERAPY | Facility: HOSPITAL | Age: 87
End: 2022-07-22
Attending: INTERNAL MEDICINE
Payer: COMMERCIAL

## 2022-07-22 VITALS
TEMPERATURE: 98 F | DIASTOLIC BLOOD PRESSURE: 72 MMHG | SYSTOLIC BLOOD PRESSURE: 175 MMHG | OXYGEN SATURATION: 96 % | RESPIRATION RATE: 16 BRPM | HEART RATE: 60 BPM

## 2022-07-22 DIAGNOSIS — D69.3 IMMUNE THROMBOCYTOPENIC PURPURA (H): Primary | ICD-10-CM

## 2022-07-22 PROCEDURE — 96372 THER/PROPH/DIAG INJ SC/IM: CPT | Performed by: NURSE PRACTITIONER

## 2022-07-22 PROCEDURE — 250N000011 HC RX IP 250 OP 636: Performed by: NURSE PRACTITIONER

## 2022-07-22 RX ADMIN — ROMIPLOSTIM 55 MCG: 250 INJECTION, POWDER, LYOPHILIZED, FOR SOLUTION SUBCUTANEOUS at 14:03

## 2022-07-29 ENCOUNTER — INFUSION THERAPY VISIT (OUTPATIENT)
Dept: INFUSION THERAPY | Facility: HOSPITAL | Age: 87
End: 2022-07-29
Attending: INTERNAL MEDICINE
Payer: COMMERCIAL

## 2022-07-29 VITALS
TEMPERATURE: 98 F | SYSTOLIC BLOOD PRESSURE: 140 MMHG | OXYGEN SATURATION: 97 % | HEART RATE: 67 BPM | RESPIRATION RATE: 16 BRPM | DIASTOLIC BLOOD PRESSURE: 66 MMHG

## 2022-07-29 DIAGNOSIS — D69.3 IMMUNE THROMBOCYTOPENIC PURPURA (H): Primary | ICD-10-CM

## 2022-07-29 PROCEDURE — 96372 THER/PROPH/DIAG INJ SC/IM: CPT | Performed by: NURSE PRACTITIONER

## 2022-07-29 PROCEDURE — 250N000011 HC RX IP 250 OP 636: Performed by: NURSE PRACTITIONER

## 2022-07-29 RX ADMIN — ROMIPLOSTIM 55 MCG: 250 INJECTION, POWDER, LYOPHILIZED, FOR SOLUTION SUBCUTANEOUS at 13:50

## 2022-08-05 ENCOUNTER — LAB (OUTPATIENT)
Dept: INFUSION THERAPY | Facility: HOSPITAL | Age: 87
End: 2022-08-05
Attending: INTERNAL MEDICINE
Payer: COMMERCIAL

## 2022-08-05 ENCOUNTER — ONCOLOGY VISIT (OUTPATIENT)
Dept: ONCOLOGY | Facility: HOSPITAL | Age: 87
End: 2022-08-05
Attending: INTERNAL MEDICINE
Payer: COMMERCIAL

## 2022-08-05 VITALS
OXYGEN SATURATION: 93 % | SYSTOLIC BLOOD PRESSURE: 190 MMHG | BODY MASS INDEX: 21.14 KG/M2 | DIASTOLIC BLOOD PRESSURE: 83 MMHG | WEIGHT: 119.3 LBS | HEIGHT: 63 IN | HEART RATE: 67 BPM

## 2022-08-05 DIAGNOSIS — D69.3 IMMUNE THROMBOCYTOPENIC PURPURA (H): Primary | ICD-10-CM

## 2022-08-05 DIAGNOSIS — D69.3 IMMUNE THROMBOCYTOPENIC PURPURA (H): ICD-10-CM

## 2022-08-05 LAB
ERYTHROCYTE [DISTWIDTH] IN BLOOD BY AUTOMATED COUNT: 14.6 % (ref 10–15)
HCT VFR BLD AUTO: 46.2 % (ref 35–47)
HGB BLD-MCNC: 15.3 G/DL (ref 11.7–15.7)
MCH RBC QN AUTO: 32.7 PG (ref 26.5–33)
MCHC RBC AUTO-ENTMCNC: 33.1 G/DL (ref 31.5–36.5)
MCV RBC AUTO: 99 FL (ref 78–100)
PLATELET # BLD AUTO: 199 10E3/UL (ref 150–450)
RBC # BLD AUTO: 4.68 10E6/UL (ref 3.8–5.2)
WBC # BLD AUTO: 17.1 10E3/UL (ref 4–11)

## 2022-08-05 PROCEDURE — G0463 HOSPITAL OUTPT CLINIC VISIT: HCPCS | Mod: 25

## 2022-08-05 PROCEDURE — 40000724 ZZH UMP OPEN ENCOUNTER >70 DAYS

## 2022-08-05 PROCEDURE — 85027 COMPLETE CBC AUTOMATED: CPT

## 2022-08-05 PROCEDURE — 96372 THER/PROPH/DIAG INJ SC/IM: CPT | Performed by: NURSE PRACTITIONER

## 2022-08-05 PROCEDURE — 36415 COLL VENOUS BLD VENIPUNCTURE: CPT

## 2022-08-05 PROCEDURE — G0463 HOSPITAL OUTPT CLINIC VISIT: HCPCS

## 2022-08-05 PROCEDURE — 99213 OFFICE O/P EST LOW 20 MIN: CPT | Performed by: INTERNAL MEDICINE

## 2022-08-05 PROCEDURE — 250N000011 HC RX IP 250 OP 636: Performed by: NURSE PRACTITIONER

## 2022-08-05 RX ADMIN — ROMIPLOSTIM 55 MCG: 250 INJECTION, POWDER, LYOPHILIZED, FOR SOLUTION SUBCUTANEOUS at 15:30

## 2022-08-05 ASSESSMENT — PAIN SCALES - GENERAL: PAINLEVEL: NO PAIN (0)

## 2022-08-05 NOTE — LETTER
"    8/5/2022         RE: Ora Gonzalez  [address] [facility] [address]        Dear Colleague,    Thank you for referring your patient, Ora Gonzalez, to the [facility]. Please see a copy of my visit note below.    Oncology Rooming Note    August 5, 2022 2:44 PM   Ora Gonzalez is a 88 year old female who presents for:    Chief Complaint   Patient presents with     Hematology     Immune thrombocytopenic purpura      Initial Vitals: BP (!) 190/83 (BP Location: Left arm, Patient Position: Sitting, Cuff Size: Adult Regular)   Pulse 67   Ht 1.588 m (5' 2.5\")   Wt 54.1 kg (119 lb 4.8 oz)   SpO2 93%   BMI 21.47 kg/m   Estimated body mass index is 21.47 kg/m  as calculated from the following:    Height as of this encounter: 1.588 m (5' 2.5\").    Weight as of this encounter: 54.1 kg (119 lb 4.8 oz). Body surface area is 1.54 meters squared.  No Pain (0) Comment: Data Unavailable   No LMP recorded.  Allergies reviewed: Yes  Medications reviewed: Yes    Medications: Medication refills not needed today.  Pharmacy name entered into [address]: [id number] - [place] [id number]    Clinical concerns: 6 month follow up with labs and injection      Susan Hussein MA              [facility] Hematology and Oncology Progress Note    Patient: Ora Gonzalez  MRN: [id number]  Date of Service: Aug 5, 2022        Assessment and Plan:    1. Immune thrombocytopenia: Platelet count continues to remain stable.  She is tolerating her medication well with no side effects.  She will continue with the current schedule and dose.  I reviewed her labs today which a white count of 17.1, hemoglobin of 15.3 and platelets of 199,000.    2. Leukocytosis: Stable, mild, chronic. Follow.  Neutrophilia.    ECOG Performance  0    Diagnosis:    1. 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 Bone marrow biopsy was unremarkable, November 2014. Thyroid functions were normal. Anticardiolipin " antibodies were normal.     2. Right-sided pulmonary embolism: Provoked. Diagnosed February 8, 2016.    Treatment:    She started course of prednisone on November 14, 2014 and finished on January 5, 2015. She had a complete response. Quickly relapsed and treated with Rituxan weekly from March 12 through April 2, 2015. She responded with highest platelet count of 112.     She then relapsed in October, 2015. She was started on Promacta in October. She responded within 2-3 weeks. She then quickly lost response after dose reduction from 50 to 25mg. She was started back on Promacta 50 mg and prednisone 60 mg daily. She did not respond. She received 2 doses of IVIG on December 4 and 5, 2015. She responded briefly with a platelet count of 59 on December 7 but then quickly lost response by December 14. 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 She received 2 more doses of IVIG on December 16 and 17th with minimal response.   Splenectomy was performed on December 20, 2015. She did not respond.   We started Rituxan on December 29, 2015. Steroids were continued. Romiplostim was started on January 5, 2016. 1 dose of WinRho was given on January 6, 2016.  Platelet response noted on January 13.  Her last dose of Rituxan was on January 20th, 2016.     She was restarted on N-plate on July 28, 2016 for relapse.    Interim History:    Ora comes in today for a follow-up visit.  Overall has been doing well.  No shortness of breath.  No bleeding or bruising.  No acute complaints today.    Review of Systems:    As above in the history.     Review of Systems otherwise Negative for:  General: chills, fever or night sweats  Psychological: anxiety or depression  Ophthalmic: blurry vision, double vision or loss of vision, vision change  ENT: epistaxis, oral lesions, hearing changes  Hematological and Lymphatic: bleeding, bruising, jaundice, swollen lymph nodes  Endocrine: hot flashes, unexpected weight changes  Respiratory: cough,  "hemoptysis, orthopnea or shortness of breath/NOLEN  Cardiovascular: chest pain, edema, palpitations or PND  Gastrointestinal: abdominal pain, blood in stools, change in bowel habits, constipation, diarrhea or nausea/vomiting  Genito-Urinary: change in urinary stream, incontinence, frequency/urgency  Musculoskeletal: joint pain, stiffness, swelling, muscle pain  Neurological: dizziness, headaches, numbness/tingling  Dermatological: lumps and rash    Past History:    Past Medical History:   Diagnosis Date     Adjustment disorder with mixed anxiety and depressed mood 1/22/2016     Adjustment reaction to chronic stress 1/22/2016     Chronic kidney disease      Hypertension      ITP (idiopathic thrombocytopenic purpura)      Osteoporosis      Paroxysmal atrial fibrillation (H)      Pulmonary embolism (H) 2/8/2016     Thrombocytopenia (H)      Physical Exam:    BP (!) 190/83 (BP Location: Left arm, Patient Position: Sitting, Cuff Size: Adult Regular)   Pulse 67   Ht 1.588 m (5' 2.5\")   Wt 54.1 kg (119 lb 4.8 oz)   SpO2 93%   BMI 21.47 kg/m      General: patient appears stated age of 87 year old. Nontoxic and in no distress.   HEENT: Head: atraumatic, normocephalic. Sclerae anicteric.  Chest:  Normal respiratory effort  Cardiac:  No edema.   Abdomen: abdomen is non-distended  Extremities: normal tone and muscle bulk.  Skin: no lesions or rash on visible skin. Warm and dry.   CNS: alert and oriented. Grossly non-focal.   Psychiatric: normal mood and affect.     Lab Results:    Recent Results (from the past 168 hour(s))   CBC with platelets   Result Value Ref Range    WBC Count 14.9 (H) 4.0 - 11.0 10e3/uL    RBC Count 4.75 3.80 - 5.20 10e6/uL    Hemoglobin 15.1 11.7 - 15.7 g/dL    Hematocrit 46.8 35.0 - 47.0 %    MCV 99 78 - 100 fL    MCH 31.8 26.5 - 33.0 pg    MCHC 32.3 31.5 - 36.5 g/dL    RDW 14.6 10.0 - 15.0 %    Platelet Count 272 150 - 450 10e3/uL     Imaging:    No results found.      Signed by: Vinny Garcia, " MD        Again, thank you for allowing me to participate in the care of your patient.        Sincerely,        Vinny Garcia MD

## 2022-08-05 NOTE — PROGRESS NOTES
"Oncology Rooming Note    August 5, 2022 2:44 PM   Ora Gonzalez is a 88 year old female who presents for:    Chief Complaint   Patient presents with     Hematology     Immune thrombocytopenic purpura      Initial Vitals: BP (!) 190/83 (BP Location: Left arm, Patient Position: Sitting, Cuff Size: Adult Regular)   Pulse 67   Ht 1.588 m (5' 2.5\")   Wt 54.1 kg (119 lb 4.8 oz)   SpO2 93%   BMI 21.47 kg/m   Estimated body mass index is 21.47 kg/m  as calculated from the following:    Height as of this encounter: 1.588 m (5' 2.5\").    Weight as of this encounter: 54.1 kg (119 lb 4.8 oz). Body surface area is 1.54 meters squared.  No Pain (0) Comment: Data Unavailable   No LMP recorded.  Allergies reviewed: Yes  Medications reviewed: Yes    Medications: Medication refills not needed today.  Pharmacy name entered into [employer]: [id number] - [place] [phone]    Clinical concerns: 6 month follow up with labs and injection      Susan Hussein MA            "

## 2022-08-05 NOTE — PROGRESS NOTES
Infusion Nursing Note:  Ora Gonzalez presents today for Nplate.    Patient seen by provider today: Yes: Dr Garcia   present during visit today: Not Applicable.    Note: Platelets today were 199.  Nplate given subcutaneous into her right upper arm.    Intravenous Access:  n/a.    Treatment Conditions:  Results reviewed, ok to proceed with treatment.    Post Infusion Assessment:  Patient tolerated injection without incident.     Discharge Plan:   Patient discharged in stable condition accompanied by: daughter.      Carole Barrett RN

## 2022-08-12 ENCOUNTER — INFUSION THERAPY VISIT (OUTPATIENT)
Dept: INFUSION THERAPY | Facility: HOSPITAL | Age: 87
End: 2022-08-12
Attending: INTERNAL MEDICINE
Payer: COMMERCIAL

## 2022-08-12 VITALS
TEMPERATURE: 98.3 F | SYSTOLIC BLOOD PRESSURE: 166 MMHG | OXYGEN SATURATION: 97 % | RESPIRATION RATE: 18 BRPM | HEART RATE: 62 BPM | DIASTOLIC BLOOD PRESSURE: 69 MMHG

## 2022-08-12 DIAGNOSIS — D69.3 IMMUNE THROMBOCYTOPENIC PURPURA (H): Primary | ICD-10-CM

## 2022-08-12 PROCEDURE — 250N000011 HC RX IP 250 OP 636: Performed by: NURSE PRACTITIONER

## 2022-08-12 PROCEDURE — 96372 THER/PROPH/DIAG INJ SC/IM: CPT | Performed by: NURSE PRACTITIONER

## 2022-08-12 RX ADMIN — ROMIPLOSTIM 55 MCG: 250 INJECTION, POWDER, LYOPHILIZED, FOR SOLUTION SUBCUTANEOUS at 08:52

## 2022-08-12 NOTE — PROGRESS NOTES
Infusion Nursing Note:  Ora Gonzalez presents today for Nplate.    Patient seen by provider today: No   present during visit today: Not Applicable.    Note: VSS.  Pt assessed and is feeling well today.  Nplate given as ordered.    Intravenous Access:  No Intravenous access/labs at this visit.    Treatment Conditions:  Not Applicable.    Post Infusion Assessment:  Patient tolerated injection without incident.     Discharge Plan:   Patient discharged in stable condition accompanied by: daughter.  Departure Mode: Ambulatory.      Carole Barrett RN

## 2022-08-19 ENCOUNTER — INFUSION THERAPY VISIT (OUTPATIENT)
Dept: INFUSION THERAPY | Facility: HOSPITAL | Age: 87
End: 2022-08-19
Attending: INTERNAL MEDICINE
Payer: COMMERCIAL

## 2022-08-19 VITALS
DIASTOLIC BLOOD PRESSURE: 67 MMHG | RESPIRATION RATE: 16 BRPM | SYSTOLIC BLOOD PRESSURE: 140 MMHG | OXYGEN SATURATION: 96 % | HEART RATE: 60 BPM

## 2022-08-19 DIAGNOSIS — D69.3 IMMUNE THROMBOCYTOPENIC PURPURA (H): Primary | ICD-10-CM

## 2022-08-19 PROCEDURE — 96372 THER/PROPH/DIAG INJ SC/IM: CPT | Performed by: NURSE PRACTITIONER

## 2022-08-19 PROCEDURE — 250N000011 HC RX IP 250 OP 636: Performed by: NURSE PRACTITIONER

## 2022-08-19 RX ADMIN — ROMIPLOSTIM 55 MCG: 250 INJECTION, POWDER, LYOPHILIZED, FOR SOLUTION SUBCUTANEOUS at 08:29

## 2022-08-19 NOTE — PROGRESS NOTES
Infusion Nursing Note:  Ora Lisa presents today for injection.    Patient seen by provider today: No   present during visit today: Not Applicable.    Note: N/A.    Intravenous Access:  No Intravenous access/labs at this visit.    Treatment Conditions:  Not Applicable.    Post Infusion Assessment:  Patient tolerated injection without incident.     Discharge Plan:   Patient discharged in stable condition accompanied by: self.  Departure Mode: Ambulatory.      Tova Chang RN

## 2022-08-26 ENCOUNTER — INFUSION THERAPY VISIT (OUTPATIENT)
Dept: INFUSION THERAPY | Facility: HOSPITAL | Age: 87
End: 2022-08-26
Attending: INTERNAL MEDICINE
Payer: COMMERCIAL

## 2022-08-26 DIAGNOSIS — D69.3 IMMUNE THROMBOCYTOPENIC PURPURA (H): Primary | ICD-10-CM

## 2022-08-26 PROCEDURE — 250N000011 HC RX IP 250 OP 636: Performed by: NURSE PRACTITIONER

## 2022-08-26 PROCEDURE — 96372 THER/PROPH/DIAG INJ SC/IM: CPT | Performed by: NURSE PRACTITIONER

## 2022-08-26 RX ADMIN — ROMIPLOSTIM 55 MCG: 250 INJECTION, POWDER, LYOPHILIZED, FOR SOLUTION SUBCUTANEOUS at 08:24

## 2022-08-26 NOTE — PROGRESS NOTES
PT here ambulatory for Nplate inj. Reviewed inj and administered in right upper arm/bandaid to site. PT tolerated inj. Follow up reviewed and pt dc'd steady gait.

## 2022-09-02 ENCOUNTER — INFUSION THERAPY VISIT (OUTPATIENT)
Dept: INFUSION THERAPY | Facility: HOSPITAL | Age: 87
End: 2022-09-02
Attending: INTERNAL MEDICINE
Payer: COMMERCIAL

## 2022-09-02 VITALS
DIASTOLIC BLOOD PRESSURE: 71 MMHG | HEART RATE: 58 BPM | OXYGEN SATURATION: 99 % | RESPIRATION RATE: 16 BRPM | TEMPERATURE: 97.8 F | SYSTOLIC BLOOD PRESSURE: 137 MMHG

## 2022-09-02 DIAGNOSIS — D69.3 IMMUNE THROMBOCYTOPENIC PURPURA (H): ICD-10-CM

## 2022-09-02 DIAGNOSIS — D69.3 IMMUNE THROMBOCYTOPENIC PURPURA (H): Primary | ICD-10-CM

## 2022-09-02 LAB
ERYTHROCYTE [DISTWIDTH] IN BLOOD BY AUTOMATED COUNT: 14.6 % (ref 10–15)
HCT VFR BLD AUTO: 44.4 % (ref 35–47)
HGB BLD-MCNC: 14.2 G/DL (ref 11.7–15.7)
MCH RBC QN AUTO: 31.9 PG (ref 26.5–33)
MCHC RBC AUTO-ENTMCNC: 32 G/DL (ref 31.5–36.5)
MCV RBC AUTO: 100 FL (ref 78–100)
PLATELET # BLD AUTO: 269 10E3/UL (ref 150–450)
RBC # BLD AUTO: 4.45 10E6/UL (ref 3.8–5.2)
WBC # BLD AUTO: 14.1 10E3/UL (ref 4–11)

## 2022-09-02 PROCEDURE — 85027 COMPLETE CBC AUTOMATED: CPT

## 2022-09-02 PROCEDURE — 36415 COLL VENOUS BLD VENIPUNCTURE: CPT

## 2022-09-02 PROCEDURE — 250N000011 HC RX IP 250 OP 636: Performed by: NURSE PRACTITIONER

## 2022-09-02 PROCEDURE — 96372 THER/PROPH/DIAG INJ SC/IM: CPT | Performed by: NURSE PRACTITIONER

## 2022-09-02 RX ADMIN — ROMIPLOSTIM 55 MCG: 250 INJECTION, POWDER, LYOPHILIZED, FOR SOLUTION SUBCUTANEOUS at 08:43

## 2022-09-02 NOTE — PROGRESS NOTES
Infusion Nursing Note:  Ora Gonzalez presents today for labs, injection.    Patient seen by provider today: No   present during visit today: Not Applicable.    Note: N/A.    Intravenous Access:  Labs drawn without difficulty.    Treatment Conditions:  Results reviewed, labs MET treatment parameters, ok to proceed with treatment.    Post Infusion Assessment:  Patient tolerated injection without incident.     Discharge Plan:   Patient discharged in stable condition accompanied by: self.  Departure Mode: Ambulatory.      Tova Chang RN

## 2022-09-09 ENCOUNTER — INFUSION THERAPY VISIT (OUTPATIENT)
Dept: INFUSION THERAPY | Facility: HOSPITAL | Age: 87
End: 2022-09-09
Attending: INTERNAL MEDICINE
Payer: COMMERCIAL

## 2022-09-09 VITALS
SYSTOLIC BLOOD PRESSURE: 143 MMHG | OXYGEN SATURATION: 97 % | RESPIRATION RATE: 16 BRPM | TEMPERATURE: 98.1 F | DIASTOLIC BLOOD PRESSURE: 66 MMHG | HEART RATE: 63 BPM

## 2022-09-09 DIAGNOSIS — D69.3 IMMUNE THROMBOCYTOPENIC PURPURA (H): Primary | ICD-10-CM

## 2022-09-09 PROCEDURE — 250N000011 HC RX IP 250 OP 636: Performed by: NURSE PRACTITIONER

## 2022-09-09 PROCEDURE — 96372 THER/PROPH/DIAG INJ SC/IM: CPT | Performed by: NURSE PRACTITIONER

## 2022-09-09 RX ADMIN — ROMIPLOSTIM 55 MCG: 250 INJECTION, POWDER, LYOPHILIZED, FOR SOLUTION SUBCUTANEOUS at 13:40

## 2022-09-09 NOTE — PROGRESS NOTES
PT here ambulatory for Nplate inj. inj reviewed and administered in right upper arm/bandaid to site. PT states feeing good and has no concerns. PT tolerated inj without any problems. PT dc'd steady gait.

## 2022-09-16 ENCOUNTER — INFUSION THERAPY VISIT (OUTPATIENT)
Dept: INFUSION THERAPY | Facility: HOSPITAL | Age: 87
End: 2022-09-16
Attending: INTERNAL MEDICINE
Payer: COMMERCIAL

## 2022-09-16 VITALS
HEART RATE: 60 BPM | TEMPERATURE: 97.8 F | DIASTOLIC BLOOD PRESSURE: 82 MMHG | RESPIRATION RATE: 16 BRPM | OXYGEN SATURATION: 99 % | SYSTOLIC BLOOD PRESSURE: 177 MMHG

## 2022-09-16 DIAGNOSIS — D69.3 IMMUNE THROMBOCYTOPENIC PURPURA (H): Primary | ICD-10-CM

## 2022-09-16 PROCEDURE — 96372 THER/PROPH/DIAG INJ SC/IM: CPT | Performed by: NURSE PRACTITIONER

## 2022-09-16 PROCEDURE — 250N000011 HC RX IP 250 OP 636: Performed by: NURSE PRACTITIONER

## 2022-09-16 RX ADMIN — ROMIPLOSTIM 55 MCG: 250 INJECTION, POWDER, LYOPHILIZED, FOR SOLUTION SUBCUTANEOUS at 14:21

## 2022-09-23 ENCOUNTER — INFUSION THERAPY VISIT (OUTPATIENT)
Dept: INFUSION THERAPY | Facility: HOSPITAL | Age: 87
End: 2022-09-23
Attending: INTERNAL MEDICINE
Payer: COMMERCIAL

## 2022-09-23 DIAGNOSIS — D69.3 IMMUNE THROMBOCYTOPENIC PURPURA (H): Primary | ICD-10-CM

## 2022-09-23 PROCEDURE — 96372 THER/PROPH/DIAG INJ SC/IM: CPT | Performed by: NURSE PRACTITIONER

## 2022-09-23 PROCEDURE — 250N000011 HC RX IP 250 OP 636: Performed by: NURSE PRACTITIONER

## 2022-09-23 RX ADMIN — ROMIPLOSTIM 55 MCG: 250 INJECTION, POWDER, LYOPHILIZED, FOR SOLUTION SUBCUTANEOUS at 13:54

## 2022-09-23 NOTE — PROGRESS NOTES
PT here for nplate inj. PT states feeling great and has no concerns. Nplate reviewed and administered in right upper arm/bandaid to site. Follow up reviewed and pt tolerated inj without any problems. PT dc'd steady gait.

## 2022-09-30 ENCOUNTER — INFUSION THERAPY VISIT (OUTPATIENT)
Dept: INFUSION THERAPY | Facility: HOSPITAL | Age: 87
End: 2022-09-30
Payer: COMMERCIAL

## 2022-09-30 VITALS
HEART RATE: 70 BPM | TEMPERATURE: 98 F | SYSTOLIC BLOOD PRESSURE: 141 MMHG | RESPIRATION RATE: 16 BRPM | DIASTOLIC BLOOD PRESSURE: 72 MMHG | OXYGEN SATURATION: 98 %

## 2022-09-30 DIAGNOSIS — D69.3 IMMUNE THROMBOCYTOPENIC PURPURA (H): Primary | ICD-10-CM

## 2022-09-30 LAB
ERYTHROCYTE [DISTWIDTH] IN BLOOD BY AUTOMATED COUNT: 14.6 % (ref 10–15)
HCT VFR BLD AUTO: 46.8 % (ref 35–47)
HGB BLD-MCNC: 15.1 G/DL (ref 11.7–15.7)
MCH RBC QN AUTO: 31.8 PG (ref 26.5–33)
MCHC RBC AUTO-ENTMCNC: 32.3 G/DL (ref 31.5–36.5)
MCV RBC AUTO: 99 FL (ref 78–100)
PLATELET # BLD AUTO: 272 10E3/UL (ref 150–450)
RBC # BLD AUTO: 4.75 10E6/UL (ref 3.8–5.2)
WBC # BLD AUTO: 14.9 10E3/UL (ref 4–11)

## 2022-09-30 PROCEDURE — 85027 COMPLETE CBC AUTOMATED: CPT

## 2022-09-30 PROCEDURE — 36415 COLL VENOUS BLD VENIPUNCTURE: CPT

## 2022-09-30 PROCEDURE — 96372 THER/PROPH/DIAG INJ SC/IM: CPT | Performed by: NURSE PRACTITIONER

## 2022-09-30 PROCEDURE — 250N000011 HC RX IP 250 OP 636: Performed by: NURSE PRACTITIONER

## 2022-09-30 RX ADMIN — ROMIPLOSTIM 55 MCG: 250 INJECTION, POWDER, LYOPHILIZED, FOR SOLUTION SUBCUTANEOUS at 13:14

## 2022-09-30 NOTE — PROGRESS NOTES
[facility] Hematology and Oncology Progress Note    Patient: Ora Gonzalez  MRN: [id number]  Date of Service: Aug 5, 2022        Assessment and Plan:    1. Immune thrombocytopenia: Platelet count continues to remain stable.  She is tolerating her medication well with no side effects.  She will continue with the current schedule and dose.  I reviewed her labs today which a white count of 17.1, hemoglobin of 15.3 and platelets of 199,000.    2. Leukocytosis: Stable, mild, chronic. Follow.  Neutrophilia.    ECOG Performance  0    Diagnosis:    1. 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 Bone marrow biopsy was unremarkable, November 2014. Thyroid functions were normal. Anticardiolipin antibodies were normal.     2. Right-sided pulmonary embolism: Provoked. Diagnosed February 8, 2016.    Treatment:    She started course of prednisone on November 14, 2014 and finished on January 5, 2015. She had a complete response. Quickly relapsed and treated with Rituxan weekly from March 12 through April 2, 2015. She responded with highest platelet count of 112.     She then relapsed in October, 2015. She was started on Promacta in October. She responded within 2-3 weeks. She then quickly lost response after dose reduction from 50 to 25mg. She was started back on Promacta 50 mg and prednisone 60 mg daily. She did not respond. She received 2 doses of IVIG on December 4 and 5, 2015. She responded briefly with a platelet count of 59 on December 7 but then quickly lost response by December 14. 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 She received 2 more doses of IVIG on December 16 and 17th with minimal response.   Splenectomy was performed on December 20, 2015. She did not respond.   We started Rituxan on December 29, 2015. Steroids were continued. Romiplostim was started on January 5, 2016. 1 dose of WinRho was given on January 6, 2016.  Platelet response noted on January 13.  Her last dose of Rituxan was on  "January 20th, 2016.     She was restarted on N-plate on July 28, 2016 for relapse.    Interim History:    Ora comes in today for a follow-up visit.  Overall has been doing well.  No shortness of breath.  No bleeding or bruising.  No acute complaints today.    Review of Systems:    As above in the history.     Review of Systems otherwise Negative for:  General: chills, fever or night sweats  Psychological: anxiety or depression  Ophthalmic: blurry vision, double vision or loss of vision, vision change  ENT: epistaxis, oral lesions, hearing changes  Hematological and Lymphatic: bleeding, bruising, jaundice, swollen lymph nodes  Endocrine: hot flashes, unexpected weight changes  Respiratory: cough, hemoptysis, orthopnea or shortness of breath/NOLEN  Cardiovascular: chest pain, edema, palpitations or PND  Gastrointestinal: abdominal pain, blood in stools, change in bowel habits, constipation, diarrhea or nausea/vomiting  Genito-Urinary: change in urinary stream, incontinence, frequency/urgency  Musculoskeletal: joint pain, stiffness, swelling, muscle pain  Neurological: dizziness, headaches, numbness/tingling  Dermatological: lumps and rash    Past History:    Past Medical History:   Diagnosis Date     Adjustment disorder with mixed anxiety and depressed mood 1/22/2016     Adjustment reaction to chronic stress 1/22/2016     Chronic kidney disease      Hypertension      ITP (idiopathic thrombocytopenic purpura)      Osteoporosis      Paroxysmal atrial fibrillation (H)      Pulmonary embolism (H) 2/8/2016     Thrombocytopenia (H)      Physical Exam:    BP (!) 190/83 (BP Location: Left arm, Patient Position: Sitting, Cuff Size: Adult Regular)   Pulse 67   Ht 1.588 m (5' 2.5\")   Wt 54.1 kg (119 lb 4.8 oz)   SpO2 93%   BMI 21.47 kg/m      General: patient appears stated age of 87 year old. Nontoxic and in no distress.   HEENT: Head: atraumatic, normocephalic. Sclerae anicteric.  Chest:  Normal respiratory " effort  Cardiac:  No edema.   Abdomen: abdomen is non-distended  Extremities: normal tone and muscle bulk.  Skin: no lesions or rash on visible skin. Warm and dry.   CNS: alert and oriented. Grossly non-focal.   Psychiatric: normal mood and affect.     Lab Results:    Recent Results (from the past 168 hour(s))   CBC with platelets   Result Value Ref Range    WBC Count 14.9 (H) 4.0 - 11.0 10e3/uL    RBC Count 4.75 3.80 - 5.20 10e6/uL    Hemoglobin 15.1 11.7 - 15.7 g/dL    Hematocrit 46.8 35.0 - 47.0 %    MCV 99 78 - 100 fL    MCH 31.8 26.5 - 33.0 pg    MCHC 32.3 31.5 - 36.5 g/dL    RDW 14.6 10.0 - 15.0 %    Platelet Count 272 150 - 450 10e3/uL     Imaging:    No results found.      Signed by: Vinny Garcia MD

## 2022-09-30 NOTE — PROGRESS NOTES
Infusion Nursing Note:  Ora Gonzalez presents today for lab and an injection.    Patient seen by provider today: No   present during visit today: Not Applicable.    Note: Ora comes to infusion ambulatory and in stable condition. Confirms that she is here for labs, and an injection. Labs drawn, sent to lab and reviewed. NPLATE administered in the Right arm and site covered with a band aide. Ora reports no new concerns. Left infusion ambulatory and in stable condition at 1317. [facility] will be coming to pick her up. Will return next week.     Intravenous Access:  Lab draw site Right AC, Needle type Butterfly, Gauge 23.  Labs drawn without difficulty.    Treatment Conditions:  Lab Results   Component Value Date    HGB 15.1 09/30/2022    WBC 14.9 (H) 09/30/2022    ANEU 8.2 07/23/2021    ANEUTAUTO 9.3 (H) 11/19/2021     09/30/2022        Post Infusion Assessment:  Patient tolerated injection without incident.  Site patent and intact, free from redness, edema or discomfort.     Discharge Plan:   Discharge instructions reviewed with: Patient.  Patient and/or family verbalized understanding of discharge instructions and all questions answered.  AVS to patient via MD-ITT.  Patient will return on 10/7 for next appointment.   Patient discharged in stable condition accompanied by: self.  Departure Mode: Ambulatory.      Mena Grider RN

## 2022-10-07 ENCOUNTER — INFUSION THERAPY VISIT (OUTPATIENT)
Dept: INFUSION THERAPY | Facility: HOSPITAL | Age: 87
End: 2022-10-07
Attending: INTERNAL MEDICINE
Payer: COMMERCIAL

## 2022-10-07 VITALS
TEMPERATURE: 97.8 F | RESPIRATION RATE: 16 BRPM | OXYGEN SATURATION: 97 % | SYSTOLIC BLOOD PRESSURE: 164 MMHG | HEART RATE: 58 BPM | DIASTOLIC BLOOD PRESSURE: 74 MMHG

## 2022-10-07 DIAGNOSIS — D69.3 IMMUNE THROMBOCYTOPENIC PURPURA (H): Primary | ICD-10-CM

## 2022-10-07 DIAGNOSIS — Z23 NEED FOR PROPHYLACTIC VACCINATION AND INOCULATION AGAINST INFLUENZA: ICD-10-CM

## 2022-10-07 PROCEDURE — 250N000011 HC RX IP 250 OP 636: Mod: JW | Performed by: NURSE PRACTITIONER

## 2022-10-07 PROCEDURE — 250N000011 HC RX IP 250 OP 636: Performed by: INTERNAL MEDICINE

## 2022-10-07 PROCEDURE — 90662 IIV NO PRSV INCREASED AG IM: CPT | Performed by: INTERNAL MEDICINE

## 2022-10-07 PROCEDURE — 96372 THER/PROPH/DIAG INJ SC/IM: CPT | Performed by: NURSE PRACTITIONER

## 2022-10-07 PROCEDURE — G0008 ADMIN INFLUENZA VIRUS VAC: HCPCS | Performed by: INTERNAL MEDICINE

## 2022-10-07 RX ADMIN — INFLUENZA A VIRUS A/VICTORIA/2570/2019 IVR-215 (H1N1) ANTIGEN (FORMALDEHYDE INACTIVATED), INFLUENZA A VIRUS A/DARWIN/9/2021 SAN-010 (H3N2) ANTIGEN (FORMALDEHYDE INACTIVATED), INFLUENZA B VIRUS B/PHUKET/3073/2013 ANTIGEN (FORMALDEHYDE INACTIVATED), AND INFLUENZA B VIRUS B/MICHIGAN/01/2021 ANTIGEN (FORMALDEHYDE INACTIVATED) 0.7 ML: 60; 60; 60; 60 INJECTION, SUSPENSION INTRAMUSCULAR at 13:24

## 2022-10-07 RX ADMIN — ROMIPLOSTIM 55 MCG: 250 INJECTION, POWDER, LYOPHILIZED, FOR SOLUTION SUBCUTANEOUS at 13:11

## 2022-10-07 NOTE — PROGRESS NOTES
Infusion Nursing Note:  Ora Gonzalez presents today for Nplate and her influenza vaccine.    Patient seen by provider today: No   present during visit today: Not Applicable.    Note: VSS.  Pt assessed and is feeling well.  Nplate given subcutaneous into her left upper arm.  Influenza vaccine given IM into her left deltoid.  She was observed for 15 minutes following the vaccine.    Intravenous Access:  n/a.    Treatment Conditions:  Not Applicable.    Post Infusion Assessment:  Patient tolerated both injections without incident.     Discharge Plan:   Patient discharged in stable condition accompanied by: self.      Carole Barrett RN

## 2022-10-14 ENCOUNTER — INFUSION THERAPY VISIT (OUTPATIENT)
Dept: INFUSION THERAPY | Facility: HOSPITAL | Age: 87
End: 2022-10-14
Attending: INTERNAL MEDICINE
Payer: COMMERCIAL

## 2022-10-14 VITALS
OXYGEN SATURATION: 98 % | TEMPERATURE: 97.5 F | HEART RATE: 57 BPM | DIASTOLIC BLOOD PRESSURE: 69 MMHG | SYSTOLIC BLOOD PRESSURE: 151 MMHG | RESPIRATION RATE: 16 BRPM

## 2022-10-14 DIAGNOSIS — D69.3 IMMUNE THROMBOCYTOPENIC PURPURA (H): Primary | ICD-10-CM

## 2022-10-14 PROCEDURE — 96372 THER/PROPH/DIAG INJ SC/IM: CPT | Performed by: NURSE PRACTITIONER

## 2022-10-14 PROCEDURE — 250N000011 HC RX IP 250 OP 636: Performed by: NURSE PRACTITIONER

## 2022-10-14 RX ADMIN — ROMIPLOSTIM 55 MCG: 250 INJECTION, POWDER, LYOPHILIZED, FOR SOLUTION SUBCUTANEOUS at 13:09

## 2022-10-21 ENCOUNTER — INFUSION THERAPY VISIT (OUTPATIENT)
Dept: INFUSION THERAPY | Facility: HOSPITAL | Age: 87
End: 2022-10-21
Attending: INTERNAL MEDICINE
Payer: COMMERCIAL

## 2022-10-21 VITALS
TEMPERATURE: 98 F | SYSTOLIC BLOOD PRESSURE: 163 MMHG | DIASTOLIC BLOOD PRESSURE: 67 MMHG | RESPIRATION RATE: 18 BRPM | HEART RATE: 65 BPM | OXYGEN SATURATION: 98 %

## 2022-10-21 DIAGNOSIS — D69.3 IMMUNE THROMBOCYTOPENIC PURPURA (H): Primary | ICD-10-CM

## 2022-10-21 PROCEDURE — 250N000011 HC RX IP 250 OP 636: Performed by: NURSE PRACTITIONER

## 2022-10-21 PROCEDURE — 96372 THER/PROPH/DIAG INJ SC/IM: CPT | Performed by: NURSE PRACTITIONER

## 2022-10-21 RX ADMIN — ROMIPLOSTIM 55 MCG: 250 INJECTION, POWDER, LYOPHILIZED, FOR SOLUTION SUBCUTANEOUS at 13:13

## 2022-10-21 NOTE — PROGRESS NOTES
Infusion Nursing Note:  Ora Gonzalez presents today for NPLATE injection.    Patient seen by provider today: No   present during visit today: Not Applicable.    Note: Ora comes to infusion ambulatory and in stable condition. Confirms that she is here for an injection. NPLATE administered in the Right arm and site covered with a band aide. Ora reports no new concerns. Left infusion ambulatory and in stable condition. [facility] will be coming to pick her up. Will return next week.     Intravenous Access:  Lab draw site Right AC, Needle type Butterfly, Gauge 23.  Labs drawn without difficulty.    Treatment Conditions:  Lab Results   Component Value Date    HGB 15.1 09/30/2022    WBC 14.9 (H) 09/30/2022    ANEU 8.2 07/23/2021    ANEUTAUTO 9.3 (H) 11/19/2021     09/30/2022        Post Infusion Assessment:  Patient tolerated injection without incident.  Site patent and intact, free from redness, edema or discomfort.     Discharge Plan:   Discharge instructions reviewed with: Patient.  Patient and/or family verbalized understanding of discharge instructions and all questions answered.  AVS to patient via [employer].  Patient will return on 10/28 for next appointment.   Patient discharged in stable condition accompanied by: self.  Departure Mode: Ambulatory.      Mena Grider RN

## 2022-10-28 ENCOUNTER — LAB (OUTPATIENT)
Dept: INFUSION THERAPY | Facility: HOSPITAL | Age: 87
End: 2022-10-28
Attending: INTERNAL MEDICINE
Payer: COMMERCIAL

## 2022-10-28 VITALS
SYSTOLIC BLOOD PRESSURE: 146 MMHG | WEIGHT: 118.1 LBS | RESPIRATION RATE: 16 BRPM | TEMPERATURE: 97.5 F | DIASTOLIC BLOOD PRESSURE: 66 MMHG | HEART RATE: 70 BPM | OXYGEN SATURATION: 96 % | BODY MASS INDEX: 21.26 KG/M2

## 2022-10-28 DIAGNOSIS — D69.3 IMMUNE THROMBOCYTOPENIC PURPURA (H): Primary | ICD-10-CM

## 2022-10-28 DIAGNOSIS — D69.3 IMMUNE THROMBOCYTOPENIC PURPURA (H): ICD-10-CM

## 2022-10-28 LAB
ERYTHROCYTE [DISTWIDTH] IN BLOOD BY AUTOMATED COUNT: 14.6 % (ref 10–15)
HCT VFR BLD AUTO: 45.6 % (ref 35–47)
HGB BLD-MCNC: 14.7 G/DL (ref 11.7–15.7)
MCH RBC QN AUTO: 31.9 PG (ref 26.5–33)
MCHC RBC AUTO-ENTMCNC: 32.2 G/DL (ref 31.5–36.5)
MCV RBC AUTO: 99 FL (ref 78–100)
PLATELET # BLD AUTO: 260 10E3/UL (ref 150–450)
RBC # BLD AUTO: 4.61 10E6/UL (ref 3.8–5.2)
WBC # BLD AUTO: 14.3 10E3/UL (ref 4–11)

## 2022-10-28 PROCEDURE — 85014 HEMATOCRIT: CPT

## 2022-10-28 PROCEDURE — 96372 THER/PROPH/DIAG INJ SC/IM: CPT | Performed by: INTERNAL MEDICINE

## 2022-10-28 PROCEDURE — 36415 COLL VENOUS BLD VENIPUNCTURE: CPT

## 2022-10-28 PROCEDURE — 250N000011 HC RX IP 250 OP 636: Performed by: INTERNAL MEDICINE

## 2022-10-28 RX ADMIN — ROMIPLOSTIM 55 MCG: 250 INJECTION, POWDER, LYOPHILIZED, FOR SOLUTION SUBCUTANEOUS at 14:03

## 2022-11-04 ENCOUNTER — INFUSION THERAPY VISIT (OUTPATIENT)
Dept: INFUSION THERAPY | Facility: HOSPITAL | Age: 87
End: 2022-11-04
Attending: INTERNAL MEDICINE
Payer: COMMERCIAL

## 2022-11-04 VITALS
DIASTOLIC BLOOD PRESSURE: 80 MMHG | OXYGEN SATURATION: 98 % | HEART RATE: 71 BPM | SYSTOLIC BLOOD PRESSURE: 189 MMHG | TEMPERATURE: 97.5 F | RESPIRATION RATE: 16 BRPM

## 2022-11-04 DIAGNOSIS — D69.3 IMMUNE THROMBOCYTOPENIC PURPURA (H): Primary | ICD-10-CM

## 2022-11-04 PROCEDURE — 250N000011 HC RX IP 250 OP 636: Performed by: INTERNAL MEDICINE

## 2022-11-04 PROCEDURE — 96372 THER/PROPH/DIAG INJ SC/IM: CPT | Performed by: INTERNAL MEDICINE

## 2022-11-04 RX ADMIN — ROMIPLOSTIM 55 MCG: 250 INJECTION, POWDER, LYOPHILIZED, FOR SOLUTION SUBCUTANEOUS at 14:30

## 2022-11-04 NOTE — PROGRESS NOTES
Pt here ambulatory for N plate inj. Reviewed inj with pt and administered in /bandaid to site. PT tolerated without any problems. Follow up reviewed and pt dc'd steady gait.

## 2022-11-08 ENCOUNTER — OFFICE VISIT (OUTPATIENT)
Dept: NEUROLOGY | Facility: CLINIC | Age: 87
End: 2022-11-08
Payer: COMMERCIAL

## 2022-11-08 VITALS
DIASTOLIC BLOOD PRESSURE: 77 MMHG | HEIGHT: 63 IN | WEIGHT: 117 LBS | SYSTOLIC BLOOD PRESSURE: 131 MMHG | HEART RATE: 67 BPM | BODY MASS INDEX: 20.73 KG/M2

## 2022-11-08 DIAGNOSIS — G30.1 LATE ONSET ALZHEIMER'S DISEASE WITHOUT BEHAVIORAL DISTURBANCE (H): Primary | ICD-10-CM

## 2022-11-08 DIAGNOSIS — F02.80 LATE ONSET ALZHEIMER'S DISEASE WITHOUT BEHAVIORAL DISTURBANCE (H): Primary | ICD-10-CM

## 2022-11-08 PROBLEM — E04.9 ADENOMATOUS GOITER: Status: ACTIVE | Noted: 2022-11-08

## 2022-11-08 PROBLEM — Z86.711 HISTORY OF PULMONARY EMBOLISM: Status: RESOLVED | Noted: 2020-07-08 | Resolved: 2022-11-08

## 2022-11-08 PROCEDURE — 99215 OFFICE O/P EST HI 40 MIN: CPT | Performed by: PSYCHIATRY & NEUROLOGY

## 2022-11-08 RX ORDER — DONEPEZIL HYDROCHLORIDE 10 MG/1
10 TABLET, FILM COATED ORAL AT BEDTIME
Qty: 90 TABLET | Refills: 3 | Status: SHIPPED | OUTPATIENT
Start: 2022-11-08 | End: 2024-02-01

## 2022-11-08 RX ORDER — MEMANTINE HYDROCHLORIDE 5 MG/1
TABLET ORAL
Qty: 70 TABLET | Refills: 0 | Status: SHIPPED | OUTPATIENT
Start: 2022-11-08 | End: 2022-12-08

## 2022-11-08 RX ORDER — MEMANTINE HYDROCHLORIDE 10 MG/1
10 TABLET ORAL 2 TIMES DAILY
Qty: 180 TABLET | Refills: 3 | Status: SHIPPED | OUTPATIENT
Start: 2022-12-08 | End: 2023-11-09

## 2022-11-08 ASSESSMENT — MONTREAL COGNITIVE ASSESSMENT (MOCA)
VISUOSPATIAL/EXECUTIVE SUBSCORE: 2
WHAT IS THE TOTAL SCORE (OUT OF 30): 19
12. MEMORY INDEX SCORE: 0
8. SERIAL SUBTRACTION OF 7S: 3
WHAT LEVEL OF EDUCATION WAS ATTAINED: 1
4. NAME EACH OF THE THREE ANIMALS SHOWN: 3
13. ORIENTATION SUBSCORE: 5
10. [FLUENCY] NAME WORDS STARTING WITH DESIGNATED LETTER: 0
11. FOR EACH PAIR OF WORDS, WHAT CATEGORY DO THEY BELONG TO (OUT OF 2): 1
9. REPEAT EACH SENTENCE: 2
6. READ LIST OF DIGITS [FORWARD/BACKWARD]: 2
7. [VIGILENCE] TAP WHEN HEARING DESIGNATED LETTER: 0

## 2022-11-08 NOTE — NURSING NOTE
Chief Complaint   Patient presents with     Dementia     Transfer care from Dr. Shaun Nioclas CMA on 11/8/2022 at 11:51 AM

## 2022-11-08 NOTE — PROGRESS NOTES
NEUROLOGY FOLLOW UP VISIT  NOTE       [facility] NEUROLOGY [place]  [address] [place] [address]  Tel: [phone]  Fax: [phone]  [email or web address]     Ora Gonzalez,  1933, MRN [id number]  PCP: Ryanne Corbett  Date: 2022      ASSESSMENT & PLAN     Visit Diagnosis  1. Late onset Alzheimer disease (H)     Late onset Alzheimer's dementia  88-year-old female with history of paroxysmal A. fib, ITP, HLD who is been followed in my clinic for late onset Alzheimer's dementia.  Her MoCA continues to decline and she scored 19/30 today.  I have recommended:    1.  Continue Aricept 10 mg daily (prescriptions refilled) but add Namenda 5 mg daily, ramping it up to 10 mg twice daily.  2.  I do not see any lab work done in the past for treatable causes of dementia and I have recommended checking folate, homocystine, MMA, RPR, TSH, vitamin B1, B12 and vitamin E.  Additionally hepatic profile will be checked  3.  Follow-up will be in 1 year    Thank you again for this referral, please feel free to contact me if you have any questions.    Adam Mack MD  [facility]  (Formerly, [facility]     HISTORY OF PRESENT ILLNESS     Patient is 88-year-old female with history of HLD, paroxysmal A. fib, ITP who is being followed in the clinic for late onset Alzheimer's dementia.  She previously was seeing Dr. Herb Dunn in our clinic and is a new patient for me.    Patient was initially evaluated in our clinic in 2017 due to family concern about cognitive decline.  Family noticed that starting in  she had difficulty with short-term memory and was repeating herself.  She scored 25/30 on MoCA and had MRI of the brain that showed nonspecific changes.  She was started on Aricept although its not clear if any neuropsychological testing or lab work was done.  Her MoCA gradually declined over the years and was continued on Aricept. Since  her last visit she reports she is in senior living facility.  She is able to attend to activities of daily living and has not done anything that puts her or other people at risk.  She has 3 children in the city who check on her regularly.     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     SOCIAL HISTORY     Reviewed, and she  reports that she has quit smoking. Her smoking use included cigarettes. She has never used smokeless tobacco. She reports that she does not currently use alcohol.     FAMILY HISTORY     Reviewed, and family history includes Alzheimer Disease in her sister and sister; Depression in her father; Diabetes Type 2  in her son; No Known Problems in her brother, brother, brother, father, mother, sister, and son;  "Other - See Comments in an other family member; Parkinsonism in her daughter; Suicidality in her father.     ALLERGIES     No Known Allergies      REVIEW OF SYSTEMS     A 12 point review of system was performed and was negative except as outlined in the history of present illness.     HOME MEDICATIONS     Current Outpatient Rx   Medication Sig Dispense Refill     donepezil (ARICEPT) 10 MG tablet Take 1 tablet (10 mg) by mouth At Bedtime 90 tablet 3     [START ON 12/8/2022] memantine (NAMENDA) 10 MG tablet Take 1 tablet (10 mg) by mouth 2 times daily 180 tablet 3     memantine (NAMENDA) 5 MG tablet 1 PO every day x 7 days, then 1 PO BID x 7 days, then 2 PO Q am & 1 PO at bedtime x 7 days, then 2 PO BID thereafter 70 tablet 0         PHYSICAL EXAM     Vital signs  /77 (BP Location: Right arm, Patient Position: Sitting)   Pulse 67   Ht 1.588 m (5' 2.5\")   Wt 53.1 kg (117 lb)   BMI 21.06 kg/m      Weight:   117 lbs 0 oz  Dewayne Cognitive Assessment:    Dewayne Cognitive Assessment (MOCA)  Visuospatial/Executive : 2  Naming: 3  Attention - Digits: 2  Attention - Letters: 0  Attention - Subtraction: 3  Language - Repeat: 2  Language - Fluency : 0  Abstraction: 1  Delayed Recall: 0  Orientation: 5  Education: 1  MOCA Score: 19  Administered by: : Pilar Nicolas CMA     Dewayne Cognitive Assessment Score:  MOCA Score: 19/30.    Elderly female who is alert and oriented vital signs were reviewed and are documented in electronic medical record.  Neck supple.  Neurologically speech was normal.  She scored 19/30 on MoCA.  Cranial nerves II through XII are intact motor strength 5/5 reflexes 1+ she has dysmetria in finger-nose testing gait normal     PERTINENT DIAGNOSTIC STUDIES     Following studies were reviewed:     MRI BRAIN 3/14/2017  1.  No acute/subacute infarcts, mass lesions or MRI evidence of intracranial hemorrhage.  2.  Nonspecific mild diffuse cerebral and cerebellar parenchymal volume loss. Presumed " chronic hypertensive or microvascular ischemic white matter changes.  3.  It is mucosal thickening of the ethmoid air cells. Small mucous retention cyst in the right maxillary sinus. Trace mucosal thickening in the left maxillary sinus.     PERTINENT LABS  Following labs were reviewed:  Lab on 10/28/2022   Component Date Value     WBC Count 10/28/2022 14.3 (H)      RBC Count 10/28/2022 4.61      Hemoglobin 10/28/2022 14.7      Hematocrit 10/28/2022 45.6      MCV 10/28/2022 99      MCH 10/28/2022 31.9      MCHC 10/28/2022 32.2      RDW 10/28/2022 14.6      Platelet Count 10/28/2022 260    Infusion Therapy Visit on 09/30/2022   Component Date Value     WBC Count 09/30/2022 14.9 (H)      RBC Count 09/30/2022 4.75      Hemoglobin 09/30/2022 15.1      Hematocrit 09/30/2022 46.8      MCV 09/30/2022 99      MCH 09/30/2022 31.8      MCHC 09/30/2022 32.3      RDW 09/30/2022 14.6      Platelet Count 09/30/2022 272    Lab on 09/02/2022   Component Date Value     WBC Count 09/02/2022 14.1 (H)      RBC Count 09/02/2022 4.45      Hemoglobin 09/02/2022 14.2      Hematocrit 09/02/2022 44.4      MCV 09/02/2022 100      MCH 09/02/2022 31.9      MCHC 09/02/2022 32.0      RDW 09/02/2022 14.6      Platelet Count 09/02/2022 269          Total time spent for face to face visit, reviewing labs/imaging studies, counseling and coordination of care was: 45 Minutes spent on the date of the encounter doing chart review, review of outside records, review of test results, interpretation of tests, patient visit and documentation       This note was dictated using voice recognition software.  Any grammatical or context distortions are unintentional and inherent to the software.    Orders Placed This Encounter   Procedures     Folate     Homocysteine     Methylmalonic Acid     Treponema Abs w Reflex to RPR and Titer     TSH with free T4 reflex     Vitamin B1 whole blood     Vitamin B12     Vitamin E     Hepatic function panel      New Prescriptions     MEMANTINE (NAMENDA) 10 MG TABLET    Take 1 tablet (10 mg) by mouth 2 times daily    MEMANTINE (NAMENDA) 5 MG TABLET    1 PO every day x 7 days, then 1 PO BID x 7 days, then 2 PO Q am & 1 PO at bedtime x 7 days, then 2 PO BID thereafter     Modified Medications    Modified Medication Previous Medication    DONEPEZIL (ARICEPT) 10 MG TABLET donepezil (ARICEPT) 10 MG tablet       Take 1 tablet (10 mg) by mouth At Bedtime    Take 1 tablet (10 mg) by mouth At Bedtime

## 2022-11-08 NOTE — NURSING NOTE
GRICEL COGNITIVE ASSESSMENT (MOCA)  Version 7.1 Original Version  VISUOSPATIAL/EXECUTIVE               COPY CUBE      [ 0   ]                                [  0  ] DRAW CLOCK (Ten past eleven)  (3 points)    [  1  ]                    [  1  ]               [  0  ]       Contour            Numbers     Hands POINTS               2    / 5   NAMING    [ 1  ]                                                                        [ 1   ]                                             [1    ]  Lijuana Aragon                                Camel                 3    / 3   MEMORY Read list of words, subject must repeat them. Do 2 trials, even if 1st trial is successful. Do a recall after 5 minutes  FACE VELVET Taoism NOE RED No Points    1st  x x x x     2nd x x x x x    ATTENTION Read list of digits (1 digit/sec) Subject has to repeat in the forward order       [ 1   ]   2  1  8  5  4                                [  1  ] 7 4 2                    2      /2   Read list of letters. The subject must tap with his hand at each letter A. No points if > 2 errors.  [    ] F B A C M N A A J K L B A F A K D E A A A J A M O F A A B           0   /1   Serial 7 subtraction starting at 100          [ 1   ] 93         [  1  ] 86          [  1  ] 79          [ 1   ] 72         [1    ] 65   4 or 5 correct subtractions: 3 points,  2 or 3 correct: 2 points,  1correct: 1 point,   0 correct: 0 points      3      /3   LANGUAGE Repeat: I only know that [place] is the one to help today. [ 1    ]                                      The cat always hid under the couch when dogs were in the room. [1   ]            2   /2   Fluency: Name maximum number of words in one minute that begin with the letter F                                                                                                                    [  0  ] _5__ (N > 11 words)             0  /1   ABSTRACTION  Similarity between e.g. banana-orange=fruit                                                                   [ 1   ] train-bicycle                      [  0  ] watch-ruler        1      /2   DELAYED  RECALL Has to recall words  WITH NO CUE FACE  [  0  ] VELVET  [  0  ] Congregation  [ 0   ]  NOE  [ 0   ] RED  [  0  ] Points for UNCUED recall only  0          /5           OPTIONAL Category cue 0 0 1 0 0      Multiple choice cue          ORIENTATION  [ 1   ] Date     [1    ] Month       [ 0   ] Year      [ 1   ] Day      [ 1   ] Place        [1    ] City    5 /6   TOTAL  Normal > 26/30 Add 1 point if < 12 years education  19 /30

## 2022-11-08 NOTE — LETTER
2022         RE: Ora Gonzalez  [address] [facility] [address]        Dear Colleague,    Thank you for referring your patient, Ora Gonzalez, to the [facility] [place]. Please see a copy of my visit note below.    NEUROLOGY FOLLOW UP VISIT  NOTE       [facility] NEUROLOGY [place]  [address] [place] [address]  Tel: [phone]  Fax: [phone]  [email or web address]     Ora Gonzalez,  1933, MRN [id number]  PCP: Ryanne Corbett  Date: 2022      ASSESSMENT & PLAN     Visit Diagnosis  1. Late onset Alzheimer disease (H)     Late onset Alzheimer's dementia  88-year-old female with history of paroxysmal A. fib, ITP, HLD who is been followed in my clinic for late onset Alzheimer's dementia.  Her MoCA continues to decline and she scored 19/30 today.  I have recommended:    1.  Continue Aricept 10 mg daily (prescriptions refilled) but add Namenda 5 mg daily, ramping it up to 10 mg twice daily.  2.  I do not see any lab work done in the past for treatable causes of dementia and I have recommended checking folate, homocystine, MMA, RPR, TSH, vitamin B1, B12 and vitamin E.  Additionally hepatic profile will be checked  3.  Follow-up will be in 1 year    Thank you again for this referral, please feel free to contact me if you have any questions.    Adam Mack MD  [facility]  ([facility], [facility]     HISTORY OF PRESENT ILLNESS     Patient is 88-year-old female with history of HLD, paroxysmal A. fib, ITP who is being followed in the clinic for late onset Alzheimer's dementia.  She previously was seeing Dr. Herb Dunn in our clinic and is a new patient for me.    Patient was initially evaluated in our clinic in  due to family concern about cognitive decline.  Family noticed that starting in  she had difficulty with short-term memory and was repeating herself.  She scored  25/30 on MoCA and had MRI of the brain that showed nonspecific changes.  She was started on Aricept although its not clear if any neuropsychological testing or lab work was done.  Her MoCA gradually declined over the years and was continued on Aricept. Since her last visit she reports she is in senior living facility.  She is able to attend to activities of daily living and has not done anything that puts her or other people at risk.  She has 3 children in the city who check on her regularly.     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     SOCIAL HISTORY     Reviewed, and she  reports that she has quit smoking. Her smoking use included cigarettes. She has never used smokeless tobacco. She reports that she does not currently  "use alcohol.     FAMILY HISTORY     Reviewed, and family history includes Alzheimer Disease in her sister and sister; Depression in her father; Diabetes Type 2  in her son; No Known Problems in her brother, brother, brother, father, mother, sister, and son; Other - See Comments in an other family member; Parkinsonism in her daughter; Suicidality in her father.     ALLERGIES     No Known Allergies      REVIEW OF SYSTEMS     A 12 point review of system was performed and was negative except as outlined in the history of present illness.     HOME MEDICATIONS     Current Outpatient Rx   Medication Sig Dispense Refill     donepezil (ARICEPT) 10 MG tablet Take 1 tablet (10 mg) by mouth At Bedtime 90 tablet 3     [START ON 12/8/2022] memantine (NAMENDA) 10 MG tablet Take 1 tablet (10 mg) by mouth 2 times daily 180 tablet 3     memantine (NAMENDA) 5 MG tablet 1 PO every day x 7 days, then 1 PO BID x 7 days, then 2 PO Q am & 1 PO at bedtime x 7 days, then 2 PO BID thereafter 70 tablet 0         PHYSICAL EXAM     Vital signs  /77 (BP Location: Right arm, Patient Position: Sitting)   Pulse 67   Ht 1.588 m (5' 2.5\")   Wt 53.1 kg (117 lb)   BMI 21.06 kg/m      Weight:   117 lbs 0 oz  Ira Cognitive Assessment:    Ira Cognitive Assessment (MOCA)  Visuospatial/Executive : 2  Naming: 3  Attention - Digits: 2  Attention - Letters: 0  Attention - Subtraction: 3  Language - Repeat: 2  Language - Fluency : 0  Abstraction: 1  Delayed Recall: 0  Orientation: 5  Education: 1  MOCA Score: 19  Administered by: : Pilar Nicolas CMA     Dewayne Cognitive Assessment Score:  MOCA Score: 19/30.    Elderly female who is alert and oriented vital signs were reviewed and are documented in electronic medical record.  Neck supple.  Neurologically speech was normal.  She scored 19/30 on MoCA.  Cranial nerves II through XII are intact motor strength 5/5 reflexes 1+ she has dysmetria in finger-nose testing gait normal     PERTINENT " DIAGNOSTIC STUDIES     Following studies were reviewed:     MRI BRAIN 3/14/2017  1.  No acute/subacute infarcts, mass lesions or MRI evidence of intracranial hemorrhage.  2.  Nonspecific mild diffuse cerebral and cerebellar parenchymal volume loss. Presumed chronic hypertensive or microvascular ischemic white matter changes.  3.  It is mucosal thickening of the ethmoid air cells. Small mucous retention cyst in the right maxillary sinus. Trace mucosal thickening in the left maxillary sinus.     PERTINENT LABS  Following labs were reviewed:  Lab on 10/28/2022   Component Date Value     WBC Count 10/28/2022 14.3 (H)      RBC Count 10/28/2022 4.61      Hemoglobin 10/28/2022 14.7      Hematocrit 10/28/2022 45.6      MCV 10/28/2022 99      MCH 10/28/2022 31.9      MCHC 10/28/2022 32.2      RDW 10/28/2022 14.6      Platelet Count 10/28/2022 260    Infusion Therapy Visit on 09/30/2022   Component Date Value     WBC Count 09/30/2022 14.9 (H)      RBC Count 09/30/2022 4.75      Hemoglobin 09/30/2022 15.1      Hematocrit 09/30/2022 46.8      MCV 09/30/2022 99      MCH 09/30/2022 31.8      MCHC 09/30/2022 32.3      RDW 09/30/2022 14.6      Platelet Count 09/30/2022 272    Lab on 09/02/2022   Component Date Value     WBC Count 09/02/2022 14.1 (H)      RBC Count 09/02/2022 4.45      Hemoglobin 09/02/2022 14.2      Hematocrit 09/02/2022 44.4      MCV 09/02/2022 100      MCH 09/02/2022 31.9      MCHC 09/02/2022 32.0      RDW 09/02/2022 14.6      Platelet Count 09/02/2022 269          Total time spent for face to face visit, reviewing labs/imaging studies, counseling and coordination of care was: 45 Minutes spent on the date of the encounter doing chart review, review of outside records, review of test results, interpretation of tests, patient visit and documentation       This note was dictated using voice recognition software.  Any grammatical or context distortions are unintentional and inherent to the software.    Orders Placed  This Encounter   Procedures     Folate     Homocysteine     Methylmalonic Acid     Treponema Abs w Reflex to RPR and Titer     TSH with free T4 reflex     Vitamin B1 whole blood     Vitamin B12     Vitamin E     Hepatic function panel      New Prescriptions    MEMANTINE (NAMENDA) 10 MG TABLET    Take 1 tablet (10 mg) by mouth 2 times daily    MEMANTINE (NAMENDA) 5 MG TABLET    1 PO every day x 7 days, then 1 PO BID x 7 days, then 2 PO Q am & 1 PO at bedtime x 7 days, then 2 PO BID thereafter     Modified Medications    Modified Medication Previous Medication    DONEPEZIL (ARICEPT) 10 MG TABLET donepezil (ARICEPT) 10 MG tablet       Take 1 tablet (10 mg) by mouth At Bedtime    Take 1 tablet (10 mg) by mouth At Bedtime                     Again, thank you for allowing me to participate in the care of your patient.        Sincerely,        Adam Mack MD

## 2022-11-11 ENCOUNTER — INFUSION THERAPY VISIT (OUTPATIENT)
Dept: INFUSION THERAPY | Facility: HOSPITAL | Age: 87
End: 2022-11-11
Attending: INTERNAL MEDICINE
Payer: COMMERCIAL

## 2022-11-11 VITALS
TEMPERATURE: 97.9 F | RESPIRATION RATE: 16 BRPM | OXYGEN SATURATION: 98 % | DIASTOLIC BLOOD PRESSURE: 59 MMHG | SYSTOLIC BLOOD PRESSURE: 138 MMHG | HEART RATE: 55 BPM

## 2022-11-11 DIAGNOSIS — D69.3 IMMUNE THROMBOCYTOPENIC PURPURA (H): Primary | ICD-10-CM

## 2022-11-11 PROCEDURE — 250N000011 HC RX IP 250 OP 636: Performed by: INTERNAL MEDICINE

## 2022-11-11 PROCEDURE — 96372 THER/PROPH/DIAG INJ SC/IM: CPT | Performed by: INTERNAL MEDICINE

## 2022-11-11 RX ADMIN — ROMIPLOSTIM 55 MCG: 250 INJECTION, POWDER, LYOPHILIZED, FOR SOLUTION SUBCUTANEOUS at 13:19

## 2022-11-11 NOTE — PROGRESS NOTES
Infusion Nursing Note:  Ora Lisa presents today for Nplate.    Patient seen by provider today: No   present during visit today: Not Applicable.    Note: VSS.  Pt assessed.  No signs of bleeding.  Pt is feeling well.    Intravenous Access:  n/a.    Treatment Conditions:  Not Applicable.    Post Infusion Assessment:  Patient tolerated injection without incident.     Discharge Plan:   Patient discharged in stable condition accompanied by: self.  Departure Mode: Ambulatory.  Pt to front lobby for [facility] mobility .      Carole Barrett RN

## 2022-11-18 ENCOUNTER — INFUSION THERAPY VISIT (OUTPATIENT)
Dept: INFUSION THERAPY | Facility: HOSPITAL | Age: 87
End: 2022-11-18
Attending: INTERNAL MEDICINE
Payer: COMMERCIAL

## 2022-11-18 VITALS
TEMPERATURE: 98 F | SYSTOLIC BLOOD PRESSURE: 159 MMHG | RESPIRATION RATE: 16 BRPM | DIASTOLIC BLOOD PRESSURE: 64 MMHG | OXYGEN SATURATION: 98 % | HEART RATE: 59 BPM

## 2022-11-18 DIAGNOSIS — D69.3 IMMUNE THROMBOCYTOPENIC PURPURA (H): Primary | ICD-10-CM

## 2022-11-18 PROCEDURE — 250N000011 HC RX IP 250 OP 636: Performed by: INTERNAL MEDICINE

## 2022-11-18 PROCEDURE — 96372 THER/PROPH/DIAG INJ SC/IM: CPT | Performed by: INTERNAL MEDICINE

## 2022-11-18 RX ADMIN — ROMIPLOSTIM 55 MCG: 250 INJECTION, POWDER, LYOPHILIZED, FOR SOLUTION SUBCUTANEOUS at 13:48

## 2022-11-18 NOTE — PROGRESS NOTES
Infusion Nursing Note:  Ora Gonzalez presents today for Nplate.    Patient seen by provider today: No   present during visit today: Not Applicable.    Note: VSS.  Pt assessed. No lab today.  Nplate given as ordered into her right upper arm.    Intravenous Access:  n/a.    Treatment Conditions:  Not Applicable.    Post Infusion Assessment:  Patient tolerated injection without incident.     Discharge Plan:   Patient discharged in stable condition accompanied by: self.  Departure Mode: Ambulatory.      Carole Barrett RN

## 2022-11-25 ENCOUNTER — LAB (OUTPATIENT)
Dept: INFUSION THERAPY | Facility: HOSPITAL | Age: 87
End: 2022-11-25
Attending: INTERNAL MEDICINE
Payer: COMMERCIAL

## 2022-11-25 VITALS
DIASTOLIC BLOOD PRESSURE: 62 MMHG | TEMPERATURE: 98.3 F | OXYGEN SATURATION: 96 % | HEART RATE: 61 BPM | SYSTOLIC BLOOD PRESSURE: 136 MMHG | RESPIRATION RATE: 18 BRPM

## 2022-11-25 DIAGNOSIS — G30.1 LATE ONSET ALZHEIMER'S DISEASE WITHOUT BEHAVIORAL DISTURBANCE (H): ICD-10-CM

## 2022-11-25 DIAGNOSIS — F02.80 LATE ONSET ALZHEIMER'S DISEASE WITHOUT BEHAVIORAL DISTURBANCE (H): ICD-10-CM

## 2022-11-25 DIAGNOSIS — D69.3 IMMUNE THROMBOCYTOPENIC PURPURA (H): ICD-10-CM

## 2022-11-25 DIAGNOSIS — D69.3 IMMUNE THROMBOCYTOPENIC PURPURA (H): Primary | ICD-10-CM

## 2022-11-25 LAB
ALBUMIN SERPL BCG-MCNC: 4.4 G/DL (ref 3.5–5.2)
ALP SERPL-CCNC: 106 U/L (ref 35–104)
ALT SERPL W P-5'-P-CCNC: 16 U/L (ref 10–35)
AST SERPL W P-5'-P-CCNC: 30 U/L (ref 10–35)
BILIRUB DIRECT SERPL-MCNC: <0.2 MG/DL (ref 0–0.3)
BILIRUB SERPL-MCNC: 0.3 MG/DL
ERYTHROCYTE [DISTWIDTH] IN BLOOD BY AUTOMATED COUNT: 14.9 % (ref 10–15)
HCT VFR BLD AUTO: 43.5 % (ref 35–47)
HGB BLD-MCNC: 14.2 G/DL (ref 11.7–15.7)
HOLD SPECIMEN: NORMAL
MCH RBC QN AUTO: 32.1 PG (ref 26.5–33)
MCHC RBC AUTO-ENTMCNC: 32.6 G/DL (ref 31.5–36.5)
MCV RBC AUTO: 98 FL (ref 78–100)
PLATELET # BLD AUTO: 375 10E3/UL (ref 150–450)
PROT SERPL-MCNC: 7.7 G/DL (ref 6.4–8.3)
RBC # BLD AUTO: 4.42 10E6/UL (ref 3.8–5.2)
TSH SERPL DL<=0.005 MIU/L-ACNC: 1.39 UIU/ML (ref 0.3–4.2)
WBC # BLD AUTO: 14.5 10E3/UL (ref 4–11)

## 2022-11-25 PROCEDURE — 83090 ASSAY OF HOMOCYSTEINE: CPT

## 2022-11-25 PROCEDURE — 250N000011 HC RX IP 250 OP 636: Performed by: INTERNAL MEDICINE

## 2022-11-25 PROCEDURE — 82746 ASSAY OF FOLIC ACID SERUM: CPT

## 2022-11-25 PROCEDURE — 83921 ORGANIC ACID SINGLE QUANT: CPT

## 2022-11-25 PROCEDURE — 80076 HEPATIC FUNCTION PANEL: CPT

## 2022-11-25 PROCEDURE — 85027 COMPLETE CBC AUTOMATED: CPT

## 2022-11-25 PROCEDURE — 84443 ASSAY THYROID STIM HORMONE: CPT

## 2022-11-25 PROCEDURE — 84446 ASSAY OF VITAMIN E: CPT

## 2022-11-25 PROCEDURE — 86780 TREPONEMA PALLIDUM: CPT

## 2022-11-25 PROCEDURE — 82607 VITAMIN B-12: CPT

## 2022-11-25 PROCEDURE — 96372 THER/PROPH/DIAG INJ SC/IM: CPT | Performed by: INTERNAL MEDICINE

## 2022-11-25 PROCEDURE — 36415 COLL VENOUS BLD VENIPUNCTURE: CPT

## 2022-11-25 PROCEDURE — 84425 ASSAY OF VITAMIN B-1: CPT

## 2022-11-25 RX ADMIN — ROMIPLOSTIM 55 MCG: 250 INJECTION, POWDER, LYOPHILIZED, FOR SOLUTION SUBCUTANEOUS at 14:12

## 2022-11-25 NOTE — PROGRESS NOTES
Infusion Nursing Note:  Ora Gonzalez presents today for Nplate.    Patient seen by provider today: No   present during visit today: Not Applicable.    Note: Labs drawn today.  Platelet count is **.  Nplate given as ordered subcutaneous into her right upper arm.    Intravenous Access:  n/a.    Treatment Conditions:  n/a.    Post Infusion Assessment:  Patient tolerated injection without incident.     Discharge Plan:   Patient discharged in stable condition accompanied by: self. Pt to lobby for [facility] mobility .      Carole Barrett RN

## 2022-11-26 LAB
FOLATE SERPL-MCNC: 6.7 NG/ML (ref 4.6–34.8)
T PALLIDUM AB SER QL: NONREACTIVE
VIT B12 SERPL-MCNC: 238 PG/ML (ref 232–1245)

## 2022-11-27 DIAGNOSIS — E53.8 VITAMIN B12 DEFICIENCY (NON ANEMIC): Primary | ICD-10-CM

## 2022-11-27 RX ORDER — LANOLIN ALCOHOL/MO/W.PET/CERES
1000 CREAM (GRAM) TOPICAL DAILY
Qty: 30 TABLET | Refills: 6 | Status: SHIPPED | OUTPATIENT
Start: 2022-11-27 | End: 2023-06-15

## 2022-11-27 NOTE — RESULT ENCOUNTER NOTE
Dear Ora,   Vitamin B12 level is borderline.  Start taking vitamin B12 1000 mcg daily.  I have sent a prescription to your pharmacy.    Adam Mack MD

## 2022-11-29 LAB
A-TOCOPHEROL VIT E SERPL-MCNC: 9.8 MG/L
BETA+GAMMA TOCOPHEROL SERPL-MCNC: 1.9 MG/L
METHYLMALONATE SERPL-SCNC: 0.51 UMOL/L (ref 0–0.4)

## 2022-11-30 LAB
HCYS SERPL-SCNC: 16.6 UMOL/L (ref 4–12)
VIT B1 PYROPHOSHATE BLD-SCNC: 140 NMOL/L

## 2022-12-02 ENCOUNTER — INFUSION THERAPY VISIT (OUTPATIENT)
Dept: INFUSION THERAPY | Facility: HOSPITAL | Age: 87
End: 2022-12-02
Attending: INTERNAL MEDICINE
Payer: COMMERCIAL

## 2022-12-02 ENCOUNTER — TELEPHONE (OUTPATIENT)
Dept: NEUROLOGY | Facility: CLINIC | Age: 87
End: 2022-12-02

## 2022-12-02 VITALS
SYSTOLIC BLOOD PRESSURE: 157 MMHG | DIASTOLIC BLOOD PRESSURE: 56 MMHG | TEMPERATURE: 98.5 F | HEART RATE: 62 BPM | OXYGEN SATURATION: 100 % | RESPIRATION RATE: 20 BRPM

## 2022-12-02 DIAGNOSIS — D69.3 IMMUNE THROMBOCYTOPENIC PURPURA (H): Primary | ICD-10-CM

## 2022-12-02 PROCEDURE — 250N000011 HC RX IP 250 OP 636: Performed by: INTERNAL MEDICINE

## 2022-12-02 PROCEDURE — 96372 THER/PROPH/DIAG INJ SC/IM: CPT | Performed by: INTERNAL MEDICINE

## 2022-12-02 RX ADMIN — ROMIPLOSTIM 55 MCG: 250 INJECTION, POWDER, LYOPHILIZED, FOR SOLUTION SUBCUTANEOUS at 13:32

## 2022-12-02 NOTE — LETTER
December 2, 2022      Ora Gonzalez  [address] [place] [id number]        Dear ,    We are writing to inform you of your test results.    Test results indicate you may require additional follow up, see comment below.    Vitamin B12 level is borderline.  Start taking vitamin B12 1000 mcg daily.  I have sent a prescription to your pharmacy.    Resulted Orders   Folate   Result Value Ref Range    Folic Acid 6.7 4.6 - 34.8 ng/mL   Treponema Abs w Reflex to RPR and Titer   Result Value Ref Range    Treponema Antibody Total Nonreactive Nonreactive   TSH with free T4 reflex   Result Value Ref Range    TSH 1.39 0.30 - 4.20 uIU/mL   Vitamin B12   Result Value Ref Range    Vitamin B12 238 232 - 1,245 pg/mL   Hepatic function panel   Result Value Ref Range    Protein Total 7.7 6.4 - 8.3 g/dL    Albumin 4.4 3.5 - 5.2 g/dL    Bilirubin Total 0.3 <=1.2 mg/dL    Alkaline Phosphatase 106 (H) 35 - 104 U/L    AST 30 10 - 35 U/L    ALT 16 10 - 35 U/L    Bilirubin Direct <0.20 0.00 - 0.30 mg/dL       If you have any questions or concerns, please call the clinic at the number listed above.       Sincerely,      Adam Mack MD

## 2022-12-02 NOTE — PROGRESS NOTES
Ora came to chemo infusion this afternoon for Nplate.  VSS. Pt assessed.  No signs of bleeding.  Nplate given subcutaneous into her right upper arm.  She tolerated the injection well and site was covered with a bandaid.  O[facility] clinic ambulatory [email or web address]  She is aware of her future appointment.

## 2022-12-02 NOTE — TELEPHONE ENCOUNTER
----- Message from Adam Mack MD sent at 11/27/2022 12:38 PM CST -----  Dear Ora,   Vitamin B12 level is borderline.  Start taking vitamin B12 1000 mcg daily.  I have sent a prescription to your pharmacy.     Adam Mack MD

## 2022-12-02 NOTE — TELEPHONE ENCOUNTER
Contacted patients son, Jensen, and left detailed message with results. Will mail a letter as well  Pilar Nicolas CMA on 12/2/2022 at 1:30 PM

## 2022-12-09 ENCOUNTER — INFUSION THERAPY VISIT (OUTPATIENT)
Dept: INFUSION THERAPY | Facility: HOSPITAL | Age: 87
End: 2022-12-09
Attending: INTERNAL MEDICINE
Payer: COMMERCIAL

## 2022-12-09 VITALS
SYSTOLIC BLOOD PRESSURE: 160 MMHG | OXYGEN SATURATION: 97 % | RESPIRATION RATE: 16 BRPM | DIASTOLIC BLOOD PRESSURE: 75 MMHG | HEART RATE: 68 BPM

## 2022-12-09 DIAGNOSIS — D69.3 IMMUNE THROMBOCYTOPENIC PURPURA (H): Primary | ICD-10-CM

## 2022-12-09 PROCEDURE — 250N000011 HC RX IP 250 OP 636: Performed by: INTERNAL MEDICINE

## 2022-12-09 PROCEDURE — 96372 THER/PROPH/DIAG INJ SC/IM: CPT | Performed by: INTERNAL MEDICINE

## 2022-12-09 RX ADMIN — ROMIPLOSTIM 55 MCG: 250 INJECTION, POWDER, LYOPHILIZED, FOR SOLUTION SUBCUTANEOUS at 13:52

## 2022-12-09 ASSESSMENT — PAIN SCALES - GENERAL: PAINLEVEL: NO PAIN (0)

## 2022-12-09 NOTE — PROGRESS NOTES
Infusion Nursing Note:  Ora Gonzalez presents today for Nplate injection.    Patient seen by provider today: No   present during visit today: Not Applicable.    Note: Ora Gonzalez arrived ambulatory for her Nplate injection today. Platelets 375 on 11/25. Injection administered to right upper arm and covered with band-aid. Patient tolerated injection with no issues. Follow up appointments reviewed with patient.     Intravenous Access:  No Intravenous access/labs at this visit.      Treatment Conditions:  Not Applicable.    Post Infusion Assessment:  Patient tolerated injection without incident.     Discharge Plan:   Patient and/or family verbalized understanding of discharge instructions and all questions answered.  Patient discharged in stable condition accompanied by: self.      Katherin Babcock RN

## 2022-12-16 ENCOUNTER — INFUSION THERAPY VISIT (OUTPATIENT)
Dept: INFUSION THERAPY | Facility: HOSPITAL | Age: 87
End: 2022-12-16
Attending: INTERNAL MEDICINE
Payer: COMMERCIAL

## 2022-12-16 VITALS
DIASTOLIC BLOOD PRESSURE: 69 MMHG | OXYGEN SATURATION: 97 % | SYSTOLIC BLOOD PRESSURE: 163 MMHG | TEMPERATURE: 97.9 F | HEART RATE: 66 BPM | RESPIRATION RATE: 16 BRPM

## 2022-12-16 DIAGNOSIS — D69.3 IMMUNE THROMBOCYTOPENIC PURPURA (H): Primary | ICD-10-CM

## 2022-12-16 PROCEDURE — 96372 THER/PROPH/DIAG INJ SC/IM: CPT | Performed by: INTERNAL MEDICINE

## 2022-12-16 PROCEDURE — 250N000011 HC RX IP 250 OP 636: Performed by: INTERNAL MEDICINE

## 2022-12-16 RX ADMIN — ROMIPLOSTIM 55 MCG: 250 INJECTION, POWDER, LYOPHILIZED, FOR SOLUTION SUBCUTANEOUS at 14:16

## 2022-12-16 NOTE — PROGRESS NOTES
Infusion Nursing Note:  Ora Gonzalez presents today for NPLATE    Patient seen by provider today: No   present during visit today: Not Applicable.    Note: N/A.    Intravenous Access:  No Intravenous access/labs at this visit.    Treatment Conditions:  Not Applicable.    Post Infusion Assessment:  Patient tolerated injection without incident.     Discharge Plan:   Patient discharged in stable condition accompanied by: self.  Departure Mode: Ambulatory.      Tova Morrow RN

## 2022-12-23 ENCOUNTER — INFUSION THERAPY VISIT (OUTPATIENT)
Dept: INFUSION THERAPY | Facility: HOSPITAL | Age: 87
End: 2022-12-23
Attending: INTERNAL MEDICINE
Payer: COMMERCIAL

## 2022-12-23 VITALS
HEART RATE: 70 BPM | SYSTOLIC BLOOD PRESSURE: 176 MMHG | DIASTOLIC BLOOD PRESSURE: 74 MMHG | RESPIRATION RATE: 16 BRPM | OXYGEN SATURATION: 98 %

## 2022-12-23 DIAGNOSIS — D69.3 IMMUNE THROMBOCYTOPENIC PURPURA (H): Primary | ICD-10-CM

## 2022-12-23 PROCEDURE — 250N000011 HC RX IP 250 OP 636: Performed by: INTERNAL MEDICINE

## 2022-12-23 PROCEDURE — 96372 THER/PROPH/DIAG INJ SC/IM: CPT | Performed by: INTERNAL MEDICINE

## 2022-12-23 RX ADMIN — ROMIPLOSTIM 55 MCG: 250 INJECTION, POWDER, LYOPHILIZED, FOR SOLUTION SUBCUTANEOUS at 13:36

## 2022-12-23 NOTE — PROGRESS NOTES
Infusion Nursing Note:  Ora Gonzalez presents today for Nplate.    Patient seen by provider today: No   present during visit today: Not Applicable.    Note: Pt voices no new concerns today.    Intravenous Access:  Per Dr. Garcia, no labs needed today.    Treatment Conditions:  Not Applicable.    Post Infusion Assessment:  Patient tolerated injection without incident.     Discharge Plan:   Patient discharged in stable condition accompanied by: self.  Departure Mode: Ambulatory.      Tova Chang RN

## 2022-12-28 DIAGNOSIS — D69.3 IMMUNE THROMBOCYTOPENIC PURPURA (H): Primary | ICD-10-CM

## 2022-12-30 ENCOUNTER — LAB (OUTPATIENT)
Dept: INFUSION THERAPY | Facility: HOSPITAL | Age: 87
End: 2022-12-30
Attending: INTERNAL MEDICINE
Payer: COMMERCIAL

## 2022-12-30 VITALS
TEMPERATURE: 98.4 F | HEART RATE: 69 BPM | SYSTOLIC BLOOD PRESSURE: 149 MMHG | OXYGEN SATURATION: 96 % | DIASTOLIC BLOOD PRESSURE: 67 MMHG | RESPIRATION RATE: 16 BRPM

## 2022-12-30 DIAGNOSIS — D69.3 IMMUNE THROMBOCYTOPENIC PURPURA (H): ICD-10-CM

## 2022-12-30 DIAGNOSIS — D69.3 IMMUNE THROMBOCYTOPENIC PURPURA (H): Primary | ICD-10-CM

## 2022-12-30 PROCEDURE — 85007 BL SMEAR W/DIFF WBC COUNT: CPT

## 2022-12-30 PROCEDURE — 96372 THER/PROPH/DIAG INJ SC/IM: CPT | Performed by: INTERNAL MEDICINE

## 2022-12-30 PROCEDURE — 36415 COLL VENOUS BLD VENIPUNCTURE: CPT

## 2022-12-30 PROCEDURE — 250N000011 HC RX IP 250 OP 636: Performed by: INTERNAL MEDICINE

## 2022-12-30 PROCEDURE — 85027 COMPLETE CBC AUTOMATED: CPT

## 2022-12-30 RX ADMIN — ROMIPLOSTIM 55 MCG: 250 INJECTION, POWDER, LYOPHILIZED, FOR SOLUTION SUBCUTANEOUS at 14:12

## 2022-12-30 NOTE — PROGRESS NOTES
Infusion Nursing Note:  Ora Gonzalez presents today for NPLATE.    Patient seen by provider today: No   present during visit today: Not Applicable.    Note: N/A.    Intravenous Access:  No Intravenous access/labs at this visit.    Treatment Conditions:  Not Applicable.    Post Infusion Assessment:  Patient tolerated injection without incident.     Discharge Plan:   Patient discharged in stable condition accompanied by: self.  Departure Mode: Ambulatory.      Tova Morrow RN

## 2022-12-31 LAB
ACANTHOCYTES BLD QL SMEAR: SLIGHT
BASOPHILS # BLD MANUAL: 0 10E3/UL (ref 0–0.2)
BASOPHILS NFR BLD MANUAL: 0 %
EOSINOPHIL # BLD MANUAL: 0.3 10E3/UL (ref 0–0.7)
EOSINOPHIL NFR BLD MANUAL: 2 %
ERYTHROCYTE [DISTWIDTH] IN BLOOD BY AUTOMATED COUNT: 14.8 % (ref 10–15)
HCT VFR BLD AUTO: 46.9 % (ref 35–47)
HGB BLD-MCNC: 15.2 G/DL (ref 11.7–15.7)
LYMPHOCYTES # BLD MANUAL: 5.4 10E3/UL (ref 0.8–5.3)
LYMPHOCYTES NFR BLD MANUAL: 32 %
MCH RBC QN AUTO: 32.3 PG (ref 26.5–33)
MCHC RBC AUTO-ENTMCNC: 32.4 G/DL (ref 31.5–36.5)
MCV RBC AUTO: 100 FL (ref 78–100)
MONOCYTES # BLD MANUAL: 1.4 10E3/UL (ref 0–1.3)
MONOCYTES NFR BLD MANUAL: 8 %
MYELOCYTES # BLD MANUAL: 0.2 10E3/UL
MYELOCYTES NFR BLD MANUAL: 1 %
NEUTROPHILS # BLD MANUAL: 9.7 10E3/UL (ref 1.6–8.3)
NEUTROPHILS NFR BLD MANUAL: 57 %
PLAT MORPH BLD: ABNORMAL
PLATELET # BLD AUTO: 316 10E3/UL (ref 150–450)
RBC # BLD AUTO: 4.71 10E6/UL (ref 3.8–5.2)
RBC MORPH BLD: ABNORMAL
WBC # BLD AUTO: 17 10E3/UL (ref 4–11)

## 2023-01-06 ENCOUNTER — INFUSION THERAPY VISIT (OUTPATIENT)
Dept: INFUSION THERAPY | Facility: HOSPITAL | Age: 88
End: 2023-01-06
Attending: INTERNAL MEDICINE
Payer: COMMERCIAL

## 2023-01-06 VITALS
RESPIRATION RATE: 18 BRPM | HEART RATE: 69 BPM | OXYGEN SATURATION: 98 % | DIASTOLIC BLOOD PRESSURE: 63 MMHG | TEMPERATURE: 98.2 F | SYSTOLIC BLOOD PRESSURE: 133 MMHG

## 2023-01-06 DIAGNOSIS — D69.3 IMMUNE THROMBOCYTOPENIC PURPURA (H): Primary | ICD-10-CM

## 2023-01-06 PROCEDURE — 250N000011 HC RX IP 250 OP 636: Performed by: INTERNAL MEDICINE

## 2023-01-06 PROCEDURE — 96372 THER/PROPH/DIAG INJ SC/IM: CPT | Performed by: INTERNAL MEDICINE

## 2023-01-06 RX ADMIN — ROMIPLOSTIM 55 MCG: 250 INJECTION, POWDER, LYOPHILIZED, FOR SOLUTION SUBCUTANEOUS at 13:54

## 2023-01-06 NOTE — PROGRESS NOTES
PT here for Nplate inj. Reviewed inj with pt and administered in right upper arm. PT tolerated inj without any problems. PT dc'd steady gait.

## 2023-01-13 ENCOUNTER — INFUSION THERAPY VISIT (OUTPATIENT)
Dept: INFUSION THERAPY | Facility: HOSPITAL | Age: 88
End: 2023-01-13
Attending: INTERNAL MEDICINE
Payer: COMMERCIAL

## 2023-01-13 VITALS
SYSTOLIC BLOOD PRESSURE: 126 MMHG | HEART RATE: 68 BPM | DIASTOLIC BLOOD PRESSURE: 59 MMHG | OXYGEN SATURATION: 97 % | RESPIRATION RATE: 18 BRPM | TEMPERATURE: 98 F

## 2023-01-13 DIAGNOSIS — D69.3 IMMUNE THROMBOCYTOPENIC PURPURA (H): Primary | ICD-10-CM

## 2023-01-13 PROCEDURE — 250N000011 HC RX IP 250 OP 636: Performed by: INTERNAL MEDICINE

## 2023-01-13 PROCEDURE — 96372 THER/PROPH/DIAG INJ SC/IM: CPT | Performed by: INTERNAL MEDICINE

## 2023-01-13 RX ADMIN — ROMIPLOSTIM 55 MCG: 250 INJECTION, POWDER, LYOPHILIZED, FOR SOLUTION SUBCUTANEOUS at 14:01

## 2023-01-13 NOTE — PROGRESS NOTES
Infusion Nursing Note:  Ora Gonzalez presents today for NPLATE injection.    Patient seen by provider today: No   present during visit today: Not Applicable.    Note: NPLATE administered subcutaneous as ordered in right upper arm per patient request. No bleeding or swelling noted.    Intravenous Access:  No Intravenous access/labs at this visit.    Treatment Conditions:  Not Applicable.    Post Infusion Assessment:  Patient tolerated injection without incident.     Discharge Plan:   Patient will return January 20th for next appointment.   Patient discharged in stable condition accompanied by: self.  Departure Mode: Ambulatory.      Rosa Arceo RN

## 2023-01-20 ENCOUNTER — INFUSION THERAPY VISIT (OUTPATIENT)
Dept: INFUSION THERAPY | Facility: HOSPITAL | Age: 88
End: 2023-01-20
Attending: INTERNAL MEDICINE
Payer: COMMERCIAL

## 2023-01-20 VITALS
DIASTOLIC BLOOD PRESSURE: 67 MMHG | OXYGEN SATURATION: 97 % | RESPIRATION RATE: 16 BRPM | HEART RATE: 69 BPM | TEMPERATURE: 98 F | SYSTOLIC BLOOD PRESSURE: 164 MMHG

## 2023-01-20 DIAGNOSIS — D69.3 IMMUNE THROMBOCYTOPENIC PURPURA (H): Primary | ICD-10-CM

## 2023-01-20 PROCEDURE — 96372 THER/PROPH/DIAG INJ SC/IM: CPT | Performed by: INTERNAL MEDICINE

## 2023-01-20 PROCEDURE — 250N000011 HC RX IP 250 OP 636: Performed by: INTERNAL MEDICINE

## 2023-01-20 RX ADMIN — ROMIPLOSTIM 55 MCG: 250 INJECTION, POWDER, LYOPHILIZED, FOR SOLUTION SUBCUTANEOUS at 13:56

## 2023-01-20 NOTE — PROGRESS NOTES
Infusion Nursing Note:  Ora Gonzalez presents today for NPLATE injection.    Patient seen by provider today: No   present during visit today: Not Applicable.    Note: Ora comes to infusion ambulatory and in stable condition. Confirms that she is here for an injection. NPLATE administered in the Right arm and site covered with a band aide. Ora reports no new concerns. Left infusion ambulatory and in stable condition at 1400. [facility] will be coming to pick her up. Will return next week.     Intravenous Access:  No Intravenous access/labs at this visit.    Treatment Conditions:  Not Applicable.    Post Infusion Assessment:  Patient tolerated injection without incident.  Site patent and intact, free from redness, edema or discomfort.     Discharge Plan:   Patient and/or family verbalized understanding of discharge instructions and all questions answered.  AVS to patient via [employer]. Patient will return on 1/27/2023 for next appointment.   Patient discharged in stable condition accompanied by: self.  Departure Mode: Ambulatory.    Mena Grider RN

## 2023-01-27 ENCOUNTER — LAB (OUTPATIENT)
Dept: INFUSION THERAPY | Facility: HOSPITAL | Age: 88
End: 2023-01-27
Attending: INTERNAL MEDICINE
Payer: COMMERCIAL

## 2023-01-27 DIAGNOSIS — D69.3 IMMUNE THROMBOCYTOPENIC PURPURA (H): ICD-10-CM

## 2023-01-27 DIAGNOSIS — D69.3 IMMUNE THROMBOCYTOPENIC PURPURA (H): Primary | ICD-10-CM

## 2023-01-27 LAB
BASOPHILS # BLD AUTO: 0.1 10E3/UL (ref 0–0.2)
BASOPHILS NFR BLD AUTO: 1 %
EOSINOPHIL # BLD AUTO: 0.1 10E3/UL (ref 0–0.7)
EOSINOPHIL NFR BLD AUTO: 1 %
ERYTHROCYTE [DISTWIDTH] IN BLOOD BY AUTOMATED COUNT: 14.6 % (ref 10–15)
HCT VFR BLD AUTO: 45.6 % (ref 35–47)
HGB BLD-MCNC: 14.8 G/DL (ref 11.7–15.7)
IMM GRANULOCYTES # BLD: 0.1 10E3/UL
IMM GRANULOCYTES NFR BLD: 1 %
LYMPHOCYTES # BLD AUTO: 3.6 10E3/UL (ref 0.8–5.3)
LYMPHOCYTES NFR BLD AUTO: 27 %
MCH RBC QN AUTO: 32.5 PG (ref 26.5–33)
MCHC RBC AUTO-ENTMCNC: 32.5 G/DL (ref 31.5–36.5)
MCV RBC AUTO: 100 FL (ref 78–100)
MONOCYTES # BLD AUTO: 1.1 10E3/UL (ref 0–1.3)
MONOCYTES NFR BLD AUTO: 8 %
NEUTROPHILS # BLD AUTO: 8.6 10E3/UL (ref 1.6–8.3)
NEUTROPHILS NFR BLD AUTO: 62 %
NRBC # BLD AUTO: 0 10E3/UL
NRBC BLD AUTO-RTO: 0 /100
PLATELET # BLD AUTO: 278 10E3/UL (ref 150–450)
RBC # BLD AUTO: 4.56 10E6/UL (ref 3.8–5.2)
WBC # BLD AUTO: 13.5 10E3/UL (ref 4–11)

## 2023-01-27 PROCEDURE — 96372 THER/PROPH/DIAG INJ SC/IM: CPT | Performed by: INTERNAL MEDICINE

## 2023-01-27 PROCEDURE — 85025 COMPLETE CBC W/AUTO DIFF WBC: CPT

## 2023-01-27 PROCEDURE — 36415 COLL VENOUS BLD VENIPUNCTURE: CPT

## 2023-01-27 PROCEDURE — 250N000011 HC RX IP 250 OP 636: Performed by: INTERNAL MEDICINE

## 2023-01-27 RX ADMIN — ROMIPLOSTIM 55 MCG: 250 INJECTION, POWDER, LYOPHILIZED, FOR SOLUTION SUBCUTANEOUS at 14:39

## 2023-01-27 NOTE — PROGRESS NOTES
PT here ambulatory for Nplate inj. N plate reviewed with pt and administered in right upper arm/ bandaid to site. Follow up reviewed and pt dc'd steady gait.

## 2023-02-03 ENCOUNTER — INFUSION THERAPY VISIT (OUTPATIENT)
Dept: INFUSION THERAPY | Facility: HOSPITAL | Age: 88
End: 2023-02-03
Attending: INTERNAL MEDICINE
Payer: COMMERCIAL

## 2023-02-03 VITALS
SYSTOLIC BLOOD PRESSURE: 176 MMHG | RESPIRATION RATE: 16 BRPM | TEMPERATURE: 97.7 F | DIASTOLIC BLOOD PRESSURE: 74 MMHG | HEART RATE: 64 BPM | OXYGEN SATURATION: 98 %

## 2023-02-03 DIAGNOSIS — D69.3 IMMUNE THROMBOCYTOPENIC PURPURA (H): Primary | ICD-10-CM

## 2023-02-03 PROCEDURE — 250N000011 HC RX IP 250 OP 636: Performed by: INTERNAL MEDICINE

## 2023-02-03 PROCEDURE — 96372 THER/PROPH/DIAG INJ SC/IM: CPT | Performed by: INTERNAL MEDICINE

## 2023-02-03 RX ADMIN — ROMIPLOSTIM 55 MCG: 250 INJECTION, POWDER, LYOPHILIZED, FOR SOLUTION SUBCUTANEOUS at 13:14

## 2023-02-03 NOTE — PROGRESS NOTES
Pt arrived ambulatory to clinic for subcutaneous NPlate injection.  Administered subcutaneous NPlate injection into TRISTA per MD order.  Pt tolerated procedure well, no s/s of bleeding or swelling at site.  Instructed pt to call clinic with any further questions or concerns.  Pt verbalized understanding of plan of care and return to clinic.

## 2023-02-10 ENCOUNTER — INFUSION THERAPY VISIT (OUTPATIENT)
Dept: INFUSION THERAPY | Facility: HOSPITAL | Age: 88
End: 2023-02-10
Attending: INTERNAL MEDICINE
Payer: COMMERCIAL

## 2023-02-10 VITALS
TEMPERATURE: 97.7 F | DIASTOLIC BLOOD PRESSURE: 104 MMHG | SYSTOLIC BLOOD PRESSURE: 146 MMHG | OXYGEN SATURATION: 96 % | RESPIRATION RATE: 16 BRPM | HEART RATE: 68 BPM

## 2023-02-10 DIAGNOSIS — D69.3 IMMUNE THROMBOCYTOPENIC PURPURA (H): Primary | ICD-10-CM

## 2023-02-10 PROCEDURE — 250N000011 HC RX IP 250 OP 636: Performed by: INTERNAL MEDICINE

## 2023-02-10 PROCEDURE — 96372 THER/PROPH/DIAG INJ SC/IM: CPT | Performed by: INTERNAL MEDICINE

## 2023-02-10 RX ADMIN — ROMIPLOSTIM 55 MCG: 250 INJECTION, POWDER, LYOPHILIZED, FOR SOLUTION SUBCUTANEOUS at 14:34

## 2023-02-10 NOTE — PROGRESS NOTES
Infusion Nursing Note:  Ora Gonzalez presents today for cycle 83 day 1 romiplostim.    Patient seen by provider today: No   present during visit today: Not Applicable.    Note: Ora arrived ambulatory and in stable condition. Injection was given into the right arm and site covered with a band-aid. Will return on 2/17 for next appointment.    Intravenous Access:  No Intravenous access/labs at this visit.    Treatment Conditions:  Not Applicable.    Post Infusion Assessment:  Patient tolerated injection without incident.     Discharge Plan:   Patient and/or family verbalized understanding of discharge instructions and all questions answered.  AVS to patient via [employer].  Patient will return 2/17 for next appointment.   Patient discharged in stable condition accompanied by: self.  Departure Mode: Ambulatory.      Katherin Babcock RN

## 2023-02-17 ENCOUNTER — INFUSION THERAPY VISIT (OUTPATIENT)
Dept: INFUSION THERAPY | Facility: HOSPITAL | Age: 88
End: 2023-02-17
Attending: INTERNAL MEDICINE
Payer: COMMERCIAL

## 2023-02-17 VITALS
HEART RATE: 63 BPM | RESPIRATION RATE: 16 BRPM | TEMPERATURE: 97.8 F | OXYGEN SATURATION: 98 % | DIASTOLIC BLOOD PRESSURE: 66 MMHG | BODY MASS INDEX: 20.16 KG/M2 | SYSTOLIC BLOOD PRESSURE: 166 MMHG | WEIGHT: 112 LBS

## 2023-02-17 DIAGNOSIS — D69.3 IMMUNE THROMBOCYTOPENIC PURPURA (H): Primary | ICD-10-CM

## 2023-02-17 PROCEDURE — 250N000011 HC RX IP 250 OP 636: Performed by: INTERNAL MEDICINE

## 2023-02-17 PROCEDURE — 96372 THER/PROPH/DIAG INJ SC/IM: CPT | Performed by: INTERNAL MEDICINE

## 2023-02-17 RX ADMIN — ROMIPLOSTIM 55 MCG: 250 INJECTION, POWDER, LYOPHILIZED, FOR SOLUTION SUBCUTANEOUS at 13:28

## 2023-02-17 ASSESSMENT — PAIN SCALES - GENERAL: PAINLEVEL: NO PAIN (0)

## 2023-02-17 NOTE — PROGRESS NOTES
Infusion Nursing Note:  Ora Gonzalez presents today for NPLATE injection.    Patient seen by provider today: No   present during visit today: Not Applicable.    Note: Ora comes to infusion ambulatory and in stable condition. Confirms that she is here for an injection. NPLATE administered in the Right arm and site covered with a band aide. Ora reports no new concerns. Left infusion ambulatory and in stable condition at 1330. [facility] will be coming to pick her up. Will return next week.     Intravenous Access:  No Intravenous access/labs at this visit.    Treatment Conditions:  Not Applicable.    Post Infusion Assessment:  Patient tolerated injection without incident.  Site patent and intact, free from redness, edema or discomfort.     Discharge Plan:   Patient and/or family verbalized understanding of discharge instructions and all questions answered.  AVS to patient via [employer]. Patient will return on 2/24/2023 for next appointment.   Patient discharged in stable condition accompanied by: self.  Departure Mode: Ambulatory.    Lilibeth Aguayo RN

## 2023-02-24 ENCOUNTER — LAB (OUTPATIENT)
Dept: INFUSION THERAPY | Facility: HOSPITAL | Age: 88
End: 2023-02-24
Attending: INTERNAL MEDICINE
Payer: COMMERCIAL

## 2023-02-24 ENCOUNTER — ONCOLOGY VISIT (OUTPATIENT)
Dept: ONCOLOGY | Facility: HOSPITAL | Age: 88
End: 2023-02-24
Attending: INTERNAL MEDICINE
Payer: COMMERCIAL

## 2023-02-24 VITALS
DIASTOLIC BLOOD PRESSURE: 70 MMHG | HEART RATE: 70 BPM | BODY MASS INDEX: 20.43 KG/M2 | SYSTOLIC BLOOD PRESSURE: 158 MMHG | WEIGHT: 111 LBS | RESPIRATION RATE: 24 BRPM | OXYGEN SATURATION: 96 % | TEMPERATURE: 99.1 F | HEIGHT: 62 IN

## 2023-02-24 DIAGNOSIS — D69.3 IMMUNE THROMBOCYTOPENIC PURPURA (H): Primary | ICD-10-CM

## 2023-02-24 DIAGNOSIS — D69.3 IMMUNE THROMBOCYTOPENIC PURPURA (H): ICD-10-CM

## 2023-02-24 LAB
BASOPHILS # BLD MANUAL: 0 10E3/UL (ref 0–0.2)
BASOPHILS NFR BLD MANUAL: 0 %
EOSINOPHIL # BLD MANUAL: 0.1 10E3/UL (ref 0–0.7)
EOSINOPHIL NFR BLD MANUAL: 1 %
ERYTHROCYTE [DISTWIDTH] IN BLOOD BY AUTOMATED COUNT: 14.6 % (ref 10–15)
HCT VFR BLD AUTO: 46.5 % (ref 35–47)
HGB BLD-MCNC: 15.1 G/DL (ref 11.7–15.7)
HOWELL-JOLLY BOD BLD QL SMEAR: PRESENT
LYMPHOCYTES # BLD MANUAL: 4.9 10E3/UL (ref 0.8–5.3)
LYMPHOCYTES NFR BLD MANUAL: 36 %
MCH RBC QN AUTO: 31.9 PG (ref 26.5–33)
MCHC RBC AUTO-ENTMCNC: 32.5 G/DL (ref 31.5–36.5)
MCV RBC AUTO: 98 FL (ref 78–100)
MONOCYTES # BLD MANUAL: 1.1 10E3/UL (ref 0–1.3)
MONOCYTES NFR BLD MANUAL: 8 %
NEUTROPHILS # BLD MANUAL: 7.5 10E3/UL (ref 1.6–8.3)
NEUTROPHILS NFR BLD MANUAL: 55 %
PLAT MORPH BLD: ABNORMAL
PLATELET # BLD AUTO: 338 10E3/UL (ref 150–450)
RBC # BLD AUTO: 4.74 10E6/UL (ref 3.8–5.2)
RBC MORPH BLD: ABNORMAL
TARGETS BLD QL SMEAR: SLIGHT
WBC # BLD AUTO: 13.7 10E3/UL (ref 4–11)

## 2023-02-24 PROCEDURE — G0463 HOSPITAL OUTPT CLINIC VISIT: HCPCS | Mod: 25 | Performed by: NURSE PRACTITIONER

## 2023-02-24 PROCEDURE — 96372 THER/PROPH/DIAG INJ SC/IM: CPT | Performed by: INTERNAL MEDICINE

## 2023-02-24 PROCEDURE — 85007 BL SMEAR W/DIFF WBC COUNT: CPT

## 2023-02-24 PROCEDURE — 85027 COMPLETE CBC AUTOMATED: CPT

## 2023-02-24 PROCEDURE — 250N000011 HC RX IP 250 OP 636: Performed by: INTERNAL MEDICINE

## 2023-02-24 PROCEDURE — 99214 OFFICE O/P EST MOD 30 MIN: CPT | Performed by: NURSE PRACTITIONER

## 2023-02-24 PROCEDURE — 36415 COLL VENOUS BLD VENIPUNCTURE: CPT

## 2023-02-24 RX ADMIN — ROMIPLOSTIM 55 MCG: 250 INJECTION, POWDER, LYOPHILIZED, FOR SOLUTION SUBCUTANEOUS at 14:39

## 2023-02-24 ASSESSMENT — PAIN SCALES - GENERAL: PAINLEVEL: NO PAIN (0)

## 2023-02-24 NOTE — PROGRESS NOTES
[facility] Progress Note    Patient: Ora Gonzalez  MRN: [id number]  Date of Service: Feb 24, 2023        Assessment and Plan:    1. Immune thrombocytopenia:   Continues weekly N-plate injections. She is tolerating her medication well with no side effects.     Platelet count continues to remain stable (>200).  Today, platelets 338.     Plan:  -Continue Nplate at same dose/schedule. She's on the lowest dose (1 mcg/kg), but has had some weight loss over the year, so will readjust dose according to weight (from 55 mcg to 50 mcg)  -Monthly CBC  -Return with provider in 6 mths    2. Leukocytosis, neutrophilia:   Stable, mild, chronic. Follow.      ECOG Performance  0    Diagnosis:    1. 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 Bone marrow biopsy was unremarkable, November 2014. Thyroid functions were normal. Anticardiolipin antibodies were normal.     2. Right-sided pulmonary embolism: Provoked. Diagnosed February 8, 2016.    Treatment:    She started course of prednisone on November 14, 2014 and finished on January 5, 2015. She had a complete response. Quickly relapsed and treated with Rituxan weekly from March 12 through April 2, 2015. She responded with highest platelet count of 112.     She then relapsed in October, 2015. She was started on Promacta in October. She responded within 2-3 weeks. She then quickly lost response after dose reduction from 50 to 25mg. She was started back on Promacta 50 mg and prednisone 60 mg daily. She did not respond. She received 2 doses of IVIG on December 4 and 5, 2015. She responded briefly with a platelet count of 59 on December 7 but then quickly lost response by December 14. 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 She received 2 more doses of IVIG on December 16 and 17th with minimal response.   Splenectomy was performed on December 20, 2015. She did not respond.   We started Rituxan on December 29, 2015. Steroids were continued.  "Romiplostim was started on January 5, 2016. 1 dose of WinRho was given on January 6, 2016.  Platelet response noted on January 13.  Her last dose of Rituxan was on January 20th, 2016.     She was restarted on N-plate on July 28, 2016 for relapse. Continues on weekly    Interim History:    Ora comes in today for a 6-month follow-up visit.  Has continued weekly NPlate injections.  Overall has been doing well.  No acute complaints today. No shortness of breath.  No bleeding or bruising.  No fevers.    Past History:    Past Medical History:   Diagnosis Date     Adjustment disorder with mixed anxiety and depressed mood 1/22/2016     Adjustment reaction to chronic stress 1/22/2016     Chronic kidney disease      History of pulmonary embolism 7/8/2020     Hypertension      ITP (idiopathic thrombocytopenic purpura)      Osteoporosis      Paroxysmal atrial fibrillation (H)      Pulmonary embolism (H) 2/8/2016     Thrombocytopenia (H)      Physical Exam:    BP (!) 158/70 (BP Location: Left arm, Patient Position: Sitting, Cuff Size: Adult Regular)   Pulse 70   Temp 99.1  F (37.3  C)   Resp 24   Ht 1.568 m (5' 1.75\")   Wt 50.3 kg (111 lb)   SpO2 96%   BMI 20.47 kg/m      General: patient appears stated age of 89 year old. Nontoxic and in no distress.   HEENT: Head: atraumatic, normocephalic. Sclerae anicteric.  Chest:  Normal respiratory effort  Cardiac:  No edema.   Abdomen: abdomen is non-distended  Extremities: normal tone and muscle bulk.  Skin: no lesions or rash on visible skin. Warm and dry.   CNS: alert and oriented. Grossly non-focal.   Psychiatric: normal mood and affect.     Lab Results:    Recent Results (from the past 168 hour(s))   CBC with platelets and differential   Result Value Ref Range    WBC Count 13.7 (H) 4.0 - 11.0 10e3/uL    RBC Count 4.74 3.80 - 5.20 10e6/uL    Hemoglobin 15.1 11.7 - 15.7 g/dL    Hematocrit 46.5 35.0 - 47.0 %    MCV 98 78 - 100 fL    MCH 31.9 26.5 - 33.0 pg    MCHC 32.5 31.5 - " 36.5 g/dL    RDW 14.6 10.0 - 15.0 %    Platelet Count 338 150 - 450 10e3/uL   Manual Differential   Result Value Ref Range    % Neutrophils 55 %    % Lymphocytes 36 %    % Monocytes 8 %    % Eosinophils 1 %    % Basophils 0 %    Absolute Neutrophils 7.5 1.6 - 8.3 10e3/uL    Absolute Lymphocytes 4.9 0.8 - 5.3 10e3/uL    Absolute Monocytes 1.1 0.0 - 1.3 10e3/uL    Absolute Eosinophils 0.1 0.0 - 0.7 10e3/uL    Absolute Basophils 0.0 0.0 - 0.2 10e3/uL    RBC Morphology Confirmed RBC Indices     Platelet Assessment  Automated Count Confirmed. Platelet morphology is normal.     Automated Count Confirmed. Platelet morphology is normal.    Caal[place] Bodies Present (A) None Seen    Target Cells Slight (A) None Seen     Imaging:    No results found.    Total time 30 minutes, to include face to face visit, review of EMR, ordering, documentation and coordination of care on date of service    Signed by: Elida Tamez NP

## 2023-02-24 NOTE — PROGRESS NOTES
Infusion Nursing Note:  Ora Gonzalez presents today for NPLATE injection.    Patient seen by provider today: Yes, scheduled visit with Elida Tamez NP.   present during visit today: Not Applicable.    Note: Ora comes to infusion ambulatory and in stable condition. Confirms that she is here for an injection. NPLATE administered in the Right arm and site covered with a band aide. Ora reports no new concerns. Left infusion ambulatory and in stable condition. [facility] will be coming to pick her up. Will return next week.     Intravenous Access:  Labs drawn by .    Treatment Conditions:    CBC RESULTS: Recent Labs   Lab Test 02/24/23  1310   WBC 13.7*   RBC 4.74   HGB 15.1   HCT 46.5   MCV 98   MCH 31.9   MCHC 32.5   RDW 14.6          Post Infusion Assessment:  Patient tolerated injection without incident.  Site patent and intact, free from redness, edema or discomfort.     Discharge Plan:   Patient and/or family verbalized understanding of discharge instructions and all questions answered.  AVS to patient via [employer]. Patient will return on 3/3/2023 for next appointment.   Patient discharged in stable condition accompanied by: self.  Departure Mode: Ambulatory.    Mena Grider RN

## 2023-02-24 NOTE — PROGRESS NOTES
"Oncology Rooming Note    February 24, 2023 1:52 PM   Ora Gonzalez is a 89 year old female who presents for:    Chief Complaint   Patient presents with     Hematology     6 month return Immune thrombocytopenic purpura, review labs & injection     Initial Vitals: BP (!) 158/70 (BP Location: Left arm, Patient Position: Sitting, Cuff Size: Adult Regular)   Pulse 70   Temp 99.1  F (37.3  C)   Resp 24   Ht 1.568 m (5' 1.75\")   Wt 50.3 kg (111 lb)   SpO2 96%   BMI 20.47 kg/m   Estimated body mass index is 20.47 kg/m  as calculated from the following:    Height as of this encounter: 1.568 m (5' 1.75\").    Weight as of this encounter: 50.3 kg (111 lb). Body surface area is 1.48 meters squared.  No Pain (0) Comment: Data Unavailable   No LMP recorded.  Allergies reviewed: Yes  Medications reviewed: Yes    Medications: Medication refills not needed today.  Pharmacy name entered into [employer]: [id number] - [place] [id number]    Clinical concerns: Immune thrombocytopenic purpura      Cherelle Colón CMA            "

## 2023-03-03 ENCOUNTER — INFUSION THERAPY VISIT (OUTPATIENT)
Dept: INFUSION THERAPY | Facility: HOSPITAL | Age: 88
End: 2023-03-03
Attending: INTERNAL MEDICINE
Payer: COMMERCIAL

## 2023-03-03 VITALS
OXYGEN SATURATION: 96 % | TEMPERATURE: 97.9 F | DIASTOLIC BLOOD PRESSURE: 87 MMHG | SYSTOLIC BLOOD PRESSURE: 183 MMHG | RESPIRATION RATE: 16 BRPM | HEART RATE: 90 BPM

## 2023-03-03 DIAGNOSIS — D69.3 IMMUNE THROMBOCYTOPENIC PURPURA (H): Primary | ICD-10-CM

## 2023-03-03 PROCEDURE — 96372 THER/PROPH/DIAG INJ SC/IM: CPT | Performed by: NURSE PRACTITIONER

## 2023-03-03 PROCEDURE — 250N000011 HC RX IP 250 OP 636: Performed by: NURSE PRACTITIONER

## 2023-03-03 RX ADMIN — ROMIPLOSTIM 50 MCG: 250 INJECTION, POWDER, LYOPHILIZED, FOR SOLUTION SUBCUTANEOUS at 14:08

## 2023-03-03 NOTE — PROGRESS NOTES
Pt arrived ambulatory to clinic for subcutaneous NPlate injection.  No labs were due this week.  Administered subcutaneous NPlate injection into TRISTA per MD order.  Pt tolerated procedure well, no s/s of bleeding or swelling at site.  Instructed pt to call clinic with any further questions or concerns.  Pt verbalized understanding of plan of care and return to clinic.

## 2023-03-08 ENCOUNTER — NURSE TRIAGE (OUTPATIENT)
Dept: NURSING | Facility: CLINIC | Age: 88
End: 2023-03-08
Payer: COMMERCIAL

## 2023-03-08 NOTE — TELEPHONE ENCOUNTER
"Daughter Tova calling ph. [phone]. (No consent to communicate on file).     Patient is not present. Offered to connect patient into call. Caller declines, prefers to call back when she is with patient directly.    Reporting ongoing symptoms of \"lack of interest in things\" over \"past several months.     \"It is not an emergency.\"     History of dementia \"she will deny things.\"     Stating patient has had \"a lot of coughing, for several months.\"     Denies any known change in symptoms today. Reporting symptoms are ongoing.  Patient is not present to complete triage.    Tova agrees to call back when she is with patient later today to obtain consent and complete triage questions.    Tova Mckay RN  [facility]          Reason for Disposition    [1] Caller is not with the adult (patient) AND [2] probable NON-URGENT symptoms    Additional Information    Negative: [1] Caller is not with the adult (patient) AND [2] reporting urgent symptoms    Negative: Lab result questions    Negative: Medication questions    Negative: Caller can't be reached by phone    Negative: Caller has already spoken to PCP or another triager    Negative: RN needs further essential information from caller in order to complete triage    Negative: Requesting regular office appointment    Negative: [1] Caller requesting NON-URGENT health information AND [2] PCP's office is the best resource    Negative: Health Information question, no triage required and triager able to answer question    Negative: General information question, no triage required and triager able to answer question    Negative: Question about upcoming scheduled test, no triage required and triager able to answer question    Protocols used: INFORMATION ONLY CALL - NO TRIAGE-A-      "

## 2023-03-10 ENCOUNTER — INFUSION THERAPY VISIT (OUTPATIENT)
Dept: INFUSION THERAPY | Facility: HOSPITAL | Age: 88
End: 2023-03-10
Attending: INTERNAL MEDICINE
Payer: COMMERCIAL

## 2023-03-10 VITALS
HEART RATE: 80 BPM | SYSTOLIC BLOOD PRESSURE: 175 MMHG | OXYGEN SATURATION: 97 % | TEMPERATURE: 98 F | RESPIRATION RATE: 16 BRPM | DIASTOLIC BLOOD PRESSURE: 90 MMHG

## 2023-03-10 DIAGNOSIS — D69.3 IMMUNE THROMBOCYTOPENIC PURPURA (H): Primary | ICD-10-CM

## 2023-03-10 PROCEDURE — 96372 THER/PROPH/DIAG INJ SC/IM: CPT | Performed by: NURSE PRACTITIONER

## 2023-03-10 PROCEDURE — 250N000011 HC RX IP 250 OP 636: Performed by: NURSE PRACTITIONER

## 2023-03-10 RX ADMIN — ROMIPLOSTIM 50 MCG: 250 INJECTION, POWDER, LYOPHILIZED, FOR SOLUTION SUBCUTANEOUS at 13:26

## 2023-03-10 NOTE — PROGRESS NOTES
PT here ambulatory for Nplate inj. PT states doing well/appetite and energy good. Nplate reviewed and administered in right upper arm/bandaid to site. PT tolerated without any problems. Follow up reviewed and pt dc'd steady gait.

## 2023-03-17 ENCOUNTER — INFUSION THERAPY VISIT (OUTPATIENT)
Dept: INFUSION THERAPY | Facility: HOSPITAL | Age: 88
End: 2023-03-17
Attending: INTERNAL MEDICINE
Payer: COMMERCIAL

## 2023-03-17 VITALS
RESPIRATION RATE: 20 BRPM | DIASTOLIC BLOOD PRESSURE: 65 MMHG | OXYGEN SATURATION: 98 % | SYSTOLIC BLOOD PRESSURE: 168 MMHG | HEART RATE: 68 BPM | TEMPERATURE: 97.7 F

## 2023-03-17 DIAGNOSIS — D69.3 IMMUNE THROMBOCYTOPENIC PURPURA (H): Primary | ICD-10-CM

## 2023-03-17 PROCEDURE — 250N000011 HC RX IP 250 OP 636: Performed by: NURSE PRACTITIONER

## 2023-03-17 PROCEDURE — 96372 THER/PROPH/DIAG INJ SC/IM: CPT | Performed by: NURSE PRACTITIONER

## 2023-03-17 RX ADMIN — ROMIPLOSTIM 50 MCG: 250 INJECTION, POWDER, LYOPHILIZED, FOR SOLUTION SUBCUTANEOUS at 13:39

## 2023-03-17 ASSESSMENT — PAIN SCALES - GENERAL: PAINLEVEL: NO PAIN (0)

## 2023-03-17 NOTE — PROGRESS NOTES
Infusion Nursing Note:  Ora Gonzalez presents today for Nplate injection.    Patient seen by provider today: No   present during visit today: Not Applicable.    Note: PT here ambulatory for Nplate inj. PT states doing well/appetite and energy good. Nplate reviewed and administered in right upper arm/bandaid to site. PT tolerated without any problems. Follow up reviewed and pt dc'd steady gait.    Intravenous Access:  No Intravenous access/labs at this visit.    Treatment Conditions:  Not Applicable.    Post Infusion Assessment:  Patient tolerated injection without incident.     Discharge Plan:   Discharge instructions reviewed with: Patient.  Patient and/or family verbalized understanding of discharge instructions and all questions answered.  Patient discharged in stable condition accompanied by: self.  Departure Mode: Ambulatory.      Vijaya Delgado RN

## 2023-03-24 ENCOUNTER — LAB (OUTPATIENT)
Dept: INFUSION THERAPY | Facility: HOSPITAL | Age: 88
End: 2023-03-24
Attending: INTERNAL MEDICINE
Payer: COMMERCIAL

## 2023-03-24 VITALS
HEART RATE: 70 BPM | DIASTOLIC BLOOD PRESSURE: 72 MMHG | TEMPERATURE: 97.6 F | RESPIRATION RATE: 12 BRPM | OXYGEN SATURATION: 98 % | SYSTOLIC BLOOD PRESSURE: 142 MMHG

## 2023-03-24 DIAGNOSIS — D69.3 IMMUNE THROMBOCYTOPENIC PURPURA (H): ICD-10-CM

## 2023-03-24 DIAGNOSIS — D69.3 IMMUNE THROMBOCYTOPENIC PURPURA (H): Primary | ICD-10-CM

## 2023-03-24 LAB
BASOPHILS # BLD MANUAL: 0 10E3/UL (ref 0–0.2)
BASOPHILS NFR BLD MANUAL: 0 %
EOSINOPHIL # BLD MANUAL: 0.1 10E3/UL (ref 0–0.7)
EOSINOPHIL NFR BLD MANUAL: 1 %
ERYTHROCYTE [DISTWIDTH] IN BLOOD BY AUTOMATED COUNT: 15 % (ref 10–15)
HCT VFR BLD AUTO: 42.9 % (ref 35–47)
HGB BLD-MCNC: 14 G/DL (ref 11.7–15.7)
HOWELL-JOLLY BOD BLD QL SMEAR: PRESENT
LYMPHOCYTES # BLD MANUAL: 4.1 10E3/UL (ref 0.8–5.3)
LYMPHOCYTES NFR BLD MANUAL: 29 %
MCH RBC QN AUTO: 32 PG (ref 26.5–33)
MCHC RBC AUTO-ENTMCNC: 32.6 G/DL (ref 31.5–36.5)
MCV RBC AUTO: 98 FL (ref 78–100)
MONOCYTES # BLD MANUAL: 1.3 10E3/UL (ref 0–1.3)
MONOCYTES NFR BLD MANUAL: 9 %
NEUTROPHILS # BLD MANUAL: 8.7 10E3/UL (ref 1.6–8.3)
NEUTROPHILS NFR BLD MANUAL: 61 %
NRBC # BLD AUTO: 0.1 10E3/UL
NRBC BLD MANUAL-RTO: 1 %
PLAT MORPH BLD: ABNORMAL
PLATELET # BLD AUTO: 225 10E3/UL (ref 150–450)
RBC # BLD AUTO: 4.37 10E6/UL (ref 3.8–5.2)
RBC MORPH BLD: ABNORMAL
TARGETS BLD QL SMEAR: SLIGHT
WBC # BLD AUTO: 14.3 10E3/UL (ref 4–11)

## 2023-03-24 PROCEDURE — 85027 COMPLETE CBC AUTOMATED: CPT

## 2023-03-24 PROCEDURE — 36415 COLL VENOUS BLD VENIPUNCTURE: CPT

## 2023-03-24 PROCEDURE — 250N000011 HC RX IP 250 OP 636: Performed by: NURSE PRACTITIONER

## 2023-03-24 PROCEDURE — 85007 BL SMEAR W/DIFF WBC COUNT: CPT

## 2023-03-24 PROCEDURE — 96372 THER/PROPH/DIAG INJ SC/IM: CPT | Performed by: NURSE PRACTITIONER

## 2023-03-24 RX ADMIN — ROMIPLOSTIM 50 MCG: 250 INJECTION, POWDER, LYOPHILIZED, FOR SOLUTION SUBCUTANEOUS at 13:14

## 2023-03-24 ASSESSMENT — PAIN SCALES - GENERAL: PAINLEVEL: NO PAIN (0)

## 2023-03-24 NOTE — PROGRESS NOTES
Infusion Nursing Note:  Ora Lisa presents today for monthly lab and D1 C89 N Plate injection   Patient seen by provider today: No   present during visit today: Not Applicable.    Note:Pt arrived ambulatory.    Intravenous Access:  No Intravenous access/labs at this visit.    Treatment Conditions:   injection given into right arm. Bandaid applied.    Post Infusion Assessment:       Discharge Plan:   Pt left ambulatory.        Fior Lima RN

## 2023-03-31 ENCOUNTER — INFUSION THERAPY VISIT (OUTPATIENT)
Dept: INFUSION THERAPY | Facility: HOSPITAL | Age: 88
End: 2023-03-31
Attending: INTERNAL MEDICINE
Payer: COMMERCIAL

## 2023-03-31 VITALS
RESPIRATION RATE: 18 BRPM | HEART RATE: 68 BPM | OXYGEN SATURATION: 97 % | SYSTOLIC BLOOD PRESSURE: 169 MMHG | TEMPERATURE: 98.2 F | DIASTOLIC BLOOD PRESSURE: 69 MMHG

## 2023-03-31 DIAGNOSIS — D69.3 IMMUNE THROMBOCYTOPENIC PURPURA (H): Primary | ICD-10-CM

## 2023-03-31 PROCEDURE — 96372 THER/PROPH/DIAG INJ SC/IM: CPT | Performed by: NURSE PRACTITIONER

## 2023-03-31 PROCEDURE — 250N000011 HC RX IP 250 OP 636: Performed by: NURSE PRACTITIONER

## 2023-03-31 RX ADMIN — ROMIPLOSTIM 50 MCG: 250 INJECTION, POWDER, LYOPHILIZED, FOR SOLUTION SUBCUTANEOUS at 13:37

## 2023-03-31 NOTE — PROGRESS NOTES
Infusion Nursing Note:  Ora Gonzalez presents today for Nplate.    Patient seen by provider today: No   present during visit today: Not Applicable.    Note: VSS.  Pt assessed.  No labs needed today.  Platelet count on 3/24 was 225.  Ora received Nplate as ordered subcutaneous into her right upper arm.    Intravenous Access:  No Intravenous access/labs at this visit.    Treatment Conditions:  Not Applicable.    Post Infusion Assessment:  Patient tolerated injection without incident.     Discharge Plan:   Patient discharged in stable condition accompanied by: self.      Carole Barrett RN

## 2023-04-07 ENCOUNTER — INFUSION THERAPY VISIT (OUTPATIENT)
Dept: INFUSION THERAPY | Facility: HOSPITAL | Age: 88
End: 2023-04-07
Attending: INTERNAL MEDICINE
Payer: COMMERCIAL

## 2023-04-07 VITALS
OXYGEN SATURATION: 99 % | HEART RATE: 62 BPM | DIASTOLIC BLOOD PRESSURE: 58 MMHG | RESPIRATION RATE: 16 BRPM | TEMPERATURE: 97.8 F | SYSTOLIC BLOOD PRESSURE: 174 MMHG

## 2023-04-07 DIAGNOSIS — D69.3 IMMUNE THROMBOCYTOPENIC PURPURA (H): Primary | ICD-10-CM

## 2023-04-07 PROCEDURE — 96372 THER/PROPH/DIAG INJ SC/IM: CPT | Performed by: NURSE PRACTITIONER

## 2023-04-07 PROCEDURE — 250N000011 HC RX IP 250 OP 636: Performed by: NURSE PRACTITIONER

## 2023-04-07 RX ADMIN — ROMIPLOSTIM 50 MCG: 250 INJECTION, POWDER, LYOPHILIZED, FOR SOLUTION SUBCUTANEOUS at 13:53

## 2023-04-07 NOTE — PROGRESS NOTES
Infusion Nursing Note:  Ora Gonzalez presents today for NPLATE    Patient seen by provider today: No   present during visit today: Not Applicable.    Note: N/A.    Intravenous Access:  No Intravenous access/labs at this visit.    Treatment Conditions:  Not Applicable.    Post Infusion Assessment:  Patient tolerated injection without incident. Given into right arm.    Discharge Plan:   Patient discharged in stable condition accompanied by: self.  Departure Mode: Ambulatory.      Tova Morrow RN                  \

## 2023-04-14 ENCOUNTER — INFUSION THERAPY VISIT (OUTPATIENT)
Dept: INFUSION THERAPY | Facility: HOSPITAL | Age: 88
End: 2023-04-14
Attending: NURSE PRACTITIONER
Payer: COMMERCIAL

## 2023-04-14 VITALS
HEART RATE: 68 BPM | DIASTOLIC BLOOD PRESSURE: 63 MMHG | OXYGEN SATURATION: 98 % | SYSTOLIC BLOOD PRESSURE: 137 MMHG | RESPIRATION RATE: 16 BRPM | TEMPERATURE: 97.8 F

## 2023-04-14 DIAGNOSIS — D69.3 IMMUNE THROMBOCYTOPENIC PURPURA (H): Primary | ICD-10-CM

## 2023-04-14 PROCEDURE — 250N000011 HC RX IP 250 OP 636: Performed by: NURSE PRACTITIONER

## 2023-04-14 PROCEDURE — 96372 THER/PROPH/DIAG INJ SC/IM: CPT | Performed by: NURSE PRACTITIONER

## 2023-04-14 RX ADMIN — ROMIPLOSTIM 50 MCG: 250 INJECTION, POWDER, LYOPHILIZED, FOR SOLUTION SUBCUTANEOUS at 13:40

## 2023-04-14 NOTE — PROGRESS NOTES
Infusion Nursing Note:  Ora Gonzalez presents today for Romiplostin injection    Patient seen by provider today: No   present during visit today: Not Applicable.    Note: PT here ambulatory for Romiplostin inj. PT states doing well/appetite and energy good. Romiplostin reviewed and administered in  Right upper arm/bandaid to site. PT tolerated without any problems. Follow up reviewed and pt dc'd steady gait.      Intravenous Access:  No Intravenous access/labs at this visit.    Treatment Conditions:  Not Applicable.      Post Infusion Assessment:  Patient tolerated injection without incident.       Discharge Plan:   Discharge instructions reviewed with: Patient.  Patient and/or family verbalized understanding of discharge instructions and all questions answered.  Patient discharged in stable condition accompanied by: self.  Departure Mode: Ambulatory.      Vijaya Delgado RN

## 2023-04-21 ENCOUNTER — INFUSION THERAPY VISIT (OUTPATIENT)
Dept: INFUSION THERAPY | Facility: HOSPITAL | Age: 88
End: 2023-04-21
Attending: NURSE PRACTITIONER
Payer: COMMERCIAL

## 2023-04-21 ENCOUNTER — LAB (OUTPATIENT)
Dept: INFUSION THERAPY | Facility: HOSPITAL | Age: 88
End: 2023-04-21
Attending: INTERNAL MEDICINE
Payer: COMMERCIAL

## 2023-04-21 VITALS
OXYGEN SATURATION: 100 % | RESPIRATION RATE: 18 BRPM | SYSTOLIC BLOOD PRESSURE: 143 MMHG | TEMPERATURE: 98.1 F | DIASTOLIC BLOOD PRESSURE: 72 MMHG | HEART RATE: 69 BPM

## 2023-04-21 DIAGNOSIS — D69.3 IMMUNE THROMBOCYTOPENIC PURPURA (H): ICD-10-CM

## 2023-04-21 DIAGNOSIS — D69.3 IMMUNE THROMBOCYTOPENIC PURPURA (H): Primary | ICD-10-CM

## 2023-04-21 LAB
BASOPHILS # BLD AUTO: 0.1 10E3/UL (ref 0–0.2)
BASOPHILS NFR BLD AUTO: 1 %
EOSINOPHIL # BLD AUTO: 0.1 10E3/UL (ref 0–0.7)
EOSINOPHIL NFR BLD AUTO: 1 %
ERYTHROCYTE [DISTWIDTH] IN BLOOD BY AUTOMATED COUNT: 14.6 % (ref 10–15)
HCT VFR BLD AUTO: 47.7 % (ref 35–47)
HGB BLD-MCNC: 15.4 G/DL (ref 11.7–15.7)
IMM GRANULOCYTES # BLD: 0.1 10E3/UL
IMM GRANULOCYTES NFR BLD: 1 %
LYMPHOCYTES # BLD AUTO: 3.8 10E3/UL (ref 0.8–5.3)
LYMPHOCYTES NFR BLD AUTO: 28 %
MCH RBC QN AUTO: 32.3 PG (ref 26.5–33)
MCHC RBC AUTO-ENTMCNC: 32.3 G/DL (ref 31.5–36.5)
MCV RBC AUTO: 100 FL (ref 78–100)
MONOCYTES # BLD AUTO: 1.2 10E3/UL (ref 0–1.3)
MONOCYTES NFR BLD AUTO: 9 %
NEUTROPHILS # BLD AUTO: 8.3 10E3/UL (ref 1.6–8.3)
NEUTROPHILS NFR BLD AUTO: 60 %
NRBC # BLD AUTO: 0 10E3/UL
NRBC BLD AUTO-RTO: 0 /100
PLATELET # BLD AUTO: 285 10E3/UL (ref 150–450)
RBC # BLD AUTO: 4.77 10E6/UL (ref 3.8–5.2)
WBC # BLD AUTO: 13.7 10E3/UL (ref 4–11)

## 2023-04-21 PROCEDURE — 250N000011 HC RX IP 250 OP 636: Performed by: NURSE PRACTITIONER

## 2023-04-21 PROCEDURE — 96372 THER/PROPH/DIAG INJ SC/IM: CPT | Performed by: NURSE PRACTITIONER

## 2023-04-21 PROCEDURE — 85025 COMPLETE CBC W/AUTO DIFF WBC: CPT

## 2023-04-21 PROCEDURE — 36415 COLL VENOUS BLD VENIPUNCTURE: CPT

## 2023-04-21 RX ADMIN — ROMIPLOSTIM 50 MCG: 250 INJECTION, POWDER, LYOPHILIZED, FOR SOLUTION SUBCUTANEOUS at 13:05

## 2023-04-21 NOTE — PROGRESS NOTES
Infusion Nursing Note:  Ora Gonzalez presents today for lab draw and Romiplostim.    Patient seen by provider today: No   present during visit today: Not Applicable.    Note: VSS.  Pt assessed and is feeling well today.  She received Romiplostim subcutaneous into her right upper arm.      Intravenous Access:  n/a.    Treatment Conditions:  Results reviewed, labs MET treatment parameters, ok to proceed with treatment.      Post Infusion Assessment:  Patient tolerated injection without incident.       Discharge Plan:   Patient discharged in stable condition accompanied by: self.  Departure Mode: Ambulatory.      Carole Barrett RN

## 2023-04-28 ENCOUNTER — INFUSION THERAPY VISIT (OUTPATIENT)
Dept: INFUSION THERAPY | Facility: HOSPITAL | Age: 88
End: 2023-04-28
Attending: NURSE PRACTITIONER
Payer: COMMERCIAL

## 2023-04-28 VITALS
SYSTOLIC BLOOD PRESSURE: 157 MMHG | TEMPERATURE: 97.6 F | OXYGEN SATURATION: 99 % | HEART RATE: 57 BPM | RESPIRATION RATE: 18 BRPM | DIASTOLIC BLOOD PRESSURE: 79 MMHG

## 2023-04-28 DIAGNOSIS — D69.3 IMMUNE THROMBOCYTOPENIC PURPURA (H): Primary | ICD-10-CM

## 2023-04-28 PROCEDURE — 96372 THER/PROPH/DIAG INJ SC/IM: CPT | Performed by: NURSE PRACTITIONER

## 2023-04-28 PROCEDURE — 250N000011 HC RX IP 250 OP 636: Performed by: NURSE PRACTITIONER

## 2023-04-28 RX ADMIN — ROMIPLOSTIM 50 MCG: 250 INJECTION, POWDER, LYOPHILIZED, FOR SOLUTION SUBCUTANEOUS at 14:00

## 2023-04-28 NOTE — PROGRESS NOTES
Infusion Nursing Note:  Ora Gonzalez presents today for cycle 94 day 1 romiplostim.    Patient seen by provider today: No   present during visit today: Not Applicable.    Note: Ora arrived ambulatory and in stable condition. Injection was given into the right arm and site covered with a band-aid. Will return on 5/5 for next appointment.    Intravenous Access:  No Intravenous access/labs at this visit.    Treatment Conditions:  Not Applicable.    Post Infusion Assessment:  Patient tolerated injection without incident.     Discharge Plan:   Patient and/or family verbalized understanding of discharge instructions and all questions answered.  Patient discharged in stable condition accompanied by: self.  Departure Mode: Ambulatory.      Katherin Babcock RN

## 2023-05-05 ENCOUNTER — INFUSION THERAPY VISIT (OUTPATIENT)
Dept: INFUSION THERAPY | Facility: HOSPITAL | Age: 88
End: 2023-05-05
Attending: INTERNAL MEDICINE
Payer: COMMERCIAL

## 2023-05-05 VITALS
RESPIRATION RATE: 16 BRPM | OXYGEN SATURATION: 93 % | DIASTOLIC BLOOD PRESSURE: 67 MMHG | HEART RATE: 60 BPM | SYSTOLIC BLOOD PRESSURE: 143 MMHG | TEMPERATURE: 97.5 F

## 2023-05-05 DIAGNOSIS — D69.3 IMMUNE THROMBOCYTOPENIC PURPURA (H): Primary | ICD-10-CM

## 2023-05-05 PROCEDURE — 96372 THER/PROPH/DIAG INJ SC/IM: CPT | Performed by: NURSE PRACTITIONER

## 2023-05-05 PROCEDURE — 250N000011 HC RX IP 250 OP 636: Performed by: NURSE PRACTITIONER

## 2023-05-05 RX ADMIN — ROMIPLOSTIM 50 MCG: 250 INJECTION, POWDER, LYOPHILIZED, FOR SOLUTION SUBCUTANEOUS at 13:29

## 2023-05-05 ASSESSMENT — PAIN SCALES - GENERAL: PAINLEVEL: NO PAIN (0)

## 2023-05-11 ENCOUNTER — OFFICE VISIT (OUTPATIENT)
Dept: FAMILY MEDICINE | Facility: CLINIC | Age: 88
End: 2023-05-11
Payer: COMMERCIAL

## 2023-05-11 VITALS
TEMPERATURE: 97.8 F | SYSTOLIC BLOOD PRESSURE: 127 MMHG | RESPIRATION RATE: 15 BRPM | DIASTOLIC BLOOD PRESSURE: 78 MMHG | OXYGEN SATURATION: 97 % | HEART RATE: 69 BPM

## 2023-05-11 DIAGNOSIS — R30.0 DYSURIA: Primary | ICD-10-CM

## 2023-05-11 DIAGNOSIS — R35.0 URINARY FREQUENCY: ICD-10-CM

## 2023-05-11 LAB
ALBUMIN UR-MCNC: ABNORMAL MG/DL
APPEARANCE UR: CLEAR
BACTERIA #/AREA URNS HPF: ABNORMAL /HPF
BILIRUB UR QL STRIP: NEGATIVE
COLOR UR AUTO: YELLOW
GLUCOSE UR STRIP-MCNC: NEGATIVE MG/DL
HGB UR QL STRIP: ABNORMAL
HYALINE CASTS #/AREA URNS LPF: ABNORMAL /LPF
KETONES UR STRIP-MCNC: NEGATIVE MG/DL
LEUKOCYTE ESTERASE UR QL STRIP: ABNORMAL
MUCOUS THREADS #/AREA URNS LPF: PRESENT /LPF
NITRATE UR QL: NEGATIVE
PH UR STRIP: 5.5 [PH] (ref 5–8)
RBC #/AREA URNS AUTO: ABNORMAL /HPF
SP GR UR STRIP: 1.02 (ref 1–1.03)
SQUAMOUS #/AREA URNS AUTO: ABNORMAL /LPF
UROBILINOGEN UR STRIP-ACNC: 0.2 E.U./DL
WBC #/AREA URNS AUTO: ABNORMAL /HPF

## 2023-05-11 PROCEDURE — 81001 URINALYSIS AUTO W/SCOPE: CPT | Performed by: FAMILY MEDICINE

## 2023-05-11 PROCEDURE — 99213 OFFICE O/P EST LOW 20 MIN: CPT | Performed by: FAMILY MEDICINE

## 2023-05-11 NOTE — PROGRESS NOTES
(R30.0) Dysuria  (primary encounter diagnosis)  Comment:   Plan: UA with Microscopic reflex to Culture - lab         collect, UA Microscopic with Reflex to Culture            (R35.0) Urinary frequency  Comment:   Plan: UA with Microscopic reflex to Culture - lab         collect, UA Microscopic with Reflex to Culture            Urinalysis is negative today.  After some additional discussion, it sounds like these symptoms only show up about once a month and she does not really have them presently.  After some discussion we will manage expectantly and best to have her come in when she is symptomatic and we can retest.  Patient and daughter are agreeable with this plan.          CHIEF COMPLAINT    Urinary frequency and dysuria, intermittent.      HISTORY    This is an 89-year-old woman seen today along with her daughter.    She basically is complaining of spells of urinary frequency and dysuria which have occurred off and on over the last month.  She has not treated herself with any medications.  Has not had fevers.  She does not recall a recent history of UTI.      REVIEW OF SYSTEMS    No fevers or chills.  No cough or short of breath.  No chest pain.  No abdominal pain.      EXAM  /78 (BP Location: Right arm, Patient Position: Sitting, Cuff Size: Adult Regular)   Pulse 69   Temp 97.8  F (36.6  C) (Oral)   Resp 15   SpO2 97%     Alert, NAD.  Nonlabored breathing.  Abdomen nondistended, nontender.  Extremities without edema.      Results for orders placed or performed in visit on 05/11/23   UA with Microscopic reflex to Culture - lab collect     Status: Abnormal    Specimen: Urine, Clean Catch   Result Value Ref Range    Color Urine Yellow Colorless, Straw, Light Yellow, Yellow    Appearance Urine Clear Clear    Glucose Urine Negative Negative mg/dL    Bilirubin Urine Negative Negative    Ketones Urine Negative Negative mg/dL    Specific Gravity Urine 1.020 1.005 - 1.030    Blood Urine Trace (A) Negative     pH Urine 5.5 5.0 - 8.0    Protein Albumin Urine Trace (A) Negative mg/dL    Urobilinogen Urine 0.2 0.2, 1.0 E.U./dL    Nitrite Urine Negative Negative    Leukocyte Esterase Urine Small (A) Negative   UA Microscopic with Reflex to Culture     Status: Abnormal   Result Value Ref Range    Bacteria Urine None Seen None Seen /HPF    RBC Urine None Seen 0-2 /HPF /HPF    WBC Urine 0-5 0-5 /HPF /HPF    Squamous Epithelials Urine Few (A) None Seen /LPF    Mucus Urine Present (A) None Seen /LPF    Hyaline Casts Urine 0-2 (A) None Seen /LPF    Narrative    Urine Culture not indicated

## 2023-05-12 ENCOUNTER — INFUSION THERAPY VISIT (OUTPATIENT)
Dept: INFUSION THERAPY | Facility: HOSPITAL | Age: 88
End: 2023-05-12
Attending: INTERNAL MEDICINE
Payer: COMMERCIAL

## 2023-05-12 VITALS
TEMPERATURE: 97.4 F | OXYGEN SATURATION: 100 % | SYSTOLIC BLOOD PRESSURE: 162 MMHG | RESPIRATION RATE: 16 BRPM | HEART RATE: 53 BPM | DIASTOLIC BLOOD PRESSURE: 78 MMHG

## 2023-05-12 DIAGNOSIS — D69.3 IMMUNE THROMBOCYTOPENIC PURPURA (H): Primary | ICD-10-CM

## 2023-05-12 PROCEDURE — 250N000011 HC RX IP 250 OP 636: Performed by: NURSE PRACTITIONER

## 2023-05-12 PROCEDURE — 96372 THER/PROPH/DIAG INJ SC/IM: CPT | Performed by: NURSE PRACTITIONER

## 2023-05-12 RX ADMIN — ROMIPLOSTIM 50 MCG: 250 INJECTION, POWDER, LYOPHILIZED, FOR SOLUTION SUBCUTANEOUS at 13:35

## 2023-05-12 ASSESSMENT — PAIN SCALES - GENERAL: PAINLEVEL: NO PAIN (0)

## 2023-05-16 ENCOUNTER — PATIENT OUTREACH (OUTPATIENT)
Dept: CARE COORDINATION | Facility: CLINIC | Age: 88
End: 2023-05-16

## 2023-05-16 ENCOUNTER — OFFICE VISIT (OUTPATIENT)
Dept: INTERNAL MEDICINE | Facility: CLINIC | Age: 88
End: 2023-05-16
Payer: COMMERCIAL

## 2023-05-16 VITALS
BODY MASS INDEX: 20.43 KG/M2 | SYSTOLIC BLOOD PRESSURE: 118 MMHG | RESPIRATION RATE: 16 BRPM | OXYGEN SATURATION: 99 % | WEIGHT: 111 LBS | HEART RATE: 63 BPM | TEMPERATURE: 97.7 F | DIASTOLIC BLOOD PRESSURE: 64 MMHG | HEIGHT: 62 IN

## 2023-05-16 DIAGNOSIS — D69.3 IMMUNE THROMBOCYTOPENIC PURPURA (H): ICD-10-CM

## 2023-05-16 DIAGNOSIS — G30.1 LATE ONSET ALZHEIMER'S DISEASE WITHOUT BEHAVIORAL DISTURBANCE (H): ICD-10-CM

## 2023-05-16 DIAGNOSIS — N76.0 VAGINITIS AND VULVOVAGINITIS: ICD-10-CM

## 2023-05-16 DIAGNOSIS — I48.0 PAROXYSMAL ATRIAL FIBRILLATION (H): ICD-10-CM

## 2023-05-16 DIAGNOSIS — F02.80 LATE ONSET ALZHEIMER'S DISEASE WITHOUT BEHAVIORAL DISTURBANCE (H): ICD-10-CM

## 2023-05-16 DIAGNOSIS — R42 DIZZINESS: ICD-10-CM

## 2023-05-16 DIAGNOSIS — Z00.00 ENCOUNTER FOR MEDICARE ANNUAL WELLNESS EXAM: Primary | ICD-10-CM

## 2023-05-16 PROBLEM — M65.30 TRIGGER FINGER, ACQUIRED: Status: ACTIVE | Noted: 2022-11-08

## 2023-05-16 PROCEDURE — 0134A COVID-19 BIVALENT 18+ (MODERNA): CPT | Performed by: NURSE PRACTITIONER

## 2023-05-16 PROCEDURE — G0439 PPPS, SUBSEQ VISIT: HCPCS | Performed by: NURSE PRACTITIONER

## 2023-05-16 PROCEDURE — 99213 OFFICE O/P EST LOW 20 MIN: CPT | Mod: 25 | Performed by: NURSE PRACTITIONER

## 2023-05-16 PROCEDURE — 91313 COVID-19 BIVALENT 18+ (MODERNA): CPT | Performed by: NURSE PRACTITIONER

## 2023-05-16 ASSESSMENT — ENCOUNTER SYMPTOMS
MYALGIAS: 0
SORE THROAT: 0
FREQUENCY: 1
DIZZINESS: 1
PARESTHESIAS: 0
HEMATURIA: 0
HEMATOCHEZIA: 0
DYSURIA: 0
WEAKNESS: 1
HEADACHES: 0
SHORTNESS OF BREATH: 0
JOINT SWELLING: 0
DIARRHEA: 1
ARTHRALGIAS: 0
NAUSEA: 0
CHILLS: 0
PALPITATIONS: 0
NERVOUS/ANXIOUS: 1
CONSTIPATION: 0
HEARTBURN: 0
BREAST MASS: 0
FEVER: 0
ABDOMINAL PAIN: 0
EYE PAIN: 0
COUGH: 0

## 2023-05-16 ASSESSMENT — ACTIVITIES OF DAILY LIVING (ADL)
CURRENT_FUNCTION: TRANSPORTATION REQUIRES ASSISTANCE
CURRENT_FUNCTION: BATHING REQUIRES ASSISTANCE
CURRENT_FUNCTION: MEDICATION ADMINISTRATION REQUIRES ASSISTANCE
CURRENT_FUNCTION: PREPARING MEALS REQUIRES ASSISTANCE

## 2023-05-16 ASSESSMENT — PAIN SCALES - GENERAL: PAINLEVEL: NO PAIN (0)

## 2023-05-16 NOTE — PROGRESS NOTES
Clinic Care Coordination Contact  [facility]/Voicemail       Clinical Data: Care Coordinator Outreach  Outreach attempted x 1.  Left message on patient's daughter Myrna voicemail with call back information and requested return call.  Plan: Care Coordinator CHW to discuss recent CC referral placed by PCP on 5/16/2023  Care Coordinator will try to reach patient again in 1-2 business days= 5/17/2023    Order Questions    Question Answer   Reason for Referral: Complex Medical Concerns/Education   Complex Medical Concerns: Chronic Diagnosis - Use Comments   Clinical Staff have discussed the Care Coordination Referral with the patient and/or caregiver: Yes   Home Care needs as well    Isabel MI  Community Health Worker  [facility] Care Coordination  [email or web address]  [email or web address]  Office: [phone]

## 2023-05-16 NOTE — PATIENT INSTRUCTIONS
[facility] Line for more information about additional assistance: [phone]       Patient Education   Personalized Prevention Plan  You are due for the preventive services outlined below.  Your care team is available to assist you in scheduling these services.  If you have already completed any of these items, please share that information with your care team to update in your medical record.  Health Maintenance Due   Topic Date Due    ANNUAL REVIEW OF HM ORDERS  Never done    Annual Wellness Visit  09/07/2022    COVID-19 Vaccine (5 - Moderna series) 01/20/2023       Understanding USDA MyPlate  The USDA has guidelines to help you make healthy food choices. These are called MyPlate. MyPlate shows the food groups that make up healthy meals using the image of a place setting. Before you eat, think about the healthiest choices for what to put on your plate or in your cup or bowl. To learn more about building a healthy plate, visit www.[employer] Groupmyplate.gov.     The food groups  Fruits. Any fruit or 100% fruit juice counts as part of the Fruit Group. Fruits may be fresh, canned, frozen, or dried, and may be whole, cut-up, or pureed. Make 1/2 of your plate fruits and vegetables.  Vegetables. Any vegetable or 100% vegetable juice counts as a member of the Vegetable Group. Vegetables may be fresh, frozen, canned, or dried. They can be served raw or cooked and may be whole, cut-up, or mashed. Make 1/2 of your plate fruits and vegetables.  Grains. All foods made from grains are part of the Grains Group. These include wheat, rice, oats, cornmeal, and barley. Grains are often used to make foods such as bread, pasta, oatmeal, cereal, tortillas, and grits. Grains should be no more than 1/4 of your plate. At least half of your grains should be whole grains.  Protein. This group includes meat, poultry, seafood, beans and peas, eggs, processed soy products (such as tofu), nuts (including nut butters), and seeds. Make protein  choices no more than 1/4 of your plate. Meat and poultry choices should be lean or low fat.  Dairy. The Dairy Group includes all fluid milk products and foods made from milk that contain calcium, such as yogurt and cheese. (Foods that have little calcium, such as cream, butter, and cream cheese, are not part of this group.) Most dairy choices should be low-fat or fat-free.  Oils. Oils aren't a food group, but they do contain essential nutrients. However it's important to watch your intake of oils. These are fats that are liquid at room temperature. They include canola, corn, olive, soybean, vegetable, and sunflower oil. Foods that are mainly oil include mayonnaise, certain salad dressings, and soft margarines. You likely already get your daily oil allowance from the foods you eat.  Things to limit  Eating healthy also means limiting these things in your diet:  Salt (sodium). Many processed foods have a lot of sodium. To keep sodium intake down, eat fresh vegetables, meats, poultry, and seafood when possible. Purchase low-sodium, reduced-sodium, or no-salt-added food products at the store. And don't add salt to your meals at home. Instead, season them with herbs and spices such as dill, oregano, cumin, and paprika. Or try adding flavor with lemon or lime zest and juice.  Saturated fat. Saturated fats are most often found in animal products such as beef, pork, and chicken. They are often solid at room temperature, such as butter. To reduce your saturated fat intake, choose leaner cuts of meat and poultry. And try healthier cooking methods such as grilling, broiling, roasting, or baking. For a simple lower-fat swap, use plain nonfat yogurt instead of mayonnaise when making potato salad or macaroni salad.  Added sugars. These are sugars added to foods. They are in foods such as ice cream, candy, soda, fruit drinks, sports drinks, energy drinks, cookies, pastries, jams, and syrups. Cut down on added sugars by sharing  sweet treats with a family member or friend. You can also choose fruit for dessert, and drink water or other unsweetened beverages.  [employer] last reviewed this educational content on 6/1/2020 2000-2022 [employer]. All rights reserved. This information is not intended as a substitute for professional medical care. Always follow your healthcare professional's instructions.        Activities of Daily Living    Your Health Risk Assessment indicates you have difficulties with activities of daily living such as housework, bathing, preparing meals, taking medication, etc. Please make a follow up appointment for us to address this issue in more detail.  Your Health Risk Assessment indicates you feel you are not in good emotional health.    Recreation   Recreation is not limited to sports and team events. It includes any activity that provides relaxation, interest, enjoyment, and exercise. Recreation provides an outlet for physical, mental, and social energy. It can give a sense of worth and achievement. It can help you stay healthy.    Mental Exercise and Social Involvement  Mental and emotional health is as important as physical health. Keep in touch with friends and family. Stay as active as possible. Continue to learn and challenge yourself.   Things you can do to stay mentally active are:  Learn something new, like a foreign language or musical instrument.   Play SCRABBLE or do crossword puzzles. If you cannot find people to play these games with you at home, you can play them with others on your computer through the Internet.   Join a games club--anything from card games to chess or checkers or lawn bowling.   Start a new hobby.   Go back to school.   Volunteer.   Read.   Keep up with world events.

## 2023-05-16 NOTE — PROGRESS NOTES
"SUBJECTIVE:   Ora is a 89 year old who presents for Preventive Visit.      9/7/2021     1:50 PM   Additional Questions   Roomed by shon Robledo   Accompanied by daughter Myrna and son, Jensen     Alzheimer's disease- She lives independently in a senior living. Her children note that she is forgetting to eat some meals and has lost weight.  She has a med box that is filled by her children and they check in with her throughout the week to assure she is taking them. So far, this has been working. She is sleeping a lot during the day. She takes [employer] to appointments.     Last weekend she reported that she was dizziness, it \"felt like things were moving in front of my face\". Her daughter was with her at the time and she was concerned that she could fall. She estimates that the dizziness lasted about 15 minutes. When it occurred, she told her daughter that it had happened before about 2 days prior but today she has no recollection of any other prior dizziness episodes.     H/o paroxysmal atrial fibrillation, single episode documented she denies heart palpitations.  In 2016 associated with a severe GI bleed. She has a relative contraindication to anticoagulation related to her history of ITP and history of severe GI bleed. She denies heart palpitations.     She has been experiencing vaginal itching, burning which lead to a walk-in visit. UA was negative for infection.     Patient has been advised of split billing requirements and indicates understanding: Yes  Are you in the first 12 months of your Medicare coverage?  No    Healthy Habits:     In general, how would you rate your overall health?  Good    Frequency of exercise:  6-7 days/week    Duration of exercise:  15-30 minutes    Do you usually eat at least 4 servings of fruit and vegetables a day, include whole grains    & fiber and avoid regularly eating high fat or \"junk\" foods?  No    Taking medications regularly:  Yes    Medication side effects:  None    Ability " to successfully perform activities of daily living:  Transportation requires assistance, preparing meals requires assistance, bathing requires assistance and medication administration requires assistance    Home Safety:  No safety concerns identified    Hearing Impairment:  No hearing concerns    In the past 6 months, have you been bothered by leaking of urine?  No    In general, how would you rate your overall mental or emotional health?  Fair      PHQ-2 Total Score: 2    Additional concerns today:  No      Have you ever done Advance Care Planning? (For example, a Health Directive, POLST, or a discussion with a medical provider or your loved ones about your wishes): Yes, advance care planning is on file.    Fall risk  Fallen 2 or more times in the past year?: No  Any fall with injury in the past year?: No      Cognitive Screening Not appropriate due to known dementia    Reviewed and updated as needed this visit by clinical staff   Tobacco  Allergies  Meds  Problems  Med Hx  Surg Hx  Fam Hx          Reviewed and updated as needed this visit by Provider   Tobacco  Allergies  Meds  Problems  Med Hx  Surg Hx  Fam Hx         Social History     Tobacco Use     Smoking status: Former     Types: Cigarettes     Smokeless tobacco: Never   Vaping Use     Vaping status: Not on file   Substance Use Topics     Alcohol use: Not Currently           5/16/2023    12:49 PM   Alcohol Use   Prescreen: >3 drinks/day or >7 drinks/week? Not Applicable     Do you have a current opioid prescription? No  Do you use any other controlled substances or medications that are not prescribed by a provider? None      Current providers sharing in care for this patient include:   Patient Care Team:  Ryanne Corbett NP as PCP - General  Vinny Garcia MD as MD (Hematology & Oncology)  Herb Dunn MD as MD (Neurology)  Vinny Garcia MD as Assigned Cancer Care Provider  Ryanne Corbett NP as Assigned PCP  Adam Mack MD as MD  "(Neurology)  Adam Mack MD as Assigned Neuroscience Provider  Vijaya Mackenzie as Community Health Worker    The following health maintenance items are reviewed in Epic and correct as of today:  Health Maintenance   Topic Date Due     DTAP/TDAP/TD IMMUNIZATION (1 - Tdap) 09/07/2027 (Originally 6/14/2017)     MEDICARE ANNUAL WELLNESS VISIT  05/16/2024     ANNUAL REVIEW OF HM ORDERS  05/16/2024     FALL RISK ASSESSMENT  05/16/2024     ADVANCE CARE PLANNING  05/16/2028     PHQ-2 (once per calendar year)  Completed     INFLUENZA VACCINE  Completed     Pneumococcal Vaccine: 65+ Years  Completed     COVID-19 Vaccine  Completed     IPV IMMUNIZATION  Aged Out     MENINGITIS IMMUNIZATION  Aged Out     ZOSTER IMMUNIZATION  Discontinued       Review of Systems   Constitutional: Negative for chills and fever.   HENT: Negative for congestion, ear pain, hearing loss and sore throat.    Eyes: Positive for visual disturbance. Negative for pain.   Respiratory: Negative for cough and shortness of breath.    Cardiovascular: Negative for chest pain, palpitations and peripheral edema.   Gastrointestinal: Positive for diarrhea. Negative for abdominal pain, constipation, heartburn, hematochezia and nausea.   Breasts:  Negative for tenderness, breast mass and discharge.   Genitourinary: Positive for frequency and urgency. Negative for dysuria, genital sores, hematuria, pelvic pain, vaginal bleeding and vaginal discharge.   Musculoskeletal: Negative for arthralgias, joint swelling and myalgias.   Skin: Negative for rash.   Neurological: Positive for dizziness and weakness. Negative for headaches and paresthesias.   Psychiatric/Behavioral: Negative for mood changes. The patient is nervous/anxious.        OBJECTIVE:   /64   Pulse 63   Temp 97.7  F (36.5  C) (Oral)   Resp 16   Ht 1.568 m (5' 1.75\")   Wt 50.3 kg (111 lb)   LMP  (LMP Unknown)   SpO2 99%   BMI 20.47 kg/m   Estimated body mass index is 20.47 kg/m  as calculated " "from the following:    Height as of this encounter: 1.568 m (5' 1.75\").    Weight as of this encounter: 50.3 kg (111 lb).     Wt Readings from Last 5 Encounters:   05/16/23 50.3 kg (111 lb)   02/24/23 50.3 kg (111 lb)   02/17/23 50.8 kg (112 lb)   11/08/22 53.1 kg (117 lb)   10/28/22 53.6 kg (118 lb 1.6 oz)       Physical Exam  GENERAL: healthy, alert and no distress  EYES: Eyes grossly normal to inspection, conjunctivae and sclerae normal  HENT: ear canals and TM's normal, mouth without ulcers or lesions.  Poor dentition  RESP: lungs clear to auscultation - no rales, rhonchi or wheezes  CV: regular rate and rhythm, normal S1 S2, no S3 or S4, no murmur  NEURO: Normal strength and tone.  She is alert and oriented but some of the history that she provides is not accurate according to her children present with her today.      ASSESSMENT / PLAN:     Problem List Items Addressed This Visit        Nervous and Auditory    Late onset Alzheimer's disease without behavioral disturbance (H)     She is currently living independently at a senior living facility with assistance from her daughter, Myrna, and son, Jensen.  With her recent weight loss and her children's observation that she is skipping meals, she needs additional assistance to remain safe.  Additionally, it sounds like she is becoming more confused with her medication administration.  They were given a phone number for Senior linkage line.  I will also order a home health safety assessment, RN for complex med management, and  consult.         Relevant Orders    Primary Care - Care Coordination Referral    Home Care Referral       Circulatory    Paroxysmal atrial fibrillation (H)     Single episode documented in 2016, contraindication to anticoagulation. No heart palpitations and heart rate is controlled. Rhythm is normal on exam. Continue to monitor.             Immune    Immune thrombocytopenic purpura (H)     Followed by hem/onc. Platelets remain stable "             Urinary    Vaginitis and vulvovaginitis     Possibly r/t hygiene as her family sometimes questions whether she remembers how to bathe herself. They are looking into assistance including someone that could assist her with a shower. If recurrent vaginal itching/discomfort, we also discussed an estradiol vaginal cream for atrophic vaginitis. They would like to defer this option for now until they address hygiene.             Other    Dizziness     Broad differential and difficult to get a good history due to her cognitive issues r/t Alzheimer's. We will first get a head CT to evaluate for evidence of a recent stroke. HR is well controlled today and CV exam with normal heart rhythm. If imaging is normal, we discussed a referral to physical therapy for vestibular rehab if symptoms recur.          Relevant Orders    CT Head w/o Contrast   Other Visit Diagnoses     Encounter for Medicare annual wellness exam    -  Primary        Moderna COVID booster shot was given today        COUNSELING:  Reviewed preventive health counseling, as reflected in patient instructions       Regular exercise       Healthy diet/nutrition       Fall risk prevention        She reports that she has quit smoking. Her smoking use included cigarettes. She has never used smokeless tobacco.      Appropriate preventive services were discussed with this patient, including applicable screening as appropriate for cardiovascular disease, diabetes, osteopenia/osteoporosis, and glaucoma.  As appropriate for age/gender, discussed screening for colorectal cancer, prostate cancer, breast cancer, and cervical cancer. Checklist reviewing preventive services available has been given to the patient.    Reviewed patients plan of care and provided an AVS. The Complex Care Plan (for patients with higher acuity and needing more deliberate coordination of services) for Ora meets the Care Plan requirement. This Care Plan has been established and reviewed with  the Patient and son and daughter.      Ryanne Corbett NP  [facility]    Identified Health Risks:    I have reviewed Opioid Use Disorder and Substance Use Disorder risk factors and made any needed referrals.     The patient was counseled and encouraged to consider modifying their diet and eating habits. She was provided with information on recommended healthy diet options.  The patient reports that she has difficulty with activities of daily living.  Referrals were placed today.  The patient was provided with suggestions to help her develop a healthy emotional lifestyle.

## 2023-05-17 ENCOUNTER — PATIENT OUTREACH (OUTPATIENT)
Dept: CARE COORDINATION | Facility: CLINIC | Age: 88
End: 2023-05-17
Payer: COMMERCIAL

## 2023-05-17 PROBLEM — I48.0 PAROXYSMAL ATRIAL FIBRILLATION (H): Status: ACTIVE | Noted: 2022-11-08

## 2023-05-17 PROBLEM — R42 DIZZINESS: Status: ACTIVE | Noted: 2023-05-17

## 2023-05-17 NOTE — ASSESSMENT & PLAN NOTE
Single episode documented in 2016, contraindication to anticoagulation. No heart palpitations and heart rate is controlled. Rhythm is normal on exam. Continue to monitor.

## 2023-05-17 NOTE — PROGRESS NOTES
Clinic Care Coordination Contact  [facility]/Voicemail    CHW returning two voicemail's from patient's daughter- one from 5/16 and other was today 5/17     Clinical Data: Care Coordinator Outreach  Outreach attempted x 1.  Left message on patient's dtr Myrna voicemail with call back information and requested return call.  Plan: Care Coordinator CHW to discuss CC referral  Care Coordinator will try to reach patient again in 1-2 business days.    Isabel MI  Community Health Worker  [facility] Care Coordination  [email or web address] [email or web address]  Office: [phone]

## 2023-05-17 NOTE — ASSESSMENT & PLAN NOTE
Possibly r/t hygiene as her family sometimes questions whether she remembers how to bathe herself. They are looking into assistance including someone that could assist her with a shower. If recurrent vaginal itching/discomfort, we also discussed an estradiol vaginal cream for atrophic vaginitis. They would like to defer this option for now until they address hygiene.

## 2023-05-17 NOTE — ASSESSMENT & PLAN NOTE
She is currently living independently at a senior living facility with assistance from her daughter, Myrna, and son, Jensen.  With her recent weight loss and her children's observation that she is skipping meals, she needs additional assistance to remain safe.  Additionally, it sounds like she is becoming more confused with her medication administration.  They were given a phone number for Senior linkage line.  I will also order a home health safety assessment, RN for complex med management, and  consult.

## 2023-05-17 NOTE — ASSESSMENT & PLAN NOTE
Broad differential and difficult to get a good history due to her cognitive issues r/t Alzheimer's. We will first get a head CT to evaluate for evidence of a recent stroke. HR is well controlled today and CV exam with normal heart rhythm. If imaging is normal, we discussed a referral to physical therapy for vestibular rehab if symptoms recur.

## 2023-05-18 ENCOUNTER — MEDICAL CORRESPONDENCE (OUTPATIENT)
Dept: HEALTH INFORMATION MANAGEMENT | Facility: CLINIC | Age: 88
End: 2023-05-18

## 2023-05-18 ENCOUNTER — PATIENT OUTREACH (OUTPATIENT)
Dept: CARE COORDINATION | Facility: CLINIC | Age: 88
End: 2023-05-18
Payer: COMMERCIAL

## 2023-05-18 NOTE — PROGRESS NOTES
Clinic Care Coordination Contact  [facility]/Voicemail       Clinical Data: Care Coordinator Outreach  Outreach attempted x 1.  Left message on patient's dtr Myrna voicemail with call back information and requested return call.  Plan: Care Coordinator CHW to discuss CC referral   Care Coordinator will try to reach patient again in 1-2 business days.    Isabel MI  Community Health Worker  [facility] Care Coordination  [email or web address]  Office: [phone]

## 2023-05-19 ENCOUNTER — INFUSION THERAPY VISIT (OUTPATIENT)
Dept: INFUSION THERAPY | Facility: HOSPITAL | Age: 88
End: 2023-05-19
Attending: INTERNAL MEDICINE
Payer: COMMERCIAL

## 2023-05-19 VITALS
SYSTOLIC BLOOD PRESSURE: 175 MMHG | TEMPERATURE: 97.7 F | RESPIRATION RATE: 16 BRPM | DIASTOLIC BLOOD PRESSURE: 71 MMHG | OXYGEN SATURATION: 97 % | HEART RATE: 67 BPM

## 2023-05-19 DIAGNOSIS — D69.3 IMMUNE THROMBOCYTOPENIC PURPURA (H): ICD-10-CM

## 2023-05-19 DIAGNOSIS — D69.3 IMMUNE THROMBOCYTOPENIC PURPURA (H): Primary | ICD-10-CM

## 2023-05-19 LAB
BASOPHILS # BLD AUTO: 0.1 10E3/UL (ref 0–0.2)
BASOPHILS NFR BLD AUTO: 1 %
EOSINOPHIL # BLD AUTO: 0.2 10E3/UL (ref 0–0.7)
EOSINOPHIL NFR BLD AUTO: 2 %
ERYTHROCYTE [DISTWIDTH] IN BLOOD BY AUTOMATED COUNT: 14.7 % (ref 10–15)
HCT VFR BLD AUTO: 46.1 % (ref 35–47)
HGB BLD-MCNC: 14.9 G/DL (ref 11.7–15.7)
IMM GRANULOCYTES # BLD: 0.1 10E3/UL
IMM GRANULOCYTES NFR BLD: 1 %
LYMPHOCYTES # BLD AUTO: 3.8 10E3/UL (ref 0.8–5.3)
LYMPHOCYTES NFR BLD AUTO: 27 %
MCH RBC QN AUTO: 32.1 PG (ref 26.5–33)
MCHC RBC AUTO-ENTMCNC: 32.3 G/DL (ref 31.5–36.5)
MCV RBC AUTO: 99 FL (ref 78–100)
MONOCYTES # BLD AUTO: 1.1 10E3/UL (ref 0–1.3)
MONOCYTES NFR BLD AUTO: 8 %
NEUTROPHILS # BLD AUTO: 8.8 10E3/UL (ref 1.6–8.3)
NEUTROPHILS NFR BLD AUTO: 61 %
NRBC # BLD AUTO: 0 10E3/UL
NRBC BLD AUTO-RTO: 0 /100
PLATELET # BLD AUTO: 211 10E3/UL (ref 150–450)
RBC # BLD AUTO: 4.64 10E6/UL (ref 3.8–5.2)
WBC # BLD AUTO: 14.1 10E3/UL (ref 4–11)

## 2023-05-19 PROCEDURE — 96372 THER/PROPH/DIAG INJ SC/IM: CPT | Performed by: NURSE PRACTITIONER

## 2023-05-19 PROCEDURE — 85014 HEMATOCRIT: CPT

## 2023-05-19 PROCEDURE — 250N000011 HC RX IP 250 OP 636: Performed by: NURSE PRACTITIONER

## 2023-05-19 PROCEDURE — 36415 COLL VENOUS BLD VENIPUNCTURE: CPT

## 2023-05-19 RX ADMIN — ROMIPLOSTIM 50 MCG: 250 INJECTION, POWDER, LYOPHILIZED, FOR SOLUTION SUBCUTANEOUS at 13:17

## 2023-05-19 NOTE — PROGRESS NOTES
Infusion Nursing Note:  Ora Gonzalez presents today for cycle 97 day 1 romiplostim.    Patient seen by provider today: No   present during visit today: Not Applicable.    Note: Ora arrived ambulatory and in stable condition. Injection was given into the right arm and site covered with a band-aid. Will return on 5/26 for next appointment.    Intravenous Access:  No Intravenous access/labs at this visit.    Treatment Conditions:  Not Applicable.    Post Infusion Assessment:  Patient tolerated injection without incident.     Discharge Plan:   Patient and/or family verbalized understanding of discharge instructions and all questions answered.  Patient discharged in stable condition accompanied by: self.  Departure Mode: Ambulatory.      Katherin Babcock RN

## 2023-05-22 ENCOUNTER — PATIENT OUTREACH (OUTPATIENT)
Dept: CARE COORDINATION | Facility: CLINIC | Age: 88
End: 2023-05-22
Payer: COMMERCIAL

## 2023-05-22 ENCOUNTER — HOSPITAL ENCOUNTER (OUTPATIENT)
Dept: CT IMAGING | Facility: HOSPITAL | Age: 88
Discharge: HOME | End: 2023-05-22
Attending: NURSE PRACTITIONER | Admitting: NURSE PRACTITIONER
Payer: COMMERCIAL

## 2023-05-22 DIAGNOSIS — R42 DIZZINESS: ICD-10-CM

## 2023-05-22 PROCEDURE — 70450 CT HEAD/BRAIN W/O DYE: CPT

## 2023-05-22 NOTE — PROGRESS NOTES
CHW spoke to Ora's daughter Myrna today regarding CC referral, CHW offered to schedule call with CC RN  Myrna will contacted patient's other sons to coordinate a time when all children can be available  Myrna will contact CHW with seema MI  Community Health Worker  [facility] Care Coordination  Breana Pi[email or web address]  Office: [phone]

## 2023-05-23 DIAGNOSIS — H81.10 BENIGN PAROXYSMAL POSITIONAL VERTIGO, UNSPECIFIED LATERALITY: Primary | ICD-10-CM

## 2023-05-26 ENCOUNTER — INFUSION THERAPY VISIT (OUTPATIENT)
Dept: INFUSION THERAPY | Facility: HOSPITAL | Age: 88
End: 2023-05-26
Attending: INTERNAL MEDICINE
Payer: COMMERCIAL

## 2023-05-26 VITALS
HEART RATE: 65 BPM | SYSTOLIC BLOOD PRESSURE: 188 MMHG | OXYGEN SATURATION: 98 % | RESPIRATION RATE: 16 BRPM | DIASTOLIC BLOOD PRESSURE: 76 MMHG

## 2023-05-26 DIAGNOSIS — D69.3 IMMUNE THROMBOCYTOPENIC PURPURA (H): Primary | ICD-10-CM

## 2023-05-26 PROCEDURE — 250N000011 HC RX IP 250 OP 636: Performed by: NURSE PRACTITIONER

## 2023-05-26 PROCEDURE — 96372 THER/PROPH/DIAG INJ SC/IM: CPT | Performed by: NURSE PRACTITIONER

## 2023-05-26 RX ADMIN — ROMIPLOSTIM 50 MCG: 250 INJECTION, POWDER, LYOPHILIZED, FOR SOLUTION SUBCUTANEOUS at 13:35

## 2023-05-26 ASSESSMENT — PAIN SCALES - GENERAL: PAINLEVEL: NO PAIN (0)

## 2023-05-26 NOTE — PROGRESS NOTES
Infusion Nursing Note:  Ora Gonzalez presents today for N-Plate.    Patient seen by provider today: No   present during visit today: Not Applicable.    Note: Pt voices no new concerns today.      Intravenous Access:  No Intravenous access/labs at this visit.    Treatment Conditions:  Not Applicable.      Post Infusion Assessment:  Patient tolerated injection without incident.       Discharge Plan:   Patient discharged in stable condition accompanied by: self.  Departure Mode: Ambulatory.      oTva Chang RN

## 2023-06-02 ENCOUNTER — INFUSION THERAPY VISIT (OUTPATIENT)
Dept: INFUSION THERAPY | Facility: HOSPITAL | Age: 88
End: 2023-06-02
Attending: INTERNAL MEDICINE
Payer: COMMERCIAL

## 2023-06-02 VITALS
HEART RATE: 57 BPM | RESPIRATION RATE: 16 BRPM | SYSTOLIC BLOOD PRESSURE: 157 MMHG | OXYGEN SATURATION: 99 % | TEMPERATURE: 97.6 F | DIASTOLIC BLOOD PRESSURE: 77 MMHG

## 2023-06-02 DIAGNOSIS — D69.3 IMMUNE THROMBOCYTOPENIC PURPURA (H): Primary | ICD-10-CM

## 2023-06-02 PROCEDURE — 96372 THER/PROPH/DIAG INJ SC/IM: CPT | Performed by: NURSE PRACTITIONER

## 2023-06-02 PROCEDURE — 250N000011 HC RX IP 250 OP 636: Performed by: NURSE PRACTITIONER

## 2023-06-02 RX ADMIN — ROMIPLOSTIM 50 MCG: 250 INJECTION, POWDER, LYOPHILIZED, FOR SOLUTION SUBCUTANEOUS at 13:45

## 2023-06-02 NOTE — PROGRESS NOTES
Pt here for injection which was given R arm without incident. Pt denies complaint and continues to walk 4 miles a day.

## 2023-06-07 ENCOUNTER — PATIENT OUTREACH (OUTPATIENT)
Dept: CARE COORDINATION | Facility: CLINIC | Age: 88
End: 2023-06-07
Payer: COMMERCIAL

## 2023-06-07 NOTE — PROGRESS NOTES
Clinic Care Coordination Contact  [facility]/Voicemail       Clinical Data: Care Coordinator Outreach  Outreach attempted x 4.  No answer at time of call today, CHW has been in contact with patient's daughter Myrna- At last outreach Myrna was going to talk with her siblings about arranging a time where everyone could be present or on call.    Plan: Care Coordinator will send care coordination introduction letter with care coordinator contact information and explanation of care coordination services via [employer]. Care Coordinator will do no further outreaches at this time.    Isabel MI  Community Health Worker  [facility] Care Coordination  [email or web address] [email or web address]  Office: [phone]  ]

## 2023-06-07 NOTE — LETTER
[facility] CARE COORDINATION  [address]  [place] [address]   June 7, 2023    Ora Gonzalez  [address] [place] [address]      Dear Ora,        I am a  clinic community health worker who works with Ryanne Corbett NP with the [facility]. I have been trying to reach you recently to introduce Clinic Care Coordination. Below is a description of clinic care coordination and how I can further assist you.       The clinic care coordination team is made up of a registered nurse, , financial resource worker and community health worker who understand the health care system. The goal of clinic care coordination is to help you manage your health and improve access to the health care system. Our team works alongside your provider to assist you in determining your health and social needs. We can help you obtain health care and community resources, providing you with necessary information and education. We can work with you through any barriers and develop a care plan that helps coordinate and strengthen the communication between you and your care team.  Our services are voluntary and are offered without charge to you personally.    Please feel free to contact me with any questions or concerns regarding care coordination and what we can offer.      We are focused on providing you with the highest-quality healthcare experience possible.    Sincerely,     Isabel MI  Community Health Worker  [facility]  Clinic Care Coordination  [email or web address]  [email or web address]  Office: [phone]

## 2023-06-09 ENCOUNTER — INFUSION THERAPY VISIT (OUTPATIENT)
Dept: INFUSION THERAPY | Facility: HOSPITAL | Age: 88
End: 2023-06-09
Attending: INTERNAL MEDICINE
Payer: COMMERCIAL

## 2023-06-09 VITALS
TEMPERATURE: 98.1 F | RESPIRATION RATE: 16 BRPM | SYSTOLIC BLOOD PRESSURE: 138 MMHG | DIASTOLIC BLOOD PRESSURE: 67 MMHG | HEART RATE: 56 BPM | OXYGEN SATURATION: 96 %

## 2023-06-09 DIAGNOSIS — D69.3 IMMUNE THROMBOCYTOPENIC PURPURA (H): Primary | ICD-10-CM

## 2023-06-09 PROCEDURE — 250N000011 HC RX IP 250 OP 636: Performed by: NURSE PRACTITIONER

## 2023-06-09 PROCEDURE — 96372 THER/PROPH/DIAG INJ SC/IM: CPT | Performed by: NURSE PRACTITIONER

## 2023-06-09 RX ADMIN — ROMIPLOSTIM 50 MCG: 250 INJECTION, POWDER, LYOPHILIZED, FOR SOLUTION SUBCUTANEOUS at 12:51

## 2023-06-09 ASSESSMENT — PAIN SCALES - GENERAL: PAINLEVEL: NO PAIN (0)

## 2023-06-14 DIAGNOSIS — E53.8 VITAMIN B12 DEFICIENCY (NON ANEMIC): ICD-10-CM

## 2023-06-15 RX ORDER — OMEGA-3/DHA/EPA/FISH OIL 35-113.5MG
TABLET,CHEWABLE ORAL
Qty: 90 TABLET | Refills: 0 | Status: SHIPPED | OUTPATIENT
Start: 2023-06-15 | End: 2023-08-31

## 2023-06-15 NOTE — TELEPHONE ENCOUNTER
Refill request for: cyanocobalamin (VITAMIN B-12) 1000 MCG tablet  Directions: Take 1 tablet (1,000 mcg) by mouth daily    LOV: 11/8/22  NOV: Not scheduled    90 day supply with 0 refills Medication T'd for review and signature  Pilar Nicolas CMA on 6/15/2023 at 10:54 AM

## 2023-06-16 ENCOUNTER — INFUSION THERAPY VISIT (OUTPATIENT)
Dept: INFUSION THERAPY | Facility: HOSPITAL | Age: 88
End: 2023-06-16
Attending: INTERNAL MEDICINE
Payer: COMMERCIAL

## 2023-06-16 VITALS
TEMPERATURE: 97.9 F | WEIGHT: 111.5 LBS | HEART RATE: 62 BPM | DIASTOLIC BLOOD PRESSURE: 71 MMHG | RESPIRATION RATE: 16 BRPM | BODY MASS INDEX: 20.56 KG/M2 | OXYGEN SATURATION: 98 % | SYSTOLIC BLOOD PRESSURE: 158 MMHG

## 2023-06-16 DIAGNOSIS — D69.3 IMMUNE THROMBOCYTOPENIC PURPURA (H): ICD-10-CM

## 2023-06-16 DIAGNOSIS — D69.3 IMMUNE THROMBOCYTOPENIC PURPURA (H): Primary | ICD-10-CM

## 2023-06-16 LAB
BASOPHILS # BLD AUTO: 0.1 10E3/UL (ref 0–0.2)
BASOPHILS NFR BLD AUTO: 1 %
EOSINOPHIL # BLD AUTO: 0.1 10E3/UL (ref 0–0.7)
EOSINOPHIL NFR BLD AUTO: 1 %
ERYTHROCYTE [DISTWIDTH] IN BLOOD BY AUTOMATED COUNT: 14.9 % (ref 10–15)
HCT VFR BLD AUTO: 43.6 % (ref 35–47)
HGB BLD-MCNC: 13.9 G/DL (ref 11.7–15.7)
IMM GRANULOCYTES # BLD: 0.1 10E3/UL
IMM GRANULOCYTES NFR BLD: 1 %
LYMPHOCYTES # BLD AUTO: 2.9 10E3/UL (ref 0.8–5.3)
LYMPHOCYTES NFR BLD AUTO: 23 %
MCH RBC QN AUTO: 31.9 PG (ref 26.5–33)
MCHC RBC AUTO-ENTMCNC: 31.9 G/DL (ref 31.5–36.5)
MCV RBC AUTO: 100 FL (ref 78–100)
MONOCYTES # BLD AUTO: 1 10E3/UL (ref 0–1.3)
MONOCYTES NFR BLD AUTO: 8 %
NEUTROPHILS # BLD AUTO: 8.4 10E3/UL (ref 1.6–8.3)
NEUTROPHILS NFR BLD AUTO: 66 %
NRBC # BLD AUTO: 0 10E3/UL
NRBC BLD AUTO-RTO: 0 /100
PLATELET # BLD AUTO: 264 10E3/UL (ref 150–450)
RBC # BLD AUTO: 4.36 10E6/UL (ref 3.8–5.2)
WBC # BLD AUTO: 12.5 10E3/UL (ref 4–11)

## 2023-06-16 PROCEDURE — 36415 COLL VENOUS BLD VENIPUNCTURE: CPT

## 2023-06-16 PROCEDURE — 96372 THER/PROPH/DIAG INJ SC/IM: CPT | Performed by: NURSE PRACTITIONER

## 2023-06-16 PROCEDURE — 250N000011 HC RX IP 250 OP 636: Performed by: NURSE PRACTITIONER

## 2023-06-16 PROCEDURE — 85025 COMPLETE CBC W/AUTO DIFF WBC: CPT

## 2023-06-16 RX ADMIN — ROMIPLOSTIM 50 MCG: 250 INJECTION, POWDER, LYOPHILIZED, FOR SOLUTION SUBCUTANEOUS at 14:06

## 2023-06-16 ASSESSMENT — PAIN SCALES - GENERAL: PAINLEVEL: NO PAIN (0)

## 2023-06-16 NOTE — PROGRESS NOTES
Pt arrived ambulatory to clinic for labs and subcutaneous NPlate injection.  Labs were reviewed, pt met parameters for treatment.  Administered subcutaneous NPlate injection into TRISTA per MD order.  Pt tolerated procedure well, no s/s of bleeding or swelling at site.  Instructed pt to call clinic with any further questions or concerns.  Pt verbalized understanding of plan of care and return to clinic.

## 2023-06-23 ENCOUNTER — INFUSION THERAPY VISIT (OUTPATIENT)
Dept: INFUSION THERAPY | Facility: HOSPITAL | Age: 88
End: 2023-06-23
Attending: INTERNAL MEDICINE
Payer: COMMERCIAL

## 2023-06-23 VITALS
TEMPERATURE: 98.3 F | HEART RATE: 68 BPM | SYSTOLIC BLOOD PRESSURE: 144 MMHG | RESPIRATION RATE: 16 BRPM | OXYGEN SATURATION: 98 % | DIASTOLIC BLOOD PRESSURE: 60 MMHG

## 2023-06-23 DIAGNOSIS — D69.3 IMMUNE THROMBOCYTOPENIC PURPURA (H): Primary | ICD-10-CM

## 2023-06-23 PROCEDURE — 250N000011 HC RX IP 250 OP 636: Mod: JZ | Performed by: NURSE PRACTITIONER

## 2023-06-23 PROCEDURE — 96372 THER/PROPH/DIAG INJ SC/IM: CPT | Performed by: NURSE PRACTITIONER

## 2023-06-23 RX ADMIN — ROMIPLOSTIM 50 MCG: 250 INJECTION, POWDER, LYOPHILIZED, FOR SOLUTION SUBCUTANEOUS at 12:12

## 2023-06-23 NOTE — PROGRESS NOTES
Infusion Nursing Note:  Ora Gonzalez presents today for Romiplostin injection.    Patient seen by provider today: No   present during visit today: Not Applicable.    Note: PT here ambulatory for Romiplostin inj. PT states doing well/appetite and energy good. Romiplostin reviewed and administered in  Right upper arm/bandaid to site. PT tolerated without any problems. Follow up reviewed and pt dc'd steady gait.      Intravenous Access:  No Intravenous access/labs at this visit.    Treatment Conditions:  Not Applicable.      Post Infusion Assessment:  Patient tolerated injection without incident.       Discharge Plan:   Discharge instructions reviewed with: Patient.  Patient and/or family verbalized understanding of discharge instructions and all questions answered.  Patient discharged in stable condition accompanied by: self.  Departure Mode: Ambulatory.      Vijaya Delgado RN

## 2023-06-30 ENCOUNTER — INFUSION THERAPY VISIT (OUTPATIENT)
Dept: INFUSION THERAPY | Facility: HOSPITAL | Age: 88
End: 2023-06-30
Attending: INTERNAL MEDICINE
Payer: COMMERCIAL

## 2023-06-30 VITALS
RESPIRATION RATE: 16 BRPM | SYSTOLIC BLOOD PRESSURE: 148 MMHG | OXYGEN SATURATION: 99 % | HEART RATE: 64 BPM | TEMPERATURE: 98 F | DIASTOLIC BLOOD PRESSURE: 67 MMHG

## 2023-06-30 DIAGNOSIS — D69.3 IMMUNE THROMBOCYTOPENIC PURPURA (H): Primary | ICD-10-CM

## 2023-06-30 PROCEDURE — 96372 THER/PROPH/DIAG INJ SC/IM: CPT | Performed by: NURSE PRACTITIONER

## 2023-06-30 PROCEDURE — 250N000011 HC RX IP 250 OP 636: Mod: JZ | Performed by: NURSE PRACTITIONER

## 2023-06-30 RX ADMIN — ROMIPLOSTIM 50 MCG: 250 INJECTION, POWDER, LYOPHILIZED, FOR SOLUTION SUBCUTANEOUS at 13:06

## 2023-06-30 NOTE — PROGRESS NOTES
Pt arrived ambulatory to clinic for labs and subcutaneous NPlate injection.  No labs were needed today, so medication was ordered.  Administered subcutaneous NPlate injection into TRISTA per MD order.  Pt tolerated procedure well, no s/s of bleeding or swelling at site.  Instructed pt to call clinic with any further questions or concerns.  Pt verbalized understanding of plan of care and return to clinic.

## 2023-07-07 ENCOUNTER — INFUSION THERAPY VISIT (OUTPATIENT)
Dept: INFUSION THERAPY | Facility: HOSPITAL | Age: 88
End: 2023-07-07
Attending: INTERNAL MEDICINE
Payer: COMMERCIAL

## 2023-07-07 VITALS
RESPIRATION RATE: 18 BRPM | DIASTOLIC BLOOD PRESSURE: 71 MMHG | HEART RATE: 63 BPM | OXYGEN SATURATION: 99 % | SYSTOLIC BLOOD PRESSURE: 145 MMHG | TEMPERATURE: 97.4 F

## 2023-07-07 DIAGNOSIS — D69.3 IMMUNE THROMBOCYTOPENIC PURPURA (H): Primary | ICD-10-CM

## 2023-07-07 PROCEDURE — 96372 THER/PROPH/DIAG INJ SC/IM: CPT | Performed by: NURSE PRACTITIONER

## 2023-07-07 PROCEDURE — 250N000011 HC RX IP 250 OP 636: Mod: JZ | Performed by: NURSE PRACTITIONER

## 2023-07-07 RX ADMIN — ROMIPLOSTIM 50 MCG: 250 INJECTION, POWDER, LYOPHILIZED, FOR SOLUTION SUBCUTANEOUS at 13:11

## 2023-07-07 ASSESSMENT — PAIN SCALES - GENERAL: PAINLEVEL: NO PAIN (0)

## 2023-07-07 NOTE — PROGRESS NOTES
Infusion Nursing Note:  Ora Gonzalez presents today for NPLATE injection.    Patient seen by provider today: No   present during visit today: Not Applicable.    Note: Ora arrived ambulatory by herself for NPLATE injection. She is feeling well and reports no new concerns today.  NPLATE administered subcutaneous as ordered in right upper arm per patient request. No swelling or bleeding noted.     Intravenous Access:  No Intravenous access/labs at this visit.    Treatment Conditions:  Not Applicable.    Post Infusion Assessment:  Patient tolerated injection without incident.     Discharge Plan:   Patient will return July 14th for next appointment.   Patient discharged in stable condition accompanied by: self.  Departure Mode: Ambulatory.      Rosa Arceo RN

## 2023-07-14 ENCOUNTER — INFUSION THERAPY VISIT (OUTPATIENT)
Dept: INFUSION THERAPY | Facility: HOSPITAL | Age: 88
End: 2023-07-14
Attending: INTERNAL MEDICINE
Payer: COMMERCIAL

## 2023-07-14 VITALS
OXYGEN SATURATION: 95 % | RESPIRATION RATE: 18 BRPM | SYSTOLIC BLOOD PRESSURE: 179 MMHG | TEMPERATURE: 97.6 F | DIASTOLIC BLOOD PRESSURE: 73 MMHG | HEART RATE: 65 BPM

## 2023-07-14 DIAGNOSIS — D69.3 IMMUNE THROMBOCYTOPENIC PURPURA (H): Primary | ICD-10-CM

## 2023-07-14 DIAGNOSIS — D69.3 IMMUNE THROMBOCYTOPENIC PURPURA (H): ICD-10-CM

## 2023-07-14 LAB
BASOPHILS # BLD AUTO: 0.1 10E3/UL (ref 0–0.2)
BASOPHILS NFR BLD AUTO: 1 %
EOSINOPHIL # BLD AUTO: 0.2 10E3/UL (ref 0–0.7)
EOSINOPHIL NFR BLD AUTO: 1 %
ERYTHROCYTE [DISTWIDTH] IN BLOOD BY AUTOMATED COUNT: 14.9 % (ref 10–15)
HCT VFR BLD AUTO: 43 % (ref 35–47)
HGB BLD-MCNC: 14 G/DL (ref 11.7–15.7)
IMM GRANULOCYTES # BLD: 0.1 10E3/UL
IMM GRANULOCYTES NFR BLD: 1 %
LYMPHOCYTES # BLD AUTO: 4.1 10E3/UL (ref 0.8–5.3)
LYMPHOCYTES NFR BLD AUTO: 31 %
MCH RBC QN AUTO: 32.3 PG (ref 26.5–33)
MCHC RBC AUTO-ENTMCNC: 32.6 G/DL (ref 31.5–36.5)
MCV RBC AUTO: 99 FL (ref 78–100)
MONOCYTES # BLD AUTO: 1 10E3/UL (ref 0–1.3)
MONOCYTES NFR BLD AUTO: 8 %
NEUTROPHILS # BLD AUTO: 7.7 10E3/UL (ref 1.6–8.3)
NEUTROPHILS NFR BLD AUTO: 58 %
NRBC # BLD AUTO: 0 10E3/UL
NRBC BLD AUTO-RTO: 0 /100
PLATELET # BLD AUTO: 252 10E3/UL (ref 150–450)
RBC # BLD AUTO: 4.33 10E6/UL (ref 3.8–5.2)
WBC # BLD AUTO: 13.2 10E3/UL (ref 4–11)

## 2023-07-14 PROCEDURE — 36415 COLL VENOUS BLD VENIPUNCTURE: CPT

## 2023-07-14 PROCEDURE — 96372 THER/PROPH/DIAG INJ SC/IM: CPT | Performed by: NURSE PRACTITIONER

## 2023-07-14 PROCEDURE — 250N000011 HC RX IP 250 OP 636: Mod: JZ | Performed by: NURSE PRACTITIONER

## 2023-07-14 PROCEDURE — 85025 COMPLETE CBC W/AUTO DIFF WBC: CPT

## 2023-07-14 RX ADMIN — ROMIPLOSTIM 50 MCG: 250 INJECTION, POWDER, LYOPHILIZED, FOR SOLUTION SUBCUTANEOUS at 14:07

## 2023-07-14 NOTE — PROGRESS NOTES
Infusion Nursing Note:  Ora Gonzalez presents today for cycle 105 day 1 romiplostim.    Patient seen by provider today: No   present during visit today: Not Applicable.    Note: Ora arrived ambulatory and in stable condition. Injection was given into the right arm and site covered with a band-aid. Will return on 7/21 for next appointment.    Intravenous Access:  No Intravenous access/labs at this visit.    Treatment Conditions:  Not Applicable.    Post Infusion Assessment:  Patient tolerated injection without incident.     Discharge Plan:   Patient and/or family verbalized understanding of discharge instructions and all questions answered.  Patient discharged in stable condition accompanied by: self.  Departure Mode: Ambulatory.      Katherin Babcock RN

## 2023-07-21 ENCOUNTER — INFUSION THERAPY VISIT (OUTPATIENT)
Dept: INFUSION THERAPY | Facility: HOSPITAL | Age: 88
End: 2023-07-21
Attending: INTERNAL MEDICINE
Payer: COMMERCIAL

## 2023-07-21 DIAGNOSIS — D69.3 IMMUNE THROMBOCYTOPENIC PURPURA (H): Primary | ICD-10-CM

## 2023-07-21 PROCEDURE — 96372 THER/PROPH/DIAG INJ SC/IM: CPT | Performed by: NURSE PRACTITIONER

## 2023-07-21 PROCEDURE — 250N000011 HC RX IP 250 OP 636: Mod: JZ | Performed by: NURSE PRACTITIONER

## 2023-07-21 RX ADMIN — ROMIPLOSTIM 50 MCG: 250 INJECTION, POWDER, LYOPHILIZED, FOR SOLUTION SUBCUTANEOUS at 13:29

## 2023-07-21 NOTE — PROGRESS NOTES
PT here ambulatory for Nplate inj. Injection reviewed and administered in right upper arm/bandaid to site. Follow up reviewed and pt dc'd steady gait.

## 2023-07-28 ENCOUNTER — INFUSION THERAPY VISIT (OUTPATIENT)
Dept: INFUSION THERAPY | Facility: HOSPITAL | Age: 88
End: 2023-07-28
Attending: INTERNAL MEDICINE
Payer: COMMERCIAL

## 2023-07-28 VITALS
DIASTOLIC BLOOD PRESSURE: 77 MMHG | OXYGEN SATURATION: 97 % | TEMPERATURE: 98.2 F | RESPIRATION RATE: 20 BRPM | SYSTOLIC BLOOD PRESSURE: 170 MMHG | HEART RATE: 62 BPM

## 2023-07-28 DIAGNOSIS — D69.3 IMMUNE THROMBOCYTOPENIC PURPURA (H): Primary | ICD-10-CM

## 2023-07-28 PROCEDURE — 250N000011 HC RX IP 250 OP 636: Mod: JZ | Performed by: NURSE PRACTITIONER

## 2023-07-28 PROCEDURE — 96372 THER/PROPH/DIAG INJ SC/IM: CPT | Performed by: NURSE PRACTITIONER

## 2023-07-28 RX ADMIN — ROMIPLOSTIM 50 MCG: 250 INJECTION, POWDER, LYOPHILIZED, FOR SOLUTION SUBCUTANEOUS at 12:32

## 2023-07-28 ASSESSMENT — PAIN SCALES - GENERAL: PAINLEVEL: NO PAIN (0)

## 2023-07-28 NOTE — PROGRESS NOTES
Infusion Nursing Note:  Ora Gonzalez presents today for C107D1.    Patient seen by provider today: No   present during visit today: Not Applicable.    Note:PT here ambulatory for Romiplostin inj. PT states doing well/appetite and energy good. Romiplostin reviewed and administered in   arm/bandaid to site. PT tolerated without any problems. Follow up reviewed and pt dc'd steady gait      Intravenous Access:  No Intravenous access/labs at this visit.    Treatment Conditions:  Not Applicable.      Post Infusion Assessment:  Patient tolerated injection without incident.       Discharge Plan:   Discharge instructions reviewed with: Patient.  Patient and/or family verbalized understanding of discharge instructions and all questions answered.  Patient discharged in stable condition accompanied by: self.  Departure Mode: Ambulatory.      Vijaya Delgado RN

## 2023-08-04 ENCOUNTER — INFUSION THERAPY VISIT (OUTPATIENT)
Dept: INFUSION THERAPY | Facility: HOSPITAL | Age: 88
End: 2023-08-04
Attending: INTERNAL MEDICINE
Payer: COMMERCIAL

## 2023-08-04 VITALS
HEART RATE: 73 BPM | TEMPERATURE: 99.5 F | OXYGEN SATURATION: 99 % | RESPIRATION RATE: 18 BRPM | SYSTOLIC BLOOD PRESSURE: 167 MMHG | DIASTOLIC BLOOD PRESSURE: 70 MMHG

## 2023-08-04 DIAGNOSIS — D69.3 IMMUNE THROMBOCYTOPENIC PURPURA (H): Primary | ICD-10-CM

## 2023-08-04 PROCEDURE — 250N000011 HC RX IP 250 OP 636: Mod: JZ | Performed by: NURSE PRACTITIONER

## 2023-08-04 PROCEDURE — 96372 THER/PROPH/DIAG INJ SC/IM: CPT | Performed by: NURSE PRACTITIONER

## 2023-08-04 RX ADMIN — ROMIPLOSTIM 50 MCG: 250 INJECTION, POWDER, LYOPHILIZED, FOR SOLUTION SUBCUTANEOUS at 13:19

## 2023-08-04 ASSESSMENT — PAIN SCALES - GENERAL: PAINLEVEL: NO PAIN (0)

## 2023-08-04 NOTE — PROGRESS NOTES
Infusion Nursing Note:  Ora Gonzalez presents today for C108D1.    Patient seen by provider today: No   present during visit today: Not Applicable.    Note: PT here ambulatory for Romiplostin inj. PT states doing well/appetite and energy good. Romiplostin reviewed and administered in right arm/bandaid to site. PT tolerated without any problems. Follow up reviewed and pt dc'd steady gait      Intravenous Access:  No Intravenous access/labs at this visit.    Treatment Conditions:  Not Applicable.      Post Infusion Assessment:  Patient tolerated injection without incident.       Discharge Plan:   Discharge instructions reviewed with: Patient.  Patient and/or family verbalized understanding of discharge instructions and all questions answered.  Patient discharged in stable condition accompanied by: self.  Departure Mode: Ambulatory.      Vijaya Delgado RN

## 2023-08-11 ENCOUNTER — INFUSION THERAPY VISIT (OUTPATIENT)
Dept: INFUSION THERAPY | Facility: HOSPITAL | Age: 88
End: 2023-08-11
Attending: INTERNAL MEDICINE
Payer: COMMERCIAL

## 2023-08-11 VITALS
HEART RATE: 62 BPM | SYSTOLIC BLOOD PRESSURE: 154 MMHG | OXYGEN SATURATION: 97 % | RESPIRATION RATE: 18 BRPM | TEMPERATURE: 98.1 F | DIASTOLIC BLOOD PRESSURE: 78 MMHG

## 2023-08-11 DIAGNOSIS — D69.3 IMMUNE THROMBOCYTOPENIC PURPURA (H): Primary | ICD-10-CM

## 2023-08-11 DIAGNOSIS — D69.3 IMMUNE THROMBOCYTOPENIC PURPURA (H): ICD-10-CM

## 2023-08-11 LAB
BASOPHILS # BLD AUTO: 0.1 10E3/UL (ref 0–0.2)
BASOPHILS NFR BLD AUTO: 1 %
EOSINOPHIL # BLD AUTO: 0.1 10E3/UL (ref 0–0.7)
EOSINOPHIL NFR BLD AUTO: 1 %
ERYTHROCYTE [DISTWIDTH] IN BLOOD BY AUTOMATED COUNT: 15.2 % (ref 10–15)
HCT VFR BLD AUTO: 44.3 % (ref 35–47)
HGB BLD-MCNC: 14.6 G/DL (ref 11.7–15.7)
IMM GRANULOCYTES # BLD: 0.1 10E3/UL
IMM GRANULOCYTES NFR BLD: 1 %
LYMPHOCYTES # BLD AUTO: 3.9 10E3/UL (ref 0.8–5.3)
LYMPHOCYTES NFR BLD AUTO: 26 %
MCH RBC QN AUTO: 32.7 PG (ref 26.5–33)
MCHC RBC AUTO-ENTMCNC: 33 G/DL (ref 31.5–36.5)
MCV RBC AUTO: 99 FL (ref 78–100)
MONOCYTES # BLD AUTO: 1.2 10E3/UL (ref 0–1.3)
MONOCYTES NFR BLD AUTO: 8 %
NEUTROPHILS # BLD AUTO: 9.4 10E3/UL (ref 1.6–8.3)
NEUTROPHILS NFR BLD AUTO: 63 %
NRBC # BLD AUTO: 0 10E3/UL
NRBC BLD AUTO-RTO: 0 /100
PLATELET # BLD AUTO: 325 10E3/UL (ref 150–450)
RBC # BLD AUTO: 4.46 10E6/UL (ref 3.8–5.2)
WBC # BLD AUTO: 14.8 10E3/UL (ref 4–11)

## 2023-08-11 PROCEDURE — 250N000011 HC RX IP 250 OP 636: Mod: JZ | Performed by: NURSE PRACTITIONER

## 2023-08-11 PROCEDURE — 85025 COMPLETE CBC W/AUTO DIFF WBC: CPT

## 2023-08-11 PROCEDURE — 36415 COLL VENOUS BLD VENIPUNCTURE: CPT

## 2023-08-11 PROCEDURE — 96372 THER/PROPH/DIAG INJ SC/IM: CPT | Performed by: NURSE PRACTITIONER

## 2023-08-11 RX ADMIN — ROMIPLOSTIM 50 MCG: 250 INJECTION, POWDER, LYOPHILIZED, FOR SOLUTION SUBCUTANEOUS at 14:50

## 2023-08-11 NOTE — PROGRESS NOTES
Ora came to chemo infusion this afternoon for Nplate.  Platelets today was 385.  VSS.  Pt assessed.  She is feeling well and has no signs of bleeding.  Nplate given as ordered into her right upper arm.  She tolerated the injection well and site was covered with a bandaid.  Ora left clinic ambulatory.

## 2023-08-18 ENCOUNTER — INFUSION THERAPY VISIT (OUTPATIENT)
Dept: INFUSION THERAPY | Facility: HOSPITAL | Age: 88
End: 2023-08-18
Attending: INTERNAL MEDICINE
Payer: COMMERCIAL

## 2023-08-18 ENCOUNTER — ONCOLOGY VISIT (OUTPATIENT)
Dept: ONCOLOGY | Facility: HOSPITAL | Age: 88
End: 2023-08-18
Attending: INTERNAL MEDICINE
Payer: COMMERCIAL

## 2023-08-18 VITALS
TEMPERATURE: 98.2 F | BODY MASS INDEX: 20.41 KG/M2 | RESPIRATION RATE: 23 BRPM | SYSTOLIC BLOOD PRESSURE: 184 MMHG | DIASTOLIC BLOOD PRESSURE: 77 MMHG | OXYGEN SATURATION: 97 % | HEART RATE: 61 BPM | WEIGHT: 110.7 LBS

## 2023-08-18 DIAGNOSIS — D69.3 IMMUNE THROMBOCYTOPENIC PURPURA (H): Primary | ICD-10-CM

## 2023-08-18 PROCEDURE — G0463 HOSPITAL OUTPT CLINIC VISIT: HCPCS | Mod: 25 | Performed by: INTERNAL MEDICINE

## 2023-08-18 PROCEDURE — 96372 THER/PROPH/DIAG INJ SC/IM: CPT | Performed by: NURSE PRACTITIONER

## 2023-08-18 PROCEDURE — 99213 OFFICE O/P EST LOW 20 MIN: CPT | Performed by: INTERNAL MEDICINE

## 2023-08-18 PROCEDURE — 250N000011 HC RX IP 250 OP 636: Mod: JZ | Performed by: NURSE PRACTITIONER

## 2023-08-18 RX ADMIN — ROMIPLOSTIM 50 MCG: 250 INJECTION, POWDER, LYOPHILIZED, FOR SOLUTION SUBCUTANEOUS at 12:57

## 2023-08-18 NOTE — PROGRESS NOTES
Infusion Nursing Note:  Ora Gonzalez presents today for romiplostim.    Patient seen by provider today: Yes: Dr. Garcia   present during visit today: Not Applicable.    Note: Ora arrived ambulatory and in stable condition. Injection was given into the right arm and site covered with a band-aid. Will return on 8/25 for next appointment.    Intravenous Access:  No Intravenous access/labs at this visit.    Treatment Conditions:  Not Applicable.    Post Infusion Assessment:  Patient tolerated injection without incident.     Discharge Plan:   Patient and/or family verbalized understanding of discharge instructions and all questions answered.  Patient discharged in stable condition accompanied by: self.  Departure Mode: Ambulatory.      Katherin Babcock RN

## 2023-08-18 NOTE — LETTER
"    8/18/2023         RE: Ora Gonzalez  [address] [facility] [address]        Dear Colleague,    Thank you for referring your patient, Ora Gonzalez, to the [facility]. Please see a copy of my visit note below.    Oncology Rooming Note    August 18, 2023 12:20 PM   Ora Gonzalez is a 89 year old female who presents for:    Chief Complaint   Patient presents with     Oncology Clinic Visit     6 month f/u MD/Injection and labs the wk prior     Initial Vitals: BP (!) 184/77   Pulse 61   Temp 98.2  F (36.8  C)   Resp 23   Wt 50.2 kg (110 lb 11.2 oz)   SpO2 97%   BMI 20.41 kg/m   Estimated body mass index is 20.41 kg/m  as calculated from the following:    Height as of 5/16/23: 1.568 m (5' 1.75\").    Weight as of this encounter: 50.2 kg (110 lb 11.2 oz). Body surface area is 1.48 meters squared.  Data Unavailable Comment: Data Unavailable   No LMP recorded.  Allergies reviewed: Yes  Medications reviewed: Yes    Medications: Medication refills not needed today.  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  She continues to get weekly romiplostim.  CBC from August 11 was reviewed and shows a white count of 14.8, hemoglobin 14.6 and platelets 325,000.  We will check in on her again in 1 year.    2. Leukocytosis: No change.  White count as above.  Stable, mild, chronic. Follow.  Neutrophilia.    ECOG Performance  0    Diagnosis:    1. 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 Bone marrow biopsy was unremarkable, November 2014. Thyroid functions were normal. Anticardiolipin antibodies were normal.     2. Right-sided pulmonary embolism: Provoked. Diagnosed " February 8, 2016.    Treatment:    She started course of prednisone on November 14, 2014 and finished on January 5, 2015. She had a complete response. Quickly relapsed and treated with Rituxan weekly from March 12 through April 2, 2015. She responded with highest platelet count of 112.     She then relapsed in October, 2015. She was started on Promacta in October. She responded within 2-3 weeks. She then quickly lost response after dose reduction from 50 to 25mg. She was started back on Promacta 50 mg and prednisone 60 mg daily. She did not respond. She received 2 doses of IVIG on December 4 and 5, 2015. She responded briefly with a platelet count of 59 on December 7 but then quickly lost response by December 14. 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 She received 2 more doses of IVIG on December 16 and 17th with minimal response.   Splenectomy was performed on December 20, 2015. She did not respond.   We started Rituxan on December 29, 2015. Steroids were continued. Romiplostim was started on January 5, 2016. 1 dose of WinRho was given on January 6, 2016.  Platelet response noted on January 13.  Her last dose of Rituxan was on January 20th, 2016.     She was restarted on N-plate on July 28, 2016 for relapse.    Interim History:    Ora comes in today for a follow-up visit.  We last saw her in clinic a year ago.  Over the interim she has been doing okay.  No major changes in her health.  No acute complaints today.    Review of Systems:    As above in the history.     Review of Systems otherwise Negative for:  General: chills, fever or night sweats  Psychological: anxiety or depression  Ophthalmic: blurry vision, double vision or loss of vision, vision change  ENT: epistaxis, oral lesions, hearing changes  Hematological and Lymphatic: bleeding, bruising, jaundice, swollen lymph nodes  Endocrine: hot flashes, unexpected weight changes  Respiratory: cough, hemoptysis, orthopnea or shortness of  breath/NOLEN  Cardiovascular: chest pain, edema, palpitations or PND  Gastrointestinal: abdominal pain, blood in stools, change in bowel habits, constipation, diarrhea or nausea/vomiting  Genito-Urinary: change in urinary stream, incontinence, frequency/urgency  Musculoskeletal: joint pain, stiffness, swelling, muscle pain  Neurological: dizziness, headaches, numbness/tingling  Dermatological: lumps and rash    Past History:    Past Medical History:   Diagnosis Date     Adjustment disorder with mixed anxiety and depressed mood 1/22/2016     Adjustment reaction to chronic stress 1/22/2016     Chronic kidney disease      History of pulmonary embolism 7/8/2020     Hypertension      ITP (idiopathic thrombocytopenic purpura)      Osteoporosis      Paroxysmal atrial fibrillation (H)      Pulmonary embolism (H) 2/8/2016     Thrombocytopenia (H)      Physical Exam:    BP (!) 184/77   Pulse 61   Temp 98.2  F (36.8  C)   Resp 23   Wt 50.2 kg (110 lb 11.2 oz)   SpO2 97%   BMI 20.41 kg/m      General: patient appears stated age of 87 year old. Nontoxic and in no distress.   HEENT: Head: atraumatic, normocephalic. Sclerae anicteric.  Chest:  Normal respiratory effort  Cardiac:  No edema.   Abdomen: abdomen is non-distended  Extremities: normal tone and muscle bulk.  Skin: no lesions or rash on visible skin. Warm and dry.   CNS: alert and oriented. Grossly non-focal.   Psychiatric: normal mood and affect.     Lab Results:    No results found for this or any previous visit (from the past 168 hour(s)).    Imaging:    No results found.      Signed by: Vinny Garcia MD      Again, thank you for allowing me to participate in the care of your patient.        Sincerely,        Vinny Garcia MD

## 2023-08-18 NOTE — PROGRESS NOTES
"Oncology Rooming Note    August 18, 2023 12:20 PM   Ora Gonzalez is a 89 year old female who presents for:    Chief Complaint   Patient presents with    Oncology Clinic Visit     6 month f/u MD/Injection and labs the wk prior     Initial Vitals: BP (!) 184/77   Pulse 61   Temp 98.2  F (36.8  C)   Resp 23   Wt 50.2 kg (110 lb 11.2 oz)   SpO2 97%   BMI 20.41 kg/m   Estimated body mass index is 20.41 kg/m  as calculated from the following:    Height as of 5/16/23: 1.568 m (5' 1.75\").    Weight as of this encounter: 50.2 kg (110 lb 11.2 oz). Body surface area is 1.48 meters squared.  Data Unavailable Comment: Data Unavailable   No LMP recorded.  Allergies reviewed: Yes  Medications reviewed: Yes    Medications: Medication refills not needed today.  Pharmacy name entered into [employer]: [id number] - [place] [address]          Matthew Kapoor RN             "

## 2023-08-19 NOTE — PROGRESS NOTES
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  She continues to get weekly romiplostim.  CBC from August 11 was reviewed and shows a white count of 14.8, hemoglobin 14.6 and platelets 325,000.  We will check in on her again in 1 year.    2. Leukocytosis: No change.  White count as above.  Stable, mild, chronic. Follow.  Neutrophilia.    ECOG Performance  0    Diagnosis:    1. 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 Bone marrow biopsy was unremarkable, November 2014. Thyroid functions were normal. Anticardiolipin antibodies were normal.     2. Right-sided pulmonary embolism: Provoked. Diagnosed February 8, 2016.    Treatment:    She started course of prednisone on November 14, 2014 and finished on January 5, 2015. She had a complete response. Quickly relapsed and treated with Rituxan weekly from March 12 through April 2, 2015. She responded with highest platelet count of 112.     She then relapsed in October, 2015. She was started on Promacta in October. She responded within 2-3 weeks. She then quickly lost response after dose reduction from 50 to 25mg. She was started back on Promacta 50 mg and prednisone 60 mg daily. She did not respond. She received 2 doses of IVIG on December 4 and 5, 2015. She responded briefly with a platelet count of 59 on December 7 but then quickly lost response by December 14. 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 She received 2 more doses of IVIG on December 16 and 17th with minimal response.   Splenectomy was performed on December 20, 2015. She did not respond.   We started Rituxan on December 29, 2015. Steroids were continued. Romiplostim was started on January 5, 2016. 1 dose of WinRho was given on January 6, 2016.  Platelet response noted on January 13.  Her last dose of Rituxan was on  January 20th, 2016.     She was restarted on N-plate on July 28, 2016 for relapse.    Interim History:    Ora comes in today for a follow-up visit.  We last saw her in clinic a year ago.  Over the interim she has been doing okay.  No major changes in her health.  No acute complaints today.    Review of Systems:    As above in the history.     Review of Systems otherwise Negative for:  General: chills, fever or night sweats  Psychological: anxiety or depression  Ophthalmic: blurry vision, double vision or loss of vision, vision change  ENT: epistaxis, oral lesions, hearing changes  Hematological and Lymphatic: bleeding, bruising, jaundice, swollen lymph nodes  Endocrine: hot flashes, unexpected weight changes  Respiratory: cough, hemoptysis, orthopnea or shortness of breath/NOLEN  Cardiovascular: chest pain, edema, palpitations or PND  Gastrointestinal: abdominal pain, blood in stools, change in bowel habits, constipation, diarrhea or nausea/vomiting  Genito-Urinary: change in urinary stream, incontinence, frequency/urgency  Musculoskeletal: joint pain, stiffness, swelling, muscle pain  Neurological: dizziness, headaches, numbness/tingling  Dermatological: lumps and rash    Past History:    Past Medical History:   Diagnosis Date    Adjustment disorder with mixed anxiety and depressed mood 1/22/2016    Adjustment reaction to chronic stress 1/22/2016    Chronic kidney disease     History of pulmonary embolism 7/8/2020    Hypertension     ITP (idiopathic thrombocytopenic purpura)     Osteoporosis     Paroxysmal atrial fibrillation (H)     Pulmonary embolism (H) 2/8/2016    Thrombocytopenia (H)      Physical Exam:    BP (!) 184/77   Pulse 61   Temp 98.2  F (36.8  C)   Resp 23   Wt 50.2 kg (110 lb 11.2 oz)   SpO2 97%   BMI 20.41 kg/m      General: patient appears stated age of 87 year old. Nontoxic and in no distress.   HEENT: Head: atraumatic, normocephalic. Sclerae anicteric.  Chest:  Normal respiratory  effort  Cardiac:  No edema.   Abdomen: abdomen is non-distended  Extremities: normal tone and muscle bulk.  Skin: no lesions or rash on visible skin. Warm and dry.   CNS: alert and oriented. Grossly non-focal.   Psychiatric: normal mood and affect.     Lab Results:    No results found for this or any previous visit (from the past 168 hour(s)).    Imaging:    No results found.      Signed by: Vinny Garcia MD

## 2023-08-25 ENCOUNTER — INFUSION THERAPY VISIT (OUTPATIENT)
Dept: INFUSION THERAPY | Facility: HOSPITAL | Age: 88
End: 2023-08-25
Attending: INTERNAL MEDICINE
Payer: COMMERCIAL

## 2023-08-25 VITALS
HEART RATE: 60 BPM | RESPIRATION RATE: 20 BRPM | TEMPERATURE: 97.8 F | DIASTOLIC BLOOD PRESSURE: 82 MMHG | SYSTOLIC BLOOD PRESSURE: 181 MMHG | OXYGEN SATURATION: 99 %

## 2023-08-25 DIAGNOSIS — D69.3 IMMUNE THROMBOCYTOPENIC PURPURA (H): Primary | ICD-10-CM

## 2023-08-25 PROCEDURE — 250N000011 HC RX IP 250 OP 636: Mod: JZ | Performed by: NURSE PRACTITIONER

## 2023-08-25 PROCEDURE — 96372 THER/PROPH/DIAG INJ SC/IM: CPT | Performed by: NURSE PRACTITIONER

## 2023-08-25 RX ADMIN — ROMIPLOSTIM 50 MCG: 250 INJECTION, POWDER, LYOPHILIZED, FOR SOLUTION SUBCUTANEOUS at 12:09

## 2023-08-25 NOTE — PROGRESS NOTES
Ora arrived in stable condition for NPLATE injection in stable condition.  BP is elevated, but this is her normal,  she denies s/s from elevated BP, injection given in right arm.  Pt to ignacio.

## 2023-08-31 DIAGNOSIS — E53.8 VITAMIN B12 DEFICIENCY (NON ANEMIC): ICD-10-CM

## 2023-08-31 RX ORDER — ACETAMINOPHEN/DIPHENHYDRAMINE 500MG-25MG
1000 TABLET ORAL DAILY
Qty: 90 TABLET | Refills: 0 | Status: SHIPPED | OUTPATIENT
Start: 2023-08-31 | End: 2023-11-27

## 2023-08-31 NOTE — TELEPHONE ENCOUNTER
Refill request for: cyanocobalamin (VITAMIN B-12) 1000 MCG tablet  Directions: Take 1 tablet (1,000 mcg) by mouth daily     LOV: 11/8/22  NOV: Not scheduled- Due Nov 2023- [facility] sent      90 day supply with 0 refills Medication T'd for review and signature      Pilar Nicolas CMA on 8/31/2023 at 9:32 AM

## 2023-09-01 ENCOUNTER — INFUSION THERAPY VISIT (OUTPATIENT)
Dept: INFUSION THERAPY | Facility: HOSPITAL | Age: 88
End: 2023-09-01
Attending: INTERNAL MEDICINE
Payer: COMMERCIAL

## 2023-09-01 VITALS
RESPIRATION RATE: 18 BRPM | TEMPERATURE: 97.5 F | DIASTOLIC BLOOD PRESSURE: 82 MMHG | HEART RATE: 65 BPM | SYSTOLIC BLOOD PRESSURE: 193 MMHG | OXYGEN SATURATION: 97 %

## 2023-09-01 DIAGNOSIS — D69.3 IMMUNE THROMBOCYTOPENIC PURPURA (H): ICD-10-CM

## 2023-09-01 DIAGNOSIS — D69.3 IMMUNE THROMBOCYTOPENIC PURPURA (H): Primary | ICD-10-CM

## 2023-09-01 LAB
BASOPHILS # BLD AUTO: 0.1 10E3/UL (ref 0–0.2)
BASOPHILS NFR BLD AUTO: 1 %
EOSINOPHIL # BLD AUTO: 0.1 10E3/UL (ref 0–0.7)
EOSINOPHIL NFR BLD AUTO: 1 %
ERYTHROCYTE [DISTWIDTH] IN BLOOD BY AUTOMATED COUNT: 14.8 % (ref 10–15)
HCT VFR BLD AUTO: 43 % (ref 35–47)
HGB BLD-MCNC: 14.2 G/DL (ref 11.7–15.7)
IMM GRANULOCYTES # BLD: 0.1 10E3/UL
IMM GRANULOCYTES NFR BLD: 1 %
LYMPHOCYTES # BLD AUTO: 3.9 10E3/UL (ref 0.8–5.3)
LYMPHOCYTES NFR BLD AUTO: 28 %
MCH RBC QN AUTO: 32.5 PG (ref 26.5–33)
MCHC RBC AUTO-ENTMCNC: 33 G/DL (ref 31.5–36.5)
MCV RBC AUTO: 98 FL (ref 78–100)
MONOCYTES # BLD AUTO: 1 10E3/UL (ref 0–1.3)
MONOCYTES NFR BLD AUTO: 7 %
NEUTROPHILS # BLD AUTO: 8.5 10E3/UL (ref 1.6–8.3)
NEUTROPHILS NFR BLD AUTO: 62 %
NRBC # BLD AUTO: 0 10E3/UL
NRBC BLD AUTO-RTO: 0 /100
PLATELET # BLD AUTO: 247 10E3/UL (ref 150–450)
RBC # BLD AUTO: 4.37 10E6/UL (ref 3.8–5.2)
WBC # BLD AUTO: 13.7 10E3/UL (ref 4–11)

## 2023-09-01 PROCEDURE — 36415 COLL VENOUS BLD VENIPUNCTURE: CPT

## 2023-09-01 PROCEDURE — 250N000011 HC RX IP 250 OP 636: Mod: JZ | Performed by: NURSE PRACTITIONER

## 2023-09-01 PROCEDURE — 85025 COMPLETE CBC W/AUTO DIFF WBC: CPT

## 2023-09-01 PROCEDURE — 96372 THER/PROPH/DIAG INJ SC/IM: CPT | Performed by: NURSE PRACTITIONER

## 2023-09-01 RX ADMIN — ROMIPLOSTIM 50 MCG: 250 INJECTION, POWDER, LYOPHILIZED, FOR SOLUTION SUBCUTANEOUS at 14:43

## 2023-09-01 NOTE — PROGRESS NOTES
Infusion Nursing Note:  Ora Gonzalez presents today for romiplostim.    Patient seen by provider today: No   present during visit today: Not Applicable.    Note: Ora arrived ambulatory and in stable condition. Injection was given into the right arm and site covered with a band-aid. Will return on 9/8 for next appointment.    Intravenous Access:  Labs drawn without difficulty.    Treatment Conditions:  Not Applicable.    Post Infusion Assessment:  Patient tolerated injection without incident.     Discharge Plan:   Patient and/or family verbalized understanding of discharge instructions and all questions answered.  Patient discharged in stable condition accompanied by: self.  Departure Mode: Ambulatory.      Katherin Babcock RN

## 2023-09-07 ENCOUNTER — INFUSION THERAPY VISIT (OUTPATIENT)
Dept: INFUSION THERAPY | Facility: HOSPITAL | Age: 88
End: 2023-09-07
Attending: INTERNAL MEDICINE
Payer: COMMERCIAL

## 2023-09-07 DIAGNOSIS — D69.3 IMMUNE THROMBOCYTOPENIC PURPURA (H): Primary | ICD-10-CM

## 2023-09-07 PROCEDURE — 96372 THER/PROPH/DIAG INJ SC/IM: CPT | Performed by: INTERNAL MEDICINE

## 2023-09-07 PROCEDURE — 250N000011 HC RX IP 250 OP 636: Mod: JZ | Performed by: INTERNAL MEDICINE

## 2023-09-07 RX ADMIN — ROMIPLOSTIM 50 MCG: 250 INJECTION, POWDER, LYOPHILIZED, FOR SOLUTION SUBCUTANEOUS at 14:45

## 2023-09-07 NOTE — PROGRESS NOTES
PT here ambulatory for nplate inj. Reviewed inj with pt and administered in right upper arm/bandaid to site. Follow up reviewed and pt dc'd steady gait

## 2023-09-15 ENCOUNTER — INFUSION THERAPY VISIT (OUTPATIENT)
Dept: INFUSION THERAPY | Facility: HOSPITAL | Age: 88
End: 2023-09-15
Attending: INTERNAL MEDICINE
Payer: COMMERCIAL

## 2023-09-15 VITALS
HEART RATE: 70 BPM | RESPIRATION RATE: 18 BRPM | TEMPERATURE: 97.7 F | SYSTOLIC BLOOD PRESSURE: 165 MMHG | OXYGEN SATURATION: 98 % | DIASTOLIC BLOOD PRESSURE: 70 MMHG

## 2023-09-15 DIAGNOSIS — D69.3 IMMUNE THROMBOCYTOPENIC PURPURA (H): Primary | ICD-10-CM

## 2023-09-15 PROCEDURE — 250N000011 HC RX IP 250 OP 636: Mod: JZ | Performed by: INTERNAL MEDICINE

## 2023-09-15 PROCEDURE — 96372 THER/PROPH/DIAG INJ SC/IM: CPT | Performed by: INTERNAL MEDICINE

## 2023-09-15 RX ADMIN — ROMIPLOSTIM 50 MCG: 250 INJECTION, POWDER, LYOPHILIZED, FOR SOLUTION SUBCUTANEOUS at 13:05

## 2023-09-15 NOTE — PROGRESS NOTES
Infusion Nursing Note:  Ora Gonzalez presents today for romiplostim.    Patient seen by provider today: No   present during visit today: Not Applicable.    Note: rOa arrived ambulatory and in stable condition. Injection was given into the right arm and site covered with a band-aid. Will return on 9/22 for next appointment.    Intravenous Access:  No Intravenous access/labs at this visit.    Treatment Conditions:  Not Applicable.    Post Infusion Assessment:  Patient tolerated injection without incident.     Discharge Plan:   Patient and/or family verbalized understanding of discharge instructions and all questions answered.  Patient discharged in stable condition accompanied by: self.  Departure Mode: Ambulatory.      Katherin Babcock RN

## 2023-09-22 ENCOUNTER — INFUSION THERAPY VISIT (OUTPATIENT)
Dept: INFUSION THERAPY | Facility: HOSPITAL | Age: 88
End: 2023-09-22
Attending: INTERNAL MEDICINE
Payer: COMMERCIAL

## 2023-09-22 VITALS
HEART RATE: 73 BPM | OXYGEN SATURATION: 98 % | SYSTOLIC BLOOD PRESSURE: 179 MMHG | TEMPERATURE: 97.8 F | DIASTOLIC BLOOD PRESSURE: 79 MMHG | RESPIRATION RATE: 16 BRPM

## 2023-09-22 DIAGNOSIS — D69.3 IMMUNE THROMBOCYTOPENIC PURPURA (H): Primary | ICD-10-CM

## 2023-09-22 PROCEDURE — 250N000011 HC RX IP 250 OP 636: Mod: JZ | Performed by: INTERNAL MEDICINE

## 2023-09-22 PROCEDURE — 96372 THER/PROPH/DIAG INJ SC/IM: CPT | Performed by: INTERNAL MEDICINE

## 2023-09-22 RX ADMIN — ROMIPLOSTIM 50 MCG: 250 INJECTION, POWDER, LYOPHILIZED, FOR SOLUTION SUBCUTANEOUS at 15:09

## 2023-09-22 NOTE — PROGRESS NOTES
PT here for nplate inj. PT states doing well and has no concerns. Nplate reviewed and administered in right upper arm/bandaid to site. Follow up reviewed and pt dc'd steady gait

## 2023-09-29 ENCOUNTER — INFUSION THERAPY VISIT (OUTPATIENT)
Dept: INFUSION THERAPY | Facility: HOSPITAL | Age: 88
End: 2023-09-29
Attending: INTERNAL MEDICINE
Payer: COMMERCIAL

## 2023-09-29 VITALS
OXYGEN SATURATION: 99 % | DIASTOLIC BLOOD PRESSURE: 84 MMHG | RESPIRATION RATE: 20 BRPM | TEMPERATURE: 97.4 F | HEART RATE: 59 BPM | SYSTOLIC BLOOD PRESSURE: 181 MMHG

## 2023-09-29 DIAGNOSIS — D69.3 IMMUNE THROMBOCYTOPENIC PURPURA (H): Primary | ICD-10-CM

## 2023-09-29 PROCEDURE — 96372 THER/PROPH/DIAG INJ SC/IM: CPT | Performed by: INTERNAL MEDICINE

## 2023-09-29 PROCEDURE — 250N000011 HC RX IP 250 OP 636: Mod: JZ | Performed by: INTERNAL MEDICINE

## 2023-09-29 RX ADMIN — ROMIPLOSTIM 50 MCG: 250 INJECTION, POWDER, LYOPHILIZED, FOR SOLUTION SUBCUTANEOUS at 14:34

## 2023-09-29 ASSESSMENT — PAIN SCALES - GENERAL: PAINLEVEL: NO PAIN (0)

## 2023-09-29 NOTE — PROGRESS NOTES
Infusion Nursing Note:  Ora Gonzalez presents today for Nplate injection.    Patient seen by provider today: No   present during visit today: Not Applicable.    Note: Ora arrived in stable condition, BP is elevated, but this is her normal, she denies s/s from elevated BP, injection given in right arm.  Pt to ignacio.      Intravenous Access:  No Intravenous access/labs at this visit.    Treatment Conditions:  Not Applicable.      Post Infusion Assessment:  Patient tolerated injection without incident.       Discharge Plan:   Discharge instructions reviewed with: Patient.  Patient and/or family verbalized understanding of discharge instructions and all questions answered.  Patient discharged in stable condition accompanied by: self.  Departure Mode: Ambulatory.      Vijaya Delgado RN

## 2023-10-06 ENCOUNTER — INFUSION THERAPY VISIT (OUTPATIENT)
Dept: INFUSION THERAPY | Facility: HOSPITAL | Age: 88
End: 2023-10-06
Attending: INTERNAL MEDICINE
Payer: COMMERCIAL

## 2023-10-06 DIAGNOSIS — D69.3 IMMUNE THROMBOCYTOPENIC PURPURA (H): Primary | ICD-10-CM

## 2023-10-06 DIAGNOSIS — D69.3 IMMUNE THROMBOCYTOPENIC PURPURA (H): ICD-10-CM

## 2023-10-06 LAB
BASO+EOS+MONOS # BLD AUTO: ABNORMAL 10*3/UL
BASO+EOS+MONOS NFR BLD AUTO: ABNORMAL %
BASOPHILS # BLD AUTO: 0.1 10E3/UL (ref 0–0.2)
BASOPHILS NFR BLD AUTO: 1 %
EOSINOPHIL # BLD AUTO: 0.1 10E3/UL (ref 0–0.7)
EOSINOPHIL NFR BLD AUTO: 1 %
ERYTHROCYTE [DISTWIDTH] IN BLOOD BY AUTOMATED COUNT: 14.4 % (ref 10–15)
HCT VFR BLD AUTO: 44.9 % (ref 35–47)
HGB BLD-MCNC: 14.4 G/DL (ref 11.7–15.7)
IMM GRANULOCYTES # BLD: 0.1 10E3/UL
IMM GRANULOCYTES NFR BLD: 1 %
LYMPHOCYTES # BLD AUTO: 3.5 10E3/UL (ref 0.8–5.3)
LYMPHOCYTES NFR BLD AUTO: 29 %
MCH RBC QN AUTO: 32.1 PG (ref 26.5–33)
MCHC RBC AUTO-ENTMCNC: 32.1 G/DL (ref 31.5–36.5)
MCV RBC AUTO: 100 FL (ref 78–100)
MONOCYTES # BLD AUTO: 1.1 10E3/UL (ref 0–1.3)
MONOCYTES NFR BLD AUTO: 9 %
NEUTROPHILS # BLD AUTO: 7.4 10E3/UL (ref 1.6–8.3)
NEUTROPHILS NFR BLD AUTO: 59 %
NRBC # BLD AUTO: 0 10E3/UL
NRBC BLD AUTO-RTO: 0 /100
PLATELET # BLD AUTO: 268 10E3/UL (ref 150–450)
RBC # BLD AUTO: 4.49 10E6/UL (ref 3.8–5.2)
WBC # BLD AUTO: 12.2 10E3/UL (ref 4–11)

## 2023-10-06 PROCEDURE — 85025 COMPLETE CBC W/AUTO DIFF WBC: CPT

## 2023-10-06 PROCEDURE — 36415 COLL VENOUS BLD VENIPUNCTURE: CPT

## 2023-10-06 PROCEDURE — 96372 THER/PROPH/DIAG INJ SC/IM: CPT | Performed by: INTERNAL MEDICINE

## 2023-10-06 PROCEDURE — 250N000011 HC RX IP 250 OP 636: Mod: JZ | Performed by: INTERNAL MEDICINE

## 2023-10-06 RX ADMIN — ROMIPLOSTIM 50 MCG: 250 INJECTION, POWDER, LYOPHILIZED, FOR SOLUTION SUBCUTANEOUS at 14:35

## 2023-10-06 NOTE — PROGRESS NOTES
Pt here ambulatory for nplate inj. Injection reviewed and nplate administered in right upper arm/bandaid to site. Follow up reviewed and pt dc'd steady gait.

## 2023-10-13 ENCOUNTER — INFUSION THERAPY VISIT (OUTPATIENT)
Dept: INFUSION THERAPY | Facility: HOSPITAL | Age: 88
End: 2023-10-13
Attending: INTERNAL MEDICINE
Payer: COMMERCIAL

## 2023-10-13 VITALS
SYSTOLIC BLOOD PRESSURE: 178 MMHG | RESPIRATION RATE: 18 BRPM | DIASTOLIC BLOOD PRESSURE: 77 MMHG | OXYGEN SATURATION: 97 % | HEART RATE: 72 BPM | TEMPERATURE: 98.2 F

## 2023-10-13 DIAGNOSIS — D69.3 IMMUNE THROMBOCYTOPENIC PURPURA (H): Primary | ICD-10-CM

## 2023-10-13 PROCEDURE — 96372 THER/PROPH/DIAG INJ SC/IM: CPT | Performed by: INTERNAL MEDICINE

## 2023-10-13 PROCEDURE — 250N000011 HC RX IP 250 OP 636: Mod: JZ | Performed by: INTERNAL MEDICINE

## 2023-10-13 RX ADMIN — ROMIPLOSTIM 50 MCG: 250 INJECTION, POWDER, LYOPHILIZED, FOR SOLUTION SUBCUTANEOUS at 13:41

## 2023-10-13 NOTE — PROGRESS NOTES
Infusion Nursing Note:  Ora Gonzalez presents today for Nplate.    Patient seen by provider today: No   present during visit today: Not Applicable.    Note: VSS but BP continues to be elevated.  Pt was encouraged to make appointment with her primary MD to address hypertension.  Pt verbalizes understanding of this.  Nplate given into her right upper arm.      Intravenous Access:  N/a.    Treatment Conditions:  Not Applicable. Labs done on 10/6 noted      Post Infusion Assessment:  Patient tolerated injection without incident.       Discharge Plan:   Patient discharged in stable condition accompanied by: self.  Departure Mode: Ambulatory to await [employer] ride home.      Carole Barrett RN

## 2023-10-25 ENCOUNTER — INFUSION THERAPY VISIT (OUTPATIENT)
Dept: INFUSION THERAPY | Facility: HOSPITAL | Age: 88
End: 2023-10-25
Attending: INTERNAL MEDICINE
Payer: COMMERCIAL

## 2023-10-25 DIAGNOSIS — D69.3 IMMUNE THROMBOCYTOPENIC PURPURA (H): Primary | ICD-10-CM

## 2023-10-25 PROCEDURE — 250N000011 HC RX IP 250 OP 636: Mod: JZ | Performed by: INTERNAL MEDICINE

## 2023-10-25 PROCEDURE — 96372 THER/PROPH/DIAG INJ SC/IM: CPT | Performed by: INTERNAL MEDICINE

## 2023-10-25 RX ADMIN — ROMIPLOSTIM 50 MCG: 250 INJECTION, POWDER, LYOPHILIZED, FOR SOLUTION SUBCUTANEOUS at 13:33

## 2023-10-25 NOTE — PROGRESS NOTES
PT here ambulatory for N plate inj. PT was sick last week so did not receive inj. Injection reviewed and administered in right upper arm. Bandaid to site. Follow up reviewed and pt dc'd steady gait

## 2023-11-01 DIAGNOSIS — F02.80 LATE ONSET ALZHEIMER'S DISEASE WITHOUT BEHAVIORAL DISTURBANCE (H): ICD-10-CM

## 2023-11-01 DIAGNOSIS — G30.1 LATE ONSET ALZHEIMER'S DISEASE WITHOUT BEHAVIORAL DISTURBANCE (H): ICD-10-CM

## 2023-11-02 RX ORDER — MEMANTINE HYDROCHLORIDE 5 MG/1
TABLET ORAL
Qty: 70 TABLET | Refills: 0 | OUTPATIENT
Start: 2023-11-02

## 2023-11-02 NOTE — TELEPHONE ENCOUNTER
LILIANA request for Namenda 5mg titration  Patient on Namenda 10mg BID  Pharmacy informed    Outpatient Medication Detail     Disp Refills Start End NEDA   memantine (NAMENDA) 10 MG tablet 180 tablet 3 12/8/2022  --   Sig - Route: Take 1 tablet (10 mg) by mouth 2 times daily - Oral   Patient not taking: Reported on 8/25/2023        Sent to pharmacy as: Memantine HCl 10 MG Oral Tablet (NAMENDA)   Class: E-Prescribe   Earliest Fill Date: 12/8/2022   Order: [id number]   E-Prescribing Status: Receipt confirmed by pharmacy (11/8/2022 12:25 PM CST)     Printout Tracking    External Result Report     Pharmacy    [id number] - [place] [id number]

## 2023-11-03 ENCOUNTER — LAB (OUTPATIENT)
Dept: INFUSION THERAPY | Facility: HOSPITAL | Age: 88
End: 2023-11-03
Attending: INTERNAL MEDICINE
Payer: COMMERCIAL

## 2023-11-03 VITALS
TEMPERATURE: 98.3 F | SYSTOLIC BLOOD PRESSURE: 182 MMHG | DIASTOLIC BLOOD PRESSURE: 74 MMHG | RESPIRATION RATE: 16 BRPM | OXYGEN SATURATION: 98 % | HEART RATE: 63 BPM

## 2023-11-03 DIAGNOSIS — D69.3 IMMUNE THROMBOCYTOPENIC PURPURA (H): Primary | ICD-10-CM

## 2023-11-03 DIAGNOSIS — D69.3 IMMUNE THROMBOCYTOPENIC PURPURA (H): ICD-10-CM

## 2023-11-03 LAB
BASOPHILS # BLD AUTO: 0.1 10E3/UL (ref 0–0.2)
BASOPHILS NFR BLD AUTO: 1 %
EOSINOPHIL # BLD AUTO: 0.1 10E3/UL (ref 0–0.7)
EOSINOPHIL NFR BLD AUTO: 1 %
ERYTHROCYTE [DISTWIDTH] IN BLOOD BY AUTOMATED COUNT: 14.3 % (ref 10–15)
HCT VFR BLD AUTO: 47.4 % (ref 35–47)
HGB BLD-MCNC: 15.4 G/DL (ref 11.7–15.7)
IMM GRANULOCYTES # BLD: 0.1 10E3/UL
IMM GRANULOCYTES NFR BLD: 1 %
LYMPHOCYTES # BLD AUTO: 4.9 10E3/UL (ref 0.8–5.3)
LYMPHOCYTES NFR BLD AUTO: 31 %
MCH RBC QN AUTO: 32.2 PG (ref 26.5–33)
MCHC RBC AUTO-ENTMCNC: 32.5 G/DL (ref 31.5–36.5)
MCV RBC AUTO: 99 FL (ref 78–100)
MONOCYTES # BLD AUTO: 1.4 10E3/UL (ref 0–1.3)
MONOCYTES NFR BLD AUTO: 9 %
NEUTROPHILS # BLD AUTO: 9 10E3/UL (ref 1.6–8.3)
NEUTROPHILS NFR BLD AUTO: 57 %
NRBC # BLD AUTO: 0 10E3/UL
NRBC BLD AUTO-RTO: 0 /100
PLATELET # BLD AUTO: 171 10E3/UL (ref 150–450)
RBC # BLD AUTO: 4.79 10E6/UL (ref 3.8–5.2)
WBC # BLD AUTO: 15.7 10E3/UL (ref 4–11)

## 2023-11-03 PROCEDURE — 250N000011 HC RX IP 250 OP 636: Mod: JZ | Performed by: INTERNAL MEDICINE

## 2023-11-03 PROCEDURE — 36415 COLL VENOUS BLD VENIPUNCTURE: CPT

## 2023-11-03 PROCEDURE — 85025 COMPLETE CBC W/AUTO DIFF WBC: CPT

## 2023-11-03 PROCEDURE — 96372 THER/PROPH/DIAG INJ SC/IM: CPT | Performed by: INTERNAL MEDICINE

## 2023-11-03 RX ADMIN — ROMIPLOSTIM 50 MCG: 250 INJECTION, POWDER, LYOPHILIZED, FOR SOLUTION SUBCUTANEOUS at 15:31

## 2023-11-03 NOTE — PROGRESS NOTES
Pt here ambulatory for Nplate inj. Platelets today 171. Injection reviewed and administered in right upper arm /bandaid to site. PT tolerated without any problems. Follow up reviewed and pt dc'd steady gait.

## 2023-11-09 RX ORDER — MEMANTINE HYDROCHLORIDE 10 MG/1
10 TABLET ORAL 2 TIMES DAILY
Qty: 180 TABLET | Refills: 1 | Status: SHIPPED | OUTPATIENT
Start: 2023-11-09 | End: 2024-02-14

## 2023-11-09 NOTE — TELEPHONE ENCOUNTER
[facility] Call Center    Phone Message    May a detailed message be left on voicemail: yes     Reason for Call: Medication Refill Request    Has the patient contacted the pharmacy for the refill? Yes   Name of medication being requested: Namenda 10 mg  Provider who prescribed the medication: Adam Mack  Pharmacy: [id number]  Date medication is needed: ASAP   Patient will be out of meds in1 week  PATIENT HAS APPOINTMENT 2/16/24    Action Taken: [facility]    Travel Screening: Not Applicable

## 2023-11-09 NOTE — TELEPHONE ENCOUNTER
Refill request for: namenda 10mg tablets   Directions: Take 1 tablet (10 mg) by mouth 2 times daily     LOV: 11/08/22  NOV: 02/16/24    90 day supply with 1 refills Medication T'd for review and signature    Deisy Webster LPN on 11/9/2023 at 9:10 AM

## 2023-11-10 ENCOUNTER — INFUSION THERAPY VISIT (OUTPATIENT)
Dept: INFUSION THERAPY | Facility: HOSPITAL | Age: 88
End: 2023-11-10
Attending: INTERNAL MEDICINE
Payer: COMMERCIAL

## 2023-11-10 VITALS
TEMPERATURE: 97.7 F | DIASTOLIC BLOOD PRESSURE: 74 MMHG | SYSTOLIC BLOOD PRESSURE: 177 MMHG | OXYGEN SATURATION: 98 % | RESPIRATION RATE: 16 BRPM | HEART RATE: 71 BPM

## 2023-11-10 DIAGNOSIS — D69.3 IMMUNE THROMBOCYTOPENIC PURPURA (H): Primary | ICD-10-CM

## 2023-11-10 PROCEDURE — 250N000011 HC RX IP 250 OP 636: Performed by: INTERNAL MEDICINE

## 2023-11-10 PROCEDURE — 96372 THER/PROPH/DIAG INJ SC/IM: CPT | Performed by: INTERNAL MEDICINE

## 2023-11-10 RX ADMIN — ROMIPLOSTIM 50 MCG: 250 INJECTION, POWDER, LYOPHILIZED, FOR SOLUTION SUBCUTANEOUS at 13:52

## 2023-11-10 NOTE — PROGRESS NOTES
Infusion Nursing Note:  Ora Gonzalez presents today for romiplostim.    Patient seen by provider today: No   present during visit today: Not Applicable.    Note: Ora arrived ambulatory and in stable condition. Injection was given into the right arm. Will return on 11/17 for next appointment.    Intravenous Access:  No Intravenous access/labs at this visit.    Treatment Conditions:  Not Applicable.    Post Infusion Assessment:  Patient tolerated injection without incident.     Discharge Plan:   Patient and/or family verbalized understanding of discharge instructions and all questions answered.  Patient discharged in stable condition accompanied by: self.  Departure Mode: Ambulatory.      Katherin Babcock RN

## 2023-11-17 ENCOUNTER — INFUSION THERAPY VISIT (OUTPATIENT)
Dept: INFUSION THERAPY | Facility: HOSPITAL | Age: 88
End: 2023-11-17
Attending: INTERNAL MEDICINE
Payer: COMMERCIAL

## 2023-11-17 VITALS
TEMPERATURE: 98 F | OXYGEN SATURATION: 97 % | SYSTOLIC BLOOD PRESSURE: 170 MMHG | HEART RATE: 92 BPM | RESPIRATION RATE: 18 BRPM | DIASTOLIC BLOOD PRESSURE: 72 MMHG

## 2023-11-17 DIAGNOSIS — D69.3 IMMUNE THROMBOCYTOPENIC PURPURA (H): Primary | ICD-10-CM

## 2023-11-17 PROCEDURE — 96372 THER/PROPH/DIAG INJ SC/IM: CPT | Performed by: INTERNAL MEDICINE

## 2023-11-17 PROCEDURE — 250N000011 HC RX IP 250 OP 636: Mod: JZ | Performed by: INTERNAL MEDICINE

## 2023-11-17 RX ADMIN — ROMIPLOSTIM 50 MCG: 250 INJECTION, POWDER, LYOPHILIZED, FOR SOLUTION SUBCUTANEOUS at 14:09

## 2023-11-17 NOTE — PROGRESS NOTES
PT here ambulatory for Nplate inj. PT states doing well and has no concerns. Nplated reviewed and administered as ordered in right upper arm/ bandaid to site. Follow up reviewed and pt dc'd steady gait.

## 2023-11-24 ENCOUNTER — INFUSION THERAPY VISIT (OUTPATIENT)
Dept: INFUSION THERAPY | Facility: HOSPITAL | Age: 88
End: 2023-11-24
Attending: INTERNAL MEDICINE
Payer: COMMERCIAL

## 2023-11-24 VITALS
HEART RATE: 73 BPM | SYSTOLIC BLOOD PRESSURE: 140 MMHG | RESPIRATION RATE: 16 BRPM | DIASTOLIC BLOOD PRESSURE: 66 MMHG | TEMPERATURE: 98.3 F | OXYGEN SATURATION: 98 %

## 2023-11-24 DIAGNOSIS — D69.3 IMMUNE THROMBOCYTOPENIC PURPURA (H): Primary | ICD-10-CM

## 2023-11-24 PROCEDURE — 96372 THER/PROPH/DIAG INJ SC/IM: CPT | Performed by: INTERNAL MEDICINE

## 2023-11-24 PROCEDURE — 250N000011 HC RX IP 250 OP 636: Mod: JZ | Performed by: INTERNAL MEDICINE

## 2023-11-24 RX ADMIN — ROMIPLOSTIM 50 MCG: 250 INJECTION, POWDER, LYOPHILIZED, FOR SOLUTION SUBCUTANEOUS at 14:09

## 2023-11-24 NOTE — PROGRESS NOTES
Infusion Nursing Note:  Ora Gonzalez presents today for Nplate.    Patient seen by provider today: No   present during visit today: Not Applicable.    Note: VSS.  Pt assessed and feeling well today.  Nplate given subcutaneous into her right upper arm.      Intravenous Access:  No Intravenous access/labs at this visit.    Treatment Conditions:  Not Applicable.      Post Infusion Assessment:  Patient tolerated injection without incident.       Discharge Plan:   Patient discharged in stable condition accompanied by: daughter.  Departure Mode: Ambulatory.      Carole Barrett RN

## 2023-11-25 DIAGNOSIS — E53.8 VITAMIN B12 DEFICIENCY (NON ANEMIC): ICD-10-CM

## 2023-11-27 RX ORDER — OMEGA-3/DHA/EPA/FISH OIL 35-113.5MG
TABLET,CHEWABLE ORAL
Qty: 90 TABLET | Refills: 0 | Status: SHIPPED | OUTPATIENT
Start: 2023-11-27 | End: 2024-02-14

## 2023-11-27 NOTE — TELEPHONE ENCOUNTER
Refill request for: cyanocobalamin (VITAMIN B-12) 1000 MCG tablet  Directions: Take 1 tablet (1,000 mcg) by mouth daily     LOV: 11/8/22  NOV: 2/16/24    Sending to Dr. Gonsalves as Dr. Mack is out of office     90 day supply with 0 refills Medication T'd for review and signature  Pilar Nicolas CMA on 11/27/2023 at 10:41 AM  [facility]

## 2023-12-01 ENCOUNTER — LAB (OUTPATIENT)
Dept: INFUSION THERAPY | Facility: HOSPITAL | Age: 88
End: 2023-12-01
Attending: INTERNAL MEDICINE
Payer: COMMERCIAL

## 2023-12-01 VITALS
HEART RATE: 81 BPM | SYSTOLIC BLOOD PRESSURE: 147 MMHG | RESPIRATION RATE: 16 BRPM | DIASTOLIC BLOOD PRESSURE: 67 MMHG | OXYGEN SATURATION: 97 % | TEMPERATURE: 98.1 F

## 2023-12-01 DIAGNOSIS — D69.3 IMMUNE THROMBOCYTOPENIC PURPURA (H): ICD-10-CM

## 2023-12-01 DIAGNOSIS — D69.3 IMMUNE THROMBOCYTOPENIC PURPURA (H): Primary | ICD-10-CM

## 2023-12-01 LAB
BASOPHILS # BLD AUTO: 0.1 10E3/UL (ref 0–0.2)
BASOPHILS NFR BLD AUTO: 0 %
EOSINOPHIL # BLD AUTO: 0.1 10E3/UL (ref 0–0.7)
EOSINOPHIL NFR BLD AUTO: 1 %
ERYTHROCYTE [DISTWIDTH] IN BLOOD BY AUTOMATED COUNT: 13.9 % (ref 10–15)
HCT VFR BLD AUTO: 44.8 % (ref 35–47)
HGB BLD-MCNC: 14.8 G/DL (ref 11.7–15.7)
IMM GRANULOCYTES # BLD: 0.2 10E3/UL
IMM GRANULOCYTES NFR BLD: 1 %
LYMPHOCYTES # BLD AUTO: 3 10E3/UL (ref 0.8–5.3)
LYMPHOCYTES NFR BLD AUTO: 22 %
MCH RBC QN AUTO: 32 PG (ref 26.5–33)
MCHC RBC AUTO-ENTMCNC: 33 G/DL (ref 31.5–36.5)
MCV RBC AUTO: 97 FL (ref 78–100)
MONOCYTES # BLD AUTO: 0.9 10E3/UL (ref 0–1.3)
MONOCYTES NFR BLD AUTO: 7 %
NEUTROPHILS # BLD AUTO: 9.1 10E3/UL (ref 1.6–8.3)
NEUTROPHILS NFR BLD AUTO: 69 %
NRBC # BLD AUTO: 0 10E3/UL
NRBC BLD AUTO-RTO: 0 /100
PLATELET # BLD AUTO: 209 10E3/UL (ref 150–450)
RBC # BLD AUTO: 4.62 10E6/UL (ref 3.8–5.2)
WBC # BLD AUTO: 13.3 10E3/UL (ref 4–11)

## 2023-12-01 PROCEDURE — 250N000011 HC RX IP 250 OP 636: Mod: JZ | Performed by: INTERNAL MEDICINE

## 2023-12-01 PROCEDURE — 96372 THER/PROPH/DIAG INJ SC/IM: CPT | Performed by: INTERNAL MEDICINE

## 2023-12-01 PROCEDURE — 36415 COLL VENOUS BLD VENIPUNCTURE: CPT

## 2023-12-01 PROCEDURE — 85004 AUTOMATED DIFF WBC COUNT: CPT

## 2023-12-01 RX ADMIN — ROMIPLOSTIM 50 MCG: 250 INJECTION, POWDER, LYOPHILIZED, FOR SOLUTION SUBCUTANEOUS at 14:26

## 2023-12-01 NOTE — PROGRESS NOTES
Infusion Nursing Note:  Ora Gonzalez presents today for Nplate.    Patient seen by provider today: No   present during visit today: Not Applicable.    Note: VSS.  Pt assessed.  Family reports a bit more forgetfulness.  Labs noted.  Nplate given subcutaneous into her right upper arm.      Intravenous Access:  N/a.    Treatment Conditions:  Lab Results   Component Value Date    HGB 14.8 12/01/2023    WBC 13.3 (H) 12/01/2023    ANEU 8.7 (H) 03/24/2023    ANEUTAUTO 9.1 (H) 12/01/2023     12/01/2023        Lab Results   Component Value Date     07/08/2020    POTASSIUM 4.4 07/08/2020    MAG 2.0 01/28/2018    CR 0.96 07/08/2020    MCKENNA 9.3 07/08/2020    BILITOTAL 0.3 11/25/2022    ALBUMIN 4.4 11/25/2022    ALT 16 11/25/2022    AST 30 11/25/2022         Post Infusion Assessment:  Patient tolerated injection without incident.       Discharge Plan:   Patient discharged in stable condition accompanied by: daughter.  Departure Mode: Ambulatory.      Carole Barrett RN

## 2023-12-04 ENCOUNTER — APPOINTMENT (OUTPATIENT)
Dept: CT IMAGING | Facility: HOSPITAL | Age: 88
End: 2023-12-04
Attending: EMERGENCY MEDICINE
Payer: COMMERCIAL

## 2023-12-04 ENCOUNTER — HOSPITAL ENCOUNTER (EMERGENCY)
Facility: HOSPITAL | Age: 88
Discharge: HOME | End: 2023-12-04
Attending: EMERGENCY MEDICINE | Admitting: EMERGENCY MEDICINE
Payer: COMMERCIAL

## 2023-12-04 ENCOUNTER — APPOINTMENT (OUTPATIENT)
Dept: RADIOLOGY | Facility: HOSPITAL | Age: 88
End: 2023-12-04
Attending: EMERGENCY MEDICINE
Payer: COMMERCIAL

## 2023-12-04 VITALS
WEIGHT: 110 LBS | SYSTOLIC BLOOD PRESSURE: 146 MMHG | TEMPERATURE: 98 F | RESPIRATION RATE: 22 BRPM | BODY MASS INDEX: 20.24 KG/M2 | HEART RATE: 76 BPM | DIASTOLIC BLOOD PRESSURE: 64 MMHG | OXYGEN SATURATION: 95 % | HEIGHT: 62 IN

## 2023-12-04 DIAGNOSIS — D72.829 LEUKOCYTOSIS, UNSPECIFIED TYPE: ICD-10-CM

## 2023-12-04 DIAGNOSIS — R07.89 ATYPICAL CHEST PAIN: ICD-10-CM

## 2023-12-04 DIAGNOSIS — R11.0 NAUSEA: ICD-10-CM

## 2023-12-04 LAB
ALBUMIN SERPL BCG-MCNC: 3.8 G/DL (ref 3.5–5.2)
ALBUMIN UR-MCNC: 20 MG/DL
ALP SERPL-CCNC: 95 U/L (ref 40–150)
ALT SERPL W P-5'-P-CCNC: 12 U/L (ref 0–50)
ANION GAP SERPL CALCULATED.3IONS-SCNC: 12 MMOL/L (ref 7–15)
APPEARANCE UR: CLEAR
AST SERPL W P-5'-P-CCNC: 23 U/L (ref 0–45)
BASOPHILS # BLD AUTO: 0.1 10E3/UL (ref 0–0.2)
BASOPHILS NFR BLD AUTO: 1 %
BILIRUB DIRECT SERPL-MCNC: 0.24 MG/DL (ref 0–0.3)
BILIRUB SERPL-MCNC: 0.9 MG/DL
BILIRUB UR QL STRIP: NEGATIVE
BUN SERPL-MCNC: 13.7 MG/DL (ref 8–23)
CALCIUM SERPL-MCNC: 9.2 MG/DL (ref 8.8–10.2)
CHLORIDE SERPL-SCNC: 102 MMOL/L (ref 98–107)
COLOR UR AUTO: ABNORMAL
CREAT SERPL-MCNC: 0.96 MG/DL (ref 0.51–0.95)
DEPRECATED HCO3 PLAS-SCNC: 27 MMOL/L (ref 22–29)
EGFRCR SERPLBLD CKD-EPI 2021: 56 ML/MIN/1.73M2
EOSINOPHIL # BLD AUTO: 0 10E3/UL (ref 0–0.7)
EOSINOPHIL NFR BLD AUTO: 0 %
ERYTHROCYTE [DISTWIDTH] IN BLOOD BY AUTOMATED COUNT: 14.3 % (ref 10–15)
GLUCOSE SERPL-MCNC: 117 MG/DL (ref 70–99)
GLUCOSE UR STRIP-MCNC: NEGATIVE MG/DL
HCT VFR BLD AUTO: 44.6 % (ref 35–47)
HGB BLD-MCNC: 14.8 G/DL (ref 11.7–15.7)
HGB UR QL STRIP: NEGATIVE
HOLD SPECIMEN: NORMAL
IMM GRANULOCYTES # BLD: 0.2 10E3/UL
IMM GRANULOCYTES NFR BLD: 1 %
KETONES UR STRIP-MCNC: 60 MG/DL
LEUKOCYTE ESTERASE UR QL STRIP: NEGATIVE
LYMPHOCYTES # BLD AUTO: 2.1 10E3/UL (ref 0.8–5.3)
LYMPHOCYTES NFR BLD AUTO: 12 %
MCH RBC QN AUTO: 31.6 PG (ref 26.5–33)
MCHC RBC AUTO-ENTMCNC: 33.2 G/DL (ref 31.5–36.5)
MCV RBC AUTO: 95 FL (ref 78–100)
MONOCYTES # BLD AUTO: 1.4 10E3/UL (ref 0–1.3)
MONOCYTES NFR BLD AUTO: 8 %
MUCOUS THREADS #/AREA URNS LPF: PRESENT /LPF
NEUTROPHILS # BLD AUTO: 13.6 10E3/UL (ref 1.6–8.3)
NEUTROPHILS NFR BLD AUTO: 78 %
NITRATE UR QL: NEGATIVE
NRBC # BLD AUTO: 0 10E3/UL
NRBC BLD AUTO-RTO: 0 /100
PH UR STRIP: 6.5 [PH] (ref 5–7)
PLATELET # BLD AUTO: 349 10E3/UL (ref 150–450)
POTASSIUM SERPL-SCNC: 3.5 MMOL/L (ref 3.4–5.3)
PROT SERPL-MCNC: 7.1 G/DL (ref 6.4–8.3)
RBC # BLD AUTO: 4.68 10E6/UL (ref 3.8–5.2)
RBC URINE: 2 /HPF
SODIUM SERPL-SCNC: 141 MMOL/L (ref 135–145)
SP GR UR STRIP: 1.01 (ref 1–1.03)
SQUAMOUS EPITHELIAL: <1 /HPF
TROPONIN T SERPL HS-MCNC: 17 NG/L
TROPONIN T SERPL HS-MCNC: 19 NG/L
UROBILINOGEN UR STRIP-MCNC: 2 MG/DL
WBC # BLD AUTO: 17.5 10E3/UL (ref 4–11)
WBC URINE: 4 /HPF

## 2023-12-04 PROCEDURE — 81001 URINALYSIS AUTO W/SCOPE: CPT | Performed by: EMERGENCY MEDICINE

## 2023-12-04 PROCEDURE — 250N000011 HC RX IP 250 OP 636: Mod: JZ | Performed by: EMERGENCY MEDICINE

## 2023-12-04 PROCEDURE — 74177 CT ABD & PELVIS W/CONTRAST: CPT

## 2023-12-04 PROCEDURE — 82248 BILIRUBIN DIRECT: CPT | Performed by: EMERGENCY MEDICINE

## 2023-12-04 PROCEDURE — 36415 COLL VENOUS BLD VENIPUNCTURE: CPT | Performed by: EMERGENCY MEDICINE

## 2023-12-04 PROCEDURE — 84484 ASSAY OF TROPONIN QUANT: CPT | Mod: 91 | Performed by: EMERGENCY MEDICINE

## 2023-12-04 PROCEDURE — 99285 EMERGENCY DEPT VISIT HI MDM: CPT | Mod: 25

## 2023-12-04 PROCEDURE — 85025 COMPLETE CBC W/AUTO DIFF WBC: CPT | Performed by: EMERGENCY MEDICINE

## 2023-12-04 PROCEDURE — 71045 X-RAY EXAM CHEST 1 VIEW: CPT

## 2023-12-04 RX ORDER — IOPAMIDOL 755 MG/ML
54 INJECTION, SOLUTION INTRAVASCULAR ONCE
Status: COMPLETED | OUTPATIENT
Start: 2023-12-04 | End: 2023-12-04

## 2023-12-04 RX ADMIN — IOPAMIDOL 54 ML: 755 INJECTION, SOLUTION INTRAVENOUS at 12:34

## 2023-12-04 ASSESSMENT — ACTIVITIES OF DAILY LIVING (ADL)
ADLS_ACUITY_SCORE: 35

## 2023-12-04 NOTE — ED PROVIDER NOTES
EMERGENCY DEPARTMENT ENCOUNTER      NAME: Ora Gonzalez  AGE: 89 year old female  YOB: 1933  MRN: [id number]  EVALUATION DATE & TIME: 2023  9:25 AM    PCP: Ryanne Corbett    ED PROVIDER: Vinny Stevenson D.O.      Chief Complaint   Patient presents with    Chest Pain       FINAL IMPRESSION:  1. Nausea    2. Leukocytosis, unspecified type    3. Atypical chest pain        ED COURSE & MEDICAL DECISION MAKIN:39 AM I met with the patient to gather history and to perform my initial exam. I discussed the plan for care while in the Emergency Department.  11:06 AM I rechecked and updated the patient. Per family in the room, patient has very severe dementia and can't remember things from hour to hour. Patient did have a little nausea without vomiting. She complained of chest pain this morning that has since resolved. Patient had a cough and shortness of breath 2 days ago, but does not have any today.  2:42 PM I rechecked and updated the patient          Pertinent Labs & Imaging studies reviewed. (See chart for details)  89 year old female presents to the Emergency Department for evaluation of chest pain reported by family, and nausea reported by the patient.  She denied any symptoms at time of evaluation in the emergency department.  EKG did not show any evidence of ischemia arrhythmia, first troponin was negative, repeat troponin remained negative.  Lab testing did show an elevation in white blood cell count, significant of that I decided to evaluate for possible underlying infection.  CT scan of the chest abdomen pelvis did not show any evidence of pneumonia, intra-abdominal inflammation or infection, surgical abdomen, obstruction, or other acute process.  UA does not show any evidence of UTI.  She has no fever, no headache, no neck pain that would suggest meningitis, and no evidence of cellulitis on the body from my exam.  At this time I cannot find any evidence of findings that would suggest cause of  the elevated white blood cell count, and therefore do not see obvious cause for admission.  The patient was denying chest pain though her dementia may limit her ability to remember this per the family, therefore I did decide to refer to rapid access clinic due to the report of chest pain from the family.  I did discuss with the family in detail strict return precautions, and the importance of following up with primary care and the rapid access clinic.  They were agreeable with discharge.      Medical Decision Making    History:  Supplemental history from: Documented in chart, if applicable  External Record(s) reviewed: Documented in chart, if applicable.    Work Up:  Chart documentation includes differential considered and any EKGs or imaging independently interpreted by provider, where specified.  In additional to work up documented, I considered the following work up: Documented in chart, if applicable.    External consultation:  Discussion of management with another provider: Documented in chart, if applicable    Complicating factors:  Care impacted by chronic illness: Dementia  Care affected by social determinants of health: N/A    Disposition considerations: Discharge. No recommendations on prescription strength medication(s). I considered admission, but discharged the patient after share decision making conversation.        At the conclusion of the encounter I discussed the results of all of the tests and the disposition. The questions were answered. The patient or family acknowledged understanding and was agreeable with the care plan.        HPI    Patient information was obtained from: patient    Use of : N/A         Ora Gonzalez is a 89 year old female with a history of paroxysmal atrial fibrillation, HTN, PE, late onset Alzheimer's disease without behavioral disturbance, and HLD, who presents via EMS for nausea and chest pain.    Patient reports she is not sure why she is here today. She  reports that developed nausea last night without vomiting, but this has since resolved. She states her daughter decided she needed to present to the ED. Patient denies chest pain, chest pressure, lightheadedness, current nausea, vomiting, diarrhea, or fever. She endorses she feels back to her normal self. No medical problems or past surgeries. She denies tobacco or alcohol use.     Per triage note, patient had chest discomfort starting around 2200 last night, which resolved when lying down for bed. Daughter was visiting today and called 911. Patient denies chest pain on arrival. No shortness of breath or cough. 324 ASA given by EMS PTA.       REVIEW OF SYSTEMS  Constitutional:  Denies fever, chills, weight loss or weakness  Eyes:  No pain, discharge, redness  HENT:  Denies sore throat, ear pain, congestion  Respiratory: No SOB, wheeze or cough  Cardiovascular:  No CP, palpitations  GI:  Denies abdominal pain, vomiting, diarrhea. Positive for nausea.  : Denies dysuria, hematuria  Musculoskeletal:  Denies any new muscle/joint pain, swelling or loss of function.  Skin:  Denies rash, pallor  Neurologic:  Denies headache, lightheadedness, focal weakness or sensory changes  Lymph: Denies swollen nodes    All other systems negative unless noted in HPI.    PAST MEDICAL HISTORY:  Past Medical History:   Diagnosis Date    Adjustment disorder with mixed anxiety and depressed mood 1/22/2016    Adjustment reaction to chronic stress 1/22/2016    Chronic kidney disease     History of pulmonary embolism 7/8/2020    Hypertension     ITP (idiopathic thrombocytopenic purpura)     Osteoporosis     Paroxysmal atrial fibrillation (H)     Pulmonary embolism (H) 2/8/2016    Thrombocytopenia (H24)        PAST SURGICAL HISTORY:  Past Surgical History:   Procedure Laterality Date    CATARACT EXTRACTION Left 10/2014    ESOPHAGOSCOPY, GASTROSCOPY, DUODENOSCOPY (EGD), COMBINED N/A 4/13/2017    Procedure: ESOPHAGOGASTRODUODENOSCOPY (EGD);   "Surgeon: Juanpablo John MD;  Location: [facility] GI;  Service:     HEMORRHOIDECTOMY INTERNAL LIGATION      Description: Hemorrhoidectomy;  Recorded: 2008;  Comments: with fissure    IR EMBOLIZATION VASCULAR NON HEAD/NECK  2017    LAPAROSCOPIC SPLENECTOMY N/A 2015    Procedure: LAPAROSCOPIC TO CONVERSION TO OPEN SPLENECTOMY;  Surgeon: Herb Mckeon MD;  Location: [facility] OR;  Service:     OTHER SURGICAL HISTORY      blake barboza joe    [facility]  4/15/2017              CURRENT MEDICATIONS:    No current facility-administered medications for this encounter.     Current Outpatient Medications   Medication    aspirin (ASA) 325 MG EC tablet    [facility] VITAMIN  B12 1000 MCG tablet    donepezil (ARICEPT) 10 MG tablet    memantine (NAMENDA) 10 MG tablet         ALLERGIES:  No Known Allergies    FAMILY HISTORY:  Family History   Problem Relation Age of Onset    Depression Father     Suicidality Father     Alzheimer Disease Sister     No Known Problems Mother          90s of \"old age\"    No Known Problems Father     Diabetes Type 2  Son     Other - See Comments Other         multiple family members with silicosis / black lung    Parkinsonism Daughter     Alzheimer Disease Sister     No Known Problems Brother     No Known Problems Sister     No Known Problems Brother     No Known Problems Brother     No Known Problems Son        SOCIAL HISTORY:  Social History     Socioeconomic History    Marital status:    Tobacco Use    Smoking status: Former     Types: Cigarettes    Smokeless tobacco: Never   Substance and Sexual Activity    Alcohol use: Not Currently       VITALS:  Patient Vitals for the past 24 hrs:   BP Temp Temp src Pulse Resp SpO2 Height Weight   23 1430 (!) 152/67 -- -- 77 25 -- -- --   23 1400 (!) 167/71 -- -- 87 23 -- -- --   23 1330 -- -- -- 72 25 -- -- --   23 1300 -- -- -- 72 25 -- -- --   23 1234 -- -- -- 78 21 -- -- --   23 1200 131/59 -- -- " "73 30 95 % -- --   12/04/23 1145 125/61 -- -- 73 22 -- -- --   12/04/23 1130 126/58 -- -- 71 28 -- -- --   12/04/23 1115 123/60 -- -- 74 24 -- -- --   12/04/23 1100 124/60 -- -- 76 27 -- -- --   12/04/23 1045 101/53 -- -- 76 23 -- -- --   12/04/23 1030 115/56 -- -- 75 25 -- -- --   12/04/23 1015 136/64 -- -- 84 22 -- -- --   12/04/23 1000 136/58 -- -- 81 23 94 % -- --   12/04/23 0945 (!) 144/66 -- -- 84 20 95 % -- --   12/04/23 0927 -- 98  F (36.7  C) Oral 88 18 96 % 1.575 m (5' 2\") 49.9 kg (110 lb)       PHYSICAL EXAM    VITAL SIGNS: BP (!) 152/67   Pulse 77   Temp 98  F (36.7  C) (Oral)   Resp 25   Ht 1.575 m (5' 2\")   Wt 49.9 kg (110 lb)   SpO2 95%   BMI 20.12 kg/m      General Appearance: Well-appearing, well-nourished, no acute distress   Head:  Normocephalic, without obvious abnormality, atraumatic  Eyes:  PERRL, conjunctiva/corneas clear, EOM's intact,  ENT:  Lips, mucosa, and tongue normal, membranes are moist without pallor  Neck:  Normal ROM, symmetrical, trachea midline    Cardio:  Regular rate and rhythm, no murmur, rub or gallop, 2+ pulses symmetric in all extremities  Pulm:  Clear to auscultation bilaterally, respirations unlabored,  Abdomen:  Soft, non-tender, no rebound or guarding.  Musculoskeletal: Full ROM, no edema, no cyanosis, good ROM of major joints  Integument:  Warm, Dry, No erythema, No rash.    Neurologic:  Alert & oriented to baseline.  No focal deficits appreciated.    Psychiatric:  Affect normal, Judgment normal, Mood normal.        Heart Score for Chest Pain Patients   History Highly Suspicious 2 0    Moderately Suspicious 1     Slightly Suspicious 0    EKG Significant ST depression 2 0    NonspecificRepolarization 1     Normal 0    Age >65 years 2 2    45 - 65 years 1     <45 years 0    Risk Factors 3 or more risk factors 2 1    1-2 risk factors 1     No known risk factors 0    Troponin >3x normal 2 0    >1 - <3x normal 1     Normal limit 0    Total 3     *Risk factors for " atherosclerotic disease:   Hypercholesterolemia, Hypertension, DM, Cigarettesmoking, Family History, Obesity  * Significant ST depression defined as changes of 1mm orgreater. T wave inversions and ST depression of 0.5mm considered nonspecific       LABS  Results for orders placed or performed during the hospital encounter of 12/04/23 (from the past 24 hour(s))   [place] Draw    Narrative    The following orders were created for panel order [place] Draw.  Procedure                               Abnormality         Status                     ---------                               -----------         ------                     Extra Blue Top Tube[[id number]]                              Final result               Extra Green Top (Lithium...[[id number]]                      Final result               Extra Purple Top Tube[[id number]]                            Final result                 Please view results for these tests on the individual orders.   Extra Blue Top Tube   Result Value Ref Range    Hold Specimen JIC    Extra Green Top (Lithium Heparin) Tube   Result Value Ref Range    Hold Specimen JIC    Extra Purple Top Tube   Result Value Ref Range    Hold Specimen JIC    CBC with platelets + differential    Narrative    The following orders were created for panel order CBC with platelets + differential.  Procedure                               Abnormality         Status                     ---------                               -----------         ------                     CBC with platelets and d...[[id number]]  Abnormal            Final result                 Please view results for these tests on the individual orders.   Basic metabolic panel   Result Value Ref Range    Sodium 141 135 - 145 mmol/L    Potassium 3.5 3.4 - 5.3 mmol/L    Chloride 102 98 - 107 mmol/L    Carbon Dioxide (CO2) 27 22 - 29 mmol/L    Anion Gap 12 7 - 15 mmol/L    Urea Nitrogen 13.7 8.0 - 23.0 mg/dL    Creatinine 0.96 (H) 0.51 - 0.95 mg/dL     GFR Estimate 56 (L) >60 mL/min/1.73m2    Calcium 9.2 8.8 - 10.2 mg/dL    Glucose 117 (H) 70 - 99 mg/dL   Hepatic function panel   Result Value Ref Range    Protein Total 7.1 6.4 - 8.3 g/dL    Albumin 3.8 3.5 - 5.2 g/dL    Bilirubin Total 0.9 <=1.2 mg/dL    Alkaline Phosphatase 95 40 - 150 U/L    AST 23 0 - 45 U/L    ALT 12 0 - 50 U/L    Bilirubin Direct 0.24 0.00 - 0.30 mg/dL   Troponin T, High Sensitivity (now)   Result Value Ref Range    Troponin T, High Sensitivity 19 (H) <=14 ng/L   CBC with platelets and differential   Result Value Ref Range    WBC Count 17.5 (H) 4.0 - 11.0 10e3/uL    RBC Count 4.68 3.80 - 5.20 10e6/uL    Hemoglobin 14.8 11.7 - 15.7 g/dL    Hematocrit 44.6 35.0 - 47.0 %    MCV 95 78 - 100 fL    MCH 31.6 26.5 - 33.0 pg    MCHC 33.2 31.5 - 36.5 g/dL    RDW 14.3 10.0 - 15.0 %    Platelet Count 349 150 - 450 10e3/uL    % Neutrophils 78 %    % Lymphocytes 12 %    % Monocytes 8 %    % Eosinophils 0 %    % Basophils 1 %    % Immature Granulocytes 1 %    NRBCs per 100 WBC 0 <1 /100    Absolute Neutrophils 13.6 (H) 1.6 - 8.3 10e3/uL    Absolute Lymphocytes 2.1 0.8 - 5.3 10e3/uL    Absolute Monocytes 1.4 (H) 0.0 - 1.3 10e3/uL    Absolute Eosinophils 0.0 0.0 - 0.7 10e3/uL    Absolute Basophils 0.1 0.0 - 0.2 10e3/uL    Absolute Immature Granulocytes 0.2 <=0.4 10e3/uL    Absolute NRBCs 0.0 10e3/uL   XR Chest Port 1 View    Narrative    EXAM: XR CHEST PORT 1 VIEW  LOCATION: [facility]  DATE: 12/4/2023    INDICATION: Chest pain  COMPARISON: 02/08/2016      Impression    IMPRESSION: Mild bibasilar atelectasis. Lungs are otherwise clear. No effusions or pneumothorax. Heart size is normal. No acute osseous findings.   Troponin T, High Sensitivity (now)   Result Value Ref Range    Troponin T, High Sensitivity 17 (H) <=14 ng/L   CT Chest/Abdomen/Pelvis w Contrast    Narrative    EXAM: CT CHEST/ABDOMEN/PELVIS W CONTRAST  LOCATION: [facility]  DATE:  12/4/2023    INDICATION: Cough and elevated WBC with concern for PNA not seen on CT, with additional nausea and abdominal discomfort with potential for intra abdominal infection.  COMPARISON: Chest CT from 04/25/2016, CT abdomen and pelvis from 04/12/2017  TECHNIQUE: CT scan of the chest, abdomen, and pelvis was performed following injection of IV contrast. Multiplanar reformats were obtained. Dose reduction techniques were used.   CONTRAST: IsoVue 370 54ml    FINDINGS:   LUNGS AND PLEURA: Trace right pleural effusion. Patchy areas of atelectasis and scarring. Moderate emphysema. Mild biapical pleural thickening and scarring. Mild bronchiectasis. Trace airway secretions. No new or enlarging pulmonary nodules.    MEDIASTINUM/AXILLAE: Heart size is normal. Mitral annular calcification. Normal caliber aorta. No adenopathy.    CORONARY ARTERY CALCIFICATION: Mild.    HEPATOBILIARY: Normal.    PANCREAS: Pancreatic divisum.    SPLEEN: Absent.    ADRENAL GLANDS: Normal.    KIDNEYS/BLADDER: Patchy areas of cortical scarring, right greater than left. No hydronephrosis or genitourinary calculi.    BOWEL: Diverticulosis of the colon. No acute inflammatory change. No obstruction.     LYMPH NODES: Normal.    VASCULATURE: Extensive atherosclerosis.    PELVIC ORGANS: Normal.    MUSCULOSKELETAL: Osteopenia. Degenerative change of the spine.      Impression    IMPRESSION:  1.  No acute findings to explain symptoms.  2.  Trace right pleural effusion.  3.  Moderate emphysema.  4.  Diverticulosis.   UA with Microscopic reflex to Culture    Specimen: Urine, Clean Catch   Result Value Ref Range    Color Urine Light Yellow Colorless, Straw, Light Yellow, Yellow    Appearance Urine Clear Clear    Glucose Urine Negative Negative mg/dL    Bilirubin Urine Negative Negative    Ketones Urine 60 (A) Negative mg/dL    Specific Gravity Urine 1.010 1.003 - 1.035    Blood Urine Negative Negative    pH Urine 6.5 5.0 - 7.0    Protein Albumin Urine 20  (A) Negative mg/dL    Urobilinogen Urine 2.0 (A) <2.0 mg/dL    Nitrite Urine Negative Negative    Leukocyte Esterase Urine Negative Negative    Mucus Urine Present (A) None Seen /LPF    RBC Urine 2 <=2 /HPF    WBC Urine 4 <=5 /HPF    Squamous Epithelials Urine <1 <=1 /HPF    Narrative    Urine Culture not indicated     *Note: Due to a large number of results and/or encounters for the requested time period, some results have not been displayed. A complete set of results can be found in Results Review.         RADIOLOGY  CT Chest/Abdomen/Pelvis w Contrast   Final Result   IMPRESSION:   1.  No acute findings to explain symptoms.   2.  Trace right pleural effusion.   3.  Moderate emphysema.   4.  Diverticulosis.      XR Chest Port 1 View   Final Result   IMPRESSION: Mild bibasilar atelectasis. Lungs are otherwise clear. No effusions or pneumothorax. Heart size is normal. No acute osseous findings.             EKG:    Rate: 85 bpm  Rhythm: Normal Sinus Rhythm  Axis: Left  Interval: Normal  Conduction: Normal  QRS: Normal  ST: Normal  T-wave: Normal  QT: Not prolonged  Comparison EKG: no significant change compare to 27 January 2018  Impression:  No acute ischemic change   I have independently reviewed and interpreted today's EKG, pending Cardiologist read          MEDICATIONS GIVEN IN THE EMERGENCY:  Medications   iopamidol (ISOVUE-370) solution 54 mL (54 mLs Intravenous $Given 12/4/23 1234)       NEW PRESCRIPTIONS STARTED AT TODAY'S ER VISIT  New Prescriptions    No medications on file        I, Emelia Don, am serving as a scribe to document services personally performed by Vinny Stevenson D.O., based on my observations and the provider's statements to me.  I, Vinny Stevenson D.O., attest that Emelia Don is acting in a scribe capacity, has observed my performance of the services and has documented them in accordance with my direction.     Vinny Stevenson D.O.  Emergency Medicine  [facility] EMERGENCY  DEPARTMENT  [address] [place] [id number]  [phone]  Dept: [phone]       Vinny Stevenson,   12/05/23 0708

## 2023-12-04 NOTE — ED TRIAGE NOTES
Pt arrives via [facility] from home. Pt reports chest discomfort starting around 2200 last night, resolved when lying down for bed. Daughter was visiting today and called 911. Pt denies chest pain on arrival, no SOB or cough. 324 ASA given by EMS PTA.     Triage Assessment (Adult)       Row Name 12/04/23 0929          Triage Assessment    Airway WDL WDL        Respiratory WDL    Respiratory WDL WDL        Skin Circulation/Temperature WDL    Skin Circulation/Temperature WDL WDL        Cardiac WDL    Cardiac WDL WDL        Peripheral/Neurovascular WDL    Peripheral Neurovascular WDL WDL        Cognitive/Neuro/Behavioral WDL    Cognitive/Neuro/Behavioral WDL WDL

## 2023-12-04 NOTE — ED NOTES
Bed: JNED-04  Expected date:   Expected time:   Means of arrival: Ambulance  Comments:  [facility]: Chest pain

## 2023-12-08 ENCOUNTER — INFUSION THERAPY VISIT (OUTPATIENT)
Dept: INFUSION THERAPY | Facility: HOSPITAL | Age: 88
End: 2023-12-08
Attending: INTERNAL MEDICINE
Payer: COMMERCIAL

## 2023-12-08 VITALS
OXYGEN SATURATION: 91 % | SYSTOLIC BLOOD PRESSURE: 132 MMHG | HEART RATE: 84 BPM | RESPIRATION RATE: 16 BRPM | DIASTOLIC BLOOD PRESSURE: 62 MMHG | TEMPERATURE: 97.8 F

## 2023-12-08 DIAGNOSIS — D69.3 IMMUNE THROMBOCYTOPENIC PURPURA (H): Primary | ICD-10-CM

## 2023-12-08 PROCEDURE — 250N000011 HC RX IP 250 OP 636: Mod: JZ | Performed by: INTERNAL MEDICINE

## 2023-12-08 PROCEDURE — 96372 THER/PROPH/DIAG INJ SC/IM: CPT | Performed by: INTERNAL MEDICINE

## 2023-12-08 RX ADMIN — ROMIPLOSTIM 50 MCG: 250 INJECTION, POWDER, LYOPHILIZED, FOR SOLUTION SUBCUTANEOUS at 12:50

## 2023-12-08 NOTE — PROGRESS NOTES
Infusion Nursing Note:  Ora Gonzalez presents today for Nplate.    Patient seen by provider today: No   present during visit today: Not Applicable.    Note: VSS.  Pt assessed.  She received Nplate subcutaneous into her right upper arm.      Intravenous Access:  No Intravenous access/labs at this visit.    Treatment Conditions:  Not Applicable.      Post Infusion Assessment:  Patient tolerated injection without incident.       Discharge Plan:   Patient discharged in stable condition accompanied by: sister.  Departure Mode: Ambulatory.      Carole Barrett RN

## 2023-12-11 ENCOUNTER — OFFICE VISIT (OUTPATIENT)
Dept: INTERNAL MEDICINE | Facility: CLINIC | Age: 88
End: 2023-12-11
Payer: COMMERCIAL

## 2023-12-11 VITALS
SYSTOLIC BLOOD PRESSURE: 136 MMHG | OXYGEN SATURATION: 97 % | HEART RATE: 98 BPM | DIASTOLIC BLOOD PRESSURE: 68 MMHG | RESPIRATION RATE: 18 BRPM

## 2023-12-11 DIAGNOSIS — Z53.09 CONTRAINDICATION TO ANTICOAGULATION THERAPY: ICD-10-CM

## 2023-12-11 DIAGNOSIS — G30.1 LATE ONSET ALZHEIMER'S DISEASE WITHOUT BEHAVIORAL DISTURBANCE (H): Primary | ICD-10-CM

## 2023-12-11 DIAGNOSIS — F02.80 LATE ONSET ALZHEIMER'S DISEASE WITHOUT BEHAVIORAL DISTURBANCE (H): Primary | ICD-10-CM

## 2023-12-11 DIAGNOSIS — I48.0 PAROXYSMAL ATRIAL FIBRILLATION (H): ICD-10-CM

## 2023-12-11 PROCEDURE — 90480 ADMN SARSCOV2 VAC 1/ONLY CMP: CPT | Performed by: NURSE PRACTITIONER

## 2023-12-11 PROCEDURE — 91320 SARSCV2 VAC 30MCG TRS-SUC IM: CPT | Performed by: NURSE PRACTITIONER

## 2023-12-11 PROCEDURE — 99214 OFFICE O/P EST MOD 30 MIN: CPT | Mod: 25 | Performed by: NURSE PRACTITIONER

## 2023-12-11 PROCEDURE — G0008 ADMIN INFLUENZA VIRUS VAC: HCPCS | Performed by: NURSE PRACTITIONER

## 2023-12-11 PROCEDURE — 90662 IIV NO PRSV INCREASED AG IM: CPT | Performed by: NURSE PRACTITIONER

## 2023-12-11 RX ORDER — RESPIRATORY SYNCYTIAL VIRUS VACCINE 120MCG/0.5
0.5 KIT INTRAMUSCULAR ONCE
Qty: 1 EACH | Refills: 0 | Status: CANCELLED | OUTPATIENT
Start: 2023-12-11 | End: 2023-12-11

## 2023-12-11 NOTE — PROGRESS NOTES
Assessment & Plan   Problem List Items Addressed This Visit       Late onset Alzheimer's disease without behavioral disturbance (H) - Primary    Relevant Orders    Primary Care - Care Coordination Referral    Contraindication to anticoagulation therapy    Paroxysmal atrial fibrillation (H)      - Alzheimer's disease progressing. Family is advised to look at memory care options as I do not think she is safe to continue to life independently. Referral to Care Coordination   - They have scheduled an evaluation with cardiology, they may reconsider en lieu of a more conservative approach due to her age and Alzheimer's disease status since her cardiac evaluation in the hospital was negative for ACS and she does not have any ongoing complaints   - Continue current medications          Ryanne Corbett NP  [facility] ELENITA Payan is a 89 year old, presenting for the following health issues:  ER F/U        12/11/2023     2:44 PM   Additional Questions   Roomed by Cindy ALBRIGHT     ED/UC Followup:  Facility:  [facility] Emergency Department   Date of visit: 12/4/23  Reason for visit: Chest pain, shortness of breath   Current Status: Getting better but sleep 20 hours a day    Ora Gonzalez is an 88 y/o with a history of paroxysmal atrial fibrillation, HTN, PE, late onset Alzheimer's disease without behavioral disturbance, and HLD, who presented via EMS for nausea and chest pain after she answered the door to her apartment clutching her chest and complaining of pain. When she was evaluated by the ED provider, she could not recall why she was there. She was referred to cardiology rapid access. Per her son here with her today, for months she has been sleeping almost 20 hours per day although this has been going on for months. She goes to bed at 5pm wakes up at 9pm and then goes back to sleep around 10pm and sleeps until 4:30am. She isn't walking like she used to because  she feels unsteady and wont use a walker/cane. Family has not heard her complain of any recurrences of chest pain or nausea. For her part, Ora has no complaints today but does not recall the ED visit at all.     She continues to live independently. Her son takes care of her bills. Her son, Jensen, helps with her medical appointments and medications.         Objective    /68   Pulse 98   Resp 18   SpO2 97%   There is no height or weight on file to calculate BMI.    Wt Readings from Last 5 Encounters:   12/04/23 49.9 kg (110 lb)   08/18/23 50.2 kg (110 lb 11.2 oz)   06/16/23 50.6 kg (111 lb 8 oz)   05/16/23 50.3 kg (111 lb)   02/24/23 50.3 kg (111 lb)       Physical Exam   GENERAL: healthy, alert and no distress  RESP: lungs clear to auscultation - no rales, rhonchi or wheezes  CV: regular rate and rhythm, normal S1 S2, no S3 or S4, no murmur, click or rub, no peripheral edema and peripheral pulses strong  NEURO: Pleasantly confused. Not a reliable historian

## 2023-12-13 ENCOUNTER — PATIENT OUTREACH (OUTPATIENT)
Dept: CARE COORDINATION | Facility: CLINIC | Age: 88
End: 2023-12-13
Payer: COMMERCIAL

## 2023-12-13 NOTE — PROGRESS NOTES
Clinic Care Coordination Contact  Community Health Worker Initial Outreach    CHW Initial Information Gathering:  Referral Source: PCP  Preferred Urgent Care: [facility] [place], [phone]  Current living arrangement:: I live in a private home  Type of residence:: Private home - stairs  Informal Support system:: Children  No PCP office visit in Past Year: No  Transportation means:: Family, Friend  CHW Additional Questions  If ED/Hospital discharge, follow-up appointment scheduled as recommended?: N/A  Medication changes made following ED/Hospital discharge?: N/A  MyChart active?: Yes  Patient sent Social Determinants of Health questionnaire?: No    Patient accepts CC: Yes. Patient scheduled for assessment with CC SW  on Friday 12/15/2023 at 10:00 am. Patient noted desire to discuss recent CC referral.     Call son for phone asssmt- consent to communicate on file    Order Questions    Question Answer   Reason for Referral: Patient/Caregiver Support   Patient/Caregiver Suport: Home Safety    Navigation of Long Term Care/[facility] Services   Clinical Staff have discussed the Care Coordination Referral with the patient and/or caregiver: Yes   Additional Information: Call sonJensen, who is listed as the primary phone number for patient. Patient has a diagnosis of Alzheimer's disease and is progressing. Needs a higher level of care, currently living independently     Isabel MI  Community Health Worker  [facility] Care Coordination  [email or web address]  Office: [phone]

## 2023-12-15 ENCOUNTER — INFUSION THERAPY VISIT (OUTPATIENT)
Dept: INFUSION THERAPY | Facility: HOSPITAL | Age: 88
End: 2023-12-15
Attending: INTERNAL MEDICINE
Payer: COMMERCIAL

## 2023-12-15 ENCOUNTER — PATIENT OUTREACH (OUTPATIENT)
Dept: NURSING | Facility: CLINIC | Age: 88
End: 2023-12-15
Payer: COMMERCIAL

## 2023-12-15 VITALS
DIASTOLIC BLOOD PRESSURE: 66 MMHG | HEART RATE: 81 BPM | SYSTOLIC BLOOD PRESSURE: 150 MMHG | RESPIRATION RATE: 18 BRPM | OXYGEN SATURATION: 96 % | TEMPERATURE: 97.5 F

## 2023-12-15 DIAGNOSIS — D69.3 IMMUNE THROMBOCYTOPENIC PURPURA (H): Primary | ICD-10-CM

## 2023-12-15 DIAGNOSIS — F02.80 LATE ONSET ALZHEIMER'S DISEASE WITHOUT BEHAVIORAL DISTURBANCE (H): Primary | ICD-10-CM

## 2023-12-15 DIAGNOSIS — G30.1 LATE ONSET ALZHEIMER'S DISEASE WITHOUT BEHAVIORAL DISTURBANCE (H): Primary | ICD-10-CM

## 2023-12-15 PROCEDURE — 96372 THER/PROPH/DIAG INJ SC/IM: CPT | Performed by: INTERNAL MEDICINE

## 2023-12-15 PROCEDURE — 250N000011 HC RX IP 250 OP 636: Mod: JZ | Performed by: INTERNAL MEDICINE

## 2023-12-15 RX ADMIN — ROMIPLOSTIM 50 MCG: 250 INJECTION, POWDER, LYOPHILIZED, FOR SOLUTION SUBCUTANEOUS at 13:04

## 2023-12-15 NOTE — PROGRESS NOTES
Clinic Care Coordination Contact  Care Team Conversations  Talked to son Jensen, on consent to communicate.  He and his wife have been caregivers for his dad and her mom who both had Alzheimer's disease so they are familiar with the needs.  Mom lives alone in [facility] in [facility].  No services other than family who call her in the morning to remind her to take her meds, and a dementia clock that gives her the date, etc and there is an alarm to remind her to take her meds in the evening.  She is not eating now.  Jensen went over and made some food and she did eat it when he insisted.  She had been eating sweets, but now, she doesn't want to eat anything.  She is sleeping about 20 hours per day.  Jensen thinks she is close to dying and was interested in a hospice referral.  They used hospice for his mother in law.  Hospice order was placed for [facility].  Jensen did not have a preference for a specific hospice.      They have not looked for assisted living options.  She has two small pensions and Social Security and gets about $2100 per month.  She has a credit card bill of $500 per month.  Sons help her pay for groceries as she doesn't have enough money otherwise.  She and her  had gambling problems and used credit cards.      Plan- Jensen will work with hospice to see if she is eligible.  If not, he will work with care coordination.  Will do chart review in 3 business days to see if hospice has communicated with PCP.  MARILYN CC available as needed.    12-  Talked to Jensen. Hospice was scheduled to visit last night to discuss services and they were a no show.  He is working with them to reschedule.      Plan- do chart review or call in one week.    Paige Krueger,   [facility]  [phone]

## 2023-12-15 NOTE — LETTER
[facility] CARE COORDINATION  [facility]  December 15, 2023    Ora Lisa  [address] [place] [id number]      Dear Ora,    I am a clinic care coordinator who works with Ryanne Corbett NP with the [facility]. I wanted to introduce myself and provide you with my contact information for you to be able to call me with any questions or concerns. Below is a description of clinic care coordination and how I can further assist you.       The clinic care coordination team is made up of a registered nurse, , financial resource worker and community health worker who understand the health care system. The goal of clinic care coordination is to help you manage your health and improve access to the health care system. Our team works alongside your provider to assist you in determining your health and social needs. We can help you obtain health care and community resources, providing you with necessary information and education. We can work with you through any barriers and develop a care plan that helps coordinate and strengthen the communication between you and your care team.  Our services are voluntary and are offered without charge to you personally.    Please feel free to contact me with any questions or concerns regarding care coordination and what we can offer.      We are focused on providing you with the highest-quality healthcare experience possible.    Sincerely,     Paige Krueger,   [facility]  [phone]

## 2023-12-15 NOTE — PROGRESS NOTES
Infusion Nursing Note:  Ora Gonzalez presents today for cycle 126 day 1 nplate.    Patient seen by provider today: No   present during visit today: Not Applicable.    Note: Ora arrived ambulatory and in stable condition. Nplate administered into the right arm. Will return on 12/22 for next appointment.      Intravenous Access:  No Intravenous access/labs at this visit.    Treatment Conditions:  Not Applicable.      Post Infusion Assessment:  Patient tolerated injection without incident.       Discharge Plan:   Patient and/or family verbalized understanding of discharge instructions and all questions answered.  AVS to patient via [employer].  Patient will return 12/22 for next appointment.   Patient discharged in stable condition accompanied by: daughter.  Departure Mode: Ambulatory.      Katherin Babcock RN

## 2023-12-15 NOTE — Clinical Note
Austin Pearl, I put in order for hospice.  Will see if they think she is eligible.  If not, I'll help them out.  Thanks, Paige

## 2023-12-17 DIAGNOSIS — D69.3 IMMUNE THROMBOCYTOPENIC PURPURA (H): Primary | ICD-10-CM

## 2023-12-20 ENCOUNTER — PATIENT OUTREACH (OUTPATIENT)
Dept: CARE COORDINATION | Facility: CLINIC | Age: 88
End: 2023-12-20
Payer: OTHER MISCELLANEOUS

## 2023-12-22 ENCOUNTER — INFUSION THERAPY VISIT (OUTPATIENT)
Dept: INFUSION THERAPY | Facility: HOSPITAL | Age: 88
End: 2023-12-22
Attending: INTERNAL MEDICINE
Payer: COMMERCIAL

## 2023-12-22 VITALS
SYSTOLIC BLOOD PRESSURE: 183 MMHG | DIASTOLIC BLOOD PRESSURE: 63 MMHG | TEMPERATURE: 98.7 F | HEART RATE: 93 BPM | RESPIRATION RATE: 18 BRPM | OXYGEN SATURATION: 95 %

## 2023-12-22 DIAGNOSIS — D69.3 IMMUNE THROMBOCYTOPENIC PURPURA (H): Primary | ICD-10-CM

## 2023-12-22 PROCEDURE — 250N000011 HC RX IP 250 OP 636: Mod: JZ | Performed by: INTERNAL MEDICINE

## 2023-12-22 PROCEDURE — 96372 THER/PROPH/DIAG INJ SC/IM: CPT | Performed by: INTERNAL MEDICINE

## 2023-12-22 RX ADMIN — ROMIPLOSTIM 50 MCG: 250 INJECTION, POWDER, LYOPHILIZED, FOR SOLUTION SUBCUTANEOUS at 13:47

## 2023-12-22 NOTE — PROGRESS NOTES
Infusion Nursing Note:  Ora Gonzalez presents today for Nplate.    Patient seen by provider today: No   present during visit today: Not Applicable.    Note: Pt hypertensive today.  Daughter states that pt now has home care and that they will monitor BP and call primary MD if it doesn't improve.  Nplate given subcutaneous into her left upper arm.      Intravenous Access:  No Intravenous access/labs at this visit.    Treatment Conditions:  Not Applicable.      Post Infusion Assessment:  Patient tolerated injection without incident.       Discharge Plan:   Patient discharged in stable condition accompanied by: daughter.  Departure Mode: Ambulatory.      Carole Barrett RN

## 2023-12-27 ENCOUNTER — PATIENT OUTREACH (OUTPATIENT)
Dept: CARE COORDINATION | Facility: CLINIC | Age: 88
End: 2023-12-27
Payer: OTHER MISCELLANEOUS

## 2023-12-27 NOTE — PROGRESS NOTES
Clinic Care Coordination Contact  Care Team Conversations  Talked to Jensen, son.  [facility] hospice has set up two appts for intake and were a no show at both. Jensen was direct with them about this and gave them one more try as they are scheduled to come out today at noon.  If they are another no show, will send hospice order to a different hospice.    He thinks she may need to move to memory care as she is needing more support.   Plan- call Jensen tomorrow to discuss needs.      12-  Talked to Jensen and they enrolled her in hospice and will work with the hospice SW to get her into memory care.  They need to address her toe nails as the nurse with hospice said they are too bad for her to cut and need to see podiatry.  Gave him [facility] and he will call to set up appt.      Closing to care coordination as she is enrolled in hospice.   Routed to pcp for awareness.   Paige Krueger,   [facility]  [phone]

## 2023-12-28 DIAGNOSIS — D69.3 IMMUNE THROMBOCYTOPENIC PURPURA (H): Primary | ICD-10-CM

## 2023-12-29 ENCOUNTER — INFUSION THERAPY VISIT (OUTPATIENT)
Dept: INFUSION THERAPY | Facility: HOSPITAL | Age: 88
End: 2023-12-29
Attending: INTERNAL MEDICINE
Payer: COMMERCIAL

## 2023-12-29 VITALS
RESPIRATION RATE: 16 BRPM | DIASTOLIC BLOOD PRESSURE: 61 MMHG | HEART RATE: 80 BPM | TEMPERATURE: 97.5 F | SYSTOLIC BLOOD PRESSURE: 147 MMHG | OXYGEN SATURATION: 96 %

## 2023-12-29 DIAGNOSIS — D69.3 IMMUNE THROMBOCYTOPENIC PURPURA (H): Primary | ICD-10-CM

## 2023-12-29 DIAGNOSIS — D69.3 IMMUNE THROMBOCYTOPENIC PURPURA (H): ICD-10-CM

## 2023-12-29 LAB
BASOPHILS # BLD AUTO: 0.1 10E3/UL (ref 0–0.2)
BASOPHILS NFR BLD AUTO: 1 %
EOSINOPHIL # BLD AUTO: 0.1 10E3/UL (ref 0–0.7)
EOSINOPHIL NFR BLD AUTO: 1 %
ERYTHROCYTE [DISTWIDTH] IN BLOOD BY AUTOMATED COUNT: 16.2 % (ref 10–15)
HCT VFR BLD AUTO: 40.9 % (ref 35–47)
HGB BLD-MCNC: 13.1 G/DL (ref 11.7–15.7)
IMM GRANULOCYTES # BLD: 0.2 10E3/UL
IMM GRANULOCYTES NFR BLD: 1 %
LYMPHOCYTES # BLD AUTO: 2.6 10E3/UL (ref 0.8–5.3)
LYMPHOCYTES NFR BLD AUTO: 22 %
MCH RBC QN AUTO: 31.3 PG (ref 26.5–33)
MCHC RBC AUTO-ENTMCNC: 32 G/DL (ref 31.5–36.5)
MCV RBC AUTO: 98 FL (ref 78–100)
MONOCYTES # BLD AUTO: 0.8 10E3/UL (ref 0–1.3)
MONOCYTES NFR BLD AUTO: 7 %
NEUTROPHILS # BLD AUTO: 8.1 10E3/UL (ref 1.6–8.3)
NEUTROPHILS NFR BLD AUTO: 68 %
NRBC # BLD AUTO: 0.1 10E3/UL
NRBC BLD AUTO-RTO: 0 /100
PLATELET # BLD AUTO: 316 10E3/UL (ref 150–450)
RBC # BLD AUTO: 4.18 10E6/UL (ref 3.8–5.2)
WBC # BLD AUTO: 11.9 10E3/UL (ref 4–11)

## 2023-12-29 PROCEDURE — 85025 COMPLETE CBC W/AUTO DIFF WBC: CPT

## 2023-12-29 PROCEDURE — 36415 COLL VENOUS BLD VENIPUNCTURE: CPT

## 2023-12-29 PROCEDURE — 250N000011 HC RX IP 250 OP 636: Mod: JZ | Performed by: INTERNAL MEDICINE

## 2023-12-29 PROCEDURE — 96372 THER/PROPH/DIAG INJ SC/IM: CPT | Performed by: INTERNAL MEDICINE

## 2023-12-29 RX ADMIN — ROMIPLOSTIM 50 MCG: 250 INJECTION, POWDER, LYOPHILIZED, FOR SOLUTION SUBCUTANEOUS at 14:27

## 2023-12-29 ASSESSMENT — PAIN SCALES - GENERAL: PAINLEVEL: NO PAIN (0)

## 2023-12-29 NOTE — PROGRESS NOTES
Infusion Nursing Note:  Ora Gonzalez presents today for Nplate.    Patient seen by provider today: No   present during visit today: Not Applicable.    Note:.      Intravenous Access:  Labs drawn without difficulty.    Treatment Conditions:  Results reviewed, labs MET treatment parameters, ok to proceed with treatment.      Post Infusion Assessment:  Patient tolerated injection without incident.       Discharge Plan:   Patient discharged in stable condition accompanied by: jaki.      Tova Chang RN

## 2024-01-04 ENCOUNTER — PATIENT OUTREACH (OUTPATIENT)
Dept: ONCOLOGY | Facility: HOSPITAL | Age: 89
End: 2024-01-04
Payer: OTHER MISCELLANEOUS

## 2024-01-04 NOTE — PROGRESS NOTES
[facility]/Voicemail    Clinical Data: Care Coordinator Outreach    Outreach attempted x 1.  Left message on patient's son's voicemail with call back information and requested return call.    Plan: Care Coordinator will try to reach patient again in 1-2 business days.

## 2024-01-04 NOTE — PROGRESS NOTES
[facility]: Cancer Care                                                                                        Received a call from patient's son to ask what hospice company the patient is in and if the patient is enrolled in hospice. Per the patient's son Gomez, he stated the patient was in hospice through [facility] and that he accompanied the patient to an ER follow up visit appointment in mid December. During the appointment, Gomez stated that the provider stated that the patient will still need the weekly nplate injections. I told him that I would follow up to ensure that is within the hospice plan of care and will follow up with him. Gomez stated that it was okay to leave a detailed message on his phone.       Signature:  Tierra Betancourt RN

## 2024-01-05 ENCOUNTER — INFUSION THERAPY VISIT (OUTPATIENT)
Dept: INFUSION THERAPY | Facility: HOSPITAL | Age: 89
End: 2024-01-05
Attending: INTERNAL MEDICINE
Payer: OTHER MISCELLANEOUS

## 2024-01-05 VITALS
HEART RATE: 82 BPM | TEMPERATURE: 97.6 F | OXYGEN SATURATION: 96 % | SYSTOLIC BLOOD PRESSURE: 187 MMHG | RESPIRATION RATE: 16 BRPM | DIASTOLIC BLOOD PRESSURE: 74 MMHG

## 2024-01-05 DIAGNOSIS — D69.3 IMMUNE THROMBOCYTOPENIC PURPURA (H): Primary | ICD-10-CM

## 2024-01-05 PROCEDURE — 96372 THER/PROPH/DIAG INJ SC/IM: CPT | Performed by: INTERNAL MEDICINE

## 2024-01-05 PROCEDURE — 250N000011 HC RX IP 250 OP 636: Mod: JZ | Performed by: INTERNAL MEDICINE

## 2024-01-05 RX ADMIN — ROMIPLOSTIM 50 MCG: 250 INJECTION, POWDER, LYOPHILIZED, FOR SOLUTION SUBCUTANEOUS at 13:36

## 2024-01-05 NOTE — PROGRESS NOTES
Infusion Nursing Note:  Ora Gonzalez presents today for NPLATE injection.    Patient seen by provider today: No   present during visit today: Not Applicable.    Note: Ora arrives ambulatory to [facility] for her NPLATE injection. Injection given upper right arm. She tolerated well.      Intravenous Access:  No Intravenous access/labs at this visit.    Treatment Conditions:  Not Applicable.      Post Infusion Assessment:  Patient tolerated injection without incident.       Discharge Plan:   AVS to patient via [employer].  Patient will return 1/12 for next appointment.   Patient discharged in stable condition accompanied by: son.  Departure Mode: Ambulatory.      Susanne Cazares RN

## 2024-01-12 ENCOUNTER — INFUSION THERAPY VISIT (OUTPATIENT)
Dept: INFUSION THERAPY | Facility: HOSPITAL | Age: 89
End: 2024-01-12
Attending: INTERNAL MEDICINE
Payer: COMMERCIAL

## 2024-01-12 VITALS
HEART RATE: 82 BPM | OXYGEN SATURATION: 98 % | DIASTOLIC BLOOD PRESSURE: 71 MMHG | RESPIRATION RATE: 16 BRPM | SYSTOLIC BLOOD PRESSURE: 159 MMHG | TEMPERATURE: 98 F

## 2024-01-12 DIAGNOSIS — D69.3 IMMUNE THROMBOCYTOPENIC PURPURA (H): Primary | ICD-10-CM

## 2024-01-12 PROCEDURE — 250N000011 HC RX IP 250 OP 636: Mod: JZ | Performed by: INTERNAL MEDICINE

## 2024-01-12 PROCEDURE — 96372 THER/PROPH/DIAG INJ SC/IM: CPT | Performed by: INTERNAL MEDICINE

## 2024-01-12 RX ADMIN — ROMIPLOSTIM 50 MCG: 250 INJECTION, POWDER, LYOPHILIZED, FOR SOLUTION SUBCUTANEOUS at 13:49

## 2024-01-12 NOTE — PROGRESS NOTES
Infusion Nursing Note:  Ora Gonzalez presents today for NPLATE injection.    Patient seen by provider today: No   present during visit today: Not Applicable.    Note:  Ora arrives ambulatory to [facility] for her NPLATE injection. Injection given upper right arm. She tolerated well.      Intravenous Access:  No Intravenous access/labs at this visit.    Treatment Conditions:  Not Applicable.      Post Infusion Assessment:  Patient tolerated injection without incident.       Discharge Plan:   Patient declined prescription refills.  Discharge instructions reviewed with: Patient.  Patient discharged in stable condition accompanied by: self and son.  Departure Mode: Ambulatory.      Vijaya Delgado RN

## 2024-01-15 ENCOUNTER — TELEPHONE (OUTPATIENT)
Dept: ONCOLOGY | Facility: HOSPITAL | Age: 89
End: 2024-01-15
Payer: OTHER MISCELLANEOUS

## 2024-01-19 ENCOUNTER — INFUSION THERAPY VISIT (OUTPATIENT)
Dept: INFUSION THERAPY | Facility: HOSPITAL | Age: 89
End: 2024-01-19
Attending: INTERNAL MEDICINE
Payer: OTHER MISCELLANEOUS

## 2024-01-19 VITALS
TEMPERATURE: 97.5 F | RESPIRATION RATE: 18 BRPM | SYSTOLIC BLOOD PRESSURE: 143 MMHG | OXYGEN SATURATION: 99 % | HEART RATE: 73 BPM | DIASTOLIC BLOOD PRESSURE: 65 MMHG

## 2024-01-19 DIAGNOSIS — D69.3 IMMUNE THROMBOCYTOPENIC PURPURA (H): Primary | ICD-10-CM

## 2024-01-19 DIAGNOSIS — D69.3 IMMUNE THROMBOCYTOPENIC PURPURA (H): ICD-10-CM

## 2024-01-19 LAB
BASOPHILS # BLD AUTO: 0.1 10E3/UL (ref 0–0.2)
BASOPHILS NFR BLD AUTO: 1 %
EOSINOPHIL # BLD AUTO: 0.1 10E3/UL (ref 0–0.7)
EOSINOPHIL NFR BLD AUTO: 1 %
ERYTHROCYTE [DISTWIDTH] IN BLOOD BY AUTOMATED COUNT: 17.2 % (ref 10–15)
HCT VFR BLD AUTO: 41.2 % (ref 35–47)
HGB BLD-MCNC: 12.9 G/DL (ref 11.7–15.7)
IMM GRANULOCYTES # BLD: 0.1 10E3/UL
IMM GRANULOCYTES NFR BLD: 1 %
LYMPHOCYTES # BLD AUTO: 3.8 10E3/UL (ref 0.8–5.3)
LYMPHOCYTES NFR BLD AUTO: 30 %
MCH RBC QN AUTO: 31.2 PG (ref 26.5–33)
MCHC RBC AUTO-ENTMCNC: 31.3 G/DL (ref 31.5–36.5)
MCV RBC AUTO: 100 FL (ref 78–100)
MONOCYTES # BLD AUTO: 1.2 10E3/UL (ref 0–1.3)
MONOCYTES NFR BLD AUTO: 9 %
NEUTROPHILS # BLD AUTO: 7.4 10E3/UL (ref 1.6–8.3)
NEUTROPHILS NFR BLD AUTO: 58 %
NRBC # BLD AUTO: 0 10E3/UL
NRBC BLD AUTO-RTO: 0 /100
PLATELET # BLD AUTO: 227 10E3/UL (ref 150–450)
RBC # BLD AUTO: 4.14 10E6/UL (ref 3.8–5.2)
WBC # BLD AUTO: 12.8 10E3/UL (ref 4–11)

## 2024-01-19 PROCEDURE — 250N000011 HC RX IP 250 OP 636: Mod: JZ | Performed by: INTERNAL MEDICINE

## 2024-01-19 PROCEDURE — 85025 COMPLETE CBC W/AUTO DIFF WBC: CPT

## 2024-01-19 PROCEDURE — 96372 THER/PROPH/DIAG INJ SC/IM: CPT | Performed by: INTERNAL MEDICINE

## 2024-01-19 PROCEDURE — 36415 COLL VENOUS BLD VENIPUNCTURE: CPT

## 2024-01-19 RX ADMIN — ROMIPLOSTIM 50 MCG: 250 INJECTION, POWDER, LYOPHILIZED, FOR SOLUTION SUBCUTANEOUS at 15:13

## 2024-01-19 ASSESSMENT — PAIN SCALES - GENERAL: PAINLEVEL: NO PAIN (0)

## 2024-01-19 NOTE — PROGRESS NOTES
Infusion Nursing Note:  Ora Gonzalez presents today for  labs and Nplate.    Patient seen by provider today: No   present during visit today: Not Applicable.    Note: Ora arrives ambulatory to [facility] for her NPLATE injection. Iabs were drawn. injection given upper left arm. She tolerated well. Will return 1/26.      Intravenous Access:  No Intravenous access/labs at this visit.    Treatment Conditions:  Lab Results   Component Value Date    HGB 12.9 01/19/2024    WBC 12.8 (H) 01/19/2024    ANEU 8.7 (H) 03/24/2023    ANEUTAUTO 7.4 01/19/2024     01/19/2024        Results reviewed, labs MET treatment parameters, ok to proceed with treatment.      Post Infusion Assessment:  Patient tolerated injection without incident.       Discharge Plan:   Discharge instructions reviewed with: Patient.  Patient and/or family verbalized understanding of discharge instructions and all questions answered.  Patient discharged in stable condition accompanied by: self.  Departure Mode: Ambulatory.      Vijaya Delgado RN

## 2024-01-26 ENCOUNTER — INFUSION THERAPY VISIT (OUTPATIENT)
Dept: INFUSION THERAPY | Facility: HOSPITAL | Age: 89
End: 2024-01-26
Attending: INTERNAL MEDICINE
Payer: COMMERCIAL

## 2024-01-26 DIAGNOSIS — D69.3 IMMUNE THROMBOCYTOPENIC PURPURA (H): Primary | ICD-10-CM

## 2024-01-26 PROCEDURE — 96372 THER/PROPH/DIAG INJ SC/IM: CPT | Performed by: INTERNAL MEDICINE

## 2024-01-26 PROCEDURE — 250N000011 HC RX IP 250 OP 636: Performed by: INTERNAL MEDICINE

## 2024-01-26 RX ADMIN — ROMIPLOSTIM 50 MCG: 250 INJECTION, POWDER, LYOPHILIZED, FOR SOLUTION SUBCUTANEOUS at 14:22

## 2024-01-26 NOTE — PROGRESS NOTES
PT here ambulatory for n plate inj. PT states feeling good/appetite good. N plate administered in right upper arm/bandaid to site. Follow up reviewed and pt dc'd steady gait

## 2024-01-31 DIAGNOSIS — F02.80 LATE ONSET ALZHEIMER'S DISEASE WITHOUT BEHAVIORAL DISTURBANCE (H): ICD-10-CM

## 2024-01-31 DIAGNOSIS — G30.1 LATE ONSET ALZHEIMER'S DISEASE WITHOUT BEHAVIORAL DISTURBANCE (H): ICD-10-CM

## 2024-02-01 RX ORDER — DONEPEZIL HYDROCHLORIDE 10 MG/1
10 TABLET, FILM COATED ORAL AT BEDTIME
Qty: 30 TABLET | Refills: 0 | Status: SHIPPED | OUTPATIENT
Start: 2024-02-01

## 2024-02-01 NOTE — TELEPHONE ENCOUNTER
Refill request for: donepezil 10mg   Directions: Take 1 tablet (10 mg) by mouth At Bedtime     LOV: 11/08/22  NOV: 02/16/24    30 day supply with 0 refills Medication T'd for review and signature    Deisy Webster LPN on 2/1/2024 at 11:32 AM

## 2024-02-02 ENCOUNTER — INFUSION THERAPY VISIT (OUTPATIENT)
Dept: INFUSION THERAPY | Facility: HOSPITAL | Age: 89
End: 2024-02-02
Attending: INTERNAL MEDICINE
Payer: OTHER MISCELLANEOUS

## 2024-02-02 VITALS
TEMPERATURE: 97.4 F | SYSTOLIC BLOOD PRESSURE: 168 MMHG | OXYGEN SATURATION: 99 % | HEART RATE: 80 BPM | DIASTOLIC BLOOD PRESSURE: 72 MMHG | RESPIRATION RATE: 16 BRPM

## 2024-02-02 DIAGNOSIS — D69.3 IMMUNE THROMBOCYTOPENIC PURPURA (H): Primary | ICD-10-CM

## 2024-02-02 PROCEDURE — 250N000011 HC RX IP 250 OP 636: Mod: JW | Performed by: INTERNAL MEDICINE

## 2024-02-02 PROCEDURE — 96372 THER/PROPH/DIAG INJ SC/IM: CPT | Performed by: INTERNAL MEDICINE

## 2024-02-02 RX ADMIN — ROMIPLOSTIM 50 MCG: 250 INJECTION, POWDER, LYOPHILIZED, FOR SOLUTION SUBCUTANEOUS at 14:07

## 2024-02-02 NOTE — PROGRESS NOTES
Infusion Nursing Note:  Ora Gonzalez presents today for NPLATE.    Patient seen by provider today: No   present during visit today: Not Applicable.    Note: N/A.      Intravenous Access:  No Intravenous access/labs at this visit.    Treatment Conditions:  Not Applicable.      Post Infusion Assessment:  Patient tolerated injection without incident.       Discharge Plan:   Patient discharged in stable condition accompanied by: son.  Departure Mode: Ambulatory.      Tova Morrow RN

## 2024-02-09 ENCOUNTER — INFUSION THERAPY VISIT (OUTPATIENT)
Dept: INFUSION THERAPY | Facility: HOSPITAL | Age: 89
End: 2024-02-09
Attending: INTERNAL MEDICINE
Payer: OTHER MISCELLANEOUS

## 2024-02-09 VITALS
RESPIRATION RATE: 82 BRPM | HEART RATE: 84 BPM | OXYGEN SATURATION: 95 % | DIASTOLIC BLOOD PRESSURE: 63 MMHG | SYSTOLIC BLOOD PRESSURE: 138 MMHG | TEMPERATURE: 97.9 F

## 2024-02-09 DIAGNOSIS — D69.3 IMMUNE THROMBOCYTOPENIC PURPURA (H): Primary | ICD-10-CM

## 2024-02-09 PROCEDURE — 250N000011 HC RX IP 250 OP 636: Mod: JZ | Performed by: INTERNAL MEDICINE

## 2024-02-09 PROCEDURE — 96372 THER/PROPH/DIAG INJ SC/IM: CPT | Performed by: INTERNAL MEDICINE

## 2024-02-09 RX ADMIN — ROMIPLOSTIM 50 MCG: 250 INJECTION, POWDER, LYOPHILIZED, FOR SOLUTION SUBCUTANEOUS at 13:39

## 2024-02-14 PROBLEM — F02.80 MAJOR NEUROCOGNITIVE DISORDER DUE TO ALZHEIMER'S DISEASE, WITHOUT BEHAVIORAL DISTURBANCE (H): Status: ACTIVE | Noted: 2021-06-09

## 2024-02-14 PROBLEM — G30.9 MAJOR NEUROCOGNITIVE DISORDER DUE TO ALZHEIMER'S DISEASE, WITHOUT BEHAVIORAL DISTURBANCE (H): Status: ACTIVE | Noted: 2021-06-09

## 2024-02-14 NOTE — PROGRESS NOTES
Infusion Nursing Note:  Ora Gonzalez presents today for NPLATE.    Patient seen by provider today: No   present during visit today: Not Applicable.    Note: Patient arrives today via ambulatory for her NPLATE injection.  Patient is alert and VSS.        Intravenous Access:  No Intravenous access/labs at this visit.    Treatment Conditions:  Not Applicable.      Post Infusion Assessment:  Patient tolerated injection without incident.       Discharge Plan:   Patient discharged in stable condition accompanied by: son.  Departure Mode: Ambulatory.    Lilibeth Aguayo RN

## 2024-02-16 ENCOUNTER — INFUSION THERAPY VISIT (OUTPATIENT)
Dept: INFUSION THERAPY | Facility: HOSPITAL | Age: 89
End: 2024-02-16
Attending: INTERNAL MEDICINE
Payer: OTHER MISCELLANEOUS

## 2024-02-16 VITALS
OXYGEN SATURATION: 98 % | RESPIRATION RATE: 16 BRPM | DIASTOLIC BLOOD PRESSURE: 74 MMHG | SYSTOLIC BLOOD PRESSURE: 169 MMHG | HEART RATE: 80 BPM

## 2024-02-16 DIAGNOSIS — D69.3 IMMUNE THROMBOCYTOPENIC PURPURA (H): Primary | ICD-10-CM

## 2024-02-16 PROCEDURE — 96372 THER/PROPH/DIAG INJ SC/IM: CPT | Performed by: INTERNAL MEDICINE

## 2024-02-16 PROCEDURE — 250N000011 HC RX IP 250 OP 636: Mod: JW | Performed by: INTERNAL MEDICINE

## 2024-02-16 RX ADMIN — ROMIPLOSTIM 50 MCG: 250 INJECTION, POWDER, LYOPHILIZED, FOR SOLUTION SUBCUTANEOUS at 14:16

## 2024-02-16 ASSESSMENT — PAIN SCALES - GENERAL: PAINLEVEL: NO PAIN (0)

## 2024-02-16 NOTE — PROGRESS NOTES
Infusion Nursing Note:  Ora Lisa presents today for Nplate.    Patient seen by provider today: No   present during visit today: Not Applicable.    Note: Pt voices no new concerns today.      Intravenous Access:  No Intravenous access/labs at this visit.    Treatment Conditions:  Not Applicable.      Post Infusion Assessment:  Patient tolerated injection without incident.       Discharge Plan:   Patient discharged in stable condition accompanied by: son.  Departure Mode: Ambulatory.      Tova Chang RN

## 2024-02-23 ENCOUNTER — LAB (OUTPATIENT)
Dept: INFUSION THERAPY | Facility: HOSPITAL | Age: 89
End: 2024-02-23
Attending: INTERNAL MEDICINE
Payer: OTHER MISCELLANEOUS

## 2024-02-23 VITALS
DIASTOLIC BLOOD PRESSURE: 59 MMHG | OXYGEN SATURATION: 96 % | SYSTOLIC BLOOD PRESSURE: 161 MMHG | HEART RATE: 76 BPM | RESPIRATION RATE: 16 BRPM

## 2024-02-23 DIAGNOSIS — D69.3 IMMUNE THROMBOCYTOPENIC PURPURA (H): ICD-10-CM

## 2024-02-23 DIAGNOSIS — D69.3 IMMUNE THROMBOCYTOPENIC PURPURA (H): Primary | ICD-10-CM

## 2024-02-23 LAB
BASOPHILS # BLD AUTO: 0.1 10E3/UL (ref 0–0.2)
BASOPHILS NFR BLD AUTO: 1 %
EOSINOPHIL # BLD AUTO: 0.2 10E3/UL (ref 0–0.7)
EOSINOPHIL NFR BLD AUTO: 1 %
ERYTHROCYTE [DISTWIDTH] IN BLOOD BY AUTOMATED COUNT: 16.1 % (ref 10–15)
HCT VFR BLD AUTO: 43.3 % (ref 35–47)
HGB BLD-MCNC: 13.7 G/DL (ref 11.7–15.7)
IMM GRANULOCYTES # BLD: 0.1 10E3/UL
IMM GRANULOCYTES NFR BLD: 1 %
LYMPHOCYTES # BLD AUTO: 3.8 10E3/UL (ref 0.8–5.3)
LYMPHOCYTES NFR BLD AUTO: 28 %
MCH RBC QN AUTO: 31.6 PG (ref 26.5–33)
MCHC RBC AUTO-ENTMCNC: 31.6 G/DL (ref 31.5–36.5)
MCV RBC AUTO: 100 FL (ref 78–100)
MONOCYTES # BLD AUTO: 1.3 10E3/UL (ref 0–1.3)
MONOCYTES NFR BLD AUTO: 9 %
NEUTROPHILS # BLD AUTO: 8.4 10E3/UL (ref 1.6–8.3)
NEUTROPHILS NFR BLD AUTO: 60 %
NRBC # BLD AUTO: 0 10E3/UL
NRBC BLD AUTO-RTO: 0 /100
PLATELET # BLD AUTO: 228 10E3/UL (ref 150–450)
RBC # BLD AUTO: 4.33 10E6/UL (ref 3.8–5.2)
WBC # BLD AUTO: 13.9 10E3/UL (ref 4–11)

## 2024-02-23 PROCEDURE — 96372 THER/PROPH/DIAG INJ SC/IM: CPT | Performed by: INTERNAL MEDICINE

## 2024-02-23 PROCEDURE — 36415 COLL VENOUS BLD VENIPUNCTURE: CPT

## 2024-02-23 PROCEDURE — 85004 AUTOMATED DIFF WBC COUNT: CPT

## 2024-02-23 PROCEDURE — 250N000011 HC RX IP 250 OP 636: Performed by: INTERNAL MEDICINE

## 2024-02-23 RX ADMIN — ROMIPLOSTIM 50 MCG: 250 INJECTION, POWDER, LYOPHILIZED, FOR SOLUTION SUBCUTANEOUS at 14:13

## 2024-02-23 ASSESSMENT — PAIN SCALES - GENERAL: PAINLEVEL: NO PAIN (0)

## 2024-02-23 NOTE — PROGRESS NOTES
"Infusion Nursing Note:  Ora Gonzalez presents today for labs, Nplate.    Patient seen by provider today: No   present during visit today: Not Applicable.    Note: Pt c/o \"chest discomfort\", which was relayed to pt's son who accompanied her today.        Intravenous Access:  Labs drawn without difficulty.    Treatment Conditions:  Results reviewed, labs MET treatment parameters, ok to proceed with treatment.      Post Infusion Assessment:  Patient tolerated injection without incident.       Discharge Plan:   Patient discharged in stable condition accompanied by: son.  Departure Mode: Ambulatory.      Tova Chang RN    "

## 2024-02-29 DIAGNOSIS — D69.3 IMMUNE THROMBOCYTOPENIC PURPURA (H): Primary | ICD-10-CM

## 2024-03-01 ENCOUNTER — INFUSION THERAPY VISIT (OUTPATIENT)
Dept: INFUSION THERAPY | Facility: HOSPITAL | Age: 89
End: 2024-03-01
Attending: INTERNAL MEDICINE
Payer: OTHER MISCELLANEOUS

## 2024-03-01 VITALS
OXYGEN SATURATION: 94 % | SYSTOLIC BLOOD PRESSURE: 177 MMHG | RESPIRATION RATE: 16 BRPM | HEART RATE: 78 BPM | DIASTOLIC BLOOD PRESSURE: 74 MMHG | TEMPERATURE: 97.8 F

## 2024-03-01 DIAGNOSIS — D69.3 IMMUNE THROMBOCYTOPENIC PURPURA (H): Primary | ICD-10-CM

## 2024-03-01 PROCEDURE — 96372 THER/PROPH/DIAG INJ SC/IM: CPT | Performed by: INTERNAL MEDICINE

## 2024-03-01 PROCEDURE — 250N000011 HC RX IP 250 OP 636: Performed by: INTERNAL MEDICINE

## 2024-03-01 RX ADMIN — ROMIPLOSTIM 50 MCG: 250 INJECTION, POWDER, LYOPHILIZED, FOR SOLUTION SUBCUTANEOUS at 14:00

## 2024-03-08 ENCOUNTER — INFUSION THERAPY VISIT (OUTPATIENT)
Dept: INFUSION THERAPY | Facility: HOSPITAL | Age: 89
End: 2024-03-08
Attending: INTERNAL MEDICINE
Payer: OTHER MISCELLANEOUS

## 2024-03-08 VITALS
RESPIRATION RATE: 18 BRPM | DIASTOLIC BLOOD PRESSURE: 69 MMHG | TEMPERATURE: 97.3 F | HEART RATE: 99 BPM | SYSTOLIC BLOOD PRESSURE: 146 MMHG | OXYGEN SATURATION: 95 %

## 2024-03-08 DIAGNOSIS — D69.3 IMMUNE THROMBOCYTOPENIC PURPURA (H): Primary | ICD-10-CM

## 2024-03-08 PROCEDURE — 250N000011 HC RX IP 250 OP 636: Mod: JW | Performed by: INTERNAL MEDICINE

## 2024-03-08 PROCEDURE — 96372 THER/PROPH/DIAG INJ SC/IM: CPT | Performed by: INTERNAL MEDICINE

## 2024-03-08 RX ADMIN — ROMIPLOSTIM 50 MCG: 250 INJECTION, POWDER, LYOPHILIZED, FOR SOLUTION SUBCUTANEOUS at 13:45

## 2024-03-08 NOTE — PROGRESS NOTES
Infusion Nursing Note:  Ora Gonzalez presents today for Nplate injection.    Patient seen by provider today: No   present during visit today: Not Applicable.    Note: Patient was given injection in left arm.      Intravenous Access:  No Intravenous access/labs at this visit.    Treatment Conditions:  Not Applicable.      Post Infusion Assessment:  Patient tolerated injection without incident.       Discharge Plan:   Patient discharged in stable condition accompanied by: .      Ned Griggs RN

## 2024-03-15 ENCOUNTER — INFUSION THERAPY VISIT (OUTPATIENT)
Dept: INFUSION THERAPY | Facility: HOSPITAL | Age: 89
End: 2024-03-15
Attending: INTERNAL MEDICINE
Payer: OTHER MISCELLANEOUS

## 2024-03-15 VITALS
OXYGEN SATURATION: 96 % | TEMPERATURE: 98 F | HEART RATE: 95 BPM | DIASTOLIC BLOOD PRESSURE: 69 MMHG | RESPIRATION RATE: 18 BRPM | SYSTOLIC BLOOD PRESSURE: 158 MMHG

## 2024-03-15 DIAGNOSIS — D69.3 IMMUNE THROMBOCYTOPENIC PURPURA (H): Primary | ICD-10-CM

## 2024-03-15 PROCEDURE — 250N000011 HC RX IP 250 OP 636: Mod: JZ | Performed by: INTERNAL MEDICINE

## 2024-03-15 PROCEDURE — 96372 THER/PROPH/DIAG INJ SC/IM: CPT | Performed by: INTERNAL MEDICINE

## 2024-03-15 RX ADMIN — ROMIPLOSTIM 50 MCG: 250 INJECTION, POWDER, LYOPHILIZED, FOR SOLUTION SUBCUTANEOUS at 13:41

## 2024-03-15 NOTE — PROGRESS NOTES
Infusion Nursing Note:  Ora Gonzalez presents today for Nplate.    Patient seen by provider today: No   present during visit today: Not Applicable.    Note: VSS.  Pt assessed.  Per son pt's dementia is getting worse to the point of her not eating or drinking unless he is present.  Nplate given subcutaneous into her left upper arm.      Intravenous Access:  No Intravenous access/labs at this visit.    Treatment Conditions:  Not Applicable.      Post Infusion Assessment:  Patient tolerated injection without incident.       Discharge Plan:   Patient discharged in stable condition accompanied by: son.  Departure Mode: Ambulatory.      Carole Barrett RN

## 2024-03-22 ENCOUNTER — INFUSION THERAPY VISIT (OUTPATIENT)
Dept: INFUSION THERAPY | Facility: HOSPITAL | Age: 89
End: 2024-03-22
Attending: INTERNAL MEDICINE
Payer: OTHER MISCELLANEOUS

## 2024-03-22 VITALS
DIASTOLIC BLOOD PRESSURE: 70 MMHG | TEMPERATURE: 97.9 F | HEART RATE: 79 BPM | SYSTOLIC BLOOD PRESSURE: 161 MMHG | OXYGEN SATURATION: 96 % | RESPIRATION RATE: 18 BRPM

## 2024-03-22 DIAGNOSIS — D69.3 IMMUNE THROMBOCYTOPENIC PURPURA (H): ICD-10-CM

## 2024-03-22 DIAGNOSIS — D69.3 IMMUNE THROMBOCYTOPENIC PURPURA (H): Primary | ICD-10-CM

## 2024-03-22 LAB
BASOPHILS # BLD AUTO: 0.1 10E3/UL (ref 0–0.2)
BASOPHILS NFR BLD AUTO: 1 %
EOSINOPHIL # BLD AUTO: 0.1 10E3/UL (ref 0–0.7)
EOSINOPHIL NFR BLD AUTO: 1 %
ERYTHROCYTE [DISTWIDTH] IN BLOOD BY AUTOMATED COUNT: 14.9 % (ref 10–15)
HCT VFR BLD AUTO: 44.3 % (ref 35–47)
HGB BLD-MCNC: 14.3 G/DL (ref 11.7–15.7)
IMM GRANULOCYTES # BLD: 0.2 10E3/UL
IMM GRANULOCYTES NFR BLD: 1 %
LYMPHOCYTES # BLD AUTO: 3.6 10E3/UL (ref 0.8–5.3)
LYMPHOCYTES NFR BLD AUTO: 26 %
MCH RBC QN AUTO: 31.6 PG (ref 26.5–33)
MCHC RBC AUTO-ENTMCNC: 32.3 G/DL (ref 31.5–36.5)
MCV RBC AUTO: 98 FL (ref 78–100)
MONOCYTES # BLD AUTO: 0.9 10E3/UL (ref 0–1.3)
MONOCYTES NFR BLD AUTO: 7 %
NEUTROPHILS # BLD AUTO: 8.9 10E3/UL (ref 1.6–8.3)
NEUTROPHILS NFR BLD AUTO: 64 %
NRBC # BLD AUTO: 0 10E3/UL
NRBC BLD AUTO-RTO: 0 /100
PLATELET # BLD AUTO: 306 10E3/UL (ref 150–450)
RBC # BLD AUTO: 4.53 10E6/UL (ref 3.8–5.2)
WBC # BLD AUTO: 13.8 10E3/UL (ref 4–11)

## 2024-03-22 PROCEDURE — 85004 AUTOMATED DIFF WBC COUNT: CPT

## 2024-03-22 PROCEDURE — 250N000011 HC RX IP 250 OP 636: Mod: JW | Performed by: INTERNAL MEDICINE

## 2024-03-22 PROCEDURE — 36415 COLL VENOUS BLD VENIPUNCTURE: CPT

## 2024-03-22 PROCEDURE — 96372 THER/PROPH/DIAG INJ SC/IM: CPT | Performed by: INTERNAL MEDICINE

## 2024-03-22 RX ADMIN — ROMIPLOSTIM 50 MCG: 250 INJECTION, POWDER, LYOPHILIZED, FOR SOLUTION SUBCUTANEOUS at 14:16

## 2024-03-22 NOTE — PROGRESS NOTES
Infusion Nursing Note:  Ora Gonzalez presents today for labs and nplate.    Patient seen by provider today: No   present during visit today: Not Applicable.    Note: Ora arrived ambulatory and in stable condition. Nplate administered into the left arm. Will return on 3/29 for next appointment.      Intravenous Access:  No Intravenous access/labs at this visit.    Treatment Conditions:  Not Applicable.      Post Infusion Assessment:  Patient tolerated injection without incident.       Discharge Plan:   Patient and/or family verbalized understanding of discharge instructions and all questions answered.  AVS to patient via [employer].  Patient will return 3/29 for next appointment.   Patient discharged in stable condition accompanied by: son.  Departure Mode: Ambulatory.      Katherin Babcock RN

## 2024-03-29 ENCOUNTER — INFUSION THERAPY VISIT (OUTPATIENT)
Dept: INFUSION THERAPY | Facility: HOSPITAL | Age: 89
End: 2024-03-29
Attending: INTERNAL MEDICINE
Payer: OTHER MISCELLANEOUS

## 2024-03-29 VITALS
DIASTOLIC BLOOD PRESSURE: 68 MMHG | HEART RATE: 69 BPM | SYSTOLIC BLOOD PRESSURE: 146 MMHG | OXYGEN SATURATION: 100 % | TEMPERATURE: 97.5 F | RESPIRATION RATE: 16 BRPM

## 2024-03-29 DIAGNOSIS — D69.3 IMMUNE THROMBOCYTOPENIC PURPURA (H): Primary | ICD-10-CM

## 2024-03-29 PROCEDURE — 250N000011 HC RX IP 250 OP 636: Mod: JW | Performed by: INTERNAL MEDICINE

## 2024-03-29 PROCEDURE — 96372 THER/PROPH/DIAG INJ SC/IM: CPT | Performed by: INTERNAL MEDICINE

## 2024-03-29 RX ADMIN — ROMIPLOSTIM 50 MCG: 250 INJECTION, POWDER, LYOPHILIZED, FOR SOLUTION SUBCUTANEOUS at 11:29

## 2024-04-05 ENCOUNTER — INFUSION THERAPY VISIT (OUTPATIENT)
Dept: INFUSION THERAPY | Facility: HOSPITAL | Age: 89
End: 2024-04-05
Attending: INTERNAL MEDICINE
Payer: OTHER MISCELLANEOUS

## 2024-04-05 VITALS
DIASTOLIC BLOOD PRESSURE: 72 MMHG | HEART RATE: 82 BPM | OXYGEN SATURATION: 94 % | TEMPERATURE: 97.4 F | RESPIRATION RATE: 18 BRPM | SYSTOLIC BLOOD PRESSURE: 166 MMHG

## 2024-04-05 DIAGNOSIS — D69.3 IMMUNE THROMBOCYTOPENIC PURPURA (H): Primary | ICD-10-CM

## 2024-04-05 PROCEDURE — 96372 THER/PROPH/DIAG INJ SC/IM: CPT | Performed by: INTERNAL MEDICINE

## 2024-04-05 PROCEDURE — 250N000011 HC RX IP 250 OP 636: Mod: JW | Performed by: INTERNAL MEDICINE

## 2024-04-05 RX ADMIN — ROMIPLOSTIM 50 MCG: 250 INJECTION, POWDER, LYOPHILIZED, FOR SOLUTION SUBCUTANEOUS at 10:40

## 2024-04-05 NOTE — PROGRESS NOTES
Infusion Nursing Note:  Ora Gonzalez presents today for nplate.    Patient seen by provider today: No   present during visit today: Not Applicable.    Note: Ora arrived ambulatory and in stable condition. Nplate administered into the left arm. Will return on 4/12 for next appointment.      Intravenous Access:  No Intravenous access/labs at this visit.    Treatment Conditions:  Not Applicable.      Post Infusion Assessment:  Patient tolerated injection without incident.       Discharge Plan:   Patient and/or family verbalized understanding of discharge instructions and all questions answered.  AVS to patient via [employer].  Patient will return 4/12 for next appointment.   Patient discharged in stable condition accompanied by: son.  Departure Mode: Ambulatory.      Lilibeth Aguayo RN

## 2024-04-12 ENCOUNTER — INFUSION THERAPY VISIT (OUTPATIENT)
Dept: INFUSION THERAPY | Facility: HOSPITAL | Age: 89
End: 2024-04-12
Attending: INTERNAL MEDICINE
Payer: OTHER MISCELLANEOUS

## 2024-04-12 VITALS
RESPIRATION RATE: 16 BRPM | HEART RATE: 67 BPM | TEMPERATURE: 97.8 F | DIASTOLIC BLOOD PRESSURE: 73 MMHG | OXYGEN SATURATION: 97 % | SYSTOLIC BLOOD PRESSURE: 172 MMHG

## 2024-04-12 DIAGNOSIS — D69.3 IMMUNE THROMBOCYTOPENIC PURPURA (H): Primary | ICD-10-CM

## 2024-04-12 PROCEDURE — 96372 THER/PROPH/DIAG INJ SC/IM: CPT | Performed by: INTERNAL MEDICINE

## 2024-04-12 PROCEDURE — 250N000011 HC RX IP 250 OP 636: Mod: JW | Performed by: INTERNAL MEDICINE

## 2024-04-12 RX ADMIN — ROMIPLOSTIM 50 MCG: 250 INJECTION, POWDER, LYOPHILIZED, FOR SOLUTION SUBCUTANEOUS at 10:37

## 2024-04-12 NOTE — PROGRESS NOTES
Infusion Nursing Note:  Ora Gonzalez presents today for Romiplostim injection.    Patient seen by provider today: No   present during visit today: Not Applicable.    Note: Patient was given injection in right arm.      Intravenous Access:  No Intravenous access/labs at this visit.    Treatment Conditions:  Not Applicable.      Post Infusion Assessment:  Patient tolerated injection without incident.       Discharge Plan:   Patient discharged in stable condition accompanied by: jd Griggs RN

## 2024-04-16 ENCOUNTER — PATIENT OUTREACH (OUTPATIENT)
Dept: CARE COORDINATION | Facility: CLINIC | Age: 89
End: 2024-04-16
Payer: OTHER MISCELLANEOUS

## 2024-04-19 ENCOUNTER — INFUSION THERAPY VISIT (OUTPATIENT)
Dept: INFUSION THERAPY | Facility: HOSPITAL | Age: 89
End: 2024-04-19
Attending: INTERNAL MEDICINE
Payer: OTHER MISCELLANEOUS

## 2024-04-19 VITALS
OXYGEN SATURATION: 96 % | DIASTOLIC BLOOD PRESSURE: 74 MMHG | TEMPERATURE: 98.3 F | HEART RATE: 80 BPM | RESPIRATION RATE: 18 BRPM | SYSTOLIC BLOOD PRESSURE: 170 MMHG

## 2024-04-19 DIAGNOSIS — D69.3 IMMUNE THROMBOCYTOPENIC PURPURA (H): Primary | ICD-10-CM

## 2024-04-19 DIAGNOSIS — D69.3 IMMUNE THROMBOCYTOPENIC PURPURA (H): ICD-10-CM

## 2024-04-19 LAB
BASOPHILS # BLD AUTO: 0.1 10E3/UL (ref 0–0.2)
BASOPHILS NFR BLD AUTO: 1 %
EOSINOPHIL # BLD AUTO: 0.1 10E3/UL (ref 0–0.7)
EOSINOPHIL NFR BLD AUTO: 1 %
ERYTHROCYTE [DISTWIDTH] IN BLOOD BY AUTOMATED COUNT: 14.5 % (ref 10–15)
HCT VFR BLD AUTO: 45.2 % (ref 35–47)
HGB BLD-MCNC: 14.4 G/DL (ref 11.7–15.7)
IMM GRANULOCYTES # BLD: 0.1 10E3/UL
IMM GRANULOCYTES NFR BLD: 1 %
LYMPHOCYTES # BLD AUTO: 2.9 10E3/UL (ref 0.8–5.3)
LYMPHOCYTES NFR BLD AUTO: 23 %
MCH RBC QN AUTO: 31 PG (ref 26.5–33)
MCHC RBC AUTO-ENTMCNC: 31.9 G/DL (ref 31.5–36.5)
MCV RBC AUTO: 97 FL (ref 78–100)
MONOCYTES # BLD AUTO: 1 10E3/UL (ref 0–1.3)
MONOCYTES NFR BLD AUTO: 8 %
NEUTROPHILS # BLD AUTO: 8.4 10E3/UL (ref 1.6–8.3)
NEUTROPHILS NFR BLD AUTO: 66 %
NRBC # BLD AUTO: 0 10E3/UL
NRBC BLD AUTO-RTO: 0 /100
PLATELET # BLD AUTO: 172 10E3/UL (ref 150–450)
RBC # BLD AUTO: 4.64 10E6/UL (ref 3.8–5.2)
WBC # BLD AUTO: 12.6 10E3/UL (ref 4–11)

## 2024-04-19 PROCEDURE — 36415 COLL VENOUS BLD VENIPUNCTURE: CPT

## 2024-04-19 PROCEDURE — 96372 THER/PROPH/DIAG INJ SC/IM: CPT | Performed by: INTERNAL MEDICINE

## 2024-04-19 PROCEDURE — 85025 COMPLETE CBC W/AUTO DIFF WBC: CPT

## 2024-04-19 PROCEDURE — 250N000011 HC RX IP 250 OP 636: Mod: JW | Performed by: INTERNAL MEDICINE

## 2024-04-19 RX ADMIN — ROMIPLOSTIM 50 MCG: 250 INJECTION, POWDER, LYOPHILIZED, FOR SOLUTION SUBCUTANEOUS at 11:09

## 2024-04-19 NOTE — PROGRESS NOTES
Infusion Nursing Note:  Ora Gonzalez presents today for nplate.    Patient seen by provider today: No   present during visit today: Not Applicable.    Note: Ora arrived ambulatory and in stable condition. Nplate administered into the left arm. Will return on 4/26 for next appointment.      Intravenous Access:  No Intravenous access/labs at this visit.    Treatment Conditions:  Not Applicable.      Post Infusion Assessment:  Patient tolerated injection without incident.       Discharge Plan:   Patient and/or family verbalized understanding of discharge instructions and all questions answered.  AVS to patient via [employer].  Patient will return 4/26 for next appointment.   Patient discharged in stable condition accompanied by: son.  Departure Mode: Ambulatory.      Lilibeth Aguayo RN

## 2024-04-26 ENCOUNTER — INFUSION THERAPY VISIT (OUTPATIENT)
Dept: INFUSION THERAPY | Facility: HOSPITAL | Age: 89
End: 2024-04-26
Attending: INTERNAL MEDICINE
Payer: OTHER MISCELLANEOUS

## 2024-04-26 VITALS
HEART RATE: 77 BPM | OXYGEN SATURATION: 95 % | RESPIRATION RATE: 18 BRPM | SYSTOLIC BLOOD PRESSURE: 151 MMHG | DIASTOLIC BLOOD PRESSURE: 69 MMHG | TEMPERATURE: 97.5 F

## 2024-04-26 DIAGNOSIS — D69.3 IMMUNE THROMBOCYTOPENIC PURPURA (H): Primary | ICD-10-CM

## 2024-04-26 PROCEDURE — 250N000011 HC RX IP 250 OP 636: Mod: JW | Performed by: INTERNAL MEDICINE

## 2024-04-26 PROCEDURE — 96372 THER/PROPH/DIAG INJ SC/IM: CPT | Performed by: INTERNAL MEDICINE

## 2024-04-26 RX ADMIN — ROMIPLOSTIM 50 MCG: 250 INJECTION, POWDER, LYOPHILIZED, FOR SOLUTION SUBCUTANEOUS at 11:03

## 2024-04-26 NOTE — PROGRESS NOTES
Infusion Nursing Note:  Ora Gonzalez presents today for Nplate.    Patient seen by provider today: No   present during visit today: Not Applicable.    Note: VSS.  Pt assessed.  Nplate given into her right upper arm.      Intravenous Access:  No Intravenous access/labs at this visit.    Treatment Conditions:  Not Applicable.      Post Infusion Assessment:  Patient tolerated injection without incident.       Discharge Plan:   Patient discharged in stable condition accompanied by: son.  Departure Mode: Ambulatory.      Carole Barrett RN

## 2024-04-30 ENCOUNTER — PATIENT OUTREACH (OUTPATIENT)
Dept: CARE COORDINATION | Facility: CLINIC | Age: 89
End: 2024-04-30
Payer: OTHER MISCELLANEOUS

## 2024-05-03 ENCOUNTER — INFUSION THERAPY VISIT (OUTPATIENT)
Dept: INFUSION THERAPY | Facility: HOSPITAL | Age: 89
End: 2024-05-03
Attending: INTERNAL MEDICINE
Payer: OTHER MISCELLANEOUS

## 2024-05-03 VITALS
OXYGEN SATURATION: 96 % | TEMPERATURE: 97.6 F | DIASTOLIC BLOOD PRESSURE: 64 MMHG | RESPIRATION RATE: 18 BRPM | SYSTOLIC BLOOD PRESSURE: 173 MMHG | HEART RATE: 81 BPM

## 2024-05-03 DIAGNOSIS — D69.3 IMMUNE THROMBOCYTOPENIC PURPURA (H): Primary | ICD-10-CM

## 2024-05-03 PROCEDURE — 250N000011 HC RX IP 250 OP 636: Mod: JW | Performed by: INTERNAL MEDICINE

## 2024-05-03 PROCEDURE — 96372 THER/PROPH/DIAG INJ SC/IM: CPT | Performed by: INTERNAL MEDICINE

## 2024-05-03 RX ADMIN — ROMIPLOSTIM 50 MCG: 250 INJECTION, POWDER, LYOPHILIZED, FOR SOLUTION SUBCUTANEOUS at 10:22

## 2024-05-03 NOTE — PROGRESS NOTES
Infusion Nursing Note:  Ora Gonzalez presents today for nplate.    Patient seen by provider today: No   present during visit today: Not Applicable.    Note: Ora arrived ambulatory and in stable condition. Nplate administered into the left arm.       Intravenous Access:  No Intravenous access/labs at this visit.    Treatment Conditions:  Not Applicable.      Post Infusion Assessment:  Patient tolerated injection without incident.       Discharge Plan:   Patient and/or family verbalized understanding of discharge instructions and all questions answered.  AVS to patient via [employer].  Patient will return 5/10 for next appointment.   Patient discharged in stable condition accompanied by: son.  Departure Mode: Ambulatory.      Katherin Babcock RN

## 2024-05-10 ENCOUNTER — INFUSION THERAPY VISIT (OUTPATIENT)
Dept: INFUSION THERAPY | Facility: HOSPITAL | Age: 89
End: 2024-05-10
Attending: INTERNAL MEDICINE
Payer: OTHER MISCELLANEOUS

## 2024-05-10 VITALS
OXYGEN SATURATION: 97 % | DIASTOLIC BLOOD PRESSURE: 70 MMHG | RESPIRATION RATE: 18 BRPM | SYSTOLIC BLOOD PRESSURE: 169 MMHG | TEMPERATURE: 97.3 F | HEART RATE: 71 BPM

## 2024-05-10 DIAGNOSIS — D69.3 IMMUNE THROMBOCYTOPENIC PURPURA (H): Primary | ICD-10-CM

## 2024-05-10 PROCEDURE — 250N000011 HC RX IP 250 OP 636: Performed by: INTERNAL MEDICINE

## 2024-05-10 PROCEDURE — 96372 THER/PROPH/DIAG INJ SC/IM: CPT | Performed by: INTERNAL MEDICINE

## 2024-05-10 RX ADMIN — ROMIPLOSTIM 50 MCG: 250 INJECTION, POWDER, LYOPHILIZED, FOR SOLUTION SUBCUTANEOUS at 11:07

## 2024-05-10 NOTE — PROGRESS NOTES
"Infusion Nursing Note:  Ora Gonzalez presents today for Nplate.    Patient seen by provider today: No   present during visit today: Not Applicable.    Note: VSS.  Pt assessed however pt is a poor historian due to dementia.  Son states pt is doing well physically but that dementia is getting worse.   \"She has good days and bad days\".  Today pt remembered who I am and called me by name.  Nplate given subcutaneous into her right upper arm.      Intravenous Access:  No Intravenous access/labs at this visit.    Treatment Conditions:  Not Applicable.      Post Infusion Assessment:  Patient tolerated injection without incident.       Discharge Plan:   Patient discharged in stable condition accompanied by: son  Departure Mode: Ambulatory.      Carole Barrett RN   "

## 2024-05-17 ENCOUNTER — INFUSION THERAPY VISIT (OUTPATIENT)
Dept: INFUSION THERAPY | Facility: HOSPITAL | Age: 89
End: 2024-05-17
Attending: INTERNAL MEDICINE
Payer: OTHER MISCELLANEOUS

## 2024-05-17 DIAGNOSIS — D69.3 IMMUNE THROMBOCYTOPENIC PURPURA (H): ICD-10-CM

## 2024-05-17 DIAGNOSIS — D69.3 IMMUNE THROMBOCYTOPENIC PURPURA (H): Primary | ICD-10-CM

## 2024-05-17 LAB
BASOPHILS # BLD AUTO: 0.1 10E3/UL (ref 0–0.2)
BASOPHILS NFR BLD AUTO: 1 %
EOSINOPHIL # BLD AUTO: 0.2 10E3/UL (ref 0–0.7)
EOSINOPHIL NFR BLD AUTO: 1 %
ERYTHROCYTE [DISTWIDTH] IN BLOOD BY AUTOMATED COUNT: 15 % (ref 10–15)
HCT VFR BLD AUTO: 43.5 % (ref 35–47)
HGB BLD-MCNC: 14.3 G/DL (ref 11.7–15.7)
IMM GRANULOCYTES # BLD: 0.2 10E3/UL
IMM GRANULOCYTES NFR BLD: 1 %
LYMPHOCYTES # BLD AUTO: 3.2 10E3/UL (ref 0.8–5.3)
LYMPHOCYTES NFR BLD AUTO: 23 %
MCH RBC QN AUTO: 32 PG (ref 26.5–33)
MCHC RBC AUTO-ENTMCNC: 32.9 G/DL (ref 31.5–36.5)
MCV RBC AUTO: 97 FL (ref 78–100)
MONOCYTES # BLD AUTO: 1.2 10E3/UL (ref 0–1.3)
MONOCYTES NFR BLD AUTO: 9 %
NEUTROPHILS # BLD AUTO: 9.2 10E3/UL (ref 1.6–8.3)
NEUTROPHILS NFR BLD AUTO: 65 %
NRBC # BLD AUTO: 0 10E3/UL
NRBC BLD AUTO-RTO: 0 /100
PLATELET # BLD AUTO: 177 10E3/UL (ref 150–450)
RBC # BLD AUTO: 4.47 10E6/UL (ref 3.8–5.2)
WBC # BLD AUTO: 14.1 10E3/UL (ref 4–11)

## 2024-05-17 PROCEDURE — 250N000011 HC RX IP 250 OP 636: Mod: JZ | Performed by: INTERNAL MEDICINE

## 2024-05-17 PROCEDURE — 85025 COMPLETE CBC W/AUTO DIFF WBC: CPT

## 2024-05-17 PROCEDURE — 96372 THER/PROPH/DIAG INJ SC/IM: CPT | Performed by: INTERNAL MEDICINE

## 2024-05-17 PROCEDURE — 36415 COLL VENOUS BLD VENIPUNCTURE: CPT

## 2024-05-17 RX ADMIN — ROMIPLOSTIM 50 MCG: 250 INJECTION, POWDER, LYOPHILIZED, FOR SOLUTION SUBCUTANEOUS at 12:15

## 2024-05-17 NOTE — PROGRESS NOTES
PT here ambulatory for Nplate inj. Reviewed inj with pt and administered in right upper arm/bandaid to site. PT tolerated without any problems. Follow up reviewed and pt dc'd steady gait.

## 2024-05-24 ENCOUNTER — INFUSION THERAPY VISIT (OUTPATIENT)
Dept: INFUSION THERAPY | Facility: HOSPITAL | Age: 89
End: 2024-05-24
Attending: INTERNAL MEDICINE
Payer: OTHER MISCELLANEOUS

## 2024-05-24 VITALS
HEART RATE: 88 BPM | DIASTOLIC BLOOD PRESSURE: 65 MMHG | SYSTOLIC BLOOD PRESSURE: 145 MMHG | TEMPERATURE: 98.3 F | OXYGEN SATURATION: 95 %

## 2024-05-24 DIAGNOSIS — D69.3 IMMUNE THROMBOCYTOPENIC PURPURA (H): Primary | ICD-10-CM

## 2024-05-24 PROCEDURE — 250N000011 HC RX IP 250 OP 636: Mod: JW | Performed by: INTERNAL MEDICINE

## 2024-05-24 PROCEDURE — 96372 THER/PROPH/DIAG INJ SC/IM: CPT | Performed by: INTERNAL MEDICINE

## 2024-05-24 RX ADMIN — ROMIPLOSTIM 50 MCG: 250 INJECTION, POWDER, LYOPHILIZED, FOR SOLUTION SUBCUTANEOUS at 10:20

## 2024-05-24 ASSESSMENT — PAIN SCALES - GENERAL: PAINLEVEL: NO PAIN (0)

## 2024-05-31 ENCOUNTER — INFUSION THERAPY VISIT (OUTPATIENT)
Dept: INFUSION THERAPY | Facility: HOSPITAL | Age: 89
End: 2024-05-31
Attending: INTERNAL MEDICINE
Payer: OTHER MISCELLANEOUS

## 2024-05-31 VITALS
DIASTOLIC BLOOD PRESSURE: 70 MMHG | HEART RATE: 86 BPM | TEMPERATURE: 97.8 F | RESPIRATION RATE: 18 BRPM | SYSTOLIC BLOOD PRESSURE: 150 MMHG | OXYGEN SATURATION: 98 %

## 2024-05-31 DIAGNOSIS — D69.3 IMMUNE THROMBOCYTOPENIC PURPURA (H): Primary | ICD-10-CM

## 2024-05-31 PROCEDURE — 250N000011 HC RX IP 250 OP 636: Mod: JW | Performed by: INTERNAL MEDICINE

## 2024-05-31 PROCEDURE — 96372 THER/PROPH/DIAG INJ SC/IM: CPT | Performed by: INTERNAL MEDICINE

## 2024-05-31 RX ADMIN — ROMIPLOSTIM 50 MCG: 250 INJECTION, POWDER, LYOPHILIZED, FOR SOLUTION SUBCUTANEOUS at 12:26

## 2024-05-31 NOTE — PROGRESS NOTES
Infusion Nursing Note:  Ora Gonzalez presents today for nplate.    Patient seen by provider today: No   present during visit today: Not Applicable.    Note: Ora arrived ambulatory and in stable condition. Nplate administered into the right arm. Will return on 6/7 for next appointment.      Intravenous Access:  No Intravenous access/labs at this visit.    Treatment Conditions:  Not Applicable.      Post Infusion Assessment:  Patient tolerated injection without incident.       Discharge Plan:   Patient and/or family verbalized understanding of discharge instructions and all questions answered.  AVS to patient via [employer].  Patient will return 6/7 for next appointment.   Patient discharged in stable condition accompanied by: son.  Departure Mode: Ambulatory.      Lilibeth Augayo RN

## 2024-06-07 ENCOUNTER — INFUSION THERAPY VISIT (OUTPATIENT)
Dept: INFUSION THERAPY | Facility: HOSPITAL | Age: 89
End: 2024-06-07
Attending: INTERNAL MEDICINE
Payer: OTHER MISCELLANEOUS

## 2024-06-07 VITALS
OXYGEN SATURATION: 95 % | HEART RATE: 82 BPM | SYSTOLIC BLOOD PRESSURE: 155 MMHG | DIASTOLIC BLOOD PRESSURE: 64 MMHG | RESPIRATION RATE: 16 BRPM | TEMPERATURE: 97.7 F

## 2024-06-07 DIAGNOSIS — D69.3 IMMUNE THROMBOCYTOPENIC PURPURA (H): Primary | ICD-10-CM

## 2024-06-07 PROCEDURE — 96372 THER/PROPH/DIAG INJ SC/IM: CPT | Performed by: INTERNAL MEDICINE

## 2024-06-07 PROCEDURE — 250N000011 HC RX IP 250 OP 636: Performed by: INTERNAL MEDICINE

## 2024-06-07 RX ADMIN — ROMIPLOSTIM 50 MCG: 250 INJECTION, POWDER, LYOPHILIZED, FOR SOLUTION SUBCUTANEOUS at 10:52

## 2024-06-07 ASSESSMENT — PAIN SCALES - GENERAL: PAINLEVEL: NO PAIN (0)

## 2024-06-07 NOTE — PROGRESS NOTES
Ora arrives ambulatory to [facility] for her NPLATE injection. injection given upper right arm. She tolerated well. Will return 6/14.

## 2024-06-14 ENCOUNTER — LAB (OUTPATIENT)
Dept: INFUSION THERAPY | Facility: HOSPITAL | Age: 89
End: 2024-06-14
Attending: INTERNAL MEDICINE
Payer: OTHER MISCELLANEOUS

## 2024-06-14 VITALS
RESPIRATION RATE: 16 BRPM | SYSTOLIC BLOOD PRESSURE: 167 MMHG | HEART RATE: 72 BPM | OXYGEN SATURATION: 96 % | DIASTOLIC BLOOD PRESSURE: 74 MMHG | TEMPERATURE: 97.6 F

## 2024-06-14 DIAGNOSIS — D69.3 IMMUNE THROMBOCYTOPENIC PURPURA (H): ICD-10-CM

## 2024-06-14 DIAGNOSIS — D69.3 IMMUNE THROMBOCYTOPENIC PURPURA (H): Primary | ICD-10-CM

## 2024-06-14 LAB
BASOPHILS # BLD AUTO: 0.1 10E3/UL (ref 0–0.2)
BASOPHILS NFR BLD AUTO: 1 %
EOSINOPHIL # BLD AUTO: 0.2 10E3/UL (ref 0–0.7)
EOSINOPHIL NFR BLD AUTO: 2 %
ERYTHROCYTE [DISTWIDTH] IN BLOOD BY AUTOMATED COUNT: 15.5 % (ref 10–15)
HCT VFR BLD AUTO: 44.4 % (ref 35–47)
HGB BLD-MCNC: 14.2 G/DL (ref 11.7–15.7)
IMM GRANULOCYTES # BLD: 0.2 10E3/UL
IMM GRANULOCYTES NFR BLD: 2 %
LYMPHOCYTES # BLD AUTO: 3.9 10E3/UL (ref 0.8–5.3)
LYMPHOCYTES NFR BLD AUTO: 31 %
MCH RBC QN AUTO: 31.6 PG (ref 26.5–33)
MCHC RBC AUTO-ENTMCNC: 32 G/DL (ref 31.5–36.5)
MCV RBC AUTO: 99 FL (ref 78–100)
MONOCYTES # BLD AUTO: 1.1 10E3/UL (ref 0–1.3)
MONOCYTES NFR BLD AUTO: 9 %
NEUTROPHILS # BLD AUTO: 7.1 10E3/UL (ref 1.6–8.3)
NEUTROPHILS NFR BLD AUTO: 56 %
NRBC # BLD AUTO: 0 10E3/UL
NRBC BLD AUTO-RTO: 0 /100
PLATELET # BLD AUTO: 213 10E3/UL (ref 150–450)
RBC # BLD AUTO: 4.49 10E6/UL (ref 3.8–5.2)
WBC # BLD AUTO: 12.6 10E3/UL (ref 4–11)

## 2024-06-14 PROCEDURE — 250N000011 HC RX IP 250 OP 636: Mod: JZ | Performed by: INTERNAL MEDICINE

## 2024-06-14 PROCEDURE — 36415 COLL VENOUS BLD VENIPUNCTURE: CPT

## 2024-06-14 PROCEDURE — 96372 THER/PROPH/DIAG INJ SC/IM: CPT | Performed by: INTERNAL MEDICINE

## 2024-06-14 PROCEDURE — 85004 AUTOMATED DIFF WBC COUNT: CPT

## 2024-06-14 RX ADMIN — ROMIPLOSTIM 50 MCG: 250 INJECTION, POWDER, LYOPHILIZED, FOR SOLUTION SUBCUTANEOUS at 10:26

## 2024-06-14 NOTE — PROGRESS NOTES
Infusion Nursing Note:  Ora Gonzalez presents today for nplate.    Patient seen by provider today: No   present during visit today: Not Applicable.    Note: Ora arrived ambulatory and in stable condition. Nplate administered into the left arm.       Intravenous Access:  No Intravenous access/labs at this visit.    Treatment Conditions:  Not Applicable.      Post Infusion Assessment:  Patient tolerated injection without incident.       Discharge Plan:   Patient and/or family verbalized understanding of discharge instructions and all questions answered.  AVS to patient via [employer].  Patient will return 6/21 for next appointment.   Patient discharged in stable condition accompanied by: son.  Departure Mode: Ambulatory.      Katherin Babcock RN

## 2024-06-21 ENCOUNTER — INFUSION THERAPY VISIT (OUTPATIENT)
Dept: INFUSION THERAPY | Facility: HOSPITAL | Age: 89
End: 2024-06-21
Attending: INTERNAL MEDICINE
Payer: OTHER MISCELLANEOUS

## 2024-06-21 VITALS
OXYGEN SATURATION: 98 % | TEMPERATURE: 97.9 F | HEART RATE: 79 BPM | SYSTOLIC BLOOD PRESSURE: 167 MMHG | RESPIRATION RATE: 16 BRPM | DIASTOLIC BLOOD PRESSURE: 72 MMHG

## 2024-06-21 DIAGNOSIS — D69.3 IMMUNE THROMBOCYTOPENIC PURPURA (H): Primary | ICD-10-CM

## 2024-06-21 PROCEDURE — 96372 THER/PROPH/DIAG INJ SC/IM: CPT | Performed by: INTERNAL MEDICINE

## 2024-06-21 PROCEDURE — 250N000011 HC RX IP 250 OP 636: Performed by: INTERNAL MEDICINE

## 2024-06-21 RX ORDER — CETIRIZINE HYDROCHLORIDE 5 MG/1
5 TABLET ORAL DAILY
COMMUNITY

## 2024-06-21 RX ADMIN — ROMIPLOSTIM 50 MCG: 250 INJECTION, POWDER, LYOPHILIZED, FOR SOLUTION SUBCUTANEOUS at 10:17

## 2024-06-21 NOTE — PROGRESS NOTES
Ora arrives ambulatory to [facility] for her NPLATE injection. injection given upper left arm. She tolerated well.

## 2024-06-28 ENCOUNTER — INFUSION THERAPY VISIT (OUTPATIENT)
Dept: INFUSION THERAPY | Facility: HOSPITAL | Age: 89
End: 2024-06-28
Attending: INTERNAL MEDICINE
Payer: OTHER MISCELLANEOUS

## 2024-06-28 VITALS
DIASTOLIC BLOOD PRESSURE: 79 MMHG | TEMPERATURE: 97.7 F | SYSTOLIC BLOOD PRESSURE: 183 MMHG | HEART RATE: 72 BPM | RESPIRATION RATE: 16 BRPM | OXYGEN SATURATION: 96 %

## 2024-06-28 DIAGNOSIS — D69.3 IMMUNE THROMBOCYTOPENIC PURPURA (H): Primary | ICD-10-CM

## 2024-06-28 PROCEDURE — 96372 THER/PROPH/DIAG INJ SC/IM: CPT | Performed by: INTERNAL MEDICINE

## 2024-06-28 PROCEDURE — 250N000011 HC RX IP 250 OP 636: Mod: JW | Performed by: INTERNAL MEDICINE

## 2024-06-28 RX ADMIN — ROMIPLOSTIM 50 MCG: 250 INJECTION, POWDER, LYOPHILIZED, FOR SOLUTION SUBCUTANEOUS at 10:50

## 2024-06-28 NOTE — PROGRESS NOTES
Infusion Nursing Note:  Ora Lisa presents today for NPLATE.    Patient seen by provider today: No   present during visit today: Not Applicable.    Note: N/A.      Intravenous Access:  No Intravenous access/labs at this visit.    Treatment Conditions:  Not Applicable.      Post Infusion Assessment:  Patient tolerated injection without incident.       Discharge Plan:   Patient and/or family verbalized understanding of discharge instructions and all questions answered.  Patient discharged in stable condition accompanied by: son.  Departure Mode: Ambulatory.      Tova Morrow RN

## 2024-07-05 ENCOUNTER — INFUSION THERAPY VISIT (OUTPATIENT)
Dept: INFUSION THERAPY | Facility: HOSPITAL | Age: 89
End: 2024-07-05
Attending: INTERNAL MEDICINE
Payer: OTHER MISCELLANEOUS

## 2024-07-05 VITALS
RESPIRATION RATE: 16 BRPM | DIASTOLIC BLOOD PRESSURE: 72 MMHG | TEMPERATURE: 97.4 F | SYSTOLIC BLOOD PRESSURE: 165 MMHG | OXYGEN SATURATION: 97 % | HEART RATE: 66 BPM

## 2024-07-05 DIAGNOSIS — D69.3 IMMUNE THROMBOCYTOPENIC PURPURA (H): Primary | ICD-10-CM

## 2024-07-05 PROCEDURE — 96372 THER/PROPH/DIAG INJ SC/IM: CPT | Performed by: INTERNAL MEDICINE

## 2024-07-05 PROCEDURE — 250N000011 HC RX IP 250 OP 636: Mod: JW | Performed by: INTERNAL MEDICINE

## 2024-07-05 RX ADMIN — ROMIPLOSTIM 50 MCG: 250 INJECTION, POWDER, LYOPHILIZED, FOR SOLUTION SUBCUTANEOUS at 11:00

## 2024-07-05 ASSESSMENT — PAIN SCALES - GENERAL: PAINLEVEL: NO PAIN (0)

## 2024-07-05 NOTE — PROGRESS NOTES
Infusion Nursing Note:  Ora Gonzalez presents today for Nplate.    Patient seen by provider today: No   present during visit today: Not Applicable.    Note: VSS.  Pt assessed.  She continues to be confused at times but otherwise assessment unremarkable.  No evidence3 of any bleeding.  No labs needed today.  She received Nplate subcutaneous into her right upper arm.      Intravenous Access:  No Intravenous access/labs at this visit.    Treatment Conditions:  Not Applicable.      Post Infusion Assessment:  Patient tolerated injection without incident.       Discharge Plan:   Patient discharged in stable condition accompanied by: son.  Departure Mode: Ambulatory.      Carole Barrett RN

## 2024-07-12 ENCOUNTER — INFUSION THERAPY VISIT (OUTPATIENT)
Dept: INFUSION THERAPY | Facility: HOSPITAL | Age: 89
End: 2024-07-12
Attending: INTERNAL MEDICINE
Payer: OTHER MISCELLANEOUS

## 2024-07-12 VITALS
RESPIRATION RATE: 18 BRPM | TEMPERATURE: 97.6 F | BODY MASS INDEX: 20.52 KG/M2 | WEIGHT: 112.2 LBS | SYSTOLIC BLOOD PRESSURE: 188 MMHG | OXYGEN SATURATION: 97 % | DIASTOLIC BLOOD PRESSURE: 80 MMHG | HEART RATE: 73 BPM

## 2024-07-12 DIAGNOSIS — D69.3 IMMUNE THROMBOCYTOPENIC PURPURA (H): Primary | ICD-10-CM

## 2024-07-12 DIAGNOSIS — D69.3 IMMUNE THROMBOCYTOPENIC PURPURA (H): ICD-10-CM

## 2024-07-12 LAB
BASOPHILS # BLD AUTO: 0.1 10E3/UL (ref 0–0.2)
BASOPHILS NFR BLD AUTO: 1 %
EOSINOPHIL # BLD AUTO: 0.2 10E3/UL (ref 0–0.7)
EOSINOPHIL NFR BLD AUTO: 2 %
ERYTHROCYTE [DISTWIDTH] IN BLOOD BY AUTOMATED COUNT: 15.1 % (ref 10–15)
HCT VFR BLD AUTO: 44.5 % (ref 35–47)
HGB BLD-MCNC: 14.5 G/DL (ref 11.7–15.7)
IMM GRANULOCYTES # BLD: 0.1 10E3/UL
IMM GRANULOCYTES NFR BLD: 1 %
LYMPHOCYTES # BLD AUTO: 4.4 10E3/UL (ref 0.8–5.3)
LYMPHOCYTES NFR BLD AUTO: 30 %
MCH RBC QN AUTO: 32.1 PG (ref 26.5–33)
MCHC RBC AUTO-ENTMCNC: 32.6 G/DL (ref 31.5–36.5)
MCV RBC AUTO: 99 FL (ref 78–100)
MONOCYTES # BLD AUTO: 1.4 10E3/UL (ref 0–1.3)
MONOCYTES NFR BLD AUTO: 9 %
NEUTROPHILS # BLD AUTO: 8.6 10E3/UL (ref 1.6–8.3)
NEUTROPHILS NFR BLD AUTO: 58 %
NRBC # BLD AUTO: 0 10E3/UL
NRBC BLD AUTO-RTO: 0 /100
PLATELET # BLD AUTO: 93 10E3/UL (ref 150–450)
RBC # BLD AUTO: 4.52 10E6/UL (ref 3.8–5.2)
WBC # BLD AUTO: 14.9 10E3/UL (ref 4–11)

## 2024-07-12 PROCEDURE — 85025 COMPLETE CBC W/AUTO DIFF WBC: CPT

## 2024-07-12 PROCEDURE — 250N000011 HC RX IP 250 OP 636: Mod: JW | Performed by: INTERNAL MEDICINE

## 2024-07-12 PROCEDURE — 96372 THER/PROPH/DIAG INJ SC/IM: CPT | Performed by: INTERNAL MEDICINE

## 2024-07-12 PROCEDURE — 36415 COLL VENOUS BLD VENIPUNCTURE: CPT

## 2024-07-12 RX ADMIN — ROMIPLOSTIM 50 MCG: 250 INJECTION, POWDER, LYOPHILIZED, FOR SOLUTION SUBCUTANEOUS at 11:13

## 2024-07-12 NOTE — PROGRESS NOTES
Infusion Nursing Note:  Ora Gonzalez presents today for Nplate.    Patient seen by provider today: No   present during visit today: Not Applicable.    Note: Pt assessed with son present.  Pt has been getting more and more confused with alzheimer's to the point of not eating unless family present and they have to instruct her to eat. They are hoping to move her into memory care soon.  Pt is hypertensive but otherwise looks well overall.  No signs of bleeding observed or related from family or pt.  She received Nplate as ordered subcutaneous into her right upper arm.      Intravenous Access:  N/a.    Treatment Conditions:  Lab Results   Component Value Date    HGB 14.5 07/12/2024    WBC 14.9 (H) 07/12/2024    ANEU 8.7 (H) 03/24/2023    ANEUTAUTO 8.6 (H) 07/12/2024    PLT 93 (L) 07/12/2024        Results reviewed, labs MET treatment parameters, ok to proceed with treatment.      Post Infusion Assessment:  Patient tolerated injection without incident.       Discharge Plan:   Patient discharged in stable condition accompanied by: son.  Departure Mode: Ambulatory.      Carole Barrett RN

## 2024-07-19 ENCOUNTER — INFUSION THERAPY VISIT (OUTPATIENT)
Dept: INFUSION THERAPY | Facility: HOSPITAL | Age: 89
End: 2024-07-19
Attending: INTERNAL MEDICINE
Payer: OTHER MISCELLANEOUS

## 2024-07-19 VITALS
RESPIRATION RATE: 18 BRPM | OXYGEN SATURATION: 97 % | HEART RATE: 64 BPM | DIASTOLIC BLOOD PRESSURE: 74 MMHG | SYSTOLIC BLOOD PRESSURE: 189 MMHG | TEMPERATURE: 97.8 F

## 2024-07-19 DIAGNOSIS — D69.3 IMMUNE THROMBOCYTOPENIC PURPURA (H): Primary | ICD-10-CM

## 2024-07-19 PROCEDURE — 250N000011 HC RX IP 250 OP 636: Performed by: INTERNAL MEDICINE

## 2024-07-19 PROCEDURE — 96372 THER/PROPH/DIAG INJ SC/IM: CPT | Performed by: INTERNAL MEDICINE

## 2024-07-19 RX ADMIN — ROMIPLOSTIM 50 MCG: 250 INJECTION, POWDER, LYOPHILIZED, FOR SOLUTION SUBCUTANEOUS at 11:00

## 2024-07-19 NOTE — PROGRESS NOTES
Infusion Nursing Note:  Ora Gonzalez presents today for nplate.    Patient seen by provider today: No   present during visit today: Not Applicable.    Note: Ora arrived ambulatory and in stable condition. Continues to be hypertensive but reports feeling well overall. The contact dermatitis that was on her neck last week has resolved. Per her son, she was using hand soap instead of lotion for her face, and it caused her to have a rash. Nplate administered into the right arm. Will return on 7/26 for next appointment.      Intravenous Access:  No Intravenous access/labs at this visit.    Treatment Conditions:  Lab Results   Component Value Date    HGB 14.5 07/12/2024    WBC 14.9 (H) 07/12/2024    ANEU 8.7 (H) 03/24/2023    ANEUTAUTO 8.6 (H) 07/12/2024    PLT 93 (L) 07/12/2024            Post Infusion Assessment:  Patient tolerated injection without incident.       Discharge Plan:   Patient and/or family verbalized understanding of discharge instructions and all questions answered.  AVS to patient via [employer].  Patient will return 7/26 for next appointment.   Patient discharged in stable condition accompanied by: son.  Departure Mode: Ambulatory.      Katherin Babcock RN

## 2024-07-20 ENCOUNTER — HEALTH MAINTENANCE LETTER (OUTPATIENT)
Age: 89
End: 2024-07-20

## 2024-07-23 ENCOUNTER — DOCUMENTATION ONLY (OUTPATIENT)
Dept: OTHER | Facility: CLINIC | Age: 89
End: 2024-07-23
Payer: OTHER MISCELLANEOUS

## 2024-07-26 ENCOUNTER — INFUSION THERAPY VISIT (OUTPATIENT)
Dept: INFUSION THERAPY | Facility: HOSPITAL | Age: 89
End: 2024-07-26
Attending: INTERNAL MEDICINE
Payer: OTHER MISCELLANEOUS

## 2024-07-26 VITALS
OXYGEN SATURATION: 97 % | SYSTOLIC BLOOD PRESSURE: 171 MMHG | HEART RATE: 87 BPM | RESPIRATION RATE: 18 BRPM | DIASTOLIC BLOOD PRESSURE: 72 MMHG | TEMPERATURE: 97.6 F

## 2024-07-26 DIAGNOSIS — D69.3 IMMUNE THROMBOCYTOPENIC PURPURA (H): Primary | ICD-10-CM

## 2024-07-26 PROCEDURE — 250N000011 HC RX IP 250 OP 636: Performed by: INTERNAL MEDICINE

## 2024-07-26 PROCEDURE — 96372 THER/PROPH/DIAG INJ SC/IM: CPT | Performed by: INTERNAL MEDICINE

## 2024-07-26 RX ADMIN — ROMIPLOSTIM 50 MCG: 250 INJECTION, POWDER, LYOPHILIZED, FOR SOLUTION SUBCUTANEOUS at 10:05

## 2024-07-26 ASSESSMENT — PAIN SCALES - GENERAL: PAINLEVEL: NO PAIN (0)

## 2024-07-26 NOTE — PROGRESS NOTES
Ora arrives ambulatory to [facility] for her NPLATE injection. injection given upper right arm. She tolerated well.

## 2024-08-02 ENCOUNTER — INFUSION THERAPY VISIT (OUTPATIENT)
Dept: INFUSION THERAPY | Facility: HOSPITAL | Age: 89
End: 2024-08-02
Attending: INTERNAL MEDICINE
Payer: OTHER MISCELLANEOUS

## 2024-08-02 VITALS
TEMPERATURE: 97.4 F | DIASTOLIC BLOOD PRESSURE: 82 MMHG | WEIGHT: 112 LBS | OXYGEN SATURATION: 96 % | RESPIRATION RATE: 18 BRPM | BODY MASS INDEX: 20.49 KG/M2 | HEART RATE: 76 BPM | SYSTOLIC BLOOD PRESSURE: 199 MMHG

## 2024-08-02 DIAGNOSIS — D69.3 IMMUNE THROMBOCYTOPENIC PURPURA (H): Primary | ICD-10-CM

## 2024-08-02 PROCEDURE — 96372 THER/PROPH/DIAG INJ SC/IM: CPT | Performed by: INTERNAL MEDICINE

## 2024-08-02 PROCEDURE — 250N000011 HC RX IP 250 OP 636: Mod: JW | Performed by: INTERNAL MEDICINE

## 2024-08-02 RX ADMIN — ROMIPLOSTIM 50 MCG: 250 INJECTION, POWDER, LYOPHILIZED, FOR SOLUTION SUBCUTANEOUS at 11:00

## 2024-08-02 NOTE — PROGRESS NOTES
Infusion Nursing Note:  Ora Gonzalez presents today for Nplate.    Patient seen by provider today: No   present during visit today: Not Applicable.    Note: Pt is hypertensive and this was discussed with the son. Family is electing not to do anything about this at his time.  MD aware.  Pt assessed with son's assistance due to severe dementia.  Nplate was given as ordered subcutaneous into her left upper arm.      Intravenous Access:  No Intravenous access/labs at this visit.    Treatment Conditions:  Not Applicable.      Post Infusion Assessment:  Patient tolerated injection without incident.       Discharge Plan:   Patient discharged in stable condition accompanied by: self.  Departure Mode: Ambulatory.      Carole Barrett RN

## 2024-08-09 ENCOUNTER — LAB (OUTPATIENT)
Dept: INFUSION THERAPY | Facility: HOSPITAL | Age: 89
End: 2024-08-09
Attending: INTERNAL MEDICINE
Payer: OTHER MISCELLANEOUS

## 2024-08-09 VITALS
DIASTOLIC BLOOD PRESSURE: 62 MMHG | OXYGEN SATURATION: 97 % | RESPIRATION RATE: 12 BRPM | SYSTOLIC BLOOD PRESSURE: 173 MMHG | TEMPERATURE: 97.7 F | HEART RATE: 87 BPM

## 2024-08-09 DIAGNOSIS — D69.3 IMMUNE THROMBOCYTOPENIC PURPURA (H): ICD-10-CM

## 2024-08-09 DIAGNOSIS — D69.3 IMMUNE THROMBOCYTOPENIC PURPURA (H): Primary | ICD-10-CM

## 2024-08-09 LAB
BASOPHILS # BLD AUTO: 0.1 10E3/UL (ref 0–0.2)
BASOPHILS NFR BLD AUTO: 1 %
EOSINOPHIL # BLD AUTO: 0.2 10E3/UL (ref 0–0.7)
EOSINOPHIL NFR BLD AUTO: 2 %
ERYTHROCYTE [DISTWIDTH] IN BLOOD BY AUTOMATED COUNT: 14.9 % (ref 10–15)
HCT VFR BLD AUTO: 44.7 % (ref 35–47)
HGB BLD-MCNC: 14.5 G/DL (ref 11.7–15.7)
IMM GRANULOCYTES # BLD: 0.1 10E3/UL
IMM GRANULOCYTES NFR BLD: 1 %
LYMPHOCYTES # BLD AUTO: 3.8 10E3/UL (ref 0.8–5.3)
LYMPHOCYTES NFR BLD AUTO: 31 %
MCH RBC QN AUTO: 32 PG (ref 26.5–33)
MCHC RBC AUTO-ENTMCNC: 32.4 G/DL (ref 31.5–36.5)
MCV RBC AUTO: 99 FL (ref 78–100)
MONOCYTES # BLD AUTO: 0.9 10E3/UL (ref 0–1.3)
MONOCYTES NFR BLD AUTO: 8 %
NEUTROPHILS # BLD AUTO: 7 10E3/UL (ref 1.6–8.3)
NEUTROPHILS NFR BLD AUTO: 57 %
NRBC # BLD AUTO: 0 10E3/UL
NRBC BLD AUTO-RTO: 0 /100
PLATELET # BLD AUTO: 225 10E3/UL (ref 150–450)
RBC # BLD AUTO: 4.53 10E6/UL (ref 3.8–5.2)
WBC # BLD AUTO: 12.2 10E3/UL (ref 4–11)

## 2024-08-09 PROCEDURE — 85025 COMPLETE CBC W/AUTO DIFF WBC: CPT

## 2024-08-09 PROCEDURE — 250N000011 HC RX IP 250 OP 636: Performed by: INTERNAL MEDICINE

## 2024-08-09 PROCEDURE — 36415 COLL VENOUS BLD VENIPUNCTURE: CPT

## 2024-08-09 PROCEDURE — 96372 THER/PROPH/DIAG INJ SC/IM: CPT | Performed by: INTERNAL MEDICINE

## 2024-08-09 RX ADMIN — ROMIPLOSTIM 50 MCG: 250 INJECTION, POWDER, LYOPHILIZED, FOR SOLUTION SUBCUTANEOUS at 09:47

## 2024-08-09 ASSESSMENT — PAIN SCALES - GENERAL: PAINLEVEL: NO PAIN (0)

## 2024-08-09 NOTE — PROGRESS NOTES
Infusion Nursing Note:  Ora Gonzalez presents today for labs and N plate injection  Patient seen by provider today: No   present during visit today: Not Applicable.    Note: pt arrived with son.pt is pleasant but confused. Son reports everything is status quo. Pts appetite fairly good. No c/o at this time.        Treatment Conditions:  Lab Results   Component Value Date    HGB 14.5 08/09/2024    WBC 12.2 (H) 08/09/2024    ANEU 8.7 (H) 03/24/2023    ANEUTAUTO 7.0 08/09/2024     08/09/2024            Post Infusion Assessment:  Patient tolerated injection without incident into right upper arm.      Discharge Plan:   Departure Mode: Ambulatory.      Fior Lima RN

## 2024-08-16 ENCOUNTER — INFUSION THERAPY VISIT (OUTPATIENT)
Dept: INFUSION THERAPY | Facility: HOSPITAL | Age: 89
End: 2024-08-16
Attending: INTERNAL MEDICINE
Payer: OTHER MISCELLANEOUS

## 2024-08-16 VITALS
OXYGEN SATURATION: 98 % | RESPIRATION RATE: 18 BRPM | HEART RATE: 75 BPM | SYSTOLIC BLOOD PRESSURE: 169 MMHG | TEMPERATURE: 97.5 F | DIASTOLIC BLOOD PRESSURE: 75 MMHG

## 2024-08-16 DIAGNOSIS — D69.3 IMMUNE THROMBOCYTOPENIC PURPURA (H): Primary | ICD-10-CM

## 2024-08-16 PROCEDURE — 250N000011 HC RX IP 250 OP 636: Performed by: INTERNAL MEDICINE

## 2024-08-16 PROCEDURE — 96372 THER/PROPH/DIAG INJ SC/IM: CPT | Performed by: INTERNAL MEDICINE

## 2024-08-16 RX ADMIN — ROMIPLOSTIM 50 MCG: 250 INJECTION, POWDER, LYOPHILIZED, FOR SOLUTION SUBCUTANEOUS at 10:43

## 2024-08-16 NOTE — PROGRESS NOTES
Infusion Nursing Note:  Ora Gonzalez presents today for Nplate.    Patient seen by provider today: No   present during visit today: Not Applicable.    Note: Pt continues to have high BP and family is aware.  They are not treating this.  She continues to have mental decline and will be moving into a memory unit very soon (hopefully within the next week).  Nplate given subcutaneous into her left upper arm.      Intravenous Access:  No Intravenous access/labs at this visit.    Treatment Conditions:  Lab Results   Component Value Date    HGB 14.5 08/09/2024    WBC 12.2 (H) 08/09/2024    ANEU 8.7 (H) 03/24/2023    ANEUTAUTO 7.0 08/09/2024     08/09/2024        Results reviewed, labs MET treatment parameters, ok to proceed with treatment.      Post Infusion Assessment:  Patient tolerated injection without incident.       Discharge Plan:   Patient discharged in stable condition accompanied by: son.  Departure Mode: Ambulatory.      Carole Barrett RN

## 2024-08-23 ENCOUNTER — INFUSION THERAPY VISIT (OUTPATIENT)
Dept: INFUSION THERAPY | Facility: HOSPITAL | Age: 89
End: 2024-08-23
Attending: INTERNAL MEDICINE
Payer: OTHER MISCELLANEOUS

## 2024-08-23 VITALS
TEMPERATURE: 98.4 F | HEART RATE: 82 BPM | RESPIRATION RATE: 16 BRPM | OXYGEN SATURATION: 96 % | DIASTOLIC BLOOD PRESSURE: 72 MMHG | SYSTOLIC BLOOD PRESSURE: 162 MMHG

## 2024-08-23 DIAGNOSIS — D69.3 IMMUNE THROMBOCYTOPENIC PURPURA (H): Primary | ICD-10-CM

## 2024-08-23 PROCEDURE — 250N000011 HC RX IP 250 OP 636: Performed by: INTERNAL MEDICINE

## 2024-08-23 PROCEDURE — 96372 THER/PROPH/DIAG INJ SC/IM: CPT | Performed by: INTERNAL MEDICINE

## 2024-08-23 RX ADMIN — ROMIPLOSTIM 50 MCG: 250 INJECTION, POWDER, LYOPHILIZED, FOR SOLUTION SUBCUTANEOUS at 10:38

## 2024-08-23 ASSESSMENT — PAIN SCALES - GENERAL: PAINLEVEL: NO PAIN (0)

## 2024-08-23 NOTE — PROGRESS NOTES
Ora arrives ambulatory to [facility] for her NPLATE injection. injection given upper right arm. She tolerated well.

## 2024-08-30 ENCOUNTER — INFUSION THERAPY VISIT (OUTPATIENT)
Dept: INFUSION THERAPY | Facility: HOSPITAL | Age: 89
End: 2024-08-30
Attending: INTERNAL MEDICINE
Payer: OTHER MISCELLANEOUS

## 2024-08-30 VITALS
RESPIRATION RATE: 16 BRPM | TEMPERATURE: 97.5 F | SYSTOLIC BLOOD PRESSURE: 174 MMHG | OXYGEN SATURATION: 99 % | DIASTOLIC BLOOD PRESSURE: 76 MMHG | HEART RATE: 69 BPM

## 2024-08-30 DIAGNOSIS — D69.3 IMMUNE THROMBOCYTOPENIC PURPURA (H): Primary | ICD-10-CM

## 2024-08-30 PROCEDURE — 96372 THER/PROPH/DIAG INJ SC/IM: CPT | Performed by: INTERNAL MEDICINE

## 2024-08-30 PROCEDURE — 250N000011 HC RX IP 250 OP 636: Performed by: INTERNAL MEDICINE

## 2024-08-30 RX ADMIN — ROMIPLOSTIM 50 MCG: 250 INJECTION, POWDER, LYOPHILIZED, FOR SOLUTION SUBCUTANEOUS at 10:31

## 2024-08-30 NOTE — PROGRESS NOTES
Oar came to chemo infusion this morning for her next dose of Nplate.  She was accompanied by her son.  VSS.  Pt continues to be hypertensive.  Pt continues to decline mentally with no longer being oriented x3.  She is moving into memory care within the next week per her son.  Nplate given subcutaneous into her right upper arm and was tolerated well.  Ora left clinic ambulatory accompanied by her son.

## 2024-09-04 ENCOUNTER — PATIENT OUTREACH (OUTPATIENT)
Dept: ONCOLOGY | Facility: HOSPITAL | Age: 89
End: 2024-09-04
Payer: OTHER MISCELLANEOUS

## 2024-09-04 NOTE — PROGRESS NOTES
[facility]: Cancer Care                                                                                          Received a fax request from [facility] to fax over most recent visit note from Dr. Garcia and recent lab results from cancer clinic. I faxed most recent visit note and recent lab results to [facility] at [phone].    Signature:  Tierra Betancourt RN

## 2024-09-06 ENCOUNTER — INFUSION THERAPY VISIT (OUTPATIENT)
Dept: INFUSION THERAPY | Facility: HOSPITAL | Age: 89
End: 2024-09-06
Attending: INTERNAL MEDICINE
Payer: OTHER MISCELLANEOUS

## 2024-09-06 ENCOUNTER — PATIENT OUTREACH (OUTPATIENT)
Dept: ONCOLOGY | Facility: HOSPITAL | Age: 89
End: 2024-09-06

## 2024-09-06 VITALS
DIASTOLIC BLOOD PRESSURE: 68 MMHG | TEMPERATURE: 97.9 F | RESPIRATION RATE: 16 BRPM | SYSTOLIC BLOOD PRESSURE: 150 MMHG | OXYGEN SATURATION: 96 % | HEART RATE: 71 BPM

## 2024-09-06 DIAGNOSIS — D69.3 IMMUNE THROMBOCYTOPENIC PURPURA (H): Primary | ICD-10-CM

## 2024-09-06 LAB
BASOPHILS # BLD AUTO: 0.1 10E3/UL (ref 0–0.2)
BASOPHILS NFR BLD AUTO: 1 %
EOSINOPHIL # BLD AUTO: 0.1 10E3/UL (ref 0–0.7)
EOSINOPHIL NFR BLD AUTO: 1 %
ERYTHROCYTE [DISTWIDTH] IN BLOOD BY AUTOMATED COUNT: 14.7 % (ref 10–15)
HCT VFR BLD AUTO: 45.6 % (ref 35–47)
HGB BLD-MCNC: 15.1 G/DL (ref 11.7–15.7)
IMM GRANULOCYTES # BLD: 0.1 10E3/UL
IMM GRANULOCYTES NFR BLD: 1 %
LYMPHOCYTES # BLD AUTO: 2.8 10E3/UL (ref 0.8–5.3)
LYMPHOCYTES NFR BLD AUTO: 22 %
MCH RBC QN AUTO: 32 PG (ref 26.5–33)
MCHC RBC AUTO-ENTMCNC: 33.1 G/DL (ref 31.5–36.5)
MCV RBC AUTO: 97 FL (ref 78–100)
MONOCYTES # BLD AUTO: 0.9 10E3/UL (ref 0–1.3)
MONOCYTES NFR BLD AUTO: 8 %
NEUTROPHILS # BLD AUTO: 8.3 10E3/UL (ref 1.6–8.3)
NEUTROPHILS NFR BLD AUTO: 68 %
NRBC # BLD AUTO: 0 10E3/UL
NRBC BLD AUTO-RTO: 0 /100
PLATELET # BLD AUTO: 164 10E3/UL (ref 150–450)
RBC # BLD AUTO: 4.72 10E6/UL (ref 3.8–5.2)
WBC # BLD AUTO: 12.3 10E3/UL (ref 4–11)

## 2024-09-06 PROCEDURE — 96372 THER/PROPH/DIAG INJ SC/IM: CPT | Performed by: INTERNAL MEDICINE

## 2024-09-06 PROCEDURE — 36415 COLL VENOUS BLD VENIPUNCTURE: CPT

## 2024-09-06 PROCEDURE — 250N000011 HC RX IP 250 OP 636: Performed by: INTERNAL MEDICINE

## 2024-09-06 PROCEDURE — 85025 COMPLETE CBC W/AUTO DIFF WBC: CPT

## 2024-09-06 RX ADMIN — ROMIPLOSTIM 50 MCG: 250 INJECTION, POWDER, LYOPHILIZED, FOR SOLUTION SUBCUTANEOUS at 10:52

## 2024-09-06 ASSESSMENT — PAIN SCALES - GENERAL: PAINLEVEL: NO PAIN (0)

## 2024-09-06 NOTE — PROGRESS NOTES
[facility]: Cancer Care                                                                                          Called [facility] to ask if the patient was still enrolled in hospice. Intake staff member stated that the patient was discharged from hospice. I then called patient's son Gomez to confirm to see if the patient was still on hospice with [facility].  Patient's son Gomez stated that she was recently discharged from [facility] and was moved into [facility].  Now that the patient is off of hospice care and still coming in weekly for Nplate injections, she will need a follow-up visit with a provider since the last time she was seen was in 2023.    Signature:  Tierra Betancourt RN

## 2024-09-06 NOTE — PROGRESS NOTES
Infusion Nursing Note:  Ora Gonzalez presents today for lab and Nplate injection.    Patient seen by provider today: No   present during visit today: Not Applicable.    Note: Patient assessed and vital signs stable. Platelets 164 today. Administered Nplate subcutaneous as ordered per provider. Tolerated without issue.      Intravenous Access:  Labs drawn without difficulty.    Treatment Conditions:  Lab Results   Component Value Date    HGB 15.1 09/06/2024    WBC 12.3 (H) 09/06/2024    ANEU 8.7 (H) 03/24/2023    ANEUTAUTO 8.3 09/06/2024     09/06/2024        Results reviewed, labs MET treatment parameters, ok to proceed with treatment.      Post Infusion Assessment:  Patient tolerated injection without incident.       Discharge Plan:   Patient and/or family verbalized understanding of discharge instructions and all questions answered.  Patient discharged in stable condition accompanied by: son.  Departure Mode: Ambulatory.      CLAUDINE CHE RN

## 2024-09-09 ENCOUNTER — LAB REQUISITION (OUTPATIENT)
Dept: LAB | Facility: CLINIC | Age: 89
End: 2024-09-09
Payer: OTHER MISCELLANEOUS

## 2024-09-09 DIAGNOSIS — D69.3 IMMUNE THROMBOCYTOPENIC PURPURA (H): ICD-10-CM

## 2024-09-09 DIAGNOSIS — N18.31 CHRONIC KIDNEY DISEASE, STAGE 3A (H): ICD-10-CM

## 2024-09-09 DIAGNOSIS — G30.1 ALZHEIMER'S DISEASE WITH LATE ONSET (CODE) (H): ICD-10-CM

## 2024-09-09 DIAGNOSIS — I48.0 PAROXYSMAL ATRIAL FIBRILLATION (H): ICD-10-CM

## 2024-09-10 LAB
ALBUMIN SERPL BCG-MCNC: 4.1 G/DL (ref 3.5–5.2)
ALP SERPL-CCNC: 82 U/L (ref 40–150)
ALT SERPL W P-5'-P-CCNC: 13 U/L (ref 0–50)
ANION GAP SERPL CALCULATED.3IONS-SCNC: 12 MMOL/L (ref 7–15)
AST SERPL W P-5'-P-CCNC: 35 U/L (ref 0–45)
BILIRUB SERPL-MCNC: 0.5 MG/DL
BUN SERPL-MCNC: 21 MG/DL (ref 8–23)
CALCIUM SERPL-MCNC: 9 MG/DL (ref 8.8–10.4)
CHLORIDE SERPL-SCNC: 107 MMOL/L (ref 98–107)
CREAT SERPL-MCNC: 1.07 MG/DL (ref 0.51–0.95)
EGFRCR SERPLBLD CKD-EPI 2021: 49 ML/MIN/1.73M2
ERYTHROCYTE [DISTWIDTH] IN BLOOD BY AUTOMATED COUNT: 15 % (ref 10–15)
GLUCOSE SERPL-MCNC: 111 MG/DL (ref 70–99)
HCO3 SERPL-SCNC: 23 MMOL/L (ref 22–29)
HCT VFR BLD AUTO: 46.1 % (ref 35–47)
HGB BLD-MCNC: 14.8 G/DL (ref 11.7–15.7)
MCH RBC QN AUTO: 31.8 PG (ref 26.5–33)
MCHC RBC AUTO-ENTMCNC: 32.1 G/DL (ref 31.5–36.5)
MCV RBC AUTO: 99 FL (ref 78–100)
PLATELET # BLD AUTO: 207 10E3/UL (ref 150–450)
POTASSIUM SERPL-SCNC: 4.4 MMOL/L (ref 3.4–5.3)
PROT SERPL-MCNC: 7.4 G/DL (ref 6.4–8.3)
RBC # BLD AUTO: 4.65 10E6/UL (ref 3.8–5.2)
SODIUM SERPL-SCNC: 142 MMOL/L (ref 135–145)
TSH SERPL DL<=0.005 MIU/L-ACNC: 1.27 UIU/ML (ref 0.3–4.2)
VIT B12 SERPL-MCNC: 386 PG/ML (ref 232–1245)
WBC # BLD AUTO: 13.9 10E3/UL (ref 4–11)

## 2024-09-10 PROCEDURE — P9604 ONE-WAY ALLOW PRORATED TRIP: HCPCS | Mod: ORL | Performed by: PHYSICIAN ASSISTANT

## 2024-09-10 PROCEDURE — 36415 COLL VENOUS BLD VENIPUNCTURE: CPT | Mod: ORL | Performed by: PHYSICIAN ASSISTANT

## 2024-09-10 PROCEDURE — 84443 ASSAY THYROID STIM HORMONE: CPT | Mod: ORL | Performed by: PHYSICIAN ASSISTANT

## 2024-09-10 PROCEDURE — 82607 VITAMIN B-12: CPT | Mod: ORL | Performed by: PHYSICIAN ASSISTANT

## 2024-09-10 PROCEDURE — 85027 COMPLETE CBC AUTOMATED: CPT | Mod: ORL | Performed by: PHYSICIAN ASSISTANT

## 2024-09-10 PROCEDURE — 80053 COMPREHEN METABOLIC PANEL: CPT | Mod: ORL | Performed by: PHYSICIAN ASSISTANT

## 2024-09-12 DIAGNOSIS — D69.3 IMMUNE THROMBOCYTOPENIC PURPURA (H): Primary | ICD-10-CM

## 2024-09-13 ENCOUNTER — INFUSION THERAPY VISIT (OUTPATIENT)
Dept: INFUSION THERAPY | Facility: HOSPITAL | Age: 89
End: 2024-09-13
Attending: INTERNAL MEDICINE
Payer: OTHER MISCELLANEOUS

## 2024-09-13 VITALS
SYSTOLIC BLOOD PRESSURE: 161 MMHG | DIASTOLIC BLOOD PRESSURE: 71 MMHG | RESPIRATION RATE: 14 BRPM | HEART RATE: 76 BPM | OXYGEN SATURATION: 96 % | TEMPERATURE: 97.5 F

## 2024-09-13 DIAGNOSIS — D69.3 IMMUNE THROMBOCYTOPENIC PURPURA (H): Primary | ICD-10-CM

## 2024-09-13 PROCEDURE — 96372 THER/PROPH/DIAG INJ SC/IM: CPT | Performed by: NURSE PRACTITIONER

## 2024-09-13 PROCEDURE — 250N000011 HC RX IP 250 OP 636: Performed by: NURSE PRACTITIONER

## 2024-09-13 RX ADMIN — ROMIPLOSTIM 50 MCG: 250 INJECTION, POWDER, LYOPHILIZED, FOR SOLUTION SUBCUTANEOUS at 10:35

## 2024-09-20 ENCOUNTER — INFUSION THERAPY VISIT (OUTPATIENT)
Dept: INFUSION THERAPY | Facility: HOSPITAL | Age: 89
End: 2024-09-20
Attending: INTERNAL MEDICINE
Payer: OTHER MISCELLANEOUS

## 2024-09-20 VITALS
DIASTOLIC BLOOD PRESSURE: 71 MMHG | OXYGEN SATURATION: 96 % | HEART RATE: 79 BPM | SYSTOLIC BLOOD PRESSURE: 168 MMHG | TEMPERATURE: 97.6 F | RESPIRATION RATE: 12 BRPM

## 2024-09-20 DIAGNOSIS — D69.3 IMMUNE THROMBOCYTOPENIC PURPURA (H): Primary | ICD-10-CM

## 2024-09-20 PROCEDURE — 250N000011 HC RX IP 250 OP 636: Performed by: NURSE PRACTITIONER

## 2024-09-20 PROCEDURE — 96372 THER/PROPH/DIAG INJ SC/IM: CPT | Performed by: NURSE PRACTITIONER

## 2024-09-20 RX ADMIN — ROMIPLOSTIM 50 MCG: 250 INJECTION, POWDER, LYOPHILIZED, FOR SOLUTION SUBCUTANEOUS at 11:15

## 2024-09-20 NOTE — PROGRESS NOTES
"Infusion Nursing Note:  Ora Gonzalez presents today for romiplostim injection.    Patient seen by provider today: No   present during visit today: Not Applicable.    Note: Pt arrives ambulatory with her son. Confirms she is here for \"her shot\". Denies any new symptoms, pain, or medications. VSS.      Intravenous Access:  No Intravenous access/labs at this visit.    Treatment Conditions:  Not Applicable.      Post Infusion Assessment:  Patient tolerated injection without incident.       Discharge Plan:   Patient and/or family verbalized understanding of discharge instructions and all questions answered.  Patient discharged in stable condition accompanied by: son.  Departure Mode: Ambulatory.      Little Javier RN   "

## 2024-09-26 ENCOUNTER — INFUSION THERAPY VISIT (OUTPATIENT)
Dept: INFUSION THERAPY | Facility: HOSPITAL | Age: 89
End: 2024-09-26
Payer: OTHER MISCELLANEOUS

## 2024-09-26 VITALS
SYSTOLIC BLOOD PRESSURE: 195 MMHG | OXYGEN SATURATION: 98 % | DIASTOLIC BLOOD PRESSURE: 85 MMHG | RESPIRATION RATE: 16 BRPM | TEMPERATURE: 97.8 F | HEART RATE: 68 BPM

## 2024-09-26 DIAGNOSIS — D69.3 IMMUNE THROMBOCYTOPENIC PURPURA (H): Primary | ICD-10-CM

## 2024-09-26 PROCEDURE — 96372 THER/PROPH/DIAG INJ SC/IM: CPT | Performed by: NURSE PRACTITIONER

## 2024-09-26 PROCEDURE — 250N000011 HC RX IP 250 OP 636: Performed by: NURSE PRACTITIONER

## 2024-09-26 RX ADMIN — ROMIPLOSTIM 50 MCG: 250 INJECTION, POWDER, LYOPHILIZED, FOR SOLUTION SUBCUTANEOUS at 11:06

## 2024-09-26 ASSESSMENT — PAIN SCALES - GENERAL: PAINLEVEL: NO PAIN (0)

## 2024-09-26 NOTE — PROGRESS NOTES
"Infusion Nursing Note:  Ora Gonzalez presents today for Nplate injection.    Patient seen by provider today: No   present during visit today: Not Applicable.    Note: Patient's vital signs stable and states she has had no changes and feels the \"same as always\". Administered Nplate subcutaneous (left arm) as ordered per provider.      Intravenous Access:  No Intravenous access/labs at this visit.    Treatment Conditions:  Not Applicable.      Post Infusion Assessment:  Patient tolerated injection without incident.       Discharge Plan:   Patient and/or family verbalized understanding of discharge instructions and all questions answered.  Patient discharged in stable condition accompanied by: son.  Departure Mode: Ambulatory.      CLAUDINE CHE RN    "

## 2024-10-04 ENCOUNTER — LAB (OUTPATIENT)
Dept: INFUSION THERAPY | Facility: HOSPITAL | Age: 89
End: 2024-10-04
Payer: OTHER MISCELLANEOUS

## 2024-10-04 ENCOUNTER — INFUSION THERAPY VISIT (OUTPATIENT)
Dept: INFUSION THERAPY | Facility: HOSPITAL | Age: 89
End: 2024-10-04
Payer: OTHER MISCELLANEOUS

## 2024-10-04 VITALS
RESPIRATION RATE: 16 BRPM | DIASTOLIC BLOOD PRESSURE: 70 MMHG | HEART RATE: 83 BPM | OXYGEN SATURATION: 97 % | SYSTOLIC BLOOD PRESSURE: 150 MMHG

## 2024-10-04 DIAGNOSIS — D69.3 IMMUNE THROMBOCYTOPENIC PURPURA (H): ICD-10-CM

## 2024-10-04 DIAGNOSIS — D69.3 IMMUNE THROMBOCYTOPENIC PURPURA (H): Primary | ICD-10-CM

## 2024-10-04 LAB
BASOPHILS # BLD AUTO: 0.1 10E3/UL (ref 0–0.2)
BASOPHILS NFR BLD AUTO: 1 %
EOSINOPHIL # BLD AUTO: 0.2 10E3/UL (ref 0–0.7)
EOSINOPHIL NFR BLD AUTO: 2 %
ERYTHROCYTE [DISTWIDTH] IN BLOOD BY AUTOMATED COUNT: 15.1 % (ref 10–15)
HCT VFR BLD AUTO: 45.8 % (ref 35–47)
HGB BLD-MCNC: 14.7 G/DL (ref 11.7–15.7)
IMM GRANULOCYTES # BLD: 0.1 10E3/UL
IMM GRANULOCYTES NFR BLD: 1 %
LYMPHOCYTES # BLD AUTO: 1.9 10E3/UL (ref 0.8–5.3)
LYMPHOCYTES NFR BLD AUTO: 15 %
MCH RBC QN AUTO: 31.5 PG (ref 26.5–33)
MCHC RBC AUTO-ENTMCNC: 32.1 G/DL (ref 31.5–36.5)
MCV RBC AUTO: 98 FL (ref 78–100)
MONOCYTES # BLD AUTO: 1 10E3/UL (ref 0–1.3)
MONOCYTES NFR BLD AUTO: 8 %
NEUTROPHILS # BLD AUTO: 9 10E3/UL (ref 1.6–8.3)
NEUTROPHILS NFR BLD AUTO: 73 %
NRBC # BLD AUTO: 0 10E3/UL
NRBC BLD AUTO-RTO: 0 /100
PLATELET # BLD AUTO: 121 10E3/UL (ref 150–450)
RBC # BLD AUTO: 4.67 10E6/UL (ref 3.8–5.2)
WBC # BLD AUTO: 12.3 10E3/UL (ref 4–11)

## 2024-10-04 PROCEDURE — 85025 COMPLETE CBC W/AUTO DIFF WBC: CPT

## 2024-10-04 PROCEDURE — 250N000011 HC RX IP 250 OP 636: Performed by: NURSE PRACTITIONER

## 2024-10-04 PROCEDURE — 96372 THER/PROPH/DIAG INJ SC/IM: CPT | Performed by: NURSE PRACTITIONER

## 2024-10-04 PROCEDURE — 36415 COLL VENOUS BLD VENIPUNCTURE: CPT

## 2024-10-04 RX ADMIN — ROMIPLOSTIM 50 MCG: 250 INJECTION, POWDER, LYOPHILIZED, FOR SOLUTION SUBCUTANEOUS at 10:57

## 2024-10-04 NOTE — PROGRESS NOTES
Infusion Nursing Note:  Ora Gonzalez presents today for Nplate injection.    Patient seen by provider today: No   present during visit today: Not Applicable.    Note: Patient's vital signs stable and states she has had no changes and feels well today.  Administered Nplate subcutaneous (left arm) as ordered per provider.      Intravenous Access:  No Intravenous access/labs at this visit.    Treatment Conditions:  Not Applicable.      Post Infusion Assessment:  Patient tolerated injection without incident.       Discharge Plan:   Patient and/or family verbalized understanding of discharge instructions and all questions answered.  Patient discharged in stable condition accompanied by: son.  Departure Mode: Ambulatory.    Lilibeth Aguayo RN

## 2024-10-09 ENCOUNTER — TELEPHONE (OUTPATIENT)
Dept: ONCOLOGY | Facility: HOSPITAL | Age: 89
End: 2024-10-09
Payer: OTHER MISCELLANEOUS

## 2024-10-09 NOTE — TELEPHONE ENCOUNTER
Yanni from [facility] called today to see if patient is okay to get a flu vaccine and covid-19 booster. I let her know that Dr. Garcia recommends patients get the vaccines on different days (usually flu vaccine first). She verbalized understanding and has no other questions at this time.     Radha Rodas RN on 10/9/2024 at 12:58 PM

## 2024-10-11 ENCOUNTER — INFUSION THERAPY VISIT (OUTPATIENT)
Dept: INFUSION THERAPY | Facility: HOSPITAL | Age: 89
End: 2024-10-11
Attending: INTERNAL MEDICINE
Payer: OTHER MISCELLANEOUS

## 2024-10-11 VITALS
DIASTOLIC BLOOD PRESSURE: 55 MMHG | OXYGEN SATURATION: 97 % | TEMPERATURE: 97.7 F | RESPIRATION RATE: 18 BRPM | SYSTOLIC BLOOD PRESSURE: 138 MMHG | HEART RATE: 83 BPM

## 2024-10-11 DIAGNOSIS — D69.3 IMMUNE THROMBOCYTOPENIC PURPURA (H): Primary | ICD-10-CM

## 2024-10-11 PROCEDURE — 91320 SARSCV2 VAC 30MCG TRS-SUC IM: CPT | Performed by: NURSE PRACTITIONER

## 2024-10-11 PROCEDURE — 250N000011 HC RX IP 250 OP 636: Mod: JW | Performed by: NURSE PRACTITIONER

## 2024-10-11 PROCEDURE — 90480 ADMN SARSCOV2 VAC 1/ONLY CMP: CPT | Performed by: NURSE PRACTITIONER

## 2024-10-11 PROCEDURE — 96372 THER/PROPH/DIAG INJ SC/IM: CPT | Performed by: NURSE PRACTITIONER

## 2024-10-11 PROCEDURE — 250N000011 HC RX IP 250 OP 636: Performed by: NURSE PRACTITIONER

## 2024-10-11 RX ADMIN — ROMIPLOSTIM 50 MCG: 250 INJECTION, POWDER, LYOPHILIZED, FOR SOLUTION SUBCUTANEOUS at 10:53

## 2024-10-11 RX ADMIN — COVID-19 VACCINE, MRNA 30 MCG: 0.04 INJECTION, SUSPENSION INTRAMUSCULAR at 11:15

## 2024-10-11 NOTE — PROGRESS NOTES
Infusion Nursing Note:  Ora Gonzalez presents today for Nplate. Pt son also asked if she could get Covid vaccine today.   Patient seen by provider today: No   present during visit today: Not Applicable.    Note: VSS.  Pt assessed with son present.  Pt dementia keeps her from being a good historian.  Nplate given subcutaneous into her left upper arm.  Covid vaccine was given IM into her right deltoid    Intravenous Access:  No Intravenous access/labs at this visit.    Treatment Conditions:  Not Applicable.      Post Infusion Assessment:  Patient tolerated injection without incident.       Discharge Plan:   Patient discharged in stable condition accompanied by: son.  Departure Mode: Ambulatory.      Carole Barrett RN

## 2024-10-18 ENCOUNTER — INFUSION THERAPY VISIT (OUTPATIENT)
Dept: INFUSION THERAPY | Facility: HOSPITAL | Age: 89
End: 2024-10-18
Payer: OTHER MISCELLANEOUS

## 2024-10-18 DIAGNOSIS — D69.3 IMMUNE THROMBOCYTOPENIC PURPURA (H): Primary | ICD-10-CM

## 2024-10-18 PROCEDURE — 96372 THER/PROPH/DIAG INJ SC/IM: CPT | Performed by: NURSE PRACTITIONER

## 2024-10-18 PROCEDURE — 250N000011 HC RX IP 250 OP 636: Mod: JW | Performed by: NURSE PRACTITIONER

## 2024-10-18 RX ADMIN — ROMIPLOSTIM 50 MCG: 250 INJECTION, POWDER, LYOPHILIZED, FOR SOLUTION SUBCUTANEOUS at 10:31

## 2024-10-18 NOTE — PROGRESS NOTES
Infusion Nursing Note:  Ora Gonzalez presents today for N plate injection.    Patient seen by provider today: No   present during visit today: Not Applicable.    Note: PT here ambulatory with walker for Np late inj. On 10/4 platelets were 121. Reviewed inj with pt and administered in right upper arm/bandaid to site. PT dc'd slow gait with walker      Intravenous Access:  No Intravenous access/labs at this visit.    Treatment Conditions:  Results reviewed, labs MET treatment parameters, ok to proceed with treatment.      Post Infusion Assessment:  Patient tolerated injection without incident.       Discharge Plan:   Follow up reviewed.      Vijaya Akins RN

## 2024-10-21 ENCOUNTER — TRANSFERRED RECORDS (OUTPATIENT)
Dept: HEALTH INFORMATION MANAGEMENT | Facility: CLINIC | Age: 89
End: 2024-10-21
Payer: OTHER MISCELLANEOUS

## 2024-10-22 ENCOUNTER — HOSPITAL ENCOUNTER (EMERGENCY)
Facility: HOSPITAL | Age: 89
Discharge: HOME | End: 2024-10-22
Attending: EMERGENCY MEDICINE | Admitting: EMERGENCY MEDICINE
Payer: OTHER MISCELLANEOUS

## 2024-10-22 ENCOUNTER — APPOINTMENT (OUTPATIENT)
Dept: CT IMAGING | Facility: HOSPITAL | Age: 89
End: 2024-10-22
Attending: EMERGENCY MEDICINE
Payer: OTHER MISCELLANEOUS

## 2024-10-22 VITALS
OXYGEN SATURATION: 98 % | RESPIRATION RATE: 21 BRPM | DIASTOLIC BLOOD PRESSURE: 82 MMHG | TEMPERATURE: 97.9 F | SYSTOLIC BLOOD PRESSURE: 188 MMHG | HEART RATE: 77 BPM

## 2024-10-22 DIAGNOSIS — R42 DIZZINESS: ICD-10-CM

## 2024-10-22 LAB
ALBUMIN SERPL BCG-MCNC: 3.9 G/DL (ref 3.5–5.2)
ALBUMIN UR-MCNC: NEGATIVE MG/DL
ALP SERPL-CCNC: 88 U/L (ref 40–150)
ALT SERPL W P-5'-P-CCNC: 25 U/L (ref 0–50)
ANION GAP SERPL CALCULATED.3IONS-SCNC: 11 MMOL/L (ref 7–15)
APPEARANCE UR: CLEAR
AST SERPL W P-5'-P-CCNC: 32 U/L (ref 0–45)
BILIRUB SERPL-MCNC: 0.5 MG/DL
BILIRUB UR QL STRIP: NEGATIVE
BUN SERPL-MCNC: 24.1 MG/DL (ref 8–23)
CALCIUM SERPL-MCNC: 9.3 MG/DL (ref 8.8–10.4)
CHLORIDE SERPL-SCNC: 105 MMOL/L (ref 98–107)
COLOR UR AUTO: ABNORMAL
CREAT SERPL-MCNC: 1.05 MG/DL (ref 0.51–0.95)
EGFRCR SERPLBLD CKD-EPI 2021: 50 ML/MIN/1.73M2
ERYTHROCYTE [DISTWIDTH] IN BLOOD BY AUTOMATED COUNT: 15.2 % (ref 10–15)
GLUCOSE SERPL-MCNC: 152 MG/DL (ref 70–99)
GLUCOSE UR STRIP-MCNC: NEGATIVE MG/DL
HCO3 SERPL-SCNC: 25 MMOL/L (ref 22–29)
HCT VFR BLD AUTO: 42.1 % (ref 35–47)
HGB BLD-MCNC: 13.9 G/DL (ref 11.7–15.7)
HGB UR QL STRIP: NEGATIVE
KETONES UR STRIP-MCNC: NEGATIVE MG/DL
LEUKOCYTE ESTERASE UR QL STRIP: NEGATIVE
MAGNESIUM SERPL-MCNC: 2.4 MG/DL (ref 1.7–2.3)
MCH RBC QN AUTO: 32 PG (ref 26.5–33)
MCHC RBC AUTO-ENTMCNC: 33 G/DL (ref 31.5–36.5)
MCV RBC AUTO: 97 FL (ref 78–100)
MUCOUS THREADS #/AREA URNS LPF: PRESENT /LPF
NITRATE UR QL: NEGATIVE
PH UR STRIP: 5.5 [PH] (ref 5–7)
PLATELET # BLD AUTO: 220 10E3/UL (ref 150–450)
POTASSIUM SERPL-SCNC: 4.5 MMOL/L (ref 3.4–5.3)
PROT SERPL-MCNC: 7.3 G/DL (ref 6.4–8.3)
RBC # BLD AUTO: 4.34 10E6/UL (ref 3.8–5.2)
RBC URINE: 2 /HPF
SODIUM SERPL-SCNC: 141 MMOL/L (ref 135–145)
SP GR UR STRIP: 1.02 (ref 1–1.03)
TRANSITIONAL EPI: <1 /HPF
TROPONIN T SERPL HS-MCNC: 13 NG/L
TROPONIN T SERPL HS-MCNC: 14 NG/L
UROBILINOGEN UR STRIP-MCNC: <2 MG/DL
WBC # BLD AUTO: 16 10E3/UL (ref 4–11)
WBC URINE: 1 /HPF

## 2024-10-22 PROCEDURE — 81003 URINALYSIS AUTO W/O SCOPE: CPT | Performed by: EMERGENCY MEDICINE

## 2024-10-22 PROCEDURE — 250N000013 HC RX MED GY IP 250 OP 250 PS 637: Performed by: EMERGENCY MEDICINE

## 2024-10-22 PROCEDURE — 70450 CT HEAD/BRAIN W/O DYE: CPT

## 2024-10-22 PROCEDURE — 82040 ASSAY OF SERUM ALBUMIN: CPT | Performed by: EMERGENCY MEDICINE

## 2024-10-22 PROCEDURE — 84484 ASSAY OF TROPONIN QUANT: CPT | Performed by: EMERGENCY MEDICINE

## 2024-10-22 PROCEDURE — 85027 COMPLETE CBC AUTOMATED: CPT | Performed by: EMERGENCY MEDICINE

## 2024-10-22 PROCEDURE — 83735 ASSAY OF MAGNESIUM: CPT | Performed by: EMERGENCY MEDICINE

## 2024-10-22 PROCEDURE — 99285 EMERGENCY DEPT VISIT HI MDM: CPT | Mod: 25

## 2024-10-22 PROCEDURE — 93005 ELECTROCARDIOGRAM TRACING: CPT | Performed by: EMERGENCY MEDICINE

## 2024-10-22 PROCEDURE — 36415 COLL VENOUS BLD VENIPUNCTURE: CPT | Performed by: EMERGENCY MEDICINE

## 2024-10-22 RX ORDER — ACETAMINOPHEN 500 MG
500-1000 TABLET ORAL EVERY 6 HOURS PRN
Qty: 60 TABLET | Refills: 0 | Status: SHIPPED | OUTPATIENT
Start: 2024-10-22

## 2024-10-22 RX ORDER — ACETAMINOPHEN 325 MG/1
650 TABLET ORAL ONCE
Status: COMPLETED | OUTPATIENT
Start: 2024-10-22 | End: 2024-10-22

## 2024-10-22 RX ADMIN — ACETAMINOPHEN 650 MG: 325 TABLET ORAL at 19:21

## 2024-10-22 ASSESSMENT — COLUMBIA-SUICIDE SEVERITY RATING SCALE - C-SSRS
6. HAVE YOU EVER DONE ANYTHING, STARTED TO DO ANYTHING, OR PREPARED TO DO ANYTHING TO END YOUR LIFE?: NO
1. IN THE PAST MONTH, HAVE YOU WISHED YOU WERE DEAD OR WISHED YOU COULD GO TO SLEEP AND NOT WAKE UP?: NO
2. HAVE YOU ACTUALLY HAD ANY THOUGHTS OF KILLING YOURSELF IN THE PAST MONTH?: NO

## 2024-10-22 ASSESSMENT — ACTIVITIES OF DAILY LIVING (ADL)
ADLS_ACUITY_SCORE: 35

## 2024-10-22 NOTE — ED NOTES
Bed: JNED-26  Expected date: 10/22/24  Expected time: 3:41 PM  Means of arrival: Ambulance  Comments:  Wang CRUZ  Weakness

## 2024-10-22 NOTE — ED PROVIDER NOTES
EMERGENCY DEPARTMENT ENCOUNTER      NAME: Ora oGnzalez  AGE: 89 year old female  YOB: 1933  MRN: [id number]  EVALUATION DATE & TIME: 10/22/2024  3:44 PM    PCP: Ryanne Corbett    ED PROVIDER: Cesar Hannah M.D.      Chief Complaint   Patient presents with    Fall         FINAL IMPRESSION:  Dizziness      ED COURSE & MEDICAL DECISION MAKING:    Pertinent Labs & Imaging studies reviewed. (See chart for details)  89 year old female presents to the Emergency Department for evaluation of general weakness potential foot drop.  Per report patient had a fall on 10/17/2024.  Since then she has had a foot drop.  Did not have any imaging performed.  On exam patient is alert and appropriate.  Sensorimotor exam nonfocal.  She has good plantar strength in dorsiflexion of the feet.  Patient was not ambulated out of caution until after imaging.  Will obtain baseline blood work to assess for contributory electrolyte imbalance, anemia.. Patient appears non toxic with stable vitals signs. Overall exam is benign.        4:03 PM I met with the patient for the initial interview and physical examination. Discussed plan for treatment and workup in the ED.    5:23 PM.  Comprehensive metabolic essentially unremarkable other than minimal hyperglycemia glucose of 152.  White cell count elevated at 16.  Hemoglobin normal at 13.9.  Magnesium slightly elevated 2.9.  Urine pending.  CT head reviewed.  No evidence of obvious mass nor hemorrhage.  Awaiting definitive report.  6:16 PM.  No acute findings per radiology.  Age-related volume loss.  Urine without evidence of infection.  Will ambulate patient.  Patient's son and daughter now present.  They report patient has been having trouble with dizziness for some time.  Resides at local [facility].  7 PM.  Patient ambulated with walker.  Does report some dizziness.  Ambulated without evidence of foot drop.  Did indicate some slight right thigh discomfort.  No indications for  "additional imaging or laboratory evaluation.  Will proceed with plans for outpatient management.  At the conclusion of the encounter I discussed the results of all of the tests and the disposition. The questions were answered and return precautions provided. The patient or family acknowledged understanding and was agreeable with the care plan.       PPE: N/A     MEDICATIONS GIVEN IN THE EMERGENCY:  Medications - No data to display    NEW PRESCRIPTIONS STARTED AT TODAY'S ER VISIT  New Prescriptions    No medications on file          =================================================================    HPI    Patient information was obtained from: Patient     Use of Intrepreter: N/A         Ora Gonzalez is a 89 year old female with a pertient medical history of Alzheimer's, immune thrombocytopenic purpura, HLD, HTN, osteoporosis, CKD and paroxysmal atrial fibrillation who presents to the ED for evaluation of a fall.    Per chart review, the patient was seen by [facility] on 10/18 for a routine N plate injection (50 mcg). On 10/4 (18 days ago), her platelets were 121. Reviewed injection with the patient and administered in right upper arm.     Per EMS, the patient arrives from [facility] care for evaluation of left foot dragging since her fall on 10/17 (6 days ago). She had another fall today.    Per the patient, she states that she fell today because it is \"hard to walk\" on both of her feet. She does use a walker at baseline. Patient denies any pain related symptoms. She states that she came from home but further reports that she has been \"eating what they give me.\" She has no other concerns at this time.      REVIEW OF SYSTEMS   Constitutional:  Denies fever, chills  Respiratory:  Denies productive cough or increased work of breathing  Cardiovascular:  Denies chest pain, palpitations  GI:  Denies abdominal pain, nausea, vomiting, or change in bowel or bladder habits   Musculoskeletal:  " "Denies any new muscle/joint swelling  Skin:  Denies rash   Neurologic:  Denies focal weakness  All systems negative except as marked.     PAST MEDICAL HISTORY:  Past Medical History:   Diagnosis Date    Adjustment disorder with mixed anxiety and depressed mood 2016    Adjustment reaction to chronic stress 2016    Chronic kidney disease     History of pulmonary embolism 2020    Hypertension     ITP (idiopathic thrombocytopenic purpura)     Osteoporosis     Paroxysmal atrial fibrillation (H)     Pulmonary embolism (H) 2016    Thrombocytopenia (H)        PAST SURGICAL HISTORY:  Past Surgical History:   Procedure Laterality Date    CATARACT EXTRACTION Left 10/2014    ESOPHAGOSCOPY, GASTROSCOPY, DUODENOSCOPY (EGD), COMBINED N/A 2017    Procedure: ESOPHAGOGASTRODUODENOSCOPY (EGD);  Surgeon: Juanpablo John MD;  Location: [facility] GI;  Service:     HEMORRHOIDECTOMY INTERNAL LIGATION      Description: Hemorrhoidectomy;  Recorded: 2008;  Comments: with fissure    IR EMBOLIZATION VASCULAR NON HEAD/NECK  2017    LAPAROSCOPIC SPLENECTOMY N/A 2015    Procedure: LAPAROSCOPIC TO CONVERSION TO OPEN SPLENECTOMY;  Surgeon: Herb Mckeon MD;  Location: [facility] Main OR;  Service:     OTHER SURGICAL HISTORY      blake barboza joe    [facility]  4/15/2017              CURRENT MEDICATIONS:    No current facility-administered medications for this encounter.    Current Outpatient Medications:     cetirizine (ZYRTEC) 5 MG tablet, Take 5 mg by mouth daily (Patient not taking: Reported on 2024), Disp: , Rfl:     donepezil (ARICEPT) 10 MG tablet, TAKE 1 TABLET BY MOUTH EVERYDAY AT BEDTIME (Patient not taking: Reported on 2024), Disp: 30 tablet, Rfl: 0    ALLERGIES:  No Known Allergies    FAMILY HISTORY:  Family History   Problem Relation Age of Onset    Depression Father     Suicidality Father     Alzheimer Disease Sister     No Known Problems Mother          90s of \"old age\"    No Known " Problems Father     Diabetes Type 2  Son     Other - See Comments Other         multiple family members with silicosis / black lung    Parkinsonism Daughter     Alzheimer Disease Sister     No Known Problems Brother     No Known Problems Sister     No Known Problems Brother     No Known Problems Brother     No Known Problems Son        SOCIAL HISTORY:   Social History     Socioeconomic History    Marital status:    Tobacco Use    Smoking status: Former     Types: Cigarettes    Smokeless tobacco: Never   Substance and Sexual Activity    Alcohol use: Not Currently       VITALS:  Patient Vitals for the past 24 hrs:   BP Temp Temp src Pulse Resp SpO2   10/22/24 1546 (!) 169/71 97.9  F (36.6  C) Oral 90 22 96 %        PHYSICAL EXAM    Constitutional:  Awake, alert, in no apparent distress  HENT:  Normocephalic, Atraumatic. Bilateral external ears normal. Dry mucus membranes. Nose normal. Neck- Normal range of motion with no guarding, No midline cervical tenderness, Supple, No stridor.   Eyes:  PERRL, EOMI with no signs of entrapment, Conjunctiva normal, No discharge.   Respiratory:  Normal breath sounds, No respiratory distress, No wheezing.    Cardiovascular:  Normal heart rate, Normal rhythm, No appreciable rubs or gallops.   GI:  Soft, No tenderness, No distension, No palpable masses  Musculoskeletal: , No edema. Good range of motion in all major joints. No tenderness to palpation or major deformities noted. No outward signs of injury.  Integument:  Warm, Dry, No erythema, No rash.   Neurologic:  Alert & oriented, Normal motor function, Normal sensory function, No focal deficits noted, some confusion.  Psychiatric:  Affect normal, Judgment normal, Mood normal.     LAB:  All pertinent labs reviewed and interpreted.  Results for orders placed or performed during the hospital encounter of 10/22/24   Head CT w/o contrast     Status: None    Narrative    EXAM: CT HEAD W/O CONTRAST  LOCATION: [facility]  DATE: 10/22/2024    INDICATION: Fall, head injury.    COMPARISON: None.    TECHNIQUE: Routine CT head without intravenous contrast. Multiplanar reformats. Dose reduction techniques were used.    FINDINGS: No evidence of intracranial hemorrhage, mass, or hydrocephalus. Volume loss with background of nonspecific white matter hypoattenuation presumably representing chronic small vessel ischemic change. Small presumed chronic right cerebellar   infarct. No acute osseous abnormality. Small volume aerated secretions within the right sphenoid sinus. Dental disease partially visualized.      Impression    IMPRESSION: No acute intracranial abnormality.     Comprehensive metabolic panel     Status: Abnormal   Result Value Ref Range    Sodium 141 135 - 145 mmol/L    Potassium 4.5 3.4 - 5.3 mmol/L    Carbon Dioxide (CO2) 25 22 - 29 mmol/L    Anion Gap 11 7 - 15 mmol/L    Urea Nitrogen 24.1 (H) 8.0 - 23.0 mg/dL    Creatinine 1.05 (H) 0.51 - 0.95 mg/dL    GFR Estimate 50 (L) >60 mL/min/1.73m2    Calcium 9.3 8.8 - 10.4 mg/dL    Chloride 105 98 - 107 mmol/L    Glucose 152 (H) 70 - 99 mg/dL    Alkaline Phosphatase 88 40 - 150 U/L    AST 32 0 - 45 U/L    ALT 25 0 - 50 U/L    Protein Total 7.3 6.4 - 8.3 g/dL    Albumin 3.9 3.5 - 5.2 g/dL    Bilirubin Total 0.5 <=1.2 mg/dL   Magnesium     Status: Abnormal   Result Value Ref Range    Magnesium 2.4 (H) 1.7 - 2.3 mg/dL   Troponin T, High Sensitivity     Status: Normal   Result Value Ref Range    Troponin T, High Sensitivity 13 <=14 ng/L   CBC (+ platelets, no diff)     Status: Abnormal   Result Value Ref Range    WBC Count 16.0 (H) 4.0 - 11.0 10e3/uL    RBC Count 4.34 3.80 - 5.20 10e6/uL    Hemoglobin 13.9 11.7 - 15.7 g/dL    Hematocrit 42.1 35.0 - 47.0 %    MCV 97 78 - 100 fL    MCH 32.0 26.5 - 33.0 pg    MCHC 33.0 31.5 - 36.5 g/dL    RDW 15.2 (H) 10.0 - 15.0 %    Platelet Count 220 150 - 450 10e3/uL   UA with Microscopic reflex to Culture     Status: Abnormal     Specimen: Urine, Catheter   Result Value Ref Range    Color Urine Light Yellow Colorless, Straw, Light Yellow, Yellow    Appearance Urine Clear Clear    Glucose Urine Negative Negative mg/dL    Bilirubin Urine Negative Negative    Ketones Urine Negative Negative mg/dL    Specific Gravity Urine 1.019 1.001 - 1.030    Blood Urine Negative Negative    pH Urine 5.5 5.0 - 7.0    Protein Albumin Urine Negative Negative mg/dL    Urobilinogen Urine <2.0 <2.0 mg/dL    Nitrite Urine Negative Negative    Leukocyte Esterase Urine Negative Negative    Mucus Urine Present (A) None Seen /LPF    RBC Urine 2 <=2 /HPF    WBC Urine 1 <=5 /HPF    Transitional Epithelials Urine <1 <=1 /HPF    Narrative    Urine Culture not indicated        RADIOLOGY:  Reviewed all pertinent imaging. Please see official radiology report.  Head CT w/o contrast    (Results Pending)       EKG:    Normal sinus rhythm.  Rate of 83.  Anterior infarct.  No acute ST segment abnormalities.  Essentially unchanged compared to December 4, 2023  I have independently reviewed and interpreted the EKG(s) documented above.          I, Angelique Rudd, am serving as a scribe to document services personally performed by Cesar Hannah MD, based on my observation and the provider's statements to me. I, Cesar Hannah MD attest that Angelique Rudd is acting in a scribe capacity, has observed my performance of the services and has documented them in accordance with my direction.    Cesar Hannah M.D.  Emergency Medicine  [facility] EMERGENCY DEPARTMENT     Cesar Hannah MD  10/22/24 1902

## 2024-10-22 NOTE — ED TRIAGE NOTES
Patient arrives by medics from [facility] car for evaluation of left foot dragging since fall on Thursday.  Patient had 2nd fall today.  Patient denies complaints.  Had hip and leg xrays at time of 1st fall. Report on chart.

## 2024-10-23 ENCOUNTER — PATIENT OUTREACH (OUTPATIENT)
Dept: INTERNAL MEDICINE | Facility: CLINIC | Age: 89
End: 2024-10-23
Payer: OTHER MISCELLANEOUS

## 2024-10-23 NOTE — ED NOTES
"Patient up and ambulated with use of walker.  Reports she feels dizzy, but is able to walk.  Walks \"gingerly\" but no dragging of her feet.    "

## 2024-10-25 ENCOUNTER — INFUSION THERAPY VISIT (OUTPATIENT)
Dept: INFUSION THERAPY | Facility: HOSPITAL | Age: 89
End: 2024-10-25
Payer: OTHER MISCELLANEOUS

## 2024-10-25 VITALS
HEART RATE: 82 BPM | RESPIRATION RATE: 18 BRPM | SYSTOLIC BLOOD PRESSURE: 147 MMHG | OXYGEN SATURATION: 95 % | TEMPERATURE: 97.7 F | DIASTOLIC BLOOD PRESSURE: 64 MMHG

## 2024-10-25 DIAGNOSIS — D69.3 IMMUNE THROMBOCYTOPENIC PURPURA (H): Primary | ICD-10-CM

## 2024-10-25 LAB
ATRIAL RATE - MUSE: 83 BPM
DIASTOLIC BLOOD PRESSURE - MUSE: 70 MMHG
INTERPRETATION ECG - MUSE: NORMAL
P AXIS - MUSE: 78 DEGREES
PR INTERVAL - MUSE: 146 MS
QRS DURATION - MUSE: 88 MS
QT - MUSE: 372 MS
QTC - MUSE: 437 MS
R AXIS - MUSE: -68 DEGREES
SYSTOLIC BLOOD PRESSURE - MUSE: 156 MMHG
T AXIS - MUSE: 71 DEGREES
VENTRICULAR RATE- MUSE: 83 BPM

## 2024-10-25 PROCEDURE — 250N000011 HC RX IP 250 OP 636: Mod: JW | Performed by: NURSE PRACTITIONER

## 2024-10-25 PROCEDURE — 96372 THER/PROPH/DIAG INJ SC/IM: CPT | Performed by: NURSE PRACTITIONER

## 2024-10-25 RX ADMIN — ROMIPLOSTIM 50 MCG: 250 INJECTION, POWDER, LYOPHILIZED, FOR SOLUTION SUBCUTANEOUS at 10:53

## 2024-10-25 NOTE — PROGRESS NOTES
"Infusion Nursing Note:  Ora Gonzalez presents today for Nplate.    Patient seen by provider today: No   present during visit today: Not Applicable.    Note: VSS.  Pt assessed with son present. Pt dementia is worse and she is a very poor historian.  Per her son pt has fallen multiple times in the past week.  She was seen in the ER.  Per son pt does have some bruising from falls.  She is now wheelchair bound as her \"legs don't work\".  Nplate was given into her left upper arm.  RN care coordinator Tierra updated on pt status to review with Dr Garcia.       Intravenous Access:  No Intravenous access/labs at this visit.    Treatment Conditions:  Lab Results   Component Value Date    HGB 13.9 10/22/2024    WBC 16.0 (H) 10/22/2024    ANEU 8.7 (H) 03/24/2023    ANEUTAUTO 9.0 (H) 10/04/2024     10/22/2024        Results reviewed, labs MET treatment parameters, ok to proceed with treatment.      Post Infusion Assessment:  Patient tolerated injection without incident.       Discharge Plan:   Patient discharged in stable condition accompanied by: son.  Departure Mode: Wheelchair.      Carole Barrett RN   "

## 2024-10-25 NOTE — TELEPHONE ENCOUNTER
"\"Your call cannot be completed at this time.\" No VM.     LMTCB. If patient calls back please send to RN line for [facility] hospital follow up.      Closing encounter after 2 failed attempts to reach patient  "

## 2024-10-30 ENCOUNTER — PATIENT OUTREACH (OUTPATIENT)
Dept: ONCOLOGY | Facility: HOSPITAL | Age: 89
End: 2024-10-30
Payer: OTHER MISCELLANEOUS

## 2024-10-30 NOTE — PROGRESS NOTES
[facility]: Cancer Care                                                                                          Called and left a brief message on the patient's son Gomez's phone concerning patient and to give our clinic a call back.     I was calling to check on the patient and see how she has been doing.     Signature:  Tierra Betancourt RN

## 2024-10-31 ENCOUNTER — PATIENT OUTREACH (OUTPATIENT)
Dept: ONCOLOGY | Facility: HOSPITAL | Age: 89
End: 2024-10-31
Payer: OTHER MISCELLANEOUS

## 2024-10-31 NOTE — PROGRESS NOTES
[facility]: Cancer Care                                                                                          Patient's son Gomez called and stated that the patient is not doing well and has some fractures on her hips/legs due to some recent falls. Patient's son is worried because the patient is not aware that she cannot be weight bearing and will continually get up and fall and get injured again. Patient's son stated that he brought it up to the providers at the memory care unit that the patient is on and the only solution was to implement safety precautions in her room (floor mat alarm).  I asked the patient's son if he wanted the patient's Friday 11/1 appointments canceled until they meet with Dr. aGrcia.  Patient's son Gomez said no they would like to keep the Friday 11/1 appointments.  Patient's son requested to have a telephone visit with Dr. Garcia to discuss next steps as he is unsure of what to do next. He was okay with any date and time and was okay with leaving a detailed message regarding the appt date and time.     Signature:  Tierra Betancourt RN

## 2024-10-31 NOTE — PROGRESS NOTES
[facility]: Cancer Care                                                                                          Called patient to let him know the date and time of the patient's follow-up appointment with Dr. Garcia on 11/13 at 2:30 PM.  Patient's son verbalized understanding.    Signature:  Tierra Betancourt RN

## 2024-11-01 ENCOUNTER — LAB (OUTPATIENT)
Dept: INFUSION THERAPY | Facility: HOSPITAL | Age: 89
End: 2024-11-01
Payer: OTHER MISCELLANEOUS

## 2024-11-01 ENCOUNTER — INFUSION THERAPY VISIT (OUTPATIENT)
Dept: INFUSION THERAPY | Facility: HOSPITAL | Age: 89
End: 2024-11-01
Payer: OTHER MISCELLANEOUS

## 2024-11-01 VITALS
RESPIRATION RATE: 16 BRPM | TEMPERATURE: 97.6 F | SYSTOLIC BLOOD PRESSURE: 140 MMHG | OXYGEN SATURATION: 96 % | HEART RATE: 84 BPM | DIASTOLIC BLOOD PRESSURE: 65 MMHG

## 2024-11-01 DIAGNOSIS — D69.3 IMMUNE THROMBOCYTOPENIC PURPURA (H): Primary | ICD-10-CM

## 2024-11-01 DIAGNOSIS — D69.3 IMMUNE THROMBOCYTOPENIC PURPURA (H): ICD-10-CM

## 2024-11-01 LAB
BASOPHILS # BLD AUTO: 0.1 10E3/UL (ref 0–0.2)
BASOPHILS NFR BLD AUTO: 1 %
EOSINOPHIL # BLD AUTO: 0.2 10E3/UL (ref 0–0.7)
EOSINOPHIL NFR BLD AUTO: 1 %
ERYTHROCYTE [DISTWIDTH] IN BLOOD BY AUTOMATED COUNT: 15.6 % (ref 10–15)
HCT VFR BLD AUTO: 45 % (ref 35–47)
HGB BLD-MCNC: 14.4 G/DL (ref 11.7–15.7)
IMM GRANULOCYTES # BLD: 0.2 10E3/UL
IMM GRANULOCYTES NFR BLD: 2 %
LYMPHOCYTES # BLD AUTO: 2.6 10E3/UL (ref 0.8–5.3)
LYMPHOCYTES NFR BLD AUTO: 19 %
MCH RBC QN AUTO: 31.9 PG (ref 26.5–33)
MCHC RBC AUTO-ENTMCNC: 32 G/DL (ref 31.5–36.5)
MCV RBC AUTO: 100 FL (ref 78–100)
MONOCYTES # BLD AUTO: 1 10E3/UL (ref 0–1.3)
MONOCYTES NFR BLD AUTO: 7 %
NEUTROPHILS # BLD AUTO: 9.8 10E3/UL (ref 1.6–8.3)
NEUTROPHILS NFR BLD AUTO: 70 %
NRBC # BLD AUTO: 0 10E3/UL
NRBC BLD AUTO-RTO: 0 /100
PLATELET # BLD AUTO: 337 10E3/UL (ref 150–450)
RBC # BLD AUTO: 4.52 10E6/UL (ref 3.8–5.2)
WBC # BLD AUTO: 14 10E3/UL (ref 4–11)

## 2024-11-01 PROCEDURE — 85004 AUTOMATED DIFF WBC COUNT: CPT

## 2024-11-01 PROCEDURE — 250N000011 HC RX IP 250 OP 636: Performed by: NURSE PRACTITIONER

## 2024-11-01 PROCEDURE — 96372 THER/PROPH/DIAG INJ SC/IM: CPT | Performed by: NURSE PRACTITIONER

## 2024-11-01 PROCEDURE — 36415 COLL VENOUS BLD VENIPUNCTURE: CPT

## 2024-11-01 RX ADMIN — ROMIPLOSTIM 50 MCG: 250 INJECTION, POWDER, LYOPHILIZED, FOR SOLUTION SUBCUTANEOUS at 11:18

## 2024-11-01 NOTE — PROGRESS NOTES
Infusion Nursing Note:  Ora Gonzalez presents today for Nplate.    Patient seen by provider today: No   present during visit today: Not Applicable.    Note: VSS.  Pt accompanied by son who was used for assessment as pt has severe dementia.  She now has a fx right hip from fall a little over a week ago.  She is unable to use her legs but does not remember this so she continues to suffer falls.  She also has a scrapped nose.  Nplate given subcutaneous into her left upper arm.  Pt and family will meet with Dr Garcia 12/13.      Intravenous Access:  N/a.    Treatment Conditions:  Lab Results   Component Value Date    HGB 14.4 11/01/2024    WBC 14.0 (H) 11/01/2024    ANEU 8.7 (H) 03/24/2023    ANEUTAUTO 9.8 (H) 11/01/2024     11/01/2024            Post Infusion Assessment:  Patient tolerated injection without incident.       Discharge Plan:   Patient discharged in stable condition accompanied by: son.  Departure Mode: Wheelchair.      Carole Barrett RN

## 2024-11-08 ENCOUNTER — INFUSION THERAPY VISIT (OUTPATIENT)
Dept: INFUSION THERAPY | Facility: HOSPITAL | Age: 89
End: 2024-11-08
Payer: OTHER MISCELLANEOUS

## 2024-11-08 VITALS
DIASTOLIC BLOOD PRESSURE: 49 MMHG | RESPIRATION RATE: 16 BRPM | OXYGEN SATURATION: 97 % | TEMPERATURE: 97.4 F | HEART RATE: 82 BPM | SYSTOLIC BLOOD PRESSURE: 128 MMHG

## 2024-11-08 DIAGNOSIS — D69.3 IMMUNE THROMBOCYTOPENIC PURPURA (H): Primary | ICD-10-CM

## 2024-11-08 PROCEDURE — 250N000011 HC RX IP 250 OP 636: Mod: JZ | Performed by: NURSE PRACTITIONER

## 2024-11-08 PROCEDURE — 96372 THER/PROPH/DIAG INJ SC/IM: CPT | Performed by: NURSE PRACTITIONER

## 2024-11-08 RX ADMIN — ROMIPLOSTIM 50 MCG: 250 INJECTION, POWDER, LYOPHILIZED, FOR SOLUTION SUBCUTANEOUS at 10:57

## 2024-11-08 NOTE — PROGRESS NOTES
Infusion Nursing Note:  Ora Gonzalez presents today for nplate.    Patient seen by provider today: No   present during visit today: Not Applicable.    Note: Ora arrived via wheelchair and in stable condition accompanied by her son and daughter-in-law. They say they did not get any calls from her facility this week, so they are happy with that. She continues to have low energy, but they have been getting her up and to the common areas each day at the facility. She does not feel hungry, but if food is placed in front of her, she will eat. She does seem dizzy upon standing. Her son thinks that she is starting to forget how to walk because she shuffles her feet instead of stepping. Injection administered into the right arm. Will return on 11/13 for next appointment.      Intravenous Access:  No Intravenous access/labs at this visit.    Treatment Conditions:  Not Applicable.      Post Infusion Assessment:  Patient tolerated injection without incident.       Discharge Plan:   Patient and/or family verbalized understanding of discharge instructions and all questions answered.  AVS to patient via [employer].  Patient will return 11/13 for next appointment.   Patient discharged in stable condition accompanied by: son and daughter-in-law.  Departure Mode: Wheelchair.      Katherin Babcock RN

## 2024-11-12 ENCOUNTER — PATIENT OUTREACH (OUTPATIENT)
Dept: CARE COORDINATION | Facility: CLINIC | Age: 89
End: 2024-11-12
Payer: OTHER MISCELLANEOUS

## 2024-11-13 ENCOUNTER — VIRTUAL VISIT (OUTPATIENT)
Dept: ONCOLOGY | Facility: HOSPITAL | Age: 89
End: 2024-11-13
Attending: INTERNAL MEDICINE
Payer: OTHER MISCELLANEOUS

## 2024-11-13 DIAGNOSIS — D69.3 IMMUNE THROMBOCYTOPENIC PURPURA (H): Primary | ICD-10-CM

## 2024-11-13 NOTE — NURSING NOTE
Current patient location: [address] [facility] [place], [place] [address]    Is the patient currently in the state of [place]? YES    Visit mode:TELEPHONE    If the visit is dropped, the patient can be reconnected by:TELEPHONE VISIT: Phone number:   Telephone Information:   Mobile [phone]       Will anyone else be joining the visit? Willie's Son's  (If patient encounters technical issues they should call [phone])    Are changes needed to the allergy or medication list?  Unable to complete with pt.     Are refills needed on medications prescribed by this physician? NO    Rooming Documentation: Unable to complete with Pt      Reason for visit: RECHECK (Wondering what to do with the shot-contuinue or stop. A month ago fell, broke her hip and now is in a wheel chair)    Susan WITT

## 2024-11-13 NOTE — PROGRESS NOTES
"Virtual Visit Details    Type of service:  Telephone Visit   Phone call duration: *** minutes   Originating Location (pt. Location): {patient location:979314::\"Home\"}  {PROVIDER LOCATION On-site should be selected for visits conducted from your clinic location or adjoining [facility] hospital, academic office, or other nearby [facility] building. Off-site should be selected for all other provider locations, including home:[id number]}  Distant Location (provider location):  {virtual location provider:960893}  "

## 2024-11-13 NOTE — PROGRESS NOTES
Virtual Visit Details    Type of service:  Telephone Visit   Phone call duration: 10 minutes   Originating Location (pt. Location): Long term Care    Distant Location (provider location):  On-site    [facility] Progress Note    Patient: Ora Gonzalez  MRN: [id number]  Date of Service: Nov 13, 2024        Assessment and Plan:    1. Immune thrombocytopenia:  I spoke to the patients two sons on the phone today as the patient himself has become quite demented and is not able to participate in the conversation.  They related that her performance status is declined significantly and she recently fell and had a broken hip.  It is very difficult for her to leave her living facility.  Additionally her memory is declined significantly.  Overall, it is decided that the benefits of continuing Nplate do not outweigh potential risks and discomfort to the patient.  As such, we will stop her infusions.  I am also going to order a hospice consultation for her.  She will follow-up with us only as needed at this point.    ECOG Performance  0    Diagnosis:    1. 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 Bone marrow biopsy was unremarkable, November 2014. Thyroid functions were normal. Anticardiolipin antibodies were normal.     2. Right-sided pulmonary embolism: Provoked. Diagnosed February 8, 2016.    Treatment:    She started course of prednisone on November 14, 2014 and finished on January 5, 2015. She had a complete response. Quickly relapsed and treated with Rituxan weekly from March 12 through April 2, 2015. She responded with highest platelet count of 112.     She then relapsed in October, 2015. She was started on Promacta in October. She responded within 2-3 weeks. She then quickly lost response after dose reduction from 50 to 25mg. She was started back on Promacta 50 mg and prednisone 60 mg daily. She did not respond. She received 2 doses of IVIG on December 4 and 5, 2015. She responded  briefly with a platelet count of 59 on December 7 but then quickly lost response by December 14. 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 She received 2 more doses of IVIG on December 16 and 17th with minimal response.   Splenectomy was performed on December 20, 2015. She did not respond.   We started Rituxan on December 29, 2015. Steroids were continued. Romiplostim was started on January 5, 2016. 1 dose of WinRho was given on January 6, 2016.  Platelet response noted on January 13.  Her last dose of Rituxan was on January 20th, 2016.     She was restarted on N-plate on July 28, 2016 for relapse.    Interim History:    History was obtained today from the patient and sons. Ora's memory has declined significantly over the past few months.  She is now in a memory care unit.    Past History:    Past Medical History:   Diagnosis Date    Adjustment disorder with mixed anxiety and depressed mood 1/22/2016    Adjustment reaction to chronic stress 1/22/2016    Chronic kidney disease     History of pulmonary embolism 7/8/2020    Hypertension     ITP (idiopathic thrombocytopenic purpura)     Osteoporosis     Paroxysmal atrial fibrillation (H)     Pulmonary embolism (H) 2/8/2016    Thrombocytopenia (H)      Physical Exam:    There were no vitals taken for this visit.    Lab Results:    No results found for this or any previous visit (from the past week).    Imaging:    Head CT w/o contrast    Result Date: 10/22/2024  EXAM: CT HEAD W/O CONTRAST LOCATION: [facility] DATE: 10/22/2024 INDICATION: Fall, head injury. COMPARISON: None. TECHNIQUE: Routine CT head without intravenous contrast. Multiplanar reformats. Dose reduction techniques were used. FINDINGS: No evidence of intracranial hemorrhage, mass, or hydrocephalus. Volume loss with background of nonspecific white matter hypoattenuation presumably representing chronic small vessel ischemic change. Small presumed chronic right cerebellar  infarct. No acute osseous abnormality. Small volume aerated secretions within the right sphenoid sinus. Dental disease partially visualized.     IMPRESSION: No acute intracranial abnormality.        Signed by: Vinny Garcia MD

## 2024-11-14 ENCOUNTER — TELEPHONE (OUTPATIENT)
Dept: INTERNAL MEDICINE | Facility: CLINIC | Age: 89
End: 2024-11-14
Payer: OTHER MISCELLANEOUS

## 2024-11-14 NOTE — TELEPHONE ENCOUNTER
FYI - Status Update    Who is Calling: Jessie lui with [facility]     Update: Extending Thank You for referral.  Patient appointment is today at 2PM.  Wants Ryanne Corbett NP to know that [facility] does have capacity if any other patient's need Hospice Service.     Does caller want a call/response back: No any questions can be reached at

## 2025-01-11 ENCOUNTER — LAB REQUISITION (OUTPATIENT)
Dept: LAB | Facility: CLINIC | Age: OVER 89
End: 2025-01-11
Payer: OTHER MISCELLANEOUS

## 2025-01-11 DIAGNOSIS — D69.3 IMMUNE THROMBOCYTOPENIC PURPURA (H): ICD-10-CM

## 2025-01-14 LAB
ERYTHROCYTE [DISTWIDTH] IN BLOOD BY AUTOMATED COUNT: 15.4 % (ref 10–15)
HCT VFR BLD AUTO: 44.1 % (ref 35–47)
HGB BLD-MCNC: 13.9 G/DL (ref 11.7–15.7)
MCH RBC QN AUTO: 32 PG (ref 26.5–33)
MCHC RBC AUTO-ENTMCNC: 31.5 G/DL (ref 31.5–36.5)
MCV RBC AUTO: 102 FL (ref 78–100)
PLATELET # BLD AUTO: 97 10E3/UL (ref 150–450)
RBC # BLD AUTO: 4.34 10E6/UL (ref 3.8–5.2)
WBC # BLD AUTO: 11.7 10E3/UL (ref 4–11)

## 2025-01-14 PROCEDURE — P9604 ONE-WAY ALLOW PRORATED TRIP: HCPCS | Mod: ORL | Performed by: PHYSICIAN ASSISTANT

## 2025-01-14 PROCEDURE — 36415 COLL VENOUS BLD VENIPUNCTURE: CPT | Mod: ORL | Performed by: PHYSICIAN ASSISTANT

## 2025-01-14 PROCEDURE — 85027 COMPLETE CBC AUTOMATED: CPT | Mod: ORL | Performed by: PHYSICIAN ASSISTANT

## 2025-03-01 ENCOUNTER — APPOINTMENT (OUTPATIENT)
Dept: CT IMAGING | Facility: HOSPITAL | Age: OVER 89
DRG: 083 | End: 2025-03-01
Attending: EMERGENCY MEDICINE
Payer: COMMERCIAL

## 2025-03-01 ENCOUNTER — HOSPITAL ENCOUNTER (INPATIENT)
Facility: HOSPITAL | Age: OVER 89
LOS: 4 days | Discharge: NURSING, REHAB OR LONG-TERM CARE | DRG: 083 | End: 2025-03-05
Attending: EMERGENCY MEDICINE
Payer: COMMERCIAL

## 2025-03-01 ENCOUNTER — APPOINTMENT (OUTPATIENT)
Dept: RADIOLOGY | Facility: HOSPITAL | Age: OVER 89
DRG: 083 | End: 2025-03-01
Attending: EMERGENCY MEDICINE
Payer: COMMERCIAL

## 2025-03-01 DIAGNOSIS — S02.19XA CLOSED FRACTURE OF TEMPORAL BONE, INITIAL ENCOUNTER (H): ICD-10-CM

## 2025-03-01 DIAGNOSIS — I60.9 SAH (SUBARACHNOID HEMORRHAGE) (H): ICD-10-CM

## 2025-03-01 DIAGNOSIS — S72.101A CLOSED FRACTURE OF TROCHANTER OF RIGHT FEMUR, INITIAL ENCOUNTER (H): ICD-10-CM
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
## 2025-03-01 DIAGNOSIS — I95.1 ORTHOSTATIC HYPOTENSION: ICD-10-CM

## 2025-03-01 DIAGNOSIS — S02.40EA CLOSED FRACTURE OF RIGHT ZYGOMATIC ARCH, INITIAL ENCOUNTER (H): ICD-10-CM

## 2025-03-01 DIAGNOSIS — S06.5XAA SUBDURAL HEMATOMA (H): Primary | ICD-10-CM

## 2025-03-01 LAB
ANION GAP SERPL CALCULATED.3IONS-SCNC: 9 MMOL/L (ref 7–15)
BASOPHILS # BLD AUTO: 0.1 10E3/UL (ref 0–0.2)
BASOPHILS NFR BLD AUTO: 1 %
BUN SERPL-MCNC: 19.9 MG/DL (ref 8–23)
CALCIUM SERPL-MCNC: 9.7 MG/DL (ref 8.8–10.4)
CHLORIDE SERPL-SCNC: 104 MMOL/L (ref 98–107)
CK SERPL-CCNC: 47 U/L (ref 26–192)
CREAT SERPL-MCNC: 1.04 MG/DL (ref 0.51–0.95)
EGFRCR SERPLBLD CKD-EPI 2021: 50 ML/MIN/1.73M2
EOSINOPHIL # BLD AUTO: 0.2 10E3/UL (ref 0–0.7)
EOSINOPHIL NFR BLD AUTO: 1 %
ERYTHROCYTE [DISTWIDTH] IN BLOOD BY AUTOMATED COUNT: 13.9 % (ref 10–15)
GLUCOSE BLDC GLUCOMTR-MCNC: 123 MG/DL (ref 70–99)
GLUCOSE SERPL-MCNC: 117 MG/DL (ref 70–99)
HCO3 SERPL-SCNC: 28 MMOL/L (ref 22–29)
HCT VFR BLD AUTO: 42.8 % (ref 35–47)
HGB BLD-MCNC: 13.8 G/DL (ref 11.7–15.7)
HOLD SPECIMEN: NORMAL
IMM GRANULOCYTES # BLD: 0.1 10E3/UL
IMM GRANULOCYTES NFR BLD: 1 %
LYMPHOCYTES # BLD AUTO: 3.1 10E3/UL (ref 0.8–5.3)
LYMPHOCYTES NFR BLD AUTO: 23 %
MCH RBC QN AUTO: 31.7 PG (ref 26.5–33)
MCHC RBC AUTO-ENTMCNC: 32.2 G/DL (ref 31.5–36.5)
MCV RBC AUTO: 98 FL (ref 78–100)
MONOCYTES # BLD AUTO: 1.1 10E3/UL (ref 0–1.3)
MONOCYTES NFR BLD AUTO: 8 %
NEUTROPHILS # BLD AUTO: 9.2 10E3/UL (ref 1.6–8.3)
NEUTROPHILS NFR BLD AUTO: 67 %
NRBC # BLD AUTO: 0 10E3/UL
NRBC BLD AUTO-RTO: 0 /100
PLATELET # BLD AUTO: 146 10E3/UL (ref 150–450)
POTASSIUM SERPL-SCNC: 4.3 MMOL/L (ref 3.4–5.3)
RBC # BLD AUTO: 4.35 10E6/UL (ref 3.8–5.2)
SODIUM SERPL-SCNC: 141 MMOL/L (ref 135–145)
WBC # BLD AUTO: 13.7 10E3/UL (ref 4–11)

## 2025-03-01 PROCEDURE — 12011 RPR F/E/E/N/L/M 2.5 CM/<: CPT

## 2025-03-01 PROCEDURE — 82550 ASSAY OF CK (CPK): CPT | Performed by: EMERGENCY MEDICINE

## 2025-03-01 PROCEDURE — 70480 CT ORBIT/EAR/FOSSA W/O DYE: CPT

## 2025-03-01 PROCEDURE — 85025 COMPLETE CBC W/AUTO DIFF WBC: CPT | Performed by: EMERGENCY MEDICINE

## 2025-03-01 PROCEDURE — 250N000011 HC RX IP 250 OP 636: Performed by: FAMILY MEDICINE

## 2025-03-01 PROCEDURE — 0HQ0XZZ REPAIR SCALP SKIN, EXTERNAL APPROACH: ICD-10-PCS | Performed by: EMERGENCY MEDICINE

## 2025-03-01 PROCEDURE — 120N000001 HC R&B MED SURG/OB

## 2025-03-01 PROCEDURE — 99285 EMERGENCY DEPT VISIT HI MDM: CPT | Mod: 25

## 2025-03-01 PROCEDURE — 82310 ASSAY OF CALCIUM: CPT | Performed by: EMERGENCY MEDICINE

## 2025-03-01 PROCEDURE — 70450 CT HEAD/BRAIN W/O DYE: CPT

## 2025-03-01 PROCEDURE — 80048 BASIC METABOLIC PNL TOTAL CA: CPT | Performed by: EMERGENCY MEDICINE

## 2025-03-01 PROCEDURE — 99223 1ST HOSP IP/OBS HIGH 75: CPT | Performed by: FAMILY MEDICINE

## 2025-03-01 PROCEDURE — 36415 COLL VENOUS BLD VENIPUNCTURE: CPT | Performed by: EMERGENCY MEDICINE

## 2025-03-01 PROCEDURE — 93005 ELECTROCARDIOGRAM TRACING: CPT | Performed by: STUDENT IN AN ORGANIZED HEALTH CARE EDUCATION/TRAINING PROGRAM

## 2025-03-01 PROCEDURE — 72125 CT NECK SPINE W/O DYE: CPT

## 2025-03-01 PROCEDURE — 73502 X-RAY EXAM HIP UNI 2-3 VIEWS: CPT

## 2025-03-01 PROCEDURE — 250N000011 HC RX IP 250 OP 636: Performed by: EMERGENCY MEDICINE

## 2025-03-01 RX ORDER — HYDRALAZINE HYDROCHLORIDE 20 MG/ML
10 INJECTION INTRAMUSCULAR; INTRAVENOUS EVERY 4 HOURS PRN
Status: DISCONTINUED | OUTPATIENT
Start: 2025-03-01 | End: 2025-03-05 | Stop reason: HOSPADM

## 2025-03-01 RX ORDER — NALOXONE HYDROCHLORIDE 0.4 MG/ML
0.2 INJECTION, SOLUTION INTRAMUSCULAR; INTRAVENOUS; SUBCUTANEOUS
Status: DISCONTINUED | OUTPATIENT
Start: 2025-03-01 | End: 2025-03-05 | Stop reason: HOSPADM

## 2025-03-01 RX ORDER — NALOXONE HYDROCHLORIDE 0.4 MG/ML
0.4 INJECTION, SOLUTION INTRAMUSCULAR; INTRAVENOUS; SUBCUTANEOUS
Status: DISCONTINUED | OUTPATIENT
Start: 2025-03-01 | End: 2025-03-05 | Stop reason: HOSPADM

## 2025-03-01 RX ORDER — ONDANSETRON 2 MG/ML
4 INJECTION INTRAMUSCULAR; INTRAVENOUS EVERY 6 HOURS PRN
Status: DISCONTINUED | OUTPATIENT
Start: 2025-03-01 | End: 2025-03-05 | Stop reason: HOSPADM

## 2025-03-01 RX ORDER — ACETAMINOPHEN 325 MG/1
975 TABLET ORAL EVERY 8 HOURS
Status: DISCONTINUED | OUTPATIENT
Start: 2025-03-01 | End: 2025-03-05 | Stop reason: HOSPADM

## 2025-03-01 RX ORDER — AMOXICILLIN 250 MG
1 CAPSULE ORAL 2 TIMES DAILY PRN
Status: DISCONTINUED | OUTPATIENT
Start: 2025-03-01 | End: 2025-03-05 | Stop reason: HOSPADM

## 2025-03-01 RX ORDER — HYDRALAZINE HYDROCHLORIDE 10 MG/1
10 TABLET, FILM COATED ORAL EVERY 4 HOURS PRN
Status: DISCONTINUED | OUTPATIENT
Start: 2025-03-01 | End: 2025-03-05 | Stop reason: HOSPADM

## 2025-03-01 RX ORDER — ONDANSETRON 4 MG/1
4 TABLET, ORALLY DISINTEGRATING ORAL EVERY 6 HOURS PRN
Status: DISCONTINUED | OUTPATIENT
Start: 2025-03-01 | End: 2025-03-05 | Stop reason: HOSPADM

## 2025-03-01 RX ORDER — LIDOCAINE 40 MG/G
CREAM TOPICAL
Status: DISCONTINUED | OUTPATIENT
Start: 2025-03-01 | End: 2025-03-05 | Stop reason: HOSPADM

## 2025-03-01 RX ORDER — HYDRALAZINE HYDROCHLORIDE 20 MG/ML
5 INJECTION INTRAMUSCULAR; INTRAVENOUS ONCE
Status: COMPLETED | OUTPATIENT
Start: 2025-03-01 | End: 2025-03-01

## 2025-03-01 RX ORDER — CALCIUM CARBONATE/VITAMIN D3 600 MG-10
1 TABLET ORAL 2 TIMES DAILY
COMMUNITY

## 2025-03-01 RX ORDER — OXYCODONE HYDROCHLORIDE 5 MG/1
5 TABLET ORAL EVERY 4 HOURS PRN
Status: DISCONTINUED | OUTPATIENT
Start: 2025-03-01 | End: 2025-03-05 | Stop reason: HOSPADM

## 2025-03-01 RX ORDER — AMOXICILLIN 250 MG
2 CAPSULE ORAL 2 TIMES DAILY PRN
Status: DISCONTINUED | OUTPATIENT
Start: 2025-03-01 | End: 2025-03-05 | Stop reason: HOSPADM

## 2025-03-01 RX ORDER — CALCIUM CARBONATE 500 MG/1
1000 TABLET, CHEWABLE ORAL 4 TIMES DAILY PRN
Status: DISCONTINUED | OUTPATIENT
Start: 2025-03-01 | End: 2025-03-05 | Stop reason: HOSPADM

## 2025-03-01 RX ORDER — OLANZAPINE 10 MG/2ML
2.5 INJECTION, POWDER, FOR SOLUTION INTRAMUSCULAR EVERY 6 HOURS PRN
Status: DISCONTINUED | OUTPATIENT
Start: 2025-03-01 | End: 2025-03-05 | Stop reason: HOSPADM

## 2025-03-01 RX ADMIN — HYDRALAZINE HYDROCHLORIDE 5 MG: 20 INJECTION INTRAMUSCULAR; INTRAVENOUS at 20:24

## 2025-03-01 RX ADMIN — HYDRALAZINE HYDROCHLORIDE 10 MG: 20 INJECTION INTRAMUSCULAR; INTRAVENOUS at 23:46

## 2025-03-01 ASSESSMENT — ACTIVITIES OF DAILY LIVING (ADL)
ADLS_ACUITY_SCORE: 27
ADLS_ACUITY_SCORE: 42
ADLS_ACUITY_SCORE: 41

## 2025-03-01 NOTE — ED PROVIDER NOTES
"EMERGENCY DEPARTMENT NOTE     Name: Ora Gonzalez    Age/Sex: 89 year old female   MRN: [id number]   Evaluation Date & Time:  3/1/2025  4:53 PM    PCP:    Ryanne Corbett   ED Provider: Cesar Garcia D.O.       CHIEF COMPLAINT    Fall     HISTORY OF PRESENT ILLNESS   Ora Gonzalez is a 89 year old year old female with a relevant past history of Afib, Alzheimer's, and hyperlipidemia, who presents to the ED by EMS for evaluation of an unwitnessed fall and laceration to her head.      DIAGNOSIS & DISPOSITION/MEDICAL DECISION MAKING     1. Fall, initial encounter    2. SAH (subarachnoid hemorrhage) (H)    3. Closed fracture of right zygomatic arch, initial encounter (H)    4. Closed fracture of temporal bone, initial encounter (H)    5. Closed fracture of trochanter of right femur, initial encounter (H)        EMERGENCY DEPARTMENT COURSE   5:00 PM I met with the patient to gather history and to perform my initial exam.  We discussed treatment options and the plan for care while in the Emergency Department.  6:51 PM I spoke to Dr. Dunlap from [facility]. Would like a MRI done.  7:27 PM I spoke to Pema Rick NP from Neurosurgery.   8:02 PM I spoke to the admitting hospitalist, Dr. Morel.  8:24 PM I spoke to the admitting hospitalist, Dr. Morel, in person. She informed me that the patient's son is now present at bedside and relayed me the information he provided her.   8:26 PM I rechecked on the patient and spoke with her son.  8:37 PM I spoke to the admitting hospitalist, Dr. Morel, on the phone and updated her on my conversation with the patient's son.   Triage vital signs: BP (!) 175/74   Pulse 74   Temp 97.8  F (36.6  C) (Oral)   Resp 24   Ht 1.676 m (5' 6\")   Wt 52.2 kg (115 lb)   SpO2 96%   BMI 18.56 kg/m    Differential diagnosis considered included but not limited to: Traumatic process including skull fracture, ICH, cervical spine fracture, facial bone fracture, hip fracture    MDM:  Diagnostic studies: CT " "of the head: Small subarachnoid hemorrhage right temporal area, nondisplaced temporal bone fracture.  CT orbits without orbital fracture, nondisplaced zygomatic arch fracture.  CT cervical spine negative for fracture.  Right hip x-ray: Fracture of the tip of the trochanter without anterior trochanteric fracture visualized  Laboratory studies obtained interpreted by myself:  EKG: Sinus rhythm with normal QTc  Basic metabolic profile within normal limits, total CK 47    Patient was repaired as per procedure note.  Discussed the case with [facility] who recommended MRI to exclude intertrochanteric fracture.  Subsequently son arrived to the ED and states that the trochanteric fracture may be chronic.  She has had x-ray showing possible similar findings and they had outpatient follow-up with [facility] and it sounds like in the discussion there may have been possibility of intertrochanteric compartment but he states that his brother and he decided not to do any intervention in the case of a hip fracture since his mother is very active and with her dementia would never be able to be immobile for very long.  Will defer MRI imaging at this time and consult orthopedics in the a.m.  I discussed the CT of the head results with small subarachnoid hemorrhage with neurosurgery and plan will be to repeat head CT in the a.m.  Patient is not on anticoagulants.    Discharge Vital Signs:BP (!) 143/65 (BP Location: Left arm)   Pulse 88   Temp 98.1  F (36.7  C) (Oral)   Resp 22   Ht 1.676 m (5' 6\")   Wt 47.9 kg (105 lb 9.6 oz)   SpO2 97%   BMI 17.04 kg/m     PROCEDURES:     PROCEDURE: Laceration Repair   INDICATIONS: Laceration   PROCEDURE PROVIDER: Dr Cesar Garcia   SITE: Right lateral side of the patient's head   TYPE/SIZE: simple, clean, and no foreign body visualized  1 cm (total length)   FUNCTIONAL ASSESSMENT: Distal sensation, circulation, and motor intact   MEDICATION: N/A   PREPARATION: scrubbing with " Normal saline   DEBRIDEMENT: no debridement   CLOSURE:  Superficial layer closed with Dermabond (medical glue)    Total number of sutures/staples placed: N/A     Diagnostic studies:  XR Hip Right 2-3 Views   Final Result   IMPRESSION: Mildly displaced fracture of the right greater trochanter. No definite extension of the fracture into the intertrochanteric portion of the right femur. No additional fractures identified elsewhere in the pelvis. Normal joint spacing alignment    of both hips. Multilevel degenerative disc disease and facet arthropathy in the lower lumbar spine. Osseous demineralization.      CT Cervical Spine w/o Contrast   Final Result   IMPRESSION:   1.  No fracture or posttraumatic subluxation.   2.  No high-grade spinal canal or neural foraminal stenosis.      CT Orbits wo Contrast   Final Result   Addendum (preliminary) 1 of 1   COMMUNICATION ADDENDUM:   Findings were discussed with Dr. Cesar Garcia on 3/1/2025 6:57 PM CST.      END ADDENDUM      Final   IMPRESSION:   HEAD CT:   1.  Acute, small volume subarachnoid hemorrhage in the bilateral cerebral sulci (greater than 3 sulci). No additional acute intracranial hemorrhage. No mass effect.   2.  Acute, nondisplaced fracture of the right squamosal temporal bone.   3.  Acute, minimally displaced fracture of the right zygomatic arch.   4.  No additional acute intracranial abnormality.   5.  Moderate presumed chronic small vessel ischemic changes.      ORBITS CT:   1.  No acute orbital abnormality.   2.  Small amount of dependent fluid in the right maxillary sinus is nonspecific but could represent an acute blood products. Acute fracture of the maxilla or orbital floor is not definitely seen.         CT Head w/o Contrast   Final Result   Addendum (preliminary) 1 of 1   COMMUNICATION ADDENDUM:   Findings were discussed with Dr. Cesar Garcia on 3/1/2025 6:57 PM CST.      END ADDENDUM      Final   IMPRESSION:   HEAD CT:   1.  Acute, small volume  subarachnoid hemorrhage in the bilateral cerebral sulci (greater than 3 sulci). No additional acute intracranial hemorrhage. No mass effect.   2.  Acute, nondisplaced fracture of the right squamosal temporal bone.   3.  Acute, minimally displaced fracture of the right zygomatic arch.   4.  No additional acute intracranial abnormality.   5.  Moderate presumed chronic small vessel ischemic changes.      ORBITS CT:   1.  No acute orbital abnormality.   2.  Small amount of dependent fluid in the right maxillary sinus is nonspecific but could represent an acute blood products. Acute fracture of the maxilla or orbital floor is not definitely seen.         MR Hip Right w/o Contrast    (Results Pending)     Labs Ordered and Resulted from Time of ED Arrival to Time of ED Departure   BASIC METABOLIC PANEL - Abnormal       Result Value    Sodium 141      Potassium 4.3      Chloride 104      Carbon Dioxide (CO2) 28      Anion Gap 9      Urea Nitrogen 19.9      Creatinine 1.04 (*)     GFR Estimate 50 (*)     Calcium 9.7      Glucose 117 (*)    CBC WITH PLATELETS AND DIFFERENTIAL - Abnormal    WBC Count 13.7 (*)     RBC Count 4.35      Hemoglobin 13.8      Hematocrit 42.8      MCV 98      MCH 31.7      MCHC 32.2      RDW 13.9      Platelet Count 146 (*)     % Neutrophils 67      % Lymphocytes 23      % Monocytes 8      % Eosinophils 1      % Basophils 1      % Immature Granulocytes 1      NRBCs per 100 WBC 0      Absolute Neutrophils 9.2 (*)     Absolute Lymphocytes 3.1      Absolute Monocytes 1.1      Absolute Eosinophils 0.2      Absolute Basophils 0.1      Absolute Immature Granulocytes 0.1      Absolute NRBCs 0.0     CK TOTAL - Normal    CK 47       ED INTERVENTIONS     Medications   hydrALAZINE (APRESOLINE) injection 5 mg (5 mg Intravenous $Given 3/1/25 2024)     TOTAL CRITICAL CARE TIME (EXCLUDING PROCEDURES): Not applicable      DISCHARGE MEDICATIONS        Review of your medicines        UNREVIEWED medicines. Ask your  doctor about these medicines        Dose / Directions   calcium carbonate-vitamin D 600-10 MG-MCG per tablet  Commonly known as: CALTRATE      Dose: 1 tablet  Take 1 tablet by mouth 2 times daily.  Refills: 0            DISPOSITION: Admit    Medical Decision Making    At the time of my evaluation, I do not feel the patient s symptoms are caused by sepsis      I obtained additional history from these independent historians: EMS    I reviewed these outside records: Virtual visit on 11/13/2023 at [facility] for immune thrombocytopenia recheck.    I noted these abnormal vital signs / labs:  Blood pressure - 174/79.     Monitor Strip Interpretation:  Sinus Rhythm  12-Lead ECG Interpretation:  Sinus Rhythm  I independently reviewed the following diagnostic studies:    I spoke to the following clinicians regard the patients care: Dr. Dunlap from [facility]. Pema Rick NP from Neurosurgery. Dr. Morel, admitting hospitalist.     My disposition decision is based on the following reasons:  Admit:    MIPS Documentation Adult Minor Head Trauma:Age 65 years or older    At the conclusion of the encounter I discussed the results of all of the tests and the disposition. The questions were answered. The patient or family acknowledged understanding and was agreeable with the care plan.            INFORMATION SOURCE AND LIMITATIONS    History/Exam limitations: Alzheimer's disease   Patient information was obtained from: EMS  Use of : N/A    Per EMS, patient comes from a memory care facility after she had an unwitnessed but heard fall. Patient and staff are unsure if she lost consciousness. Laceration to the right lateral side of her head was noted. Due to this incident, patient was brought to the ED for further evaluation.     Currently in the ED, patient is complaining of right hip pain. No other pain is reported.     Denies any fevers, nausea, or vomiting. Not on any blood thinners.  Patient is otherwise in her normal state of health with no other concerns.     Per chart review, patient was seen on 11/13/2023 at [facility] for immune thrombocytopenia recheck. It was decided that the benefits of continuing Nplate do not outweigh potential risks and discomfort to the patient. Due to that, patient will not be doing infusions anymore. Hospice consultation was ordered.    REVIEW OF SYSTEMS:   All other systems reviewed and are negative except as noted above in HPI.    PATIENT HISTORY     Past Medical History:   Diagnosis Date    Adjustment disorder with mixed anxiety and depressed mood 1/22/2016    Adjustment reaction to chronic stress 1/22/2016    Chronic kidney disease     History of pulmonary embolism 7/8/2020    Hypertension     ITP (idiopathic thrombocytopenic purpura)     Osteoporosis     Paroxysmal atrial fibrillation (H)     Pulmonary embolism (H) 2/8/2016    Thrombocytopenia      Patient Active Problem List   Diagnosis    Major neurocognitive disorder due to Alzheimer's disease, without behavioral disturbance (H)    Immune thrombocytopenic purpura (H)    Disorder of bone and cartilage    HLD (hyperlipidemia)    Contraindication to anticoagulation therapy    Adenomatous goiter    Paroxysmal atrial fibrillation (H)    Trigger finger, acquired    Vaginitis and vulvovaginitis    Dizziness    SAH (subarachnoid hemorrhage) (H)    Closed fracture of trochanter of right femur, initial encounter (H)    Fall, initial encounter    Closed fracture of temporal bone, initial encounter (H)    Closed fracture of right zygomatic arch, initial encounter (H)     Past Surgical History:   Procedure Laterality Date    CATARACT EXTRACTION Left 10/2014    ESOPHAGOSCOPY, GASTROSCOPY, DUODENOSCOPY (EGD), COMBINED N/A 4/13/2017    Procedure: ESOPHAGOGASTRODUODENOSCOPY (EGD);  Surgeon: Juanpablo John MD;  Location: [facility];  Service:     HEMORRHOIDECTOMY INTERNAL LIGATION       Description: Hemorrhoidectomy;  Recorded: 05/08/2008;  Comments: with fissure    IR EMBOLIZATION VASCULAR NON HEAD/NECK  4/12/2017    LAPAROSCOPIC SPLENECTOMY N/A 12/20/2015    Procedure: LAPAROSCOPIC TO CONVERSION TO OPEN SPLENECTOMY;  Surgeon: Herb Mckeon MD;  Location: [facility] [place], [place];  Service:     OTHER SURGICAL HISTORY      blake barboza joe    PICC  4/15/2017            No Known Allergies    OUTPATIENT MEDICATIONS     New Prescriptions    No medications on file      Vitals:    03/01/25 1731 03/01/25 1801 03/01/25 1816 03/01/25 2024   BP: (!) 166/71 (!) 171/73 (!) 170/74 (!) 175/74   Pulse:  76 74    Resp:  17 24    Temp:       TempSrc:       SpO2:  97% 96%    Weight:       Height:           Physical Exam   Constitutional: Oriented to person, place, and time. Appears well-developed and well-nourished.   HEENT:    Head: Hematoma right temporal scalp with superficial 1 cm laceration  Neck: Cervical Spine nontender  Cardiovascular: Normal rate, regular rhythm and normal heart sounds.    Pulmonary/Chest: Normal effort  and breath sounds normal.  Wall atraumatic  Abdominal: Soft. Bowel sounds are normal.   Musculoskeletal: N somewhat greater trochanter of the right hip.  No thoracic or lumbar spine tenderness.  No tenderness of the pelvis remainder of axial skeleton nontender  Neurological: Alert and oriented to person. Normal strength. No sensory deficit. No cranial nerve deficit.      DIAGNOSTICS    LABORATORY FINDINGS (REVIEWED AND INTERPRETED):  Labs Ordered and Resulted from Time of ED Arrival to Time of ED Departure   BASIC METABOLIC PANEL - Abnormal       Result Value    Sodium 141      Potassium 4.3      Chloride 104      Carbon Dioxide (CO2) 28      Anion Gap 9      Urea Nitrogen 19.9      Creatinine 1.04 (*)     GFR Estimate 50 (*)     Calcium 9.7      Glucose 117 (*)    CBC WITH PLATELETS AND DIFFERENTIAL - Abnormal    WBC Count 13.7 (*)     RBC Count 4.35      Hemoglobin 13.8      Hematocrit  42.8      MCV 98      MCH 31.7      MCHC 32.2      RDW 13.9      Platelet Count 146 (*)     % Neutrophils 67      % Lymphocytes 23      % Monocytes 8      % Eosinophils 1      % Basophils 1      % Immature Granulocytes 1      NRBCs per 100 WBC 0      Absolute Neutrophils 9.2 (*)     Absolute Lymphocytes 3.1      Absolute Monocytes 1.1      Absolute Eosinophils 0.2      Absolute Basophils 0.1      Absolute Immature Granulocytes 0.1      Absolute NRBCs 0.0     CK TOTAL - Normal    CK 47           IMAGING (REVIEWED AND INTERPRETED):  XR Hip Right 2-3 Views   Final Result   IMPRESSION: Mildly displaced fracture of the right greater trochanter. No definite extension of the fracture into the intertrochanteric portion of the right femur. No additional fractures identified elsewhere in the pelvis. Normal joint spacing alignment    of both hips. Multilevel degenerative disc disease and facet arthropathy in the lower lumbar spine. Osseous demineralization.      CT Cervical Spine w/o Contrast   Final Result   IMPRESSION:   1.  No fracture or posttraumatic subluxation.   2.  No high-grade spinal canal or neural foraminal stenosis.      CT Orbits wo Contrast   Final Result   Addendum (preliminary) 1 of 1   COMMUNICATION ADDENDUM:   Findings were discussed with Dr. Cesar Garcia on 3/1/2025 6:57 PM CST.      END ADDENDUM      Final   IMPRESSION:   HEAD CT:   1.  Acute, small volume subarachnoid hemorrhage in the bilateral cerebral sulci (greater than 3 sulci). No additional acute intracranial hemorrhage. No mass effect.   2.  Acute, nondisplaced fracture of the right squamosal temporal bone.   3.  Acute, minimally displaced fracture of the right zygomatic arch.   4.  No additional acute intracranial abnormality.   5.  Moderate presumed chronic small vessel ischemic changes.      ORBITS CT:   1.  No acute orbital abnormality.   2.  Small amount of dependent fluid in the right maxillary sinus is nonspecific but could represent an  acute blood products. Acute fracture of the maxilla or orbital floor is not definitely seen.         CT Head w/o Contrast   Final Result   Addendum (preliminary) 1 of 1   COMMUNICATION ADDENDUM:   Findings were discussed with Dr. Cesar Garcia on 3/1/2025 6:57 PM CST.      END ADDENDUM      Final   IMPRESSION:   HEAD CT:   1.  Acute, small volume subarachnoid hemorrhage in the bilateral cerebral sulci (greater than 3 sulci). No additional acute intracranial hemorrhage. No mass effect.   2.  Acute, nondisplaced fracture of the right squamosal temporal bone.   3.  Acute, minimally displaced fracture of the right zygomatic arch.   4.  No additional acute intracranial abnormality.   5.  Moderate presumed chronic small vessel ischemic changes.      ORBITS CT:   1.  No acute orbital abnormality.   2.  Small amount of dependent fluid in the right maxillary sinus is nonspecific but could represent an acute blood products. Acute fracture of the maxilla or orbital floor is not definitely seen.         MR Hip Right w/o Contrast    (Results Pending)         ECG (REVIEWED AND INTERPRETED):   ECG:   Performed at: 16:59:50  HR:  86 bpm  Rhythm: Sinus  Axis: -62  QRS duration: 92 ms  QTC: 437 ms  ST changes: No ST segment elevation or depression, no T wave inversion,No Q wave  Interpretation: Sinus Rhythm with unifocal PVCs  Compared to most recent ECG from: October 22, 2024 no acute change    I have reviewed the patient's ECG, with comments made as listed above. Please see scanned image for full interpretation.         I, Felicia Sy, am serving as a scribe to document services personally performed by Dr. Cesar Garcia, based on my observations and the provider's statements to me.  I, Cesar Garcia, DO, attest that Felicia Sy is acting in a scribe capacity, has observed my performance of the services and has documented them in accordance with my direction.     Cesar Garcia D.O.  EMERGENCY MEDICINE   03/01/25  [facility]  [facility] EMERGENCY DEPARTMENT  [address] [place] [id number]  [phone]  Dept: [phone]     Cesar Garcia DO  03/01/25 6518

## 2025-03-01 NOTE — ED NOTES
Bed: JNED-03  Expected date: 3/1/25  Expected time:   Means of arrival:   Comments:  MWFD; 89F, FALL, HIT HEAD.

## 2025-03-01 NOTE — ED TRIAGE NOTES
Patient from memory care unit. Staff heard patient fall, unsure if patient lost consciousness. Patient alert and pleasantly confused per usual. Patient has laceration on right side of scalp. C/o pain on right hip. Patient not on thinners. EMS gave no treatment en route.     Triage Assessment (Adult)       Row Name 03/01/25 1651          Triage Assessment    Airway WDL WDL        Respiratory WDL    Respiratory WDL WDL        Skin Circulation/Temperature WDL    Skin Circulation/Temperature WDL WDL        Cardiac WDL    Cardiac WDL WDL        Peripheral/Neurovascular WDL    Peripheral Neurovascular WDL WDL        Cognitive/Neuro/Behavioral WDL    Cognitive/Neuro/Behavioral WDL WDL

## 2025-03-02 ENCOUNTER — APPOINTMENT (OUTPATIENT)
Dept: PHYSICAL THERAPY | Facility: HOSPITAL | Age: OVER 89
DRG: 083 | End: 2025-03-02
Attending: FAMILY MEDICINE
Payer: COMMERCIAL

## 2025-03-02 ENCOUNTER — APPOINTMENT (OUTPATIENT)
Dept: CT IMAGING | Facility: HOSPITAL | Age: OVER 89
DRG: 083 | End: 2025-03-02
Attending: FAMILY MEDICINE
Payer: COMMERCIAL

## 2025-03-02 LAB
ANION GAP SERPL CALCULATED.3IONS-SCNC: 9 MMOL/L (ref 7–15)
ATRIAL RATE - MUSE: 86 BPM
BUN SERPL-MCNC: 17.5 MG/DL (ref 8–23)
CALCIUM SERPL-MCNC: 9.4 MG/DL (ref 8.8–10.4)
CHLORIDE SERPL-SCNC: 106 MMOL/L (ref 98–107)
CREAT SERPL-MCNC: 0.98 MG/DL (ref 0.51–0.95)
DIASTOLIC BLOOD PRESSURE - MUSE: 81 MMHG
EGFRCR SERPLBLD CKD-EPI 2021: 54 ML/MIN/1.73M2
ERYTHROCYTE [DISTWIDTH] IN BLOOD BY AUTOMATED COUNT: 14.2 % (ref 10–15)
GLUCOSE SERPL-MCNC: 117 MG/DL (ref 70–99)
HCO3 SERPL-SCNC: 25 MMOL/L (ref 22–29)
HCT VFR BLD AUTO: 41.8 % (ref 35–47)
HGB BLD-MCNC: 13.5 G/DL (ref 11.7–15.7)
INTERPRETATION ECG - MUSE: NORMAL
MCH RBC QN AUTO: 31.6 PG (ref 26.5–33)
MCHC RBC AUTO-ENTMCNC: 32.3 G/DL (ref 31.5–36.5)
MCV RBC AUTO: 98 FL (ref 78–100)
P AXIS - MUSE: 81 DEGREES
PLATELET # BLD AUTO: 139 10E3/UL (ref 150–450)
POTASSIUM SERPL-SCNC: 4 MMOL/L (ref 3.4–5.3)
PR INTERVAL - MUSE: 150 MS
QRS DURATION - MUSE: 92 MS
QT - MUSE: 366 MS
QTC - MUSE: 437 MS
R AXIS - MUSE: -62 DEGREES
RBC # BLD AUTO: 4.27 10E6/UL (ref 3.8–5.2)
SODIUM SERPL-SCNC: 140 MMOL/L (ref 135–145)
SYSTOLIC BLOOD PRESSURE - MUSE: 189 MMHG
T AXIS - MUSE: 69 DEGREES
TSH SERPL DL<=0.005 MIU/L-ACNC: 1.23 UIU/ML (ref 0.3–4.2)
VENTRICULAR RATE- MUSE: 86 BPM
WBC # BLD AUTO: 15.7 10E3/UL (ref 4–11)

## 2025-03-02 PROCEDURE — 70450 CT HEAD/BRAIN W/O DYE: CPT

## 2025-03-02 PROCEDURE — 97116 GAIT TRAINING THERAPY: CPT | Mod: GP

## 2025-03-02 PROCEDURE — 97162 PT EVAL MOD COMPLEX 30 MIN: CPT | Mod: GP

## 2025-03-02 PROCEDURE — 99233 SBSQ HOSP IP/OBS HIGH 50: CPT | Performed by: STUDENT IN AN ORGANIZED HEALTH CARE EDUCATION/TRAINING PROGRAM

## 2025-03-02 PROCEDURE — 36415 COLL VENOUS BLD VENIPUNCTURE: CPT | Performed by: FAMILY MEDICINE

## 2025-03-02 PROCEDURE — 999N000111 HC STATISTIC OT IP EVAL DEFER

## 2025-03-02 PROCEDURE — 120N000001 HC R&B MED SURG/OB

## 2025-03-02 PROCEDURE — 84443 ASSAY THYROID STIM HORMONE: CPT | Performed by: STUDENT IN AN ORGANIZED HEALTH CARE EDUCATION/TRAINING PROGRAM

## 2025-03-02 PROCEDURE — 250N000013 HC RX MED GY IP 250 OP 250 PS 637: Performed by: FAMILY MEDICINE

## 2025-03-02 PROCEDURE — 80048 BASIC METABOLIC PNL TOTAL CA: CPT | Performed by: FAMILY MEDICINE

## 2025-03-02 PROCEDURE — 85014 HEMATOCRIT: CPT | Performed by: FAMILY MEDICINE

## 2025-03-02 PROCEDURE — 99254 IP/OBS CNSLTJ NEW/EST MOD 60: CPT | Performed by: PHYSICIAN ASSISTANT

## 2025-03-02 RX ADMIN — ACETAMINOPHEN 975 MG: 325 TABLET ORAL at 23:20

## 2025-03-02 RX ADMIN — ACETAMINOPHEN 975 MG: 325 TABLET ORAL at 14:30

## 2025-03-02 RX ADMIN — ACETAMINOPHEN 975 MG: 325 TABLET ORAL at 05:07

## 2025-03-02 ASSESSMENT — ACTIVITIES OF DAILY LIVING (ADL)
ADLS_ACUITY_SCORE: 26
ADLS_ACUITY_SCORE: 35
ADLS_ACUITY_SCORE: 35
ADLS_ACUITY_SCORE: 26
ADLS_ACUITY_SCORE: 26
ADLS_ACUITY_SCORE: 35
ADLS_ACUITY_SCORE: 39
ADLS_ACUITY_SCORE: 40
ADLS_ACUITY_SCORE: 35
ADLS_ACUITY_SCORE: 40
ADLS_ACUITY_SCORE: 35
ADLS_ACUITY_SCORE: 31
ADLS_ACUITY_SCORE: 26
ADLS_ACUITY_SCORE: 26
ADLS_ACUITY_SCORE: 31
ADLS_ACUITY_SCORE: 30
ADLS_ACUITY_SCORE: 31
ADLS_ACUITY_SCORE: 40
ADLS_ACUITY_SCORE: 40
DEPENDENT_IADLS:: CLEANING;COOKING;LAUNDRY;SHOPPING;MEAL PREPARATION;MEDICATION MANAGEMENT;MONEY MANAGEMENT;TRANSPORTATION
ADLS_ACUITY_SCORE: 26
ADLS_ACUITY_SCORE: 30
ADLS_ACUITY_SCORE: 35
ADLS_ACUITY_SCORE: 26

## 2025-03-02 NOTE — PLAN OF CARE
Goal Outcome Evaluation:      Plan of Care Reviewed With: patient, child          Outcome Evaluation: Goal to return to memory care at discharge, unknown discharge needs at this time.

## 2025-03-02 NOTE — CONSULTS
Care Management Initial Consult    General Information  Assessment completed with: Children,    Type of CM/SW Visit: Initial Assessment    Primary Care Provider verified and updated as needed: Yes   Readmission within the last 30 days: no previous admission in last 30 days      Reason for Consult: discharge planning  Advance Care Planning:            Communication Assessment  Patient's communication style: spoken language (English or Bilingual)    Hearing Difficulty or Deaf: no   Wear Glasses or Blind: no    Cognitive  Cognitive/Neuro/Behavioral: .WDL except, orientation  Level of Consciousness: confused  Arousal Level: opens eyes spontaneously  Orientation: disoriented to, time, situation  Mood/Behavior: calm, cooperative     Speech: spontaneous, clear    Living Environment:   People in home: facility resident     Current living Arrangements: assisted living  [facility] Facility[facility]   Able to return to prior arrangements: yes       Family/Social Support:  Care provided by:  (facility staff)  Provides care for: no one     Support system: Children          Description of Support System: Supportive, Involved         Current Resources:   Patient receiving home care services: No        Community Resources: None  Equipment currently used at home: none  Supplies currently used at home: None    Employment/Financial:  Employment Status: retired        Financial Concerns: none   Referral to Financial Worker: No       Does the patient's insurance plan have a 3 day qualifying hospital stay waiver?  No    Lifestyle & Psychosocial Needs:  Social Drivers of Health     Food Insecurity: Low Risk  (3/1/2025)    Food Insecurity     Within the past 12 months, did you worry that your food would run out before you got money to buy more?: No     Within the past 12 months, did the food you bought just not last and you didn t have money to get more?: No   Depression: Not at risk (5/16/2023)    PHQ-2     PHQ-2 Score: 2    Housing Stability: Low Risk  (3/1/2025)    Housing Stability     Do you have housing? : Yes     Are you worried about losing your housing?: No   Tobacco Use: Medium Risk (3/1/2025)    Patient History     Smoking Tobacco Use: Former     Smokeless Tobacco Use: Never     Passive Exposure: Not on file   Financial Resource Strain: Low Risk  (3/1/2025)    Financial Resource Strain     Within the past 12 months, have you or your family members you live with been unable to get utilities (heat, electricity) when it was really needed?: No   Alcohol Use: Not on file   Transportation Needs: Low Risk  (3/1/2025)    Transportation Needs     Within the past 12 months, has lack of transportation kept you from medical appointments, getting your medicines, non-medical meetings or appointments, work, or from getting things that you need?: No   Physical Activity: Not on file   Interpersonal Safety: Low Risk  (3/1/2025)    Interpersonal Safety     Do you feel physically and emotionally safe where you currently live?: Yes     Within the past 12 months, have you been hit, slapped, kicked or otherwise physically hurt by someone?: No     Within the past 12 months, have you been humiliated or emotionally abused in other ways by your partner or ex-partner?: No   Stress: Not on file   Social Connections: Not on file   Health Literacy: Not on file       Functional Status:  Prior to admission patient needed assistance:   Dependent ADLs:: Ambulation-walker, Bathing, Dressing, Eating, Grooming  Dependent IADLs:: Cleaning, Cooking, Laundry, Shopping, Meal Preparation, Medication Management, Money Management, Transportation       Mental Health Status:  Mental Health Status: No Current Concerns       Chemical Dependency Status:  Chemical Dependency Status: No Current Concerns             Values/Beliefs:  Spiritual, Cultural Beliefs, [employer] Practices, Values that affect care: no               Discussed  Partnership in Safe Discharge Planning   "document with patient/family: No    Additional Information:  MARILYN met with patient and son, Sanford in patient's room. SW introduced self and CM role. Patient lives at [facility] in memory care. MARILYN called and spoke with tonya Puga Rn at [facility] (ph: [phone]). Patient receives physical assist of one with all ADLs and is dependent for all IADLs. Sanford shares that she has a walker that she \"should\" use, but often forgets to use it and staff provides reminders.     Sanford reports goal is for patient to return to memory care when medically appropriate for discharge and family is willing to transport pending pt's mobility. MARILYN discussed plan to follow care progression and follow up related to therapy recommendations to assist with safe discharge planning.     Next Steps: follow for PT/OT recs, update patient's facility as needed.     CARRIE Dhillon at 10:55 AM on 03/02/25       "

## 2025-03-02 NOTE — PLAN OF CARE
Problem: Fall Injury Risk  Goal: Absence of Fall and Fall-Related Injury  Outcome: Progressing   Goal Outcome Evaluation:       Pt alert/disoriented to time. On scheduled tylenol, denies pain. On tele, NSR. R sclera is red and R side of head with glued wound. Neuro check every two hrs completed, pt down to CT. Scheduled rounding.

## 2025-03-02 NOTE — PLAN OF CARE
Problem: Stroke, Ischemic (Includes Transient Ischemic Attack)  Goal: Optimal Cerebral Tissue Perfusion  Outcome: Progressing     Problem: Dementia Signs/Symptoms  Goal: Optimized Cognitive Function (Dementia Signs/Symptoms)  Outcome: Progressing   Goal Outcome Evaluation:       VSS on RA, SBP WDL. SR on tele. A&Ox2. Neuros intact. R hip & shoulder pain managed w/ sched tylenol. Up w/ 1 & W. Tolerating reg diet. Family visiting at bedside.

## 2025-03-02 NOTE — CONSULTS
ORTHOPEDIC CONSULTATION    Consultation  Ora Gonzalez,  1933, MRN [id number]    [unfilled]  SAH (subarachnoid hemorrhage) (H) [I60.9]  Closed fracture of trochanter of right femur, initial encounter (H) [S72.101A]  Fall, initial encounter [W19.XXXA]  Closed fracture of temporal bone, initial encounter (H) [S02.19XA]  Closed fracture of right zygomatic arch, initial encounter (H) [S02.40EA]    PCP: Ryanne Corbett, [phone]   Code status:  No CPR- Do NOT Intubate       Extended Emergency Contact Information  Primary Emergency Contact: Gomez Gonzalez   [facility]  Home Phone: [phone]  Relation: Son  Secondary Emergency Contact: Sanford YOUSSEF Lisa   Address: [address] [place] [address] [id number] [place] [address]  Home Phone: [phone]  Mobile Phone: [phone]  Relation: Son         IMPRESSION:  89-year-old female with a mildly displaced greater trochanteric femur fracture, healing    PLAN:  I discussed the radiographic and clinical exam findings with the patient and her son in detail today at bedside. Given that the patient is an unreliable historian and the presence of a small amount of ecchymoses over the lateral aspect of the hip, it is reasonable to suspect that she may have fallen directly onto her right side.  Although my suspicion for acute occult hip fracture is low based on my exam, I explained that the best modality to definitively rule this out would be an MRI.  However, the patient's son does not feel that she is likely to be able to lay still for more than a few moments at a time.  This would likely necessitate MRI to be performed under anesthesia, which they are not interested in at this time.  After long conversation, the patient and her son have decided to elect for close monitoring and to allow weightbearing as tolerated.  If the patient exhibits signs of increased hip pain or difficulty bearing weight, I would recommend resuming weightbearing restrictions and  revisiting the possibility of obtaining an MRI of the right hip to definitively rule out acute fracture or intertrochanteric extension of the previously noted subacute greater trochanteric femur fracture.      CHIEF COMPLAINT: Right greater trochanteric femur fracture    HISTORY OF PRESENT ILLNESS:  Ora Gonzalez is a 89-year-old female with a past medical history significant for advanced dementia, CKD, history of pulmonary embolism, hypertension, paroxysmal atrial fibrillation, and osteoporosis, who presented to the ER yesterday after an unwitnessed fall in her memory care facility and was found to have a subarachnoid hemorrhage.  Radiographs of the right hip also demonstrate a known greater trochanteric femur fracture with evidence of interval healing compared to most recent comparison films on October 29.    When I spoke to the patient and her son at bedside earlier this afternoon, they both reported no significant acute increase in right hip pain.  She has had nonweightbearing restrictions since being admitted to the hospital yesterday, but feels that she would be comfortable walking if allowed.  The patient is unable to report whether she did fall directly onto the right hip or not.    PAST MEDICAL HISTORY:  Past Medical History:   Diagnosis Date    Adjustment disorder with mixed anxiety and depressed mood 1/22/2016    Adjustment reaction to chronic stress 1/22/2016    Chronic kidney disease     History of pulmonary embolism 7/8/2020    Hypertension     ITP (idiopathic thrombocytopenic purpura)     Osteoporosis     Paroxysmal atrial fibrillation (H)     Pulmonary embolism (H) 2/8/2016    Thrombocytopenia        ALLERGIES:   Review of patient's allergies indicates No Known Allergies    MEDICATIONS UPON ADMISSION:  Medications Prior to Admission   Medication Sig Dispense Refill Last Dose/Taking    calcium carbonate-vitamin D (CALTRATE) 600-10 MG-MCG per tablet Take 1 tablet by mouth 2 times daily.   3/1/2025  Morning       SOCIAL HISTORY:   she  reports that she has quit smoking. Her smoking use included cigarettes. She has never used smokeless tobacco. She reports that she does not currently use alcohol.    FAMILY HISTORY:  family history includes Alzheimer Disease in her sister and sister; Depression in her father; Diabetes Type 2  in her son; No Known Problems in her brother, brother, brother, father, mother, sister, and son; Other - See Comments in an other family member; Parkinsonism in her daughter; Suicidality in her father.      REVIEW OF SYSTEMS:   Reviewed with patient. See HPI, otherwise negative.    PHYSICAL EXAMINATION:  Vitals: Temp:  [97.3  F (36.3  C)-98.2  F (36.8  C)] 97.3  F (36.3  C)  Pulse:  [65-88] 65  Resp:  [17-33] 18  BP: (113-189)/(54-88) 139/71  SpO2:  [95 %-98 %] 98 %    RLE:  No deformity  Small area of ecchymoses over lateral hip.  No pain with passive hip flexion, internal rotation, or external rotation  No pain with heal strike  Mild tenderness to palpation over latera aspect of greater trochanter  Sensation intact to light touch in the saphenous, sural, tibial, SPN, DPN distributions  Fires EHL/FHL/TA/GSC  DP pulse is palpable, toes are warm and well perfused with brisk capillary refill     RADIOGRAPHIC EVALUATION:  AP pelvis and 2 views of the right hip were reviewed and demonstrate a healing mildly displaced greater trochanteric femur fracture.  No evidence of intertrochanteric extension.    Comparison pelvis and hip radiographs from 10/29/2024 were also reviewed and redemonstrate this fracture in the acute setting.    PERTINENT LABS:  Lab Results   Component Value Date    WBC 15.7 03/02/2025     Lab Results   Component Value Date    RBC 4.27 03/02/2025     Lab Results   Component Value Date    HGB 13.5 03/02/2025     Lab Results   Component Value Date    HCT 41.8 03/02/2025     Lab Results   Component Value Date    MCV 98 03/02/2025     Lab Results   Component Value Date    MCH 31.6  03/02/2025     Lab Results   Component Value Date    MCHC 32.3 03/02/2025     Lab Results   Component Value Date    RDW 14.2 03/02/2025     Lab Results   Component Value Date     03/02/2025     Last Comprehensive Metabolic Panel:  Lab Results   Component Value Date     03/02/2025    POTASSIUM 4.0 03/02/2025    CHLORIDE 106 03/02/2025    CO2 25 03/02/2025    ANIONGAP 9 03/02/2025     (H) 03/02/2025    BUN 17.5 03/02/2025    CR 0.98 (H) 03/02/2025    GFRESTIMATED 54 (L) 03/02/2025    MCKENNA 9.4 03/02/2025             Iam Montes MD   [facility]  3/2/2025

## 2025-03-02 NOTE — PLAN OF CARE
Goal Outcome Evaluation:      Plan of Care Reviewed With: patient, child    Overall Patient Progress: no changeOverall Patient Progress: no change         Pt admitted from ED this evening. Alert, disoriented to time. Orders fore q2h neuro check. Bedrest with commode privileges, moves assist x1. Tele NSR. PIV SL. SCDs on. R sclera bleeding, R side of head with glued wound. Son was here at admission, left number on white board to call if needed.

## 2025-03-02 NOTE — PROGRESS NOTES
Contacted regarding 89 yo female presenting to ER from memory care after unwitnessed fall.    Imaging reveals small SAH and skull fx.    Imaging:    Head CT:    IMPRESSION:  1.  Acute, small volume subarachnoid hemorrhage in the bilateral cerebral sulci (greater than 3 sulci). No additional acute intracranial hemorrhage. No mass effect.  2.  Acute, nondisplaced fracture of the right squamosal temporal bone.  3.  Acute, minimally displaced fracture of the right zygomatic arch.  4.  No additional acute intracranial abnormality.  5.  Moderate presumed chronic small vessel ischemic changes.     RECOMMENDATIONS:  Admit at [facility]  Every 2 hour neuro checks overnight.  BP less than 150 systolic.  Repeat head CT tomorrow at 6:00 AM.  NS will see in consultation tomorrow.    Discussed with Dr. Dao.    Pema Rick, MPAS  [facility]  [address] [address]    Tel [phone]  Pager [phone]

## 2025-03-02 NOTE — PROGRESS NOTES
03/02/25 1400   Appointment Info   Signing Clinician's Name / Credentials (PT) Maggie Clayton,PT   Living Environment   People in Home facility resident   Current Living Arrangements other (see comments)  (memory care)   Living Environment Comments information per chart, pt unable to answer questions   Self-Care   Equipment Currently Used at Home other (see comments)  (pt chart pt suppose to use FWW but forgets- pt Ax1 for mobility)   Fall history within last six months yes   Number of times patient has fallen within last six months 3   Activity/Exercise/Self-Care Comment per chart A with ADLs and mobility   General Information   Onset of Illness/Injury or Date of Surgery 03/01/25   Referring Physician Dr. Tamica Gamez   Patient/Family Therapy Goals Statement (PT) none stated   Pertinent History of Current Problem (include personal factors and/or comorbidities that impact the POC) Ora Gonzalez is a 89 year old female with a history of advanced dementia who lives at assisted living [facility] with her own apartment who was found down yesterday.  By report staff heard patient fall unsure if she lost consciousness.  No change in behavior but had obvious laceration with bleeding on the right side of her scalp.  Was complaining of pain in her right hip.  Came to the emergency room department in which head CT revealed an acute small volume subarachnoid hemorrhage along with acute nondisplaced right temporal bone fracture and acute minimally displaced zygomatic arch fracture and she is being admitted.  Right hip pain found to have a mildly displaced fracture of the right greater trochanter without obvious extension of the fracture into the intertrochanteric portion of the right femur.   General Observations pt up in recliner, agreeable   Cognition   Affect/Mental Status (Cognition) confused   Follows Commands (Cognition) follows one-step commands;repetition of directions required;verbal cues/prompting required    Cognitive Status Comments pt pleasantly confused   Pain Assessment   Patient Currently in Pain Yes, see Vital Sign flowsheet  (no pain at rest per pt, reported pain R knee when moving)   Range of Motion (ROM)   ROM Comment RLE WFL but pt reports pain in R knee, LLE WFL   Strength (Manual Muscle Testing)   Strength Comments BLE WFL for transfers   Bed Mobility   Comment, (Bed Mobility) NT, pt up in recliner at start/end of PT session   Transfers   Comment, (Transfers) sit<>stand CGA w/ FWW, pt denied HA, dizziness, pain with standing   Gait/Stairs (Locomotion)   [place] Level (Gait) contact guard   Assistive Device (Gait) walker, front-wheeled   Distance in Feet (Gait) 25   Pattern (Gait) swing-through   Comment, (Gait/Stairs) cues for pt to slow down   Balance   Balance Comments no LOB with FWW   Clinical Impression   Criteria for Skilled Therapeutic Intervention Yes, treatment indicated   PT Diagnosis (PT) decreased functional mobility   Influenced by the following impairments dizziness, pain, decreased strength and endurence   Functional limitations due to impairments , transfers . gait   Clinical Presentation (PT Evaluation Complexity) evolving   Clinical Presentation Rationale presents as medically diagnosed   Clinical Decision Making (Complexity) moderate complexity   Planned Therapy Interventions (PT) gait training;strengthening;transfer training   Risk & Benefits of therapy have been explained evaluation/treatment results reviewed;patient   PT Total Evaluation Time   PT Eval, Moderate Complexity Minutes (80635) 12   Physical Therapy Goals   PT Frequency 5x/week   PT Predicted Duration/Target Date for Goal Attainment 03/09/25   PT Goals Transfers;Gait   PT: Transfers Supervision/stand-by assist;Sit to/from stand;Bed to/from chair;Assistive device   PT: Gait Supervision/stand-by assist;150 feet;Rolling walker   Interventions   Interventions Quick Adds Gait Training   Gait Training   Gait Training Minutes  (17235) 8   Symptoms Noted During/After Treatment (Gait Training) dizziness;increased pain   Treatment Detail/Skilled Intervention pt reporting increasing pain R knee with walking and reported dizziness at end of walk , BP after walk 156/70 RN notified and came into pt's room   Distance in Feet 120   [place] Level (Gait Training) contact guard   Physical Assistance Level (Gait Training) 1 person assist   Assistive Device (Gait Training) rolling walker   Pattern Analysis (Gait Training) swing-through gait   Impairments (Gait Analysis/Training) pain   PT Discharge Planning   PT Plan monitor pain and BP transfers and gait w/ FWW   PT Discharge Recommendation (DC Rec) other (see comments)  (return to memory care)   PT Rationale for DC Rec pt Ax1 for transfers and gaitw/ FWW, pt needs Ax1 for safety   PT Brief overview of current status sit<>stand CGA w/ FWW, gait 120; w/ FWW CGA   PT Total Distance Amb During Session (feet) 145   PT Equipment Needed at Discharge other (see comments)  (pt may have FWW at home)

## 2025-03-02 NOTE — ED NOTES
"[facility] ED Handoff Report    ED Chief Complaint: fall with head injury    ED Diagnosis:  (W19.XXXA) Fall, initial encounter  Comment: n/a  Plan: n/a    (I60.9) SAH (subarachnoid hemorrhage) (H)  Comment: n/a  Plan: n/a    (S02.40EA) Closed fracture of right zygomatic arch, initial encounter (H)  Comment: n/a  Plan: n/a    (S02.19XA) Closed fracture of temporal bone, initial encounter (H)  Comment: n/a  Plan: n/a    (S72.101A) Closed fracture of trochanter of right femur, initial encounter (H)  Comment: n/a  Plan: n/a       PMH:    Past Medical History:   Diagnosis Date    Adjustment disorder with mixed anxiety and depressed mood 1/22/2016    Adjustment reaction to chronic stress 1/22/2016    Chronic kidney disease     History of pulmonary embolism 7/8/2020    Hypertension     ITP (idiopathic thrombocytopenic purpura)     Osteoporosis     Paroxysmal atrial fibrillation (H)     Pulmonary embolism (H) 2/8/2016    Thrombocytopenia         Code Status:  No CPR- Do NOT Intubate     Falls Risk: Yes Band: Applied    Current Living Situation/Residence: lives in a skilled nursing facility     Elimination Status: Continent: No     Activity Level: 2 assist    Patients Preferred Language:  English     Needed: No    Vital Signs:  BP (!) 147/68   Pulse 88   Temp 97.8  F (36.6  C) (Oral)   Resp (!) 33   Ht 1.676 m (5' 6\")   Wt 52.2 kg (115 lb)   SpO2 98%   BMI 18.56 kg/m       Cardiac Rhythm: sr    Pain Score: 5/10    Is the Patient Confused:  Yes Where is the patient located?    Last Food or Drink: 03/01/25 at unsure    Focused Assessment:  Pt is awake and alert. Pupils equal. Pt states she is at the doctor's office and consistently 2006 and November.Pt LAW's and obeys simple commands. Pt has skin tear to right side of forehead and it was glued. She also has conjunctival bleeding to right eye and right facial fractures. Pt has old right hip fx from about 2 weeks ago, the doctors want to do an MRI to " make sure she did not extend the injury but that is on hold for now. Gave hydralazine to keep SBP less than 150.Lungs clear, abdomen soft.     Tests Performed: Done: Labs and Imaging    Treatments Provided:  hydralazine    Family Dynamics/Concerns: No    Family Updated On Visitor Policy: Yes    Plan of Care Communicated to Family: Yes    Who Was Updated about Plan of Care: son    Belongings Checklist Done and Signed by Patient: Yes    Medications sent with patient: n/a    Covid: asymptomatic , negative    Additional Information: n/a    Beth Bean RN 3/1/2025 9:11 PM

## 2025-03-02 NOTE — H&P
[facility]    History and Physical - Hospitalist Service       Date of Admission:  3/1/2025    Assessment & Plan      Ora Gonzalez is a 89 year old female with advanced dementia brought in with an unwitnessed fall at [facility]; found to have subarachnoid hemorrhage    Subarachnoid hemorrhage  --- Reviewed by neurosurgery, appreciate note  --- CT notes acute small volume subarachnoid hemorrhage in the bilateral cerebral sulci no additional intracranial hemorrhage or mass effect.  --- Neurosurgery feels safe to admit with 2-hour neurochecks overnight  -- Use IV hydralazine to keep BP less than 150 systolic as recommended by neurosurgery  --- Neurosurgery recommends head CT tomorrow morning, 6 AM  --- Formal neurosurgery consult in the morning  --- Patient not on any anticoagulants.  ---start scheduled tylenol for pain    Unwitnessed fall  --- Staff says they hurt her, son wonders how this could be possible.  --- EKG unremarkable.  --- CT spine negative  --- PT and OT to assess    Temporal fracture  --- CT notes nondisplaced fracture of the right squamosal temporal bone  --- Neurosurgery aware.  Monitor    Zygomatic arch fracture  --- Acute minimally displaced  --- Consider ENT follow-up due to minimal displacement    Scalp laceration  --- Right side  --- Glued in the emergency room department    Chronic right hip fracture  --- Son gives good history that this fracture may be old as she had a known right hip fracture treated by [facility] as an outpatient in the fall 2024.  --- Hold off on MRI overnight  --- Orthopedic consult in the morning; son thinks they are likely seeing old fracture previously known.  --- Previously was weight-bear as tolerated with walker.  ---son doubtful they would do any surgery if recommended given dementia    Advanced dementia  --- Last note from neurology, Martina, 2022 and at that time she was on Aricept and Namenda  --- No longer on medication  ---  Encephalopathy order set used.  High risk for acute encephalopathy  --- Scheduled Tylenol for pain    Immune thrombocytopenia  --- Last phone visit with oncology 11/2024; at that time it is noted that she had had a recent broken hip and they had decided to stop Nplate infusions and shanks was placing hospice consultation at that time  --- Platelets minimally low, continue to monitor at this time.        Diet:  clear liquids  DVT Prophylaxis: Pneumatic Compression Devices  Akhtar Catheter: Not present  Lines: None     Cardiac Monitoring: None  Code Status:  confirmed with son DNR/DNI    Clinically Significant Risk Factors Present on Admission                       # Dementia: noted on problem list                  Disposition Plan     Medically Ready for Discharge: Anticipated in 2-4 Days           My Morel MD  Hospitalist Service  [facility]  Securely message with [employer] (more info)  Text page via [employer] Paging/Directory     ______________________________________________________________________    Chief Complaint   Fall with scalp bleeding      History of Present Illness   Ora Gonzalez is a 89 year old female with a history of advanced dementia who lives at assisted living memory [facility] with her own apartment who was found down.  By report staff heard patient fall unsure if she lost consciousness.  No change in behavior but had obvious laceration with bleeding on the right side of her scalp.  Was complaining of pain in her right hip.  Came to the emergency room department she was found to have an acute small volume subarachnoid hemorrhage along with acute nondisplaced right temporal bone fracture and acute minimally displaced zygomatic arch fracture and she is being admitted.  Right hip pain found to have a mildly displaced fracture of the right greater trochanter without obvious extension of the fracture into the intertrochanteric portion of the right femur.    Son is there to corroborate  history.  Confirms DNR/DNI status.  He also tells me that patient has a known right hip fracture.  When I look back she did have an ED visit for this back in October.  Radiographs reported negative but when they took her to [place] they found a small fracture.  They decided on nonoperative treatment son tells me that she always has right hip pain.      Denies pain.  Pleasantly confused.  Awake.  Does not remember how she fell.      Past Medical History    Past Medical History:   Diagnosis Date    Adjustment disorder with mixed anxiety and depressed mood 1/22/2016    Adjustment reaction to chronic stress 1/22/2016    Chronic kidney disease     History of pulmonary embolism 7/8/2020    Hypertension     ITP (idiopathic thrombocytopenic purpura)     Osteoporosis     Paroxysmal atrial fibrillation (H)     Pulmonary embolism (H) 2/8/2016    Thrombocytopenia        Past Surgical History   Past Surgical History:   Procedure Laterality Date    CATARACT EXTRACTION Left 10/2014    ESOPHAGOSCOPY, GASTROSCOPY, DUODENOSCOPY (EGD), COMBINED N/A 4/13/2017    Procedure: ESOPHAGOGASTRODUODENOSCOPY (EGD);  Surgeon: Juanpablo John MD;  Location: [facility] GI;  Service:     HEMORRHOIDECTOMY INTERNAL LIGATION      Description: Hemorrhoidectomy;  Recorded: 05/08/2008;  Comments: with fissure    IR EMBOLIZATION VASCULAR NON HEAD/NECK  4/12/2017    LAPAROSCOPIC SPLENECTOMY N/A 12/20/2015    Procedure: LAPAROSCOPIC TO CONVERSION TO OPEN SPLENECTOMY;  Surgeon: Herb Mckeon MD;  Location: [facility] OR;  Service:     OTHER SURGICAL HISTORY      blake barboza joe    [facility]  4/15/2017            Prior to Admission Medications   Prior to Admission Medications   Prescriptions Last Dose Informant Patient Reported? Taking?   calcium carbonate-vitamin D (CALTRATE) 600-10 MG-MCG per tablet 3/1/2025 Morning  Yes Yes   Sig: Take 1 tablet by mouth 2 times daily.      Facility-Administered Medications: None        Review of Systems    The 5 point  Review of Systems is negative other than noted in the HPI or here.      Physical Exam   Vital Signs: Temp: 97.8  F (36.6  C) Temp src: Oral BP: (!) 175/74 Pulse: 74   Resp: 24 SpO2: 96 % O2 Device: None (Room air)    Weight: 115 lbs 0 oz    General Appearance: Pleasant female.  Some subconjunctival bleeding right eye.  Laceration right scalp noted.  Bruising around right eye.  Respiratory: Clear to auscultation  Cardiovascular: Sounds regular without murmurs rubs or gallop  GI: Soft nontender  Skin: No significant lower extremity edema  -laceration and bruising as noted above.  No open areas I can appreciate  Other: Neurologically nonfocal.  Pleasantly confused.    Medical Decision Making             Data     I have personally reviewed the following data over the past 24 hrs:    13.7 (H)  \   13.8   / 146 (L)     141 104 19.9 /  117 (H)   4.3 28 1.04 (H) \       Imaging results reviewed over the past 24 hrs:     Personally reviewed.  Normal sinus rhythm.  No acute ST changes.  QTc 437      Recent Results (from the past 24 hours)   CT Head w/o Contrast    Addendum: 3/1/2025    COMMUNICATION ADDENDUM:  Findings were discussed with Dr. Cesar Garcia on 3/1/2025 6:57 PM CST.    END ADDENDUM      Narrative    EXAM: CT HEAD W/O CONTRAST, CT ORBITAL W/O CONTRAST  LOCATION: [facility]  DATE/TIME: 3/1/2025 5:57 PM CST    INDICATION: Trauma. Fall. Right scalp laceration.  COMPARISON: None.  TECHNIQUE:   1) Routine CT Head without IV contrast. Multiplanar reformats. Dose reduction techniques were used.  2) Routine CT Orbits without IV contrast. Multiplanar reformats. Dose reduction techniques were used.    FINDINGS:  HEAD CT:   INTRACRANIAL CONTENTS: Small volume hyperattenuating subarachnoid hemorrhage in the bilateral superior frontal sulci, anterior right parietal sulci, posterior right cingulate sulcus, overlying the left posterolateral superior temporal gyrus, and in the   anterior left sylvian  fissure No CT evidence of acute infarct. Moderate presumed chronic small vessel ischemic changes. Moderate generalized volume loss. No hydrocephalus.     OSSEOUS STRUCTURES/SOFT TISSUES: Acute nondisplaced fracture involving the right squamosal temporal bone (series 3, image 14. Acute, minimally displaced fractures of the right zygomatic arch (series 3, image 8).     ORBITS CT:  RIGHT ORBIT: Normal periorbital soft tissues. No displaced orbital bone fracture. Normal globe, extraocular muscles, optic nerve/sheath, periorbital fat and lacrimal gland.     LEFT ORBIT: Normal periorbital soft tissues. No displaced orbital fracture. Normal globe, extraocular muscles, optic nerve/sheath, periorbital fat and lacrimal gland.    SINUSES: Small amount of dependent fluid in the right maxillary sinus, nonspecific. Remaining paranasal sinuses are predominantly clear.    VISUALIZED INTRACRANIAL CONTENTS: No abnormality.       Impression    IMPRESSION:  HEAD CT:  1.  Acute, small volume subarachnoid hemorrhage in the bilateral cerebral sulci (greater than 3 sulci). No additional acute intracranial hemorrhage. No mass effect.  2.  Acute, nondisplaced fracture of the right squamosal temporal bone.  3.  Acute, minimally displaced fracture of the right zygomatic arch.  4.  No additional acute intracranial abnormality.  5.  Moderate presumed chronic small vessel ischemic changes.    ORBITS CT:  1.  No acute orbital abnormality.  2.  Small amount of dependent fluid in the right maxillary sinus is nonspecific but could represent an acute blood products. Acute fracture of the maxilla or orbital floor is not definitely seen.     CT Orbits wo Contrast    Addendum: 3/1/2025    COMMUNICATION ADDENDUM:  Findings were discussed with Dr. Cesar Garcia on 3/1/2025 6:57 PM CST.    END ADDENDUM      Narrative    EXAM: CT HEAD W/O CONTRAST, CT ORBITAL W/O CONTRAST  LOCATION: [facility]  DATE/TIME: 3/1/2025 5:57 PM  CST    INDICATION: Trauma. Fall. Right scalp laceration.  COMPARISON: None.  TECHNIQUE:   1) Routine CT Head without IV contrast. Multiplanar reformats. Dose reduction techniques were used.  2) Routine CT Orbits without IV contrast. Multiplanar reformats. Dose reduction techniques were used.    FINDINGS:  HEAD CT:   INTRACRANIAL CONTENTS: Small volume hyperattenuating subarachnoid hemorrhage in the bilateral superior frontal sulci, anterior right parietal sulci, posterior right cingulate sulcus, overlying the left posterolateral superior temporal gyrus, and in the   anterior left sylvian fissure No CT evidence of acute infarct. Moderate presumed chronic small vessel ischemic changes. Moderate generalized volume loss. No hydrocephalus.     OSSEOUS STRUCTURES/SOFT TISSUES: Acute nondisplaced fracture involving the right squamosal temporal bone (series 3, image 14. Acute, minimally displaced fractures of the right zygomatic arch (series 3, image 8).     ORBITS CT:  RIGHT ORBIT: Normal periorbital soft tissues. No displaced orbital bone fracture. Normal globe, extraocular muscles, optic nerve/sheath, periorbital fat and lacrimal gland.     LEFT ORBIT: Normal periorbital soft tissues. No displaced orbital fracture. Normal globe, extraocular muscles, optic nerve/sheath, periorbital fat and lacrimal gland.    SINUSES: Small amount of dependent fluid in the right maxillary sinus, nonspecific. Remaining paranasal sinuses are predominantly clear.    VISUALIZED INTRACRANIAL CONTENTS: No abnormality.       Impression    IMPRESSION:  HEAD CT:  1.  Acute, small volume subarachnoid hemorrhage in the bilateral cerebral sulci (greater than 3 sulci). No additional acute intracranial hemorrhage. No mass effect.  2.  Acute, nondisplaced fracture of the right squamosal temporal bone.  3.  Acute, minimally displaced fracture of the right zygomatic arch.  4.  No additional acute intracranial abnormality.  5.  Moderate presumed chronic  small vessel ischemic changes.    ORBITS CT:  1.  No acute orbital abnormality.  2.  Small amount of dependent fluid in the right maxillary sinus is nonspecific but could represent an acute blood products. Acute fracture of the maxilla or orbital floor is not definitely seen.     CT Cervical Spine w/o Contrast    Narrative    EXAM: CT CERVICAL SPINE W/O CONTRAST  LOCATION: [facility]  DATE: 3/1/2025    INDICATION: Fall from standing. Head laceration.  COMPARISON: None.  TECHNIQUE: Routine CT Cervical Spine without IV contrast. Multiplanar reformats. Dose reduction techniques were used.    FINDINGS:  VERTEBRA: Normal vertebral body heights and alignment. No fracture or posttraumatic subluxation. Multilevel cervical spondylosis.    CANAL/FORAMINA: No high-grade spinal canal or neural foraminal stenosis.    PARASPINAL: No extraspinal abnormality.      Impression    IMPRESSION:  1.  No fracture or posttraumatic subluxation.  2.  No high-grade spinal canal or neural foraminal stenosis.   XR Hip Right 2-3 Views    Narrative    EXAM: XR HIP RIGHT 2-3 VIEWS  LOCATION: [facility]  DATE: 3/1/2025    INDICATION: Fall right hip pain  COMPARISON: None.      Impression    IMPRESSION: Mildly displaced fracture of the right greater trochanter. No definite extension of the fracture into the intertrochanteric portion of the right femur. No additional fractures identified elsewhere in the pelvis. Normal joint spacing alignment   of both hips. Multilevel degenerative disc disease and facet arthropathy in the lower lumbar spine. Osseous demineralization.

## 2025-03-02 NOTE — MEDICATION SCRIBE - ADMISSION MEDICATION HISTORY
Medication Scribe Admission Medication History    Admission medication history is complete. The information provided in this note is only as accurate as the sources available at the time of the update.    Information Source(s): Facility (U/NH/) medication list/MAR and CareEverywhere/SureScripts via phone    Pertinent Information: Patient comes from [facility] (after hours triage number [phone]). Nurse from triage line for facility reports patient is only currently taking Calcium/Vit D tablet BID. Nurse mentioned an Abrysvo (RSV) vaccine, but said it looks like it had never been given based on what she could see.    Changes made to PTA medication list:  Added:   Calcium +D3 (600 mg/10 mcg) BID  Deleted:   Tylenol PRN  Cetirizine 5 mg PRN  Donepezil  Changed: None    Allergies reviewed with patient and updates made in EHR: yes    Medication History Completed By: Eran Taylor 3/1/2025 8:05 PM    PTA Med List   Medication Sig Last Dose/Taking    calcium carbonate-vitamin D (CALTRATE) 600-10 MG-MCG per tablet Take 1 tablet by mouth 2 times daily. 3/1/2025 Morning

## 2025-03-02 NOTE — PROGRESS NOTES
[facility]    Medicine Progress Note - Hospitalist Service    Date of Admission:  3/1/2025    Assessment & Plan   Ora Gonzalez is a 89 year old female with advanced dementia brought in with an unwitnessed fall at [facility]; found to have subarachnoid hemorrhage     Subarachnoid hemorrhage  - CT notes acute small volume subarachnoid hemorrhage in the bilateral cerebral sulci no additional intracranial hemorrhage or mass effect  - Repeat CT 03/02 shows diminised volume of SAH. Interval development of subdural fluid collection overlying left cerebral hemisphere  - Seen by Neurosurgery, appreciate recs  - Neuro checks every 4 hours  - Maintain SBP less than 150  - Repeat CT head tomorrow morning     Unwitnessed fall  - Staff says they hurt her, son wonders how this could be possible.  Suspect if she had a fall when she stood up, also reports poor p.o. intake and mostly dehydrated  - EKG unremarkable.  - CT spine negative  - PT and OT to assess     Temporal fracture  - CT notes nondisplaced fracture of the right squamosal temporal bone  - Neurosurgery aware.  Monitor     Zygomatic arch fracture  - Acute minimally displaced  - Consider ENT follow-up due to minimal displacement     Scalp laceration  - Right side  - Glued in the ER     Chronic right hip fracture  - Son gives good history that this fracture may be old as she had a known right hip fracture treated by [facility] as an outpatient in the fall 2024.  - Hold off on MRI now, son feels patient will not be able to lay still.  - Seen by orthopedics, appreciate recs  - Elected for conservative management, allow WBAT     Advanced dementia  - Last note from neurology, Martina, 2022 and at that time she was on Aricept and Namenda  - No longer on medication  - Encephalopathy order set used.  High risk for acute encephalopathy  - Scheduled Tylenol for pain     Immune thrombocytopenia  - Last phone visit with oncology 11/2024; at that time  "it is noted that she had had a recent broken hip and they had decided to stop Nplate infusions and shanks was placing hospice consultation at that time  - Platelets minimally low, continue to monitor at this time    Leukocytosis, suspect likely secondary to stress  - No symptoms/signs of infection  - Monitor WBC        Diet: Combination Diet Clear Liquid    DVT Prophylaxis: Pneumatic Compression Devices  Akhtar Catheter: Not present  Lines: None     Cardiac Monitoring: ACTIVE order. Indication: Syncope- low cardiac risk (24 hours)  Code Status: No CPR- Do NOT Intubate      Clinically Significant Risk Factors Present on Admission                       # Dementia: noted on problem list       # Cachexia: Estimated body mass index is 17.04 kg/m  as calculated from the following:    Height as of this encounter: 1.676 m (5' 6\").    Weight as of this encounter: 47.9 kg (105 lb 9.6 oz).              Social Drivers of Health    Tobacco Use: Medium Risk (3/1/2025)    Patient History     Smoking Tobacco Use: Former     Smokeless Tobacco Use: Never          Disposition Plan     Medically Ready for Discharge: Anticipated Tomorrow             Tamica Gamez MD  Hospitalist Service  [facility]  Securely message with [facility] (more info)  Text page via [facility] Paging/Directory   ______________________________________________________________________    Interval History   Patient was examined at bedside, new to me today.  Son Sanford present as well.  Patient denies any complaints states she has no pain.    Discussed clinical course and provided updates    Physical Exam   Vital Signs: Temp: 97.5  F (36.4  C) Temp src: Oral BP: 119/54 Pulse: 71   Resp: 18 SpO2: 96 % O2 Device: None (Room air)    Weight: 105 lbs 9.61 oz    General Appearance:  Pleasant female.  Some subconjunctival bleeding right eye.  Laceration right scalp noted.  Bruising around right eye.  Respiratory: Clear to auscultation  Cardiovascular: " Sounds regular without murmurs rubs or gallop  GI: Soft nontender  Skin: No significant lower extremity edema  -laceration and bruising as noted above  Other:  Neurologically nonfocal.  Pleasantly confused    Medical Decision Making       50 MINUTES SPENT BY ME on the date of service doing chart review, history, exam, documentation & further activities per the note.      Data     I have personally reviewed the following data over the past 24 hrs:    15.7 (H)  \   13.5   / 139 (L)     140 106 17.5 /  117 (H)   4.0 25 0.98 (H) \       Imaging results reviewed over the past 24 hrs:   Recent Results (from the past 24 hours)   CT Head w/o Contrast    Addendum: 3/1/2025    COMMUNICATION ADDENDUM:  Findings were discussed with Dr. Cesar Garcia on 3/1/2025 6:57 PM CST.    END ADDENDUM      Narrative    EXAM: CT HEAD W/O CONTRAST, CT ORBITAL W/O CONTRAST  LOCATION: [facility]  DATE/TIME: 3/1/2025 5:57 PM CST    INDICATION: Trauma. Fall. Right scalp laceration.  COMPARISON: None.  TECHNIQUE:   1) Routine CT Head without IV contrast. Multiplanar reformats. Dose reduction techniques were used.  2) Routine CT Orbits without IV contrast. Multiplanar reformats. Dose reduction techniques were used.    FINDINGS:  HEAD CT:   INTRACRANIAL CONTENTS: Small volume hyperattenuating subarachnoid hemorrhage in the bilateral superior frontal sulci, anterior right parietal sulci, posterior right cingulate sulcus, overlying the left posterolateral superior temporal gyrus, and in the   anterior left sylvian fissure No CT evidence of acute infarct. Moderate presumed chronic small vessel ischemic changes. Moderate generalized volume loss. No hydrocephalus.     OSSEOUS STRUCTURES/SOFT TISSUES: Acute nondisplaced fracture involving the right squamosal temporal bone (series 3, image 14. Acute, minimally displaced fractures of the right zygomatic arch (series 3, image 8).     ORBITS CT:  RIGHT ORBIT: Normal periorbital soft  tissues. No displaced orbital bone fracture. Normal globe, extraocular muscles, optic nerve/sheath, periorbital fat and lacrimal gland.     LEFT ORBIT: Normal periorbital soft tissues. No displaced orbital fracture. Normal globe, extraocular muscles, optic nerve/sheath, periorbital fat and lacrimal gland.    SINUSES: Small amount of dependent fluid in the right maxillary sinus, nonspecific. Remaining paranasal sinuses are predominantly clear.    VISUALIZED INTRACRANIAL CONTENTS: No abnormality.       Impression    IMPRESSION:  HEAD CT:  1.  Acute, small volume subarachnoid hemorrhage in the bilateral cerebral sulci (greater than 3 sulci). No additional acute intracranial hemorrhage. No mass effect.  2.  Acute, nondisplaced fracture of the right squamosal temporal bone.  3.  Acute, minimally displaced fracture of the right zygomatic arch.  4.  No additional acute intracranial abnormality.  5.  Moderate presumed chronic small vessel ischemic changes.    ORBITS CT:  1.  No acute orbital abnormality.  2.  Small amount of dependent fluid in the right maxillary sinus is nonspecific but could represent an acute blood products. Acute fracture of the maxilla or orbital floor is not definitely seen.     CT Orbits wo Contrast    Addendum: 3/1/2025    COMMUNICATION ADDENDUM:  Findings were discussed with Dr. Cesar Garcia on 3/1/2025 6:57 PM CST.    END ADDENDUM      Narrative    EXAM: CT HEAD W/O CONTRAST, CT ORBITAL W/O CONTRAST  LOCATION: [facility]  DATE/TIME: 3/1/2025 5:57 PM CST    INDICATION: Trauma. Fall. Right scalp laceration.  COMPARISON: None.  TECHNIQUE:   1) Routine CT Head without IV contrast. Multiplanar reformats. Dose reduction techniques were used.  2) Routine CT Orbits without IV contrast. Multiplanar reformats. Dose reduction techniques were used.    FINDINGS:  HEAD CT:   INTRACRANIAL CONTENTS: Small volume hyperattenuating subarachnoid hemorrhage in the bilateral superior frontal  sulci, anterior right parietal sulci, posterior right cingulate sulcus, overlying the left posterolateral superior temporal gyrus, and in the   anterior left sylvian fissure No CT evidence of acute infarct. Moderate presumed chronic small vessel ischemic changes. Moderate generalized volume loss. No hydrocephalus.     OSSEOUS STRUCTURES/SOFT TISSUES: Acute nondisplaced fracture involving the right squamosal temporal bone (series 3, image 14. Acute, minimally displaced fractures of the right zygomatic arch (series 3, image 8).     ORBITS CT:  RIGHT ORBIT: Normal periorbital soft tissues. No displaced orbital bone fracture. Normal globe, extraocular muscles, optic nerve/sheath, periorbital fat and lacrimal gland.     LEFT ORBIT: Normal periorbital soft tissues. No displaced orbital fracture. Normal globe, extraocular muscles, optic nerve/sheath, periorbital fat and lacrimal gland.    SINUSES: Small amount of dependent fluid in the right maxillary sinus, nonspecific. Remaining paranasal sinuses are predominantly clear.    VISUALIZED INTRACRANIAL CONTENTS: No abnormality.       Impression    IMPRESSION:  HEAD CT:  1.  Acute, small volume subarachnoid hemorrhage in the bilateral cerebral sulci (greater than 3 sulci). No additional acute intracranial hemorrhage. No mass effect.  2.  Acute, nondisplaced fracture of the right squamosal temporal bone.  3.  Acute, minimally displaced fracture of the right zygomatic arch.  4.  No additional acute intracranial abnormality.  5.  Moderate presumed chronic small vessel ischemic changes.    ORBITS CT:  1.  No acute orbital abnormality.  2.  Small amount of dependent fluid in the right maxillary sinus is nonspecific but could represent an acute blood products. Acute fracture of the maxilla or orbital floor is not definitely seen.     CT Cervical Spine w/o Contrast    Narrative    EXAM: CT CERVICAL SPINE W/O CONTRAST  LOCATION: [facility]  DATE:  3/1/2025    INDICATION: Fall from standing. Head laceration.  COMPARISON: None.  TECHNIQUE: Routine CT Cervical Spine without IV contrast. Multiplanar reformats. Dose reduction techniques were used.    FINDINGS:  VERTEBRA: Normal vertebral body heights and alignment. No fracture or posttraumatic subluxation. Multilevel cervical spondylosis.    CANAL/FORAMINA: No high-grade spinal canal or neural foraminal stenosis.    PARASPINAL: No extraspinal abnormality.      Impression    IMPRESSION:  1.  No fracture or posttraumatic subluxation.  2.  No high-grade spinal canal or neural foraminal stenosis.   XR Hip Right 2-3 Views    Narrative    EXAM: XR HIP RIGHT 2-3 VIEWS  LOCATION: [facility]  DATE: 3/1/2025    INDICATION: Fall right hip pain  COMPARISON: None.      Impression    IMPRESSION: Mildly displaced fracture of the right greater trochanter. No definite extension of the fracture into the intertrochanteric portion of the right femur. No additional fractures identified elsewhere in the pelvis. Normal joint spacing alignment   of both hips. Multilevel degenerative disc disease and facet arthropathy in the lower lumbar spine. Osseous demineralization.   CT Head w/o Contrast    Narrative    EXAM: CT HEAD WITHOUT CONTRAST  LOCATION: [facility]  DATE: 03/02/2025    INDICATION: SAH follow-up.  COMPARISON: None.  TECHNIQUE: Routine CT Head without IV contrast. Multiplanar reformats. Dose reduction techniques were used.    FINDINGS:  INTRACRANIAL CONTENTS: Small volume subarachnoid hemorrhage redemonstrated posterior right frontal sulci and left frontal sulci diminished in conspicuity from yesterday's study. Total number of sulci involvement is about four. Interval development of   low-attenuation/CSF attenuation extra-axial subdural fluid collection overlying the left cerebral hemisphere, maximal radial dimension is about 4.5 mm series 4 image 23. No midline shift. No  subfalcine shift. No ventricular effacement. No   intraventricular hemorrhage. No other changes. No CT evidence of acute infarct. Mild presumed chronic small vessel ischemic changes. Mild to moderate generalized volume loss. No hydrocephalus. Corpus callosum is normal. Cerebellar tonsillar position is   satisfactory. No sella or suprasellar mass/hemorrhage.     VISUALIZED ORBITS/SINUSES/MASTOIDS: Prior bilateral cataract surgery. Visualized portions of the orbits are otherwise unremarkable. Air-fluid level right maxillary sinus dependently as before may represent inflammatory sinusitis. No middle ear or mastoid   effusion.    BONES/SOFT TISSUES: Small degree of soft tissue swelling right preseptal periorbital soft tissues extends laterally, and involve the right inferior temporalis muscle/scalp. Stable appearance to nondisplaced right-sided temporal bone/squamous segment   fracture and of nondisplaced fracture right zygomatic arch series 3 image 7. Nasopharynx is patent.      Impression    IMPRESSION:  1.  Since yesterday's head CT evaluation, diminished conspicuity/volume of small areas of hyperdense subarachnoid hemorrhage overlying the posterior right frontal lobe sulci inferolaterally and left frontal lobe sulci nearly at the vertex. No new   subarachnoid hemorrhage.    2.  Interval development of low-attenuation/CSF attenuation subdural fluid collection overlying the left cerebral hemisphere most prominent at the left frontal lobe up to 4.5 mm series 2 image 18 and series 4 image 23. This does not result in significant   mass effect/midline shift or subfalcine shift, however.    3.  No other changes.    4.  Stable chronic ischemic changes deep white matter of both cerebral hemispheres.    5.  Stable nondisplaced fractures right temporal bone/squamous segment series 3 images 9-12, and of the right zygomatic arch series 3 image 7. Mild soft tissue swelling right preseptal periorbital soft tissues laterally and of  the right temporalis   muscle/inferior temporal scalp.

## 2025-03-02 NOTE — CONSULTS
Neurosurgery Consult    Assessment  89 yo female presenting to ER from memory care after fall with imaging findings revealing a smalll SAH, SDH and stable non-displaced skull fracture.      Plan:  Continue to observe.  Every 4 hour neuro checks.  Maintain systolic BP less than 150.  Repeat head CT tomorrow morning to monitor left SDH    HPI    Ora Gonzalez is a 89 year old female with a history of advanced dementia who lives at assisted living [facility] with her own apartment who was found down yesterday.  By report staff heard patient fall unsure if she lost consciousness.  No change in behavior but had obvious laceration with bleeding on the right side of her scalp.  Was complaining of pain in her right hip.  Came to the emergency room department in which head CT revealed an acute small volume subarachnoid hemorrhage along with acute nondisplaced right temporal bone fracture and acute minimally displaced zygomatic arch fracture and she is being admitted.  Right hip pain found to have a mildly displaced fracture of the right greater trochanter without obvious extension of the fracture into the intertrochanteric portion of the right femur.     Denies pain.  Does not remember how she fell.    Medical history  A. Fib  HTN  ITP  PE  CKD      Social history  Lives in [facility] care.        Exam  B/P: 119/54, T: 97.5, P: 71, R: 18  Awake and confused, moving all four extremities well with a negative drift.  Intact finger to nose, does have left triceps weakness at 4/5 (son states weak 1-2 months)      Imaging    Head CT 3/1/25    IMPRESSION:    1.  Acute, small volume subarachnoid hemorrhage in the bilateral cerebral sulci (greater than 3 sulci). No additional acute intracranial hemorrhage. No mass effect.  2.  Acute, nondisplaced fracture of the right squamosal temporal bone.  3.  Acute, minimally displaced fracture of the right zygomatic arch.  4.  No additional acute intracranial abnormality.  5.  Moderate presumed  chronic small vessel ischemic changes.     Head CT 3/2/25    IMPRESSION:  1.  Since yesterday's head CT evaluation, diminished conspicuity/volume of small areas of hyperdense subarachnoid hemorrhage overlying the posterior right frontal lobe sulci inferolaterally and left frontal lobe sulci nearly at the vertex. No new   subarachnoid hemorrhage.     2.  Interval development of low-attenuation/CSF attenuation subdural fluid collection overlying the left cerebral hemisphere most prominent at the left frontal lobe up to 4.5 mm series 2 image 18 and series 4 image 23. This does not result in significant   mass effect/midline shift or subfalcine shift, however.     3.  No other changes.     4.  Stable chronic ischemic changes deep white matter of both cerebral hemispheres.     5.  Stable nondisplaced fractures right temporal bone/squamous segment series 3 images 9-12, and of the right zygomatic arch series 3 image 7. Mild soft tissue swelling right preseptal periorbital soft tissues laterally and of the right temporalis   muscle/inferior temporal scalp.    Discussed with Dr. Dao.    Pema Rick, MPAS  [facility]  [address] [address]    Tel [phone]  Pager [phone]

## 2025-03-02 NOTE — PROGRESS NOTES
03/02/25 1300   Appointment Info   Signing Clinician's Name / Credentials (OT) Maci Hollis MOT OTR/L CLT   Appointment Cancel Comments (OT) OT evaluation not indicated. Patient from a memory care- receives A with ADLs and IADLs. Defer to PT for mobility evaluation

## 2025-03-03 ENCOUNTER — APPOINTMENT (OUTPATIENT)
Dept: PHYSICAL THERAPY | Facility: HOSPITAL | Age: OVER 89
DRG: 083 | End: 2025-03-03
Attending: PHYSICIAN ASSISTANT
Payer: COMMERCIAL

## 2025-03-03 ENCOUNTER — APPOINTMENT (OUTPATIENT)
Dept: CT IMAGING | Facility: HOSPITAL | Age: OVER 89
DRG: 083 | End: 2025-03-03
Attending: PHYSICIAN ASSISTANT
Payer: COMMERCIAL

## 2025-03-03 LAB
ALBUMIN UR-MCNC: 10 MG/DL
APPEARANCE UR: CLEAR
BACTERIA #/AREA URNS HPF: ABNORMAL /HPF
BILIRUB UR QL STRIP: NEGATIVE
COLOR UR AUTO: ABNORMAL
CREAT SERPL-MCNC: 0.99 MG/DL (ref 0.51–0.95)
EGFRCR SERPLBLD CKD-EPI 2021: 54 ML/MIN/1.73M2
GLUCOSE UR STRIP-MCNC: NEGATIVE MG/DL
HGB UR QL STRIP: NEGATIVE
HYALINE CASTS: 4 /LPF
KETONES UR STRIP-MCNC: NEGATIVE MG/DL
LEUKOCYTE ESTERASE UR QL STRIP: ABNORMAL
NITRATE UR QL: NEGATIVE
PH UR STRIP: 6.5 [PH] (ref 5–7)
RBC URINE: 2 /HPF
SP GR UR STRIP: 1.02 (ref 1–1.03)
SQUAMOUS EPITHELIAL: <1 /HPF
UROBILINOGEN UR STRIP-MCNC: <2 MG/DL
WBC # BLD AUTO: 10.8 10E3/UL (ref 4–11)
WBC URINE: 28 /HPF

## 2025-03-03 PROCEDURE — 250N000013 HC RX MED GY IP 250 OP 250 PS 637: Performed by: STUDENT IN AN ORGANIZED HEALTH CARE EDUCATION/TRAINING PROGRAM

## 2025-03-03 PROCEDURE — 85048 AUTOMATED LEUKOCYTE COUNT: CPT | Performed by: STUDENT IN AN ORGANIZED HEALTH CARE EDUCATION/TRAINING PROGRAM

## 2025-03-03 PROCEDURE — 36415 COLL VENOUS BLD VENIPUNCTURE: CPT | Performed by: STUDENT IN AN ORGANIZED HEALTH CARE EDUCATION/TRAINING PROGRAM

## 2025-03-03 PROCEDURE — 250N000013 HC RX MED GY IP 250 OP 250 PS 637: Performed by: FAMILY MEDICINE

## 2025-03-03 PROCEDURE — 97110 THERAPEUTIC EXERCISES: CPT | Mod: GP

## 2025-03-03 PROCEDURE — 82565 ASSAY OF CREATININE: CPT | Performed by: STUDENT IN AN ORGANIZED HEALTH CARE EDUCATION/TRAINING PROGRAM

## 2025-03-03 PROCEDURE — 81003 URINALYSIS AUTO W/O SCOPE: CPT | Performed by: STUDENT IN AN ORGANIZED HEALTH CARE EDUCATION/TRAINING PROGRAM

## 2025-03-03 PROCEDURE — 87086 URINE CULTURE/COLONY COUNT: CPT | Performed by: STUDENT IN AN ORGANIZED HEALTH CARE EDUCATION/TRAINING PROGRAM

## 2025-03-03 PROCEDURE — 97116 GAIT TRAINING THERAPY: CPT | Mod: GP

## 2025-03-03 PROCEDURE — 120N000001 HC R&B MED SURG/OB

## 2025-03-03 PROCEDURE — 99233 SBSQ HOSP IP/OBS HIGH 50: CPT | Performed by: STUDENT IN AN ORGANIZED HEALTH CARE EDUCATION/TRAINING PROGRAM

## 2025-03-03 PROCEDURE — 70450 CT HEAD/BRAIN W/O DYE: CPT

## 2025-03-03 RX ORDER — CEPHALEXIN 500 MG/1
500 CAPSULE ORAL 2 TIMES DAILY
Status: DISCONTINUED | OUTPATIENT
Start: 2025-03-03 | End: 2025-03-04

## 2025-03-03 RX ADMIN — ACETAMINOPHEN 975 MG: 325 TABLET ORAL at 08:18

## 2025-03-03 RX ADMIN — ACETAMINOPHEN 975 MG: 325 TABLET ORAL at 16:05

## 2025-03-03 RX ADMIN — CEPHALEXIN 500 MG: 500 CAPSULE ORAL at 18:29

## 2025-03-03 RX ADMIN — HYDRALAZINE HYDROCHLORIDE 10 MG: 10 TABLET ORAL at 08:18

## 2025-03-03 ASSESSMENT — ACTIVITIES OF DAILY LIVING (ADL)
ADLS_ACUITY_SCORE: 37
ADLS_ACUITY_SCORE: 46
ADLS_ACUITY_SCORE: 37
ADLS_ACUITY_SCORE: 42
ADLS_ACUITY_SCORE: 42
ADLS_ACUITY_SCORE: 37
ADLS_ACUITY_SCORE: 40
ADLS_ACUITY_SCORE: 37
ADLS_ACUITY_SCORE: 42
ADLS_ACUITY_SCORE: 37
ADLS_ACUITY_SCORE: 37
ADLS_ACUITY_SCORE: 46
ADLS_ACUITY_SCORE: 37
ADLS_ACUITY_SCORE: 37
ADLS_ACUITY_SCORE: 46
ADLS_ACUITY_SCORE: 40
ADLS_ACUITY_SCORE: 37
ADLS_ACUITY_SCORE: 42

## 2025-03-03 NOTE — PLAN OF CARE
"  Problem: Adult Inpatient Plan of Care  Goal: Plan of Care Review  Description: The Plan of Care Review/Shift note should be completed every shift.  The Outcome Evaluation is a brief statement about your assessment that the patient is improving, declining, or no change.  This information will be displayed automatically on your shift  note.  Outcome: Progressing     Problem: Dementia Signs/Symptoms  Goal: Improved Sleep (Dementia Signs/Symptoms)  Outcome: Progressing   Goal Outcome Evaluation:    Patient is alert and oriented x2-3, confused. Vitally stable on RA. SR on tele. Denies pain. Schedule tylenol given. Pills whole with water. Patient had a restful night, no behaviors noted. Ambulate with assist of 1 and walker. Call light within reach. Bed alarm on for safety.     Patient refused to go down for repeat head CT this AM. Patient stated \"I'm not going, I'm going back to sleep.\"   "

## 2025-03-03 NOTE — PROGRESS NOTES
"[facility]    Neurosurgery Progress Note    Date of Service (when I saw the patient): 03/03/2025     Assessment & Plan   89 yo female presenting to ER from memory care after fall with imaging findings revealing a smalll SAH, SDH and stable non-displaced skull fracture.     Patient was refusing repeat head CT this morning but now states she will complete it. Currently has no complaints of headaches, nausea, emesis, or visual changed     Slight increase of left SDH on repeat head CT with stable SAH     Plan:  Continue to observe.  Every 4 hour neuro checks.  Maintain systolic BP less than 150  Hold all blood thinners   Repeat head CT tomorrow morning   Will continue to follow       I have discussed the following assessment and plan Dr. Dao who is in agreement with initial plan and will follow up with further consultation recommendations.    Florencia Cox PA-C  [facility] Neurosurgery  Tel [phone]  Text page via [employer] Paging/Directory     Interval History   stable    Physical Exam   Temp: 97  F (36.1  C) Temp src: Oral BP: 110/57 Pulse: 97   Resp: 18 SpO2: 95 % O2 Device: None (Room air)    Vitals:    03/01/25 1654 03/01/25 2151 03/02/25 1629   Weight: 52.2 kg (115 lb) 47.9 kg (105 lb 9.6 oz) 47.4 kg (104 lb 8 oz)     Vital Signs with Ranges  Temp:  [97  F (36.1  C)-98.1  F (36.7  C)] 97  F (36.1  C)  Pulse:  [65-97] 97  Resp:  [16-18] 18  BP: (110-167)/(56-71) 110/57  SpO2:  [95 %-97 %] 95 %  I/O last 3 completed shifts:  In: 600 [P.O.:600]  Out: -      , Blood pressure 110/57, pulse 97, temperature 97  F (36.1  C), temperature source Oral, resp. rate 18, height 1.676 m (5' 6\"), weight 47.4 kg (104 lb 8 oz), SpO2 95%.  104 lbs 7.97 oz  HEENT:  Normocephalic.  PERRLA.  EOM s intact.    Neck:  Supple, non-tender, without lymphadenopathy.  Heart:  No peripheral edema  Lungs:  No SOB  Abdomen:  Soft, non-tender, non-distended.   Skin:  Warm and dry, good capillary " refill.  Extremities:  Good radial and dorsalis pedis pulses bilaterally, no edema, cyanosis or clubbing.    NEUROLOGICAL EXAMINATION:   Mental status: alert and oriented x3 speech clear and appropriate   Cranial nerves: II-XII intact  Motor strength:   Biceps: 5/5 right, 5/5 left   Wrist extensors:5/5 right, 5/5 left   Triceps: 5/5 right, 5/5 left   Deltoids: 5/5 right, 5/5 left   Finger flexion: 5/5 right, 5/5 left   Finger abduction:5/5 right, 5/5 left   Hand : 5/5 right, 5/5 left   Hip flexors: 5/5 right, 5/5 left   Quadriceps: 5/5 right, 5/5 left  Ankle dorsiflexion: 5/5 right, 5/5 left    Ankle plantar flexion:5/5 right, 5/5 left   Extensor hallicus longus: 5/5 right, 5/5 left   Sensation:  intact  Reflexes:  Negative Babinski.  Negative Clonus.    Coordination:  Smooth finger to nose testing.   Negative pronator drift.    Gait:  not tested    IMPRESSION:  1.  Slightly increased prominence of a left frontotemporal subdural collection with predominantly low density fluid. Continued imaging follow-up recommended.     2.  Stable subarachnoid hemorrhage findings on the right. No new intraparenchymal hemorrhage findings or new subarachnoid hemorrhage findings.    Medications   Current Facility-Administered Medications   Medication Dose Route Frequency Provider Last Rate Last Admin     Current Facility-Administered Medications   Medication Dose Route Frequency Provider Last Rate Last Admin    acetaminophen (TYLENOL) tablet 975 mg  975 mg Oral Q8H My Morel MD   975 mg at 03/03/25 0818    sodium chloride (PF) 0.9% PF flush 3 mL  3 mL Intracatheter Q8H My oMrel MD   3 mL at 03/02/25 7997       Data     CBC RESULTS:   Recent Labs   Lab Test 03/03/25  0750 03/02/25  0728   WBC 10.8 15.7*   RBC  --  4.27   HGB  --  13.5   HCT  --  41.8   MCV  --  98   MCH  --  31.6   MCHC  --  32.3   RDW  --  14.2   PLT  --  139*     Basic Metabolic Panel:  Lab Results   Component Value Date     03/02/2025      Lab  "Results   Component Value Date    POTASSIUM 4.0 03/02/2025    POTASSIUM 4.4 07/08/2020     Lab Results   Component Value Date    CHLORIDE 106 03/02/2025    CHLORIDE 105 07/08/2020     Lab Results   Component Value Date    MCKENNA 9.4 03/02/2025     Lab Results   Component Value Date    CO2 25 03/02/2025    CO2 22 07/08/2020     Lab Results   Component Value Date    BUN 17.5 03/02/2025    BUN 17 07/08/2020     Lab Results   Component Value Date    CR 0.99 03/03/2025     Lab Results   Component Value Date     03/02/2025     03/01/2025    GLC 93 07/08/2020     INR:  No results found for: \"INR\"     "

## 2025-03-03 NOTE — PLAN OF CARE
Goal Outcome Evaluation:         Problem: Adult Inpatient Plan of Care  Goal: Optimal Comfort and Wellbeing  Outcome: Progressing     Problem: Fall Injury Risk  Goal: Absence of Fall and Fall-Related Injury  Outcome: Progressing         Pt from memory care, alert to self and sometimes place.  Up with standby assist and walker but pt needs reminders to use her walker.  Bed and chair alarm in use, pt was getting up frequently at the beginning of the shift.  We moved her to a room closer to the nursing station.  Pt has been resting more now.  Pt had a fall at home, has multiple fractures, neuros intact other than orientation.  Pt is in NSR and SR with PVCs on telemetry.  Pt's BP will get elevated if she was recently up, it was better when I rechecked it after she's been in bed a little bit.  Vijaya Britt, RN

## 2025-03-03 NOTE — PROGRESS NOTES
"Orthopedic Progress Note      Assessment:    89-year-old female with a mildly displaced greater trochanteric femur fracture, healing     Plan:   -Patient has been weightbearing and ambulating with only minimal discomfort.  Will continue plan for nonoperative management.  No plan for further advanced imaging.  -Weight-bear as tolerated right lower extremity    Ortho will sign off. Please feel free to reach out with any further questions or concerns.         Subjective:    Patient reports feeling well today.  She is lying in bed.  She does not report that she has been able to weight-bear or ambulate.  I did discuss with nurse who notes that she has been ambulating with minimal discomfort.  All questions/concerns answered.      Objective:  /60 (BP Location: Left arm, Patient Position: Sitting, Cuff Size: Adult Regular)   Pulse 66   Temp 97  F (36.1  C) (Oral)   Resp 18   Ht 1.676 m (5' 6\")   Wt 47.4 kg (104 lb 8 oz)   SpO2 95%   BMI 16.87 kg/m      No deformity  Small area of ecchymoses over lateral hip.  No pain with passive hip flexion, internal rotation, or external rotation  No pain with heal strike  Mild tenderness to palpation over latera aspect of greater trochanter  Sensation intact to light touch in the saphenous, sural, tibial, SPN, DPN distributions  Fires EHL/FHL/TA/GSC  DP pulse is palpable, toes are warm and well perfused with brisk capillary refill     Contralateral side= Full range of motion, Negative joint instability findings, 5/5 motor groups about the joint, Non-tender.         Pertinent Labs   Lab Results: personally reviewed.   No results found for: \"INR\", \"PROTIME\"  Lab Results   Component Value Date    WBC 10.8 03/03/2025    HGB 13.5 03/02/2025    HCT 41.8 03/02/2025    MCV 98 03/02/2025     (L) 03/02/2025     Lab Results   Component Value Date     03/02/2025    CO2 25 03/02/2025         Report completed by:  CONSUELO DE LA TORRE PA-C  Date: 03/03/2025  [facility]

## 2025-03-03 NOTE — PLAN OF CARE
Problem: Adult Inpatient Plan of Care  Goal: Absence of Hospital-Acquired Illness or Injury  Intervention: Identify and Manage Fall Risk  Recent Flowsheet Documentation  Taken 3/3/2025 0800 by Lawrence Montgomery RN  Safety Promotion/Fall Prevention:   activity supervised   assistive device/personal items within reach   clutter free environment maintained   increased rounding and observation   increase visualization of patient   lighting adjusted   nonskid shoes/slippers when out of bed   patient and family education   room door open   room near nurse's station   room organization consistent   safety round/check completed     Problem: Stroke, Ischemic (Includes Transient Ischemic Attack)  Goal: Optimal Cognitive Function  Outcome: Progressing   Goal Outcome Evaluation:       VSS on RA, SBP WDL. Positive for orthostatic BP, pushing PO fluids. SR on tele, now DC'd. A&Ox2. Neuros intact. R hip & shoulder pain managed w/ sched tylenol. Up w/ 1 & W. Repeat H CT done, see results. No IV avMD monet aware. UA sent after urinary frequency. Family visiting at bedside.

## 2025-03-03 NOTE — PROGRESS NOTES
Care Management Follow Up    Length of Stay (days): 2    Expected Discharge Date: 03/04/2025    Anticipated Discharge Plan:   home to [facility] memory care    Transportation: Anticipate Family/friend    PT Recommendations: other (see comments) (return to memory care)  OT Recommendations:        Barriers to Discharge: medical stability, diagnostic workup, pt had refused her repeat head CT this morning, waiting on family to talk to MD    Prior Living Situation: assisted living with facility resident    Discussed  Partnership in Safe Discharge Planning  document with patient/family: No     Handoff Completed: Yes, [facility] PCP: Internal handoff referral completed    Patient/Spokesperson Updated: No    Additional Information:  Provider update and chart reviewed: pt needs repeat head CT and neurosurgery to decide on final treatment plan after repeat head CT    CM updated Sae SIEGEL at [facility] [phone], anticipate discharge Tuesday     Next Steps: final treatment  plan    Jose Sy RN

## 2025-03-03 NOTE — PROGRESS NOTES
[facility]    Medicine Progress Note - Hospitalist Service    Date of Admission:  3/1/2025    Assessment & Plan   Ora Gonzalez is a 89 year old female with advanced dementia brought in with an unwitnessed fall at [facility]; found to have subarachnoid hemorrhage     Subarachnoid hemorrhage  - CT notes acute small volume subarachnoid hemorrhage in the bilateral cerebral sulci no additional intracranial hemorrhage or mass effect  - Repeat CT 03/02 shows diminised volume of SAH. Interval development of subdural fluid collection overlying left cerebral hemisphere  - Repeat CT 03/03 slight increase left SDH.  Stable SAH on the right  - Seen by Neurosurgery, appreciate recs  - Neuro checks every 4 hours  - Maintain SBP less than 150, Hydralazine prn  - Repeat CT head tomorrow morning     Unwitnessed fall  Orthostatic hypotension  - Staff says they hurt her, son wonders how this could be possible.  Suspect likely had a fall when she stood up, also reports poor p.o. intake and mostly dehydrated  - Orthostatic vitals +  - EKG unremarkable.  - CT spine negative  - Encourage po intake and will start low dose midodrine at discharge  - PT/OT     Temporal fracture  - CT notes nondisplaced fracture of the right squamosal temporal bone  - Neurosurgery aware.  Monitor     Zygomatic arch fracture  - Acute minimally displaced  - Consider ENT follow-up due to minimal displacement     Scalp laceration  - Right side  - Glued in the ER     Chronic right hip fracture  - Son gives good history that this fracture may be old as she had a known right hip fracture treated by [facility] as an outpatient in the fall 2024.  - Hold off on MRI now, son feels patient will not be able to lay still.  - Seen by orthopedics, gee recs  - Elected for conservative management, allow WBAT     Advanced dementia  - Last note from neurology, Martina, 2022 and at that time she was on Aricept and Namenda  - No longer on  "medication  - Encephalopathy order set used.  High risk for acute encephalopathy  - Scheduled Tylenol for pain     Immune thrombocytopenia  - Last phone visit with oncology 11/2024; at that time it is noted that she had had a recent broken hip and they had decided to stop Nplate infusions and shanks was placing hospice consultation at that time  - Platelets minimally low, continue to monitor at this time    Suspect UTI  - Poor historian, has inc frequency in voiding  - UA + LE, WBC  - Urine culture sent  - Start cephalexin        Diet: Regular Diet Adult    DVT Prophylaxis: Pneumatic Compression Devices  Akhtar Catheter: Not present  Lines: None     Cardiac Monitoring: None  Code Status: No CPR- Do NOT Intubate      Clinically Significant Risk Factors                       # Dementia: noted on problem list        # Cachexia: Estimated body mass index is 16.87 kg/m  as calculated from the following:    Height as of this encounter: 1.676 m (5' 6\").    Weight as of this encounter: 47.4 kg (104 lb 8 oz)., PRESENT ON ADMISSION     # Financial/Environmental Concerns: none         Social Drivers of Health    Tobacco Use: Medium Risk (3/1/2025)    Patient History     Smoking Tobacco Use: Former     Smokeless Tobacco Use: Never          Disposition Plan     Medically Ready for Discharge: Anticipated Tomorrow             Tamica Gamez MD  Hospitalist Service  [facility]  Securely message with [employer] (more info)  Text page via [employer] Paging/Directory   ______________________________________________________________________    Interval History   Was examined at bedside, comfortably lying in the bed.  Denies any new complaints today.  Initially refused CT head but later agreed.   Updated son Sanford and answered all questions    Physical Exam   Vital Signs: Temp: 97.8  F (36.6  C) Temp src: Oral BP: (!) 150/67 Pulse: 96   Resp: 21 SpO2: 96 % O2 Device: None (Room air)    Weight: 104 lbs 7.97 oz    General " Appearance:  Pleasant female.  Some subconjunctival bleeding right eye.  Laceration right scalp noted.  Bruising around right eye.  Respiratory: Clear to auscultation  Cardiovascular: Sounds regular without murmurs rubs or gallop  GI: Soft nontender  Skin: No significant lower extremity edema  -laceration and bruising as noted above  Other:  Neurologically nonfocal.  Pleasantly confused    Medical Decision Making       50 MINUTES SPENT BY ME on the date of service doing chart review, history, exam, documentation & further activities per the note.      Data     I have personally reviewed the following data over the past 24 hrs:    10.8  \   N/A   / N/A     N/A N/A N/A /  N/A   N/A N/A 0.99 (H) \     TSH: N/A T4: N/A A1C: N/A       Imaging results reviewed over the past 24 hrs:   Recent Results (from the past 24 hours)   CT Head w/o Contrast    Narrative    EXAM: CT HEAD W/O CONTRAST  LOCATION: [facility]  DATE: 3/3/2025    INDICATION: SDH  COMPARISON: 3/2/2025.  TECHNIQUE: Routine CT Head without IV contrast. Multiplanar reformats. Dose reduction techniques were used.    FINDINGS:  Stable small volume subarachnoid hemorrhage findings in the posterior right temporal and posterior right frontal subarachnoid spaces. No new subarachnoid hemorrhage findings. The previously noted left frontotemporal subdural collection with low-density   fluid is redemonstrated, slightly increased in prominence now measuring up to 8 mm in maximal thickness previously measuring 4-5 mm.    Stable findings of right temporal calvarial and zygomatic arch fracture findings.      Impression    IMPRESSION:  1.  Slightly increased prominence of a left frontotemporal subdural collection with predominantly low density fluid. Continued imaging follow-up recommended.    2.  Stable subarachnoid hemorrhage findings on the right. No new intraparenchymal hemorrhage findings or new subarachnoid hemorrhage findings.

## 2025-03-04 ENCOUNTER — APPOINTMENT (OUTPATIENT)
Dept: CT IMAGING | Facility: HOSPITAL | Age: OVER 89
DRG: 083 | End: 2025-03-04
Attending: PHYSICIAN ASSISTANT
Payer: COMMERCIAL

## 2025-03-04 LAB — BACTERIA UR CULT: NORMAL

## 2025-03-04 PROCEDURE — 120N000001 HC R&B MED SURG/OB

## 2025-03-04 PROCEDURE — 250N000013 HC RX MED GY IP 250 OP 250 PS 637: Performed by: STUDENT IN AN ORGANIZED HEALTH CARE EDUCATION/TRAINING PROGRAM

## 2025-03-04 PROCEDURE — 250N000013 HC RX MED GY IP 250 OP 250 PS 637: Performed by: FAMILY MEDICINE

## 2025-03-04 PROCEDURE — 99232 SBSQ HOSP IP/OBS MODERATE 35: CPT | Performed by: STUDENT IN AN ORGANIZED HEALTH CARE EDUCATION/TRAINING PROGRAM

## 2025-03-04 PROCEDURE — 70450 CT HEAD/BRAIN W/O DYE: CPT

## 2025-03-04 RX ADMIN — ACETAMINOPHEN 975 MG: 325 TABLET ORAL at 18:04

## 2025-03-04 RX ADMIN — ACETAMINOPHEN 975 MG: 325 TABLET ORAL at 11:17

## 2025-03-04 RX ADMIN — HYDRALAZINE HYDROCHLORIDE 10 MG: 10 TABLET ORAL at 15:59

## 2025-03-04 RX ADMIN — CEPHALEXIN 500 MG: 500 CAPSULE ORAL at 07:53

## 2025-03-04 RX ADMIN — ACETAMINOPHEN 975 MG: 325 TABLET ORAL at 03:04

## 2025-03-04 ASSESSMENT — ACTIVITIES OF DAILY LIVING (ADL)
ADLS_ACUITY_SCORE: 44
ADLS_ACUITY_SCORE: 49
ADLS_ACUITY_SCORE: 44
ADLS_ACUITY_SCORE: 49
ADLS_ACUITY_SCORE: 44
ADLS_ACUITY_SCORE: 49
ADLS_ACUITY_SCORE: 44
ADLS_ACUITY_SCORE: 49
ADLS_ACUITY_SCORE: 44
ADLS_ACUITY_SCORE: 50
ADLS_ACUITY_SCORE: 49
ADLS_ACUITY_SCORE: 44
ADLS_ACUITY_SCORE: 49
ADLS_ACUITY_SCORE: 44
ADLS_ACUITY_SCORE: 49
ADLS_ACUITY_SCORE: 49

## 2025-03-04 NOTE — PROGRESS NOTES
Care Management Follow Up    Length of Stay (days): 3    Expected Discharge Date: 03/04/2025    Anticipated Discharge Plan:   return to [facility]     Transportation: [facility] 11am Wednesday, PCS done    PT Recommendations: other (see comments) (Memory care)  OT Recommendations:        Barriers to Discharge: medical stability, will try again to get repeat head CT today    Prior Living Situation: assisted living with facility resident    Discussed  Partnership in Safe Discharge Planning  document with patient/family: No     Handoff Completed: Yes, [facility] PCP: Internal handoff referral completed    Patient/Spokesperson Updated: No    Additional Information:  Provider update and chart reviewed: pt needs repeat head CT and neurosurgery to decide on final treatment plan after repeat head CT    10:16 AM  CM updated Sae SIEGEL at [facility] [phone], anticipate discharge Today. Pt is on a soft one to one sitter    1:47 PM  Pt is cleared to discharge once off one to one is successful.  Spoke to son Jensen, he wants to transport today before snow storm tomorrow if possible. CM asked Sae and since pt is still on one to one, will have to be tomorrow. Team to take off one to one and see how safe patient is before discharge.    Stretcher ride set up w/[facility] 11am Wednesday and PCS done. Discussed cost w/jaki Styles.     Neurosurgery cleared for discharge w/plans to repeat head CT in 4 weeks.     Next Steps: repeat Head CT, update Sae SIEGEL at [facility] [phone] on final treatment/discharge plan and cancel ride if patient is not ready for discharge or if pt remains on one to one sitter.    Jose Sy RN

## 2025-03-04 NOTE — PLAN OF CARE
Goal Outcome Evaluation:         Problem: Adult Inpatient Plan of Care  Goal: Plan of Care Review  Outcome: Progressing     Problem: Adult Inpatient Plan of Care  Goal: Optimal Comfort and Wellbeing  Outcome: Progressing  Intervention: Monitor Pain and Promote Comfort  Recent Flowsheet Documentation  Taken 3/3/2025 2030 by Alma Delia Alas RN  Pain Management Interventions:   emotional support   declines  Taken 3/3/2025 1605 by Alma Delia Alas RN  Pain Management Interventions:   medication (see MAR)   emotional support     Problem: Dementia Signs/Symptoms  Goal: Optimized Cognitive Function (Dementia Signs/Symptoms)  Outcome: Progressing         Pt is alert, oriented to self, pleasant and cooperative, rating headache pain #4, scheduled Tylenol given with good relief.  Neuro checks intact.  Pt remains on a 1:1 with sitter for high falls risk.  Pt frequently ambulating in halls with walker, gait belt and assist of one.  Pt continent of bowel and bladder.  BP WDL, PRN medication to keep SBP <150.  Pt will return to her memory care facility at time of discharge.    Alma Delia Alas RN

## 2025-03-04 NOTE — PLAN OF CARE
Problem: Adult Inpatient Plan of Care  Goal: Absence of Hospital-Acquired Illness or Injury  Intervention: Identify and Manage Fall Risk  Recent Flowsheet Documentation  Taken 3/4/2025 0730 by Lawrence Montgomery, RN  Safety Promotion/Fall Prevention:   activity supervised   assistive device/personal items within reach   clutter free environment maintained   increased rounding and observation   increase visualization of patient   lighting adjusted   nonskid shoes/slippers when out of bed   patient and family education   room door open   room near nurse's station   room organization consistent   safety round/check completed     Problem: Stroke, Ischemic (Includes Transient Ischemic Attack)  Goal: Optimal Functional Ability  Intervention: Optimize Functional Ability  Recent Flowsheet Documentation  Taken 3/4/2025 0730 by Lawrence Montgomery, RN  Activity Management: activity adjusted per tolerance   Goal Outcome Evaluation:       VSS on RA, SBP WDL. A&Ox2. Neuros intact. R hip & shoulder pain managed w/ sched tylenol. Up w/ 1 & W. Pushing PO fluids. Repeat H CT done, see results. No IV MD skyler aware. 1:1  has not been at bedside all day, just staying close by for fall risk, alarms on.

## 2025-03-04 NOTE — PROGRESS NOTES
[facility]    Medicine Progress Note - Hospitalist Service    Date of Admission:  3/1/2025    Assessment & Plan   Ora Gonzalez is a 89 year old female with advanced dementia brought in with an unwitnessed fall at [facility]; found to have subarachnoid hemorrhage     Subarachnoid hemorrhage  - CT on presentation acute small volume subarachnoid hemorrhage in the bilateral cerebral sulci no additional intracranial hemorrhage or mass effect  - Repeat CT 03/02 shows diminised volume of SAH. Interval development of subdural fluid collection overlying left cerebral hemisphere  - Repeat CT 03/03 slight increase left SDH.  Stable SAH on the right  - Repeat CT 03/04 stable appearance of SDH and SAH  - Seen by Neurosurgery, appreciate recs  - Follow-up outpatient 2 to 4 weeks with repeat CT head  - Hold anticoagulation until follow-up     Unwitnessed fall  Orthostatic hypotension  - Staff says they hurt her, son wonders how this could be possible.  Suspect likely had a fall when she stood up, also reports poor p.o. intake and mostly dehydrated  - Orthostatic vitals +  - EKG unremarkable.  - CT spine negative  - Encourage po intake and will start low dose midodrine at discharge  - Repeat orthostatic vitals  - PT/OT     Temporal fracture  - CT notes nondisplaced fracture of the right squamosal temporal bone  - Neurosurgery aware.  Monitor     Zygomatic arch fracture  - Acute minimally displaced  - Consider ENT follow-up due to minimal displacement     Scalp laceration  - Right side  - Glued in the ER     Chronic right hip fracture  - Son gives good history that this fracture may be old as she had a known right hip fracture treated by [facility] as an outpatient in the fall 2024.  - Hold off on MRI now, son feels patient will not be able to lay still.  - Seen by orthopedics, appreciate recs  - Elected for conservative management, allow WBAT     Advanced dementia  - Last note from neurology,  "Mack, 2022 and at that time she was on Aricept and Namenda  - No longer on medication  - Encephalopathy order set used.  High risk for acute encephalopathy  - Scheduled Tylenol for pain  - Has been on 1:1, wean as able     Immune thrombocytopenia  - Last phone visit with oncology 11/2024; at that time it is noted that she had had a recent broken hip and they had decided to stop Nplate infusions and shanks was placing hospice consultation at that time  - Platelets minimally low, continue to monitor at this time    Abnormal UA  - Poor historian, ? has inc frequency in voiding  - UA + LE, WBC  - Urine culture mixed damaris  - discontinue abx          Diet: Regular Diet Adult    DVT Prophylaxis: Pneumatic Compression Devices  Akhtar Catheter: Not present  Lines: None     Cardiac Monitoring: None  Code Status: No CPR- Do NOT Intubate      Clinically Significant Risk Factors                       # Dementia: noted on problem list        # Cachexia: Estimated body mass index is 16.9 kg/m  as calculated from the following:    Height as of this encounter: 1.676 m (5' 6\").    Weight as of this encounter: 47.5 kg (104 lb 11.5 oz)., PRESENT ON ADMISSION     # Financial/Environmental Concerns: none         Social Drivers of Health    Tobacco Use: Medium Risk (3/1/2025)    Patient History     Smoking Tobacco Use: Former     Smokeless Tobacco Use: Never          Disposition Plan     Medically Ready for Discharge: Ready Now             Tamica Gamez MD  Hospitalist Service  [facility]  Securely message with [employer] (more info)  Text page via [employer] Paging/Directory   ______________________________________________________________________    Interval History   Patient was examined at bedside today, comfortably lying in the bed and denies any complaints.    Medically ready, memory care can take her back tomorrow  Called son Sanford, provided updates and answered all questions    Physical Exam   Vital Signs: Temp: " 97.5  F (36.4  C) Temp src: Oral BP: (!) 168/59 Pulse: 73   Resp: 20 SpO2: 96 % O2 Device: None (Room air)    Weight: 104 lbs 11.5 oz    General Appearance:  Pleasant female.  Some subconjunctival bleeding right eye.  Laceration right scalp noted.  Bruising around right eye.  Respiratory: Clear to auscultation  Cardiovascular: Sounds regular without murmurs rubs or gallop  GI: Soft nontender  Skin: No significant lower extremity edema  -laceration and bruising as noted above  Other:  Neurologically nonfocal.  Pleasantly confused    Medical Decision Making       40 MINUTES SPENT BY ME on the date of service doing chart review, history, exam, documentation & further activities per the note.      Data         Imaging results reviewed over the past 24 hrs:   Recent Results (from the past 24 hours)   CT Head w/o Contrast    Narrative    EXAM: CT HEAD W/O CONTRAST  LOCATION: [facility]  DATE: 3/4/2025    INDICATION: SDH SAH  COMPARISON: 3/3/2025. 3/2/2025. 3/1/2025.  TECHNIQUE: Routine CT Head without IV contrast. Multiplanar reformats. Dose reduction techniques were used.    FINDINGS:  INTRACRANIAL CONTENTS: Unchanged low attenuation subdural fluid collection overlying the left cerebral convexity measuring up to 8 mm overlying the frontal convexity. Small amount of residual hyperattenuating subarachnoid blood products overlying the   right lateral frontoparietal region and posterolateral right temporal region. Subarachnoid hemorrhage is decreased from 3/1/2025 CT. No new intracranial hemorrhage or abnormal extra-axial fluid collection. No midline shift or herniation. No CT evidence   of acute infarct. Unchanged mild to moderate presumed chronic small vessel ischemic changes. Moderate generalized volume loss. No hydrocephalus.     VISUALIZED ORBITS/SINUSES/MASTOIDS: No intraorbital abnormality. No paranasal sinus mucosal disease. No middle ear or mastoid effusion.    BONES/SOFT TISSUES: Unchanged  right temporal calvarial fracture. Unchanged right zygomatic arch fracture.      Impression    IMPRESSION:  1.  Unchanged hygroma overlying the left cerebral convexity. No midline shift.  2.  No significant change in scattered small volume subarachnoid hemorrhage compared to the prior CT but decreased when compared to the initial head CT.  3.  No new acute intracranial abnormality.

## 2025-03-04 NOTE — PROGRESS NOTES
"[facility]    Neurosurgery Progress Note    Date of Service (when I saw the patient): 03/04/2025     Assessment & Plan   89 yo female presenting to ER from memory care after fall with imaging findings revealing a smalll SAH, SDH and stable non-displaced skull fracture.     Patient states that she is doing well. Has no current complaints and denies headaches, nausea, emesis, or visual changes.     Repeat head CT today with stable appearance of SDH and SAH        Plan:  Okay to discharge from neurosurgical standpoint   Will sign off please re-consult or call with any questions   Will plan to follow up in 2-4 weeks with repeat head CT   Office will call to arrange   Hold all blood thinners until outpatient follow up       I have discussed the following assessment and plan Dr. Dao who is in agreement with initial plan and will follow up with further consultation recommendations.    Florencia Cox PA-C  [facility] Neurosurgery  Tel [phone]  Text page via [employer] Paging/Directory     Interval History   stable    Physical Exam   Temp: 97.9  F (36.6  C) Temp src: Oral BP: (!) 145/66 Pulse: 60   Resp: 18 SpO2: 95 % O2 Device: None (Room air)    Vitals:    03/01/25 2151 03/02/25 1629 03/04/25 0700   Weight: 47.9 kg (105 lb 9.6 oz) 47.4 kg (104 lb 8 oz) 47.5 kg (104 lb 11.5 oz)     Vital Signs with Ranges  Temp:  [97.5  F (36.4  C)-98.2  F (36.8  C)] 97.9  F (36.6  C)  Pulse:  [60-97] 60  Resp:  [18-23] 18  BP: (110-161)/(56-72) 145/66  SpO2:  [95 %-97 %] 95 %  I/O last 3 completed shifts:  In: 1000 [P.O.:1000]  Out: -      , Blood pressure (!) 145/66, pulse 60, temperature 97.9  F (36.6  C), temperature source Oral, resp. rate 18, height 1.676 m (5' 6\"), weight 47.5 kg (104 lb 11.5 oz), SpO2 95%.  104 lbs 11.5 oz  HEENT:  Normocephalic.  PERRLA.  EOM s intact.    Neck:  Supple, non-tender, without lymphadenopathy.  Heart:  No peripheral edema  Lungs:  No SOB  Abdomen:  Soft, non-tender, " non-distended.   Skin:  Warm and dry, good capillary refill.  Extremities:  Good radial and dorsalis pedis pulses bilaterally, no edema, cyanosis or clubbing.    NEUROLOGICAL EXAMINATION:   Mental status: alert and oriented x3 speech clear and appropriate   Cranial nerves: II-XII intact  Motor strength:   Biceps: 5/5 right, 5/5 left   Wrist extensors:5/5 right, 5/5 left   Triceps: 5/5 right, 5/5 left   Deltoids: 5/5 right, 5/5 left   Finger flexion: 5/5 right, 5/5 left   Finger abduction:5/5 right, 5/5 left   Hand : 5/5 right, 5/5 left   Hip flexors: 5/5 right, 5/5 left   Quadriceps: 5/5 right, 5/5 left  Ankle dorsiflexion: 5/5 right, 5/5 left    Ankle plantar flexion:5/5 right, 5/5 left   Extensor hallicus longus: 5/5 right, 5/5 left   Sensation:  intact  Reflexes:  Negative Babinski.  Negative Clonus.    Coordination:  Smooth finger to nose testing.   Negative pronator drift.    Gait:  not tested      Noted stable appearance of SDH and SAH on repeat head CT this morning   FINDINGS:  INTRACRANIAL CONTENTS: Unchanged low attenuation subdural fluid collection overlying the left cerebral convexity measuring up to 8 mm overlying the frontal convexity. Small amount of residual hyperattenuating subarachnoid blood products overlying the   right lateral frontoparietal region and posterolateral right temporal region. Subarachnoid hemorrhage is decreased from 3/1/2025 CT. No new intracranial hemorrhage or abnormal extra-axial fluid collection. No midline shift or herniation. No CT evidence   of acute infarct. Unchanged mild to moderate presumed chronic small vessel ischemic changes. Moderate generalized volume loss. No hydrocephalus.      VISUALIZED ORBITS/SINUSES/MASTOIDS: No intraorbital abnormality. No paranasal sinus mucosal disease. No middle ear or mastoid effusion.     BONES/SOFT TISSUES: Unchanged right temporal calvarial fracture. Unchanged right zygomatic arch fracture.                                            "                           IMPRESSION:  1.  Unchanged hygroma overlying the left cerebral convexity. No midline shift.  2.  No significant change in scattered small volume subarachnoid hemorrhage compared to the prior CT but decreased when compared to the initial head CT.  3.  No new acute intracranial abnormality.    Medications   Current Facility-Administered Medications   Medication Dose Route Frequency Provider Last Rate Last Admin     Current Facility-Administered Medications   Medication Dose Route Frequency Provider Last Rate Last Admin    acetaminophen (TYLENOL) tablet 975 mg  975 mg Oral Q8H My Morel MD   975 mg at 03/04/25 0304    cephALEXin (KEFLEX) capsule 500 mg  500 mg Oral BID Tamica Gamez MD   500 mg at 03/04/25 0753    sodium chloride (PF) 0.9% PF flush 3 mL  3 mL Intracatheter Q8H My Morel MD   3 mL at 03/02/25 2323       Data     CBC RESULTS:   Recent Labs   Lab Test 03/03/25  0750 03/02/25  0728   WBC 10.8 15.7*   RBC  --  4.27   HGB  --  13.5   HCT  --  41.8   MCV  --  98   MCH  --  31.6   MCHC  --  32.3   RDW  --  14.2   PLT  --  139*     Basic Metabolic Panel:  Lab Results   Component Value Date     03/02/2025      Lab Results   Component Value Date    POTASSIUM 4.0 03/02/2025    POTASSIUM 4.4 07/08/2020     Lab Results   Component Value Date    CHLORIDE 106 03/02/2025    CHLORIDE 105 07/08/2020     Lab Results   Component Value Date    MCKENNA 9.4 03/02/2025     Lab Results   Component Value Date    CO2 25 03/02/2025    CO2 22 07/08/2020     Lab Results   Component Value Date    BUN 17.5 03/02/2025    BUN 17 07/08/2020     Lab Results   Component Value Date    CR 0.99 03/03/2025     Lab Results   Component Value Date     03/02/2025     03/01/2025    GLC 93 07/08/2020     INR:  No results found for: \"INR\"     "

## 2025-03-04 NOTE — PLAN OF CARE
Problem: Dementia Signs/Symptoms  Goal: Improved Behavioral Control (Dementia Signs/Symptoms)  Outcome: Progressing  Goal: Improved Sleep (Dementia Signs/Symptoms)  Outcome: Progressing     Problem: Comorbidity Management  Goal: Maintenance of Behavioral Health Symptom Control  Outcome: Progressing  Intervention: Maintain Behavioral Health Symptom Control  Recent Flowsheet Documentation  Taken 3/3/2025 2340 by Desirae Man RN  Medication Review/Management: medications reviewed   Goal Outcome Evaluation:       Patient is pleasantly alert to self only. VSS on RA. Denied pain. Remains on 1:1 for safety is a very high falls risk. Neuro status remains intact. Will have a repeat CT scan this morning. Returning to memory care at discharge when medically ready.

## 2025-03-05 VITALS
HEIGHT: 66 IN | WEIGHT: 104.72 LBS | BODY MASS INDEX: 16.83 KG/M2 | DIASTOLIC BLOOD PRESSURE: 62 MMHG | HEART RATE: 79 BPM | OXYGEN SATURATION: 97 % | RESPIRATION RATE: 18 BRPM | SYSTOLIC BLOOD PRESSURE: 133 MMHG | TEMPERATURE: 97.9 F

## 2025-03-05 PROCEDURE — 250N000013 HC RX MED GY IP 250 OP 250 PS 637: Performed by: FAMILY MEDICINE

## 2025-03-05 PROCEDURE — 250N000013 HC RX MED GY IP 250 OP 250 PS 637: Performed by: STUDENT IN AN ORGANIZED HEALTH CARE EDUCATION/TRAINING PROGRAM

## 2025-03-05 PROCEDURE — 99239 HOSP IP/OBS DSCHRG MGMT >30: CPT | Performed by: STUDENT IN AN ORGANIZED HEALTH CARE EDUCATION/TRAINING PROGRAM

## 2025-03-05 RX ORDER — ACETAMINOPHEN 325 MG/1
975 TABLET ORAL EVERY 8 HOURS PRN
Qty: 30 TABLET | Refills: 0 | Status: SHIPPED | OUTPATIENT
Start: 2025-03-05 | End: 2025-03-05

## 2025-03-05 RX ORDER — MIDODRINE HYDROCHLORIDE 2.5 MG/1
2.5 TABLET ORAL 2 TIMES DAILY
Status: DISCONTINUED | OUTPATIENT
Start: 2025-03-05 | End: 2025-03-05

## 2025-03-05 RX ORDER — ACETAMINOPHEN 325 MG/1
975 TABLET ORAL EVERY 8 HOURS PRN
Qty: 30 TABLET | Refills: 0 | Status: SHIPPED | OUTPATIENT
Start: 2025-03-05

## 2025-03-05 RX ORDER — MIDODRINE HYDROCHLORIDE 2.5 MG/1
2.5 TABLET ORAL 2 TIMES DAILY
Qty: 60 TABLET | Refills: 1 | Status: SHIPPED | OUTPATIENT
Start: 2025-03-05 | End: 2025-03-05

## 2025-03-05 RX ORDER — MIDODRINE HYDROCHLORIDE 2.5 MG/1
2.5 TABLET ORAL 2 TIMES DAILY
Qty: 60 TABLET | Refills: 1 | Status: SHIPPED | OUTPATIENT
Start: 2025-03-05

## 2025-03-05 RX ORDER — MIDODRINE HYDROCHLORIDE 2.5 MG/1
2.5 TABLET ORAL 2 TIMES DAILY
Status: DISCONTINUED | OUTPATIENT
Start: 2025-03-05 | End: 2025-03-05 | Stop reason: HOSPADM

## 2025-03-05 RX ADMIN — CARBIDOPA AND LEVODOPA 2.5 MG: 50; 200 TABLET, EXTENDED RELEASE ORAL at 10:05

## 2025-03-05 RX ADMIN — ACETAMINOPHEN 975 MG: 325 TABLET ORAL at 10:05

## 2025-03-05 ASSESSMENT — ACTIVITIES OF DAILY LIVING (ADL)
ADLS_ACUITY_SCORE: 44

## 2025-03-05 NOTE — PLAN OF CARE
Patient is alert and oriented to self only. Patient is on a distance 1:1 for safety, no IV access. Neuro intact, and pushing PO fluid.     Junaid Shaikh RN    Problem: Adult Inpatient Plan of Care  Goal: Plan of Care Review  Description: The Plan of Care Review/Shift note should be completed every shift.  The Outcome Evaluation is a brief statement about your assessment that the patient is improving, declining, or no change.  This information will be displayed automatically on your shift  note.  Outcome: Progressing   Goal Outcome Evaluation:

## 2025-03-05 NOTE — PROGRESS NOTES
Pt. Up to recliner today, ate well for meals, pleasantly confused, did not attempt to exit bed or recliner this shift, belongings packed and sent with pt., pt. Stretcher transport and left floor at 1344.

## 2025-03-05 NOTE — DISCHARGE SUMMARY
[facility]    Hospitalist Discharge Summary       Date of Admission:  3/1/2025  Date of Discharge:  3/5/2025 12:50 PM  Discharging Provider: Tamica Gamez MD      Discharge Diagnoses   Subarachnoid hemorrhage   Subdural hematoma  Temporal fracture   Zygomatic arch fracture   Chronic right hip fracture     Unwitnessed fall  Orthostatic hypotension  Advanced dementia   Immune thrombocytopenia         Follow-ups Needed After Discharge   Follow-up Appointments       Follow Up      Follow up with [facility] neurosurgery in 2-4 weeks with repeat head CT   Our office will call you in 2-3 business days after discharge to schedule your follow-up appointments. If you have not received a call in 2-3 business days, please call our office at [phone].        Follow Up      Follow up with Neurosurgery in 2-4 weeks with repeat CT head        Hospital Follow-up with Existing Primary Care Provider (PCP)      Please see details below         Schedule Primary Care visit within: 30 Days   Recommended labs and Imaging (to be ordered by Primary Care Provider): BP, encouraged po intake and change in position with caution               Unresulted Labs Ordered in the Past 30 Days of this Admission       No orders found from 1/30/2025 to 3/2/2025.            Hospital Course   Ora Gonzalez is a 89 year old female with advanced dementia brought in with an unwitnessed fall at [facility]; found to have subarachnoid hemorrhage     Subarachnoid hemorrhage  Subdural hematoma  - CT on presentation acute small volume subarachnoid hemorrhage in the bilateral cerebral sulci no additional intracranial hemorrhage or mass effect  - Repeat CT 03/02 shows diminised volume of SAH. Interval development of subdural fluid collection overlying left cerebral hemisphere  - Repeat CT 03/03 slight increase left SDH.  Stable SAH on the right  - Repeat CT 03/04 stable appearance of SDH and SAH  - Seen by Neurosurgery  - Follow-up  outpatient 2 to 4 weeks with repeat CT head  - Hold anticoagulation until follow-up     Unwitnessed fall  Orthostatic hypotension  - Staff says they hurt her, son wonders how this could be possible.  Suspect likely had a fall when she stood up, also reports poor p.o. intake and mostly dehydrated  - Orthostatic vitals +  - EKG unremarkable.  - CT spine negative  - Encourage po intake and will start low dose midodrine at discharge, change position with caution/assistance  - PT/OT rec discharge back to [facility] care     Temporal fracture  - CT notes nondisplaced fracture of the right squamosal temporal bone  - Seen by Neurosurgery     Zygomatic arch fracture  - Acute minimally displaced, asymptomatic  - discussed with Son Sanford, refused ENT referral at this time     Scalp laceration  - Right side, Glued in the ER     Chronic right hip fracture  - Son provided history that this fracture may be old as she had a known right hip fracture treated by [facility] as an outpatient in the fall 2024.  - Seen by orthopedics, appreciate recs  - Elected for conservative management, allow WBAT.     Advanced dementia  - Last note from neurology, Mack, 2022 and at that time she was on Aricept and Namenda  - No longer on medication  - Scheduled Tylenol for pain  - Off 1:1     Immune thrombocytopenia     Abnormal UA  - Poor historian, ? has inc frequency in voiding  - UA + LE, WBC  - Urine culture mixed damaris  - discontinue abx    Consultations This Hospital Stay   NEUROSURGERY IP CONSULT  ORTHOPEDIC SURGERY IP CONSULT  ORTHOPEDIC SURGERY IP CONSULT  PHARMACY IP CONSULT  CARE MANAGEMENT / SOCIAL WORK IP CONSULT  OCCUPATIONAL THERAPY ADULT IP CONSULT  PHYSICAL THERAPY ADULT IP CONSULT    Code Status   No CPR- Do NOT Intubate    Time Spent on this Encounter   I,Tamica Gamez, personally saw the patient today and spent approximately 35 minutes discharging this patient.       Tamica Gamez MD  [facility]  Hospital  ______________________________________________________________________    Physical Exam   Vital Signs: Temp: 97.9  F (36.6  C) Temp src: Oral BP: 133/62 Pulse: 79   Resp: 18 SpO2: 97 % O2 Device: None (Room air)    Weight: 104 lbs 11.5 oz    Physical Exam      General Appearance:  Pleasant female.  Some subconjunctival bleeding right eye.  Laceration right scalp noted.  Bruising around right eye.  Respiratory: Clear to auscultation  Cardiovascular: Sounds regular without murmurs rubs or gallop  GI: Soft nontender  Skin: No significant lower extremity edema  -laceration and bruising as noted above  Other:  Neurologically nonfocal.  Pleasantly confused    Primary Care Physician   Ryanne Corbett    Discharge Disposition   Discharged to Memory care  Condition at discharge: Stable    Significant Results and Procedures   Most Recent 3 CBC's:  Recent Labs   Lab Test 03/03/25  0750 03/02/25  0728 03/01/25  1728 01/14/25  1108   WBC 10.8 15.7* 13.7* 11.7*   HGB  --  13.5 13.8 13.9   MCV  --  98 98 102*   PLT  --  139* 146* 97*     Most Recent 3 BMP's:  Recent Labs   Lab Test 03/03/25  0750 03/02/25  0728 03/01/25  2233 03/01/25  1728 10/22/24  1612   NA  --  140  --  141 141   POTASSIUM  --  4.0  --  4.3 4.5   CHLORIDE  --  106  --  104 105   CO2  --  25  --  28 25   BUN  --  17.5  --  19.9 24.1*   CR 0.99* 0.98*  --  1.04* 1.05*   ANIONGAP  --  9  --  9 11   MCKENNA  --  9.4  --  9.7 9.3   GLC  --  117* 123* 117* 152*     Most Recent 2 LFT's:  Recent Labs   Lab Test 10/22/24  1612 09/10/24  1055   AST 32 35   ALT 25 13   ALKPHOS 88 82   BILITOTAL 0.5 0.5     Most Recent 3 INR's:No lab results found.  Most Recent 3 Troponin's:No lab results found.  Most Recent 3 BNP's:No lab results found.  Most Recent D-dimer:No lab results found.  Most Recent Cholesterol Panel:  Recent Labs   Lab Test 08/06/18  0949   CHOL 192      HDL 60   TRIG 157*       Results for orders placed or performed during the hospital encounter of  03/01/25   CT Head w/o Contrast    Addendum: 3/1/2025    COMMUNICATION ADDENDUM:  Findings were discussed with Dr. Cesar Garcia on 3/1/2025 6:57 PM CST.    END ADDENDUM      Narrative    EXAM: CT HEAD W/O CONTRAST, CT ORBITAL W/O CONTRAST  LOCATION: [facility]  DATE/TIME: 3/1/2025 5:57 PM CST    INDICATION: Trauma. Fall. Right scalp laceration.  COMPARISON: None.  TECHNIQUE:   1) Routine CT Head without IV contrast. Multiplanar reformats. Dose reduction techniques were used.  2) Routine CT Orbits without IV contrast. Multiplanar reformats. Dose reduction techniques were used.    FINDINGS:  HEAD CT:   INTRACRANIAL CONTENTS: Small volume hyperattenuating subarachnoid hemorrhage in the bilateral superior frontal sulci, anterior right parietal sulci, posterior right cingulate sulcus, overlying the left posterolateral superior temporal gyrus, and in the   anterior left sylvian fissure No CT evidence of acute infarct. Moderate presumed chronic small vessel ischemic changes. Moderate generalized volume loss. No hydrocephalus.     OSSEOUS STRUCTURES/SOFT TISSUES: Acute nondisplaced fracture involving the right squamosal temporal bone (series 3, image 14. Acute, minimally displaced fractures of the right zygomatic arch (series 3, image 8).     ORBITS CT:  RIGHT ORBIT: Normal periorbital soft tissues. No displaced orbital bone fracture. Normal globe, extraocular muscles, optic nerve/sheath, periorbital fat and lacrimal gland.     LEFT ORBIT: Normal periorbital soft tissues. No displaced orbital fracture. Normal globe, extraocular muscles, optic nerve/sheath, periorbital fat and lacrimal gland.    SINUSES: Small amount of dependent fluid in the right maxillary sinus, nonspecific. Remaining paranasal sinuses are predominantly clear.    VISUALIZED INTRACRANIAL CONTENTS: No abnormality.       Impression    IMPRESSION:  HEAD CT:  1.  Acute, small volume subarachnoid hemorrhage in the bilateral cerebral  sulci (greater than 3 sulci). No additional acute intracranial hemorrhage. No mass effect.  2.  Acute, nondisplaced fracture of the right squamosal temporal bone.  3.  Acute, minimally displaced fracture of the right zygomatic arch.  4.  No additional acute intracranial abnormality.  5.  Moderate presumed chronic small vessel ischemic changes.    ORBITS CT:  1.  No acute orbital abnormality.  2.  Small amount of dependent fluid in the right maxillary sinus is nonspecific but could represent an acute blood products. Acute fracture of the maxilla or orbital floor is not definitely seen.     CT Cervical Spine w/o Contrast    Narrative    EXAM: CT CERVICAL SPINE W/O CONTRAST  LOCATION: [facility]  DATE: 3/1/2025    INDICATION: Fall from standing. Head laceration.  COMPARISON: None.  TECHNIQUE: Routine CT Cervical Spine without IV contrast. Multiplanar reformats. Dose reduction techniques were used.    FINDINGS:  VERTEBRA: Normal vertebral body heights and alignment. No fracture or posttraumatic subluxation. Multilevel cervical spondylosis.    CANAL/FORAMINA: No high-grade spinal canal or neural foraminal stenosis.    PARASPINAL: No extraspinal abnormality.      Impression    IMPRESSION:  1.  No fracture or posttraumatic subluxation.  2.  No high-grade spinal canal or neural foraminal stenosis.   XR Hip Right 2-3 Views    Narrative    EXAM: XR HIP RIGHT 2-3 VIEWS  LOCATION: [facility]  DATE: 3/1/2025    INDICATION: Fall right hip pain  COMPARISON: None.      Impression    IMPRESSION: Mildly displaced fracture of the right greater trochanter. No definite extension of the fracture into the intertrochanteric portion of the right femur. No additional fractures identified elsewhere in the pelvis. Normal joint spacing alignment   of both hips. Multilevel degenerative disc disease and facet arthropathy in the lower lumbar spine. Osseous demineralization.   CT Orbits wo Contrast     Addendum: 3/1/2025    COMMUNICATION ADDENDUM:  Findings were discussed with Dr. Cesar Garcia on 3/1/2025 6:57 PM CST.    END ADDENDUM      Narrative    EXAM: CT HEAD W/O CONTRAST, CT ORBITAL W/O CONTRAST  LOCATION: [facility]  DATE/TIME: 3/1/2025 5:57 PM CST    INDICATION: Trauma. Fall. Right scalp laceration.  COMPARISON: None.  TECHNIQUE:   1) Routine CT Head without IV contrast. Multiplanar reformats. Dose reduction techniques were used.  2) Routine CT Orbits without IV contrast. Multiplanar reformats. Dose reduction techniques were used.    FINDINGS:  HEAD CT:   INTRACRANIAL CONTENTS: Small volume hyperattenuating subarachnoid hemorrhage in the bilateral superior frontal sulci, anterior right parietal sulci, posterior right cingulate sulcus, overlying the left posterolateral superior temporal gyrus, and in the   anterior left sylvian fissure No CT evidence of acute infarct. Moderate presumed chronic small vessel ischemic changes. Moderate generalized volume loss. No hydrocephalus.     OSSEOUS STRUCTURES/SOFT TISSUES: Acute nondisplaced fracture involving the right squamosal temporal bone (series 3, image 14. Acute, minimally displaced fractures of the right zygomatic arch (series 3, image 8).     ORBITS CT:  RIGHT ORBIT: Normal periorbital soft tissues. No displaced orbital bone fracture. Normal globe, extraocular muscles, optic nerve/sheath, periorbital fat and lacrimal gland.     LEFT ORBIT: Normal periorbital soft tissues. No displaced orbital fracture. Normal globe, extraocular muscles, optic nerve/sheath, periorbital fat and lacrimal gland.    SINUSES: Small amount of dependent fluid in the right maxillary sinus, nonspecific. Remaining paranasal sinuses are predominantly clear.    VISUALIZED INTRACRANIAL CONTENTS: No abnormality.       Impression    IMPRESSION:  HEAD CT:  1.  Acute, small volume subarachnoid hemorrhage in the bilateral cerebral sulci (greater than 3 sulci). No  additional acute intracranial hemorrhage. No mass effect.  2.  Acute, nondisplaced fracture of the right squamosal temporal bone.  3.  Acute, minimally displaced fracture of the right zygomatic arch.  4.  No additional acute intracranial abnormality.  5.  Moderate presumed chronic small vessel ischemic changes.    ORBITS CT:  1.  No acute orbital abnormality.  2.  Small amount of dependent fluid in the right maxillary sinus is nonspecific but could represent an acute blood products. Acute fracture of the maxilla or orbital floor is not definitely seen.     CT Head w/o Contrast    Narrative    EXAM: CT HEAD WITHOUT CONTRAST  LOCATION: [facility]  DATE: 03/02/2025    INDICATION: SAH follow-up.  COMPARISON: None.  TECHNIQUE: Routine CT Head without IV contrast. Multiplanar reformats. Dose reduction techniques were used.    FINDINGS:  INTRACRANIAL CONTENTS: Small volume subarachnoid hemorrhage redemonstrated posterior right frontal sulci and left frontal sulci diminished in conspicuity from yesterday's study. Total number of sulci involvement is about four. Interval development of   low-attenuation/CSF attenuation extra-axial subdural fluid collection overlying the left cerebral hemisphere, maximal radial dimension is about 4.5 mm series 4 image 23. No midline shift. No subfalcine shift. No ventricular effacement. No   intraventricular hemorrhage. No other changes. No CT evidence of acute infarct. Mild presumed chronic small vessel ischemic changes. Mild to moderate generalized volume loss. No hydrocephalus. Corpus callosum is normal. Cerebellar tonsillar position is   satisfactory. No sella or suprasellar mass/hemorrhage.     VISUALIZED ORBITS/SINUSES/MASTOIDS: Prior bilateral cataract surgery. Visualized portions of the orbits are otherwise unremarkable. Air-fluid level right maxillary sinus dependently as before may represent inflammatory sinusitis. No middle ear or mastoid    effusion.    BONES/SOFT TISSUES: Small degree of soft tissue swelling right preseptal periorbital soft tissues extends laterally, and involve the right inferior temporalis muscle/scalp. Stable appearance to nondisplaced right-sided temporal bone/squamous segment   fracture and of nondisplaced fracture right zygomatic arch series 3 image 7. Nasopharynx is patent.      Impression    IMPRESSION:  1.  Since yesterday's head CT evaluation, diminished conspicuity/volume of small areas of hyperdense subarachnoid hemorrhage overlying the posterior right frontal lobe sulci inferolaterally and left frontal lobe sulci nearly at the vertex. No new   subarachnoid hemorrhage.    2.  Interval development of low-attenuation/CSF attenuation subdural fluid collection overlying the left cerebral hemisphere most prominent at the left frontal lobe up to 4.5 mm series 2 image 18 and series 4 image 23. This does not result in significant   mass effect/midline shift or subfalcine shift, however.    3.  No other changes.    4.  Stable chronic ischemic changes deep white matter of both cerebral hemispheres.    5.  Stable nondisplaced fractures right temporal bone/squamous segment series 3 images 9-12, and of the right zygomatic arch series 3 image 7. Mild soft tissue swelling right preseptal periorbital soft tissues laterally and of the right temporalis   muscle/inferior temporal scalp.       CT Head w/o Contrast    Narrative    EXAM: CT HEAD W/O CONTRAST  LOCATION: [facility]  DATE: 3/3/2025    INDICATION: SDH  COMPARISON: 3/2/2025.  TECHNIQUE: Routine CT Head without IV contrast. Multiplanar reformats. Dose reduction techniques were used.    FINDINGS:  Stable small volume subarachnoid hemorrhage findings in the posterior right temporal and posterior right frontal subarachnoid spaces. No new subarachnoid hemorrhage findings. The previously noted left frontotemporal subdural collection with low-density   fluid is  redemonstrated, slightly increased in prominence now measuring up to 8 mm in maximal thickness previously measuring 4-5 mm.    Stable findings of right temporal calvarial and zygomatic arch fracture findings.      Impression    IMPRESSION:  1.  Slightly increased prominence of a left frontotemporal subdural collection with predominantly low density fluid. Continued imaging follow-up recommended.    2.  Stable subarachnoid hemorrhage findings on the right. No new intraparenchymal hemorrhage findings or new subarachnoid hemorrhage findings.   CT Head w/o Contrast    Narrative    EXAM: CT HEAD W/O CONTRAST  LOCATION: [facility]  DATE: 3/4/2025    INDICATION: SDH SAH  COMPARISON: 3/3/2025. 3/2/2025. 3/1/2025.  TECHNIQUE: Routine CT Head without IV contrast. Multiplanar reformats. Dose reduction techniques were used.    FINDINGS:  INTRACRANIAL CONTENTS: Unchanged low attenuation subdural fluid collection overlying the left cerebral convexity measuring up to 8 mm overlying the frontal convexity. Small amount of residual hyperattenuating subarachnoid blood products overlying the   right lateral frontoparietal region and posterolateral right temporal region. Subarachnoid hemorrhage is decreased from 3/1/2025 CT. No new intracranial hemorrhage or abnormal extra-axial fluid collection. No midline shift or herniation. No CT evidence   of acute infarct. Unchanged mild to moderate presumed chronic small vessel ischemic changes. Moderate generalized volume loss. No hydrocephalus.     VISUALIZED ORBITS/SINUSES/MASTOIDS: No intraorbital abnormality. No paranasal sinus mucosal disease. No middle ear or mastoid effusion.    BONES/SOFT TISSUES: Unchanged right temporal calvarial fracture. Unchanged right zygomatic arch fracture.      Impression    IMPRESSION:  1.  Unchanged hygroma overlying the left cerebral convexity. No midline shift.  2.  No significant change in scattered small volume subarachnoid  hemorrhage compared to the prior CT but decreased when compared to the initial head CT.  3.  No new acute intracranial abnormality.       *Note: Due to a large number of results and/or encounters for the requested time period, some results have not been displayed. A complete set of results can be found in Results Review.       Discharge Orders      CT Head w/o Contrast     Primary Care - Care Coordination Referral      Activity    Avoid all blood thinners, aspirin, Advil, aleve, ibuprofen    *No lifting, pushing or pulling greater than 5-10 pound (this is about a gallon of milk).  *No repetitive bending, twisting, or jarring activities  *No overhead work  *No aerobic or strenuous activity  *No activities with increased risk of falls  *You may move about your home as tolerated  *You may walk up and down stairs as tolerated      WALKING PROGRAM: As you can tolerate, walk daily-start with 5-10 minutes of continuous walking. This is in addition to the walking that you do as part of your daily activities. Increase the time that you walk by 5 minutes every couple of days. Do not exceed 30-45 minutes of continuous walking until seen in follow-up.   **Listen to your body, if you find that you are more painful or fatigued, you may need to proceed more slowly.     Follow Up    Follow up with [facility] neurosurgery in 2-4 weeks with repeat head CT   Our office will call you in 2-3 business days after discharge to schedule your follow-up appointments. If you have not received a call in 2-3 business days, please call our office at [phone].     When to contact your care team    Call [phone] with the following symptoms:    *Worsening pain not relieved by the  prescription given  *Worsening headache not relieved by the prescription given  *A headache that gets better or worse when you stand up or lie down  *Seizures  *Persistent nausea and vomiting  *Increased sleepiness or difficulty being awakened  *Change in ability  to walk, see, think, or talk  *Worsening or new onset of weakness, or numbness and tingling  *Loss or change in your ability to control bowel or bladder function  * If you did not have a bowel movement before leaving the hospital and your bowels have not moved within 48 hours of your discharge      !!!! IF YOU HAVE A SERIOUS OR THREATENING EMERGENCY, CALL 911 OR COME TO THE EMERGENCY ROOM.  IF YOUR CONDITIONS ALLOWS COME TO THE HOSPITAL WHERE YOUR SURGERY WAS PERFORMED.    QUESTIONS OR CONCERNS:   OUR OFFICE HOURS ARE FROM 9:00 A.M -4:30 P.M. MONDAY-FRIDAY.  AFTER OFFICE HOURS, CALLS ARE ANSWERED BY THE ON-CALL PROVIDER. PLEASE CALL WITH ROUTINE QUESTIONS DURING OFFICE HOURS. THE ON-CALL PROVIDER WILL NOT REFILL PRESCRIPTIONS.     Reason for your hospital stay    S/p Fall  Orthostatic hypotension  Subarachnoid hemorrhage  Subdural hematoma     Follow Up    Follow up with Neurosurgery in 2-4 weeks with repeat CT head     Diet    Follow this diet upon discharge:       Regular Diet Adult     Hospital Follow-up with Existing Primary Care Provider (PCP)    Please see details below          Discharge Medications   Current Discharge Medication List        START taking these medications    Details   acetaminophen (TYLENOL) 325 MG tablet Take 3 tablets (975 mg) by mouth every 8 hours as needed for mild pain.  Qty: 30 tablet, Refills: 0    Associated Diagnoses: Fall, initial encounter; Closed fracture of temporal bone, initial encounter (H); Closed fracture of trochanter of right femur, initial encounter (H)      midodrine (PROAMATINE) 2.5 MG tablet Take 1 tablet (2.5 mg) by mouth 2 times daily.  Qty: 60 tablet, Refills: 1    Associated Diagnoses: Fall, initial encounter; Orthostatic hypotension           CONTINUE these medications which have NOT CHANGED    Details   calcium carbonate-vitamin D (CALTRATE) 600-10 MG-MCG per tablet Take 1 tablet by mouth 2 times daily.           Allergies   No Known Allergies

## 2025-03-05 NOTE — PLAN OF CARE
Goal Outcome Evaluation:         A/Oxself and place. Pleasantly confused. Calm and cooperative. Follows command. Moves all extremities. R scleral hemorrhage. Neuros q4hr. VSS. Afebrile. On RA. Reg diet. A1 with walker. No PIV access. Pt slept all night, no significant events overnight.                Problem: Adult Inpatient Plan of Care  Goal: Absence of Hospital-Acquired Illness or Injury  Intervention: Identify and Manage Fall Risk  Recent Flowsheet Documentation  Taken 3/5/2025 0400 by Srikanth Sy RN  Safety Promotion/Fall Prevention:   activity supervised   assistive device/personal items within reach   clutter free environment maintained   increased rounding and observation   increase visualization of patient   lighting adjusted   nonskid shoes/slippers when out of bed   patient and family education   room door open   room near nurse's station   room organization consistent   safety round/check completed   check orthostatic blood pressure   supervised activity   treat underlying cause  Taken 3/4/2025 2330 by Srikanth Sy RN  Safety Promotion/Fall Prevention:   activity supervised   assistive device/personal items within reach   clutter free environment maintained   increased rounding and observation   increase visualization of patient   lighting adjusted   nonskid shoes/slippers when out of bed   patient and family education   room door open   room near nurse's station   room organization consistent   safety round/check completed   check orthostatic blood pressure   supervised activity   treat underlying cause     Problem: Comorbidity Management  Goal: Maintenance of Behavioral Health Symptom Control  Outcome: Progressing  Intervention: Maintain Behavioral Health Symptom Control  Recent Flowsheet Documentation  Taken 3/5/2025 0400 by Srikanth Sy, RN  Medication Review/Management: medications reviewed  Taken 3/4/2025 2330 by Srikanth Sy RN  Medication Review/Management: medications reviewed      Problem: Stroke, Ischemic (Includes Transient Ischemic Attack)  Goal: Optimal Cerebral Tissue Perfusion  Outcome: Progressing  Intervention: Protect and Optimize Cerebral Perfusion  Recent Flowsheet Documentation  Taken 3/5/2025 0400 by Srikanth Sy RN  Cerebral Perfusion Promotion: blood pressure monitored  Taken 3/4/2025 2330 by Srikanth Sy RN  Cerebral Perfusion Promotion: blood pressure monitored     Problem: Dementia Signs/Symptoms  Goal: Improved Behavioral Control (Dementia Signs/Symptoms)  Outcome: Progressing  Intervention: Manage Behavior  Recent Flowsheet Documentation  Taken 3/5/2025 0400 by Srikanth Sy RN  Environmental Support:   calm environment promoted   distractions minimized  Taken 3/4/2025 2330 by Srikanth Sy RN  Environmental Support:   calm environment promoted   distractions minimized     Problem: Delirium  Goal: Improved Attention and Thought Clarity  Intervention: Maximize Cognitive Function  Recent Flowsheet Documentation  Taken 3/5/2025 0400 by Srikanth Sy RN  Reorientation Measures:   clock in view   reorientation provided  Taken 3/4/2025 2330 by Srikanth Sy RN  Reorientation Measures:   clock in view   reorientation provided
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
## 2025-03-06 ENCOUNTER — TELEPHONE (OUTPATIENT)
Dept: NEUROSURGERY | Facility: CLINIC | Age: OVER 89
End: 2025-03-06
Payer: OTHER MISCELLANEOUS

## 2025-03-06 ENCOUNTER — PATIENT OUTREACH (OUTPATIENT)
Dept: CARE COORDINATION | Facility: CLINIC | Age: OVER 89
End: 2025-03-06
Payer: OTHER MISCELLANEOUS

## 2025-03-06 NOTE — PLAN OF CARE
Physical Therapy Discharge Summary    Reason for therapy discharge:    Discharged to memory care    Progress towards therapy goal(s). See goals on Care Plan in [facility] electronic health record for goal details.  Goals partially met.  Barriers to achieving goals:   Pt was working on the goals.    Therapy recommendation(s):    Continued therapy is recommended.  Rationale/Recommendations:  to improve mobility and strength. .

## 2025-03-06 NOTE — TELEPHONE ENCOUNTER
I called patients son Gomez to schedule this CT and follow up and he is asking why she has to do all this.  Even if they find something they are not going to operate. She is 89 years old!  And with her Alzheimer's all this does is stress her out.  Please advise.  Thank you

## 2025-03-06 NOTE — TELEPHONE ENCOUNTER
Left voicemail with her son Gomez that there is no need for follow up if in the setting of not wanting a surgery, even if hemorrhage has grown, she has Alzheimer's, and it is stressful for her. They can cancel her appointment or follow up with us as needed.

## 2025-03-06 NOTE — PROGRESS NOTES
Connected Care Resource Center: [facility]    Background: Transitional Care Management program identified per system criteria and reviewed by [facility] team for possible outreach.    Assessment: Upon chart review, [facility] member will not proceed with patient outreach related to this episode of Transitional Care Management program due to reason below:    Patient has discharged to a Memory Care, Long-term Care, Assisted Living or Group Home where patient is receiving on-site support with their daily cares, including support with hospital follow up plan.    Plan: Transitional Care Management episode addressed appropriately per reason noted above.      Maria Victoria Bush RN  Connected Care Resource [facility], [facility]    *Connected Care Resource Team does NOT follow patient ongoing. Referrals are identified based on internal discharge reports and the outreach is to ensure patient has an understanding of their discharge instructions.

## 2025-04-05 ENCOUNTER — HEALTH MAINTENANCE LETTER (OUTPATIENT)
Age: OVER 89
End: 2025-04-05

## 2025-04-17 ENCOUNTER — APPOINTMENT (OUTPATIENT)
Dept: CT IMAGING | Facility: HOSPITAL | Age: OVER 89
End: 2025-04-17
Attending: EMERGENCY MEDICINE
Payer: COMMERCIAL

## 2025-04-17 ENCOUNTER — HOSPITAL ENCOUNTER (EMERGENCY)
Facility: HOSPITAL | Age: OVER 89
Discharge: HOME | End: 2025-04-17
Attending: EMERGENCY MEDICINE
Payer: COMMERCIAL

## 2025-04-17 ENCOUNTER — APPOINTMENT (OUTPATIENT)
Dept: RADIOLOGY | Facility: HOSPITAL | Age: OVER 89
End: 2025-04-17
Attending: EMERGENCY MEDICINE
Payer: COMMERCIAL

## 2025-04-17 VITALS
RESPIRATION RATE: 17 BRPM | HEART RATE: 82 BPM | DIASTOLIC BLOOD PRESSURE: 99 MMHG | SYSTOLIC BLOOD PRESSURE: 158 MMHG | OXYGEN SATURATION: 92 % | TEMPERATURE: 97.8 F

## 2025-04-17 DIAGNOSIS — I62.9 INTRACRANIAL HEMORRHAGE (H): ICD-10-CM

## 2025-04-17 DIAGNOSIS — G96.08 SUBDURAL HYGROMA: ICD-10-CM

## 2025-04-17 PROCEDURE — 73080 X-RAY EXAM OF ELBOW: CPT | Mod: RT

## 2025-04-17 PROCEDURE — 73090 X-RAY EXAM OF FOREARM: CPT | Mod: RT

## 2025-04-17 PROCEDURE — 70450 CT HEAD/BRAIN W/O DYE: CPT

## 2025-04-17 PROCEDURE — 99291 CRITICAL CARE FIRST HOUR: CPT

## 2025-04-17 PROCEDURE — 99204 OFFICE O/P NEW MOD 45 MIN: CPT | Performed by: NURSE PRACTITIONER

## 2025-04-17 PROCEDURE — 72125 CT NECK SPINE W/O DYE: CPT

## 2025-04-17 PROCEDURE — 70450 CT HEAD/BRAIN W/O DYE: CPT | Mod: 76

## 2025-04-17 ASSESSMENT — ACTIVITIES OF DAILY LIVING (ADL)
ADLS_ACUITY_SCORE: 58

## 2025-04-17 ASSESSMENT — COLUMBIA-SUICIDE SEVERITY RATING SCALE - C-SSRS
6. HAVE YOU EVER DONE ANYTHING, STARTED TO DO ANYTHING, OR PREPARED TO DO ANYTHING TO END YOUR LIFE?: NO
2. HAVE YOU ACTUALLY HAD ANY THOUGHTS OF KILLING YOURSELF IN THE PAST MONTH?: NO
1. IN THE PAST MONTH, HAVE YOU WISHED YOU WERE DEAD OR WISHED YOU COULD GO TO SLEEP AND NOT WAKE UP?: NO

## 2025-04-17 NOTE — ED PROVIDER NOTES
EMERGENCY DEPARTMENT ENCOUNTER     NAME: Ora Gonzalez   AGE: 89 year old female   YOB: 1933   MRN: [id number]   EVALUATION DATE & TIME: 4/17/2025 11:45 AM   PCP: Ryanne Corbett     Chief Complaint   Patient presents with    Fall   :    FINAL IMPRESSION       1. Subdural hygroma    2. Intracranial hemorrhage (H)           ED COURSE & MEDICAL DECISION MAKING    12:29 PM I spoke with Dr. Hayes, the neuroradiologist regarding the patient.  12:56 PM I spoke with Rafaela Ramesh CNP, from the neurosurgery team. Plan to speak with the hospitalist. Reports that the patient needs a craniotomy.  1:02 PM I spoke with the patient's son regarding further plan of care. The son does not want the patient to undergo a craniotomy.  1:09 PM The neurosurgery team wants to keep the patient here and do a repeat scan after 6 hours. If the patient is stable after her repeat CT scan, I anticipate that the patient will be sent back to her memory care facility.  1:29 PM The neurosurgery CNP spoke with the RN, and stated that she saw the patient.  1:53 PM I spoke with the neurosurgery nurse practitioner in-person and discussed further plan of care. Informed her that we will do a repeat scan in 6 hours, and if her exam and repeat scan is stable, the plan will be to sent the patient back to her care facility.    Pertinent Labs & Imaging studies reviewed. (See chart for details)   89 year old female  presents to the Emergency Department for evaluation of . Initial Vitals Reviewed. Initial exam notable for patient who does not have any external signs of trauma except for some abrasions and mild skin tear on her right forearm and elbow.  There are no signs of trauma to the head, but she is obviously quite an unreliable patient coming from [facility].  I did end up ordering x-rays of her forearm and elbow which are negative for any underlying fracture and she is moving them normally.  She was initially awake, neurologically  intact, following commands in all extremities.  Several times during her ED stay she has fallen asleep but I checked on her frequently and she is easily arousable.  CT scan of the head and cervical spine were obtained and reviewed by myself and discussed with radiology over the phone.  It looks like about 1 month ago she had a subdural hygroma that was 0.8 cm in width, and today it has now grown in size to 3.4 cm with some layered hyperdense new hemorrhage at the bottom.  I discussed the case with neurosurgery, and given that there is now midline shift and significant growth, they did say that if family wanted intervention the patient would need to transfer for emergent neurosurgery to [facility].  I spoke over the phone with her son Gomez, and he reiterated that they have declined surgical management as they do not think she would even make it through surgery and preferred comfort care in the setting.  They are actually interested in discussing with hospice, and we actually spoke with hospice that evaluates patients onsite at her [facility] and they will be able to evaluate her for hospice as soon as she returns to her facility.  I have discussed with neurosurgery several times, and the plan is that we are going to keep her in the ED to do a 6-hour head CT.  This information helps family and the facility know if the CT is stable or if it is rapidly worsening to help them have an idea of life expectancy, but otherwise they do not wish for her to be admitted to the hospital and after CT she will go back to her memory care tonight.  Patient signed out to oncoming provider awaiting the 6-hour head CT.        80 minutes critical care time, see procedure note below for details if relevant    Medical Decision Making  I obtained history from Family Member/Significant Other and EMS  I reviewed the EMR: Inpatient Record: Admission for subdural hematoma after a fall 3/1/2025  Care impacted by Dementia  I independently  "interpreted the CT head and note midline shift with new ICH on large subdural. See radiology report for final interpretation.  I discussed the care with another health care provider: Neurosurgery, radiology  Admit.    MIPS (CTPE, Dental pain, Akhtar, Sinusitis, Asthma/COPD, Head Trauma): Adult Minor Head Trauma:Age 65 years or older    SEPSIS: None                      MEDICATIONS GIVEN IN THE EMERGENCY:   Medications - No data to display   NEW PRESCRIPTIONS STARTED AT TODAY'S ER VISIT   New Prescriptions    No medications on file     ================================================================   HISTORY OF PRESENT ILLNESS       Patient information was obtained from: EMS and RN  Use of Intrepreter: N/A    Ora Gonzalez is a 89 year old female with history of major neurocognitive disorder due to Alzheimer's disease, immune thrombocytopenic purpura, orthostatic hypotension, HLD, PAF and subarachnoid hemorrhage history, who presents following a fall.    Per EMS report, the patient is coming from a [facility], and the staff at the patient's [facility] care facility reported that the patient had a fall today (04/17/2025) that was unwitnessed. There was no episodes of loss of consciousness that they are aware of. The patient has a history of dementia, so unable to get much history from her regarding this fall. The patient sustained a skin tear on her right forearm and right elbow, and bleeding is controlled right now. The patient reports that she has \"pain everywhere\" endorsing generalized pain everywhere, and is unable to specify any specific areas of pain from today's fall. When EMS evaluated the patient, they stated that she had no instability in her pelvis bilaterally. The patient's legs looked equal for EMS. Per RN, the patient is able to walk at baseline. She is not on any blood thinners. Per the patient's paperwork she came to the ED with, it lists that the patient is " "DNR/DNI. No additional complaints or concerns reported at this time.    ================================================================        PAST HISTORY     PAST MEDICAL HISTORY:   Past Medical History:   Diagnosis Date    Adjustment disorder with mixed anxiety and depressed mood 2016    Adjustment reaction to chronic stress 2016    Chronic kidney disease     History of pulmonary embolism 2020    Hypertension     ITP (idiopathic thrombocytopenic purpura)     Osteoporosis     Paroxysmal atrial fibrillation (H)     Pulmonary embolism (H) 2016    Thrombocytopenia       PAST SURGICAL HISTORY:   Past Surgical History:   Procedure Laterality Date    CATARACT EXTRACTION Left 10/2014    ESOPHAGOSCOPY, GASTROSCOPY, DUODENOSCOPY (EGD), COMBINED N/A 2017    Procedure: ESOPHAGOGASTRODUODENOSCOPY (EGD);  Surgeon: Juanpablo John MD;  Location: [facility] GI;  Service:     HEMORRHOIDECTOMY INTERNAL LIGATION      Description: Hemorrhoidectomy;  Recorded: 2008;  Comments: with fissure    IR EMBOLIZATION VASCULAR NON HEAD/NECK  2017    LAPAROSCOPIC SPLENECTOMY N/A 2015    Procedure: LAPAROSCOPIC TO CONVERSION TO OPEN SPLENECTOMY;  Surgeon: Herb Mckeon MD;  Location: [facility] OR;  Service:     OTHER SURGICAL HISTORY      blake barboza joe    [facility]  4/15/2017           CURRENT MEDICATIONS:   acetaminophen (TYLENOL) 325 MG tablet  calcium carbonate-vitamin D (CALTRATE) 600-10 MG-MCG per tablet  midodrine (PROAMATINE) 2.5 MG tablet      ALLERGIES:   No Known Allergies   FAMILY HISTORY:   Family History   Problem Relation Age of Onset    Depression Father     Suicidality Father     Alzheimer Disease Sister     No Known Problems Mother          90s of \"old age\"    No Known Problems Father     Diabetes Type 2  Son     Other - See Comments Other         multiple family members with silicosis / black lung    Parkinsonism Daughter     Alzheimer Disease Sister     No Known Problems " Brother     No Known Problems Sister     No Known Problems Brother     No Known Problems Brother     No Known Problems Son       SOCIAL HISTORY:   Social History     Socioeconomic History    Marital status:    Tobacco Use    Smoking status: Former     Types: Cigarettes    Smokeless tobacco: Never   Substance and Sexual Activity    Alcohol use: Not Currently   Social History Narrative    Has adult children involved in her care.  In the last 6 months moved to memory care assisted living.  Supposed to use a walker due to previous fracture but is not reliable with this due to dementia     Social Drivers of Health     Financial Resource Strain: Low Risk  (3/1/2025)    Financial Resource Strain     Within the past 12 months, have you or your family members you live with been unable to get utilities (heat, electricity) when it was really needed?: No   Food Insecurity: Low Risk  (3/1/2025)    Food Insecurity     Within the past 12 months, did you worry that your food would run out before you got money to buy more?: No     Within the past 12 months, did the food you bought just not last and you didn t have money to get more?: No   Transportation Needs: Low Risk  (3/1/2025)    Transportation Needs     Within the past 12 months, has lack of transportation kept you from medical appointments, getting your medicines, non-medical meetings or appointments, work, or from getting things that you need?: No   Interpersonal Safety: Low Risk  (3/1/2025)    Interpersonal Safety     Do you feel physically and emotionally safe where you currently live?: Yes     Within the past 12 months, have you been hit, slapped, kicked or otherwise physically hurt by someone?: No     Within the past 12 months, have you been humiliated or emotionally abused in other ways by your partner or ex-partner?: No   Housing Stability: Low Risk  (3/1/2025)    Housing Stability     Do you have housing? : Yes     Are you worried about losing your housing?: No         VITALS  Patient Vitals for the past 24 hrs:   BP Temp Temp src Pulse Resp SpO2   04/17/25 1547 (!) 161/75 -- -- 76 -- 94 %   04/17/25 1528 (!) 163/70 -- -- 68 -- 94 %   04/17/25 1514 -- -- -- 70 -- 95 %   04/17/25 1512 (!) 183/79 -- -- 70 -- 95 %   04/17/25 1459 (!) 187/81 -- -- 69 -- 94 %   04/17/25 1444 (!) 170/79 -- -- 67 -- 94 %   04/17/25 1346 (!) 185/80 -- -- 66 -- 96 %   04/17/25 1331 (!) 158/69 -- -- 69 -- 95 %   04/17/25 1329 -- -- -- 75 -- 97 %   04/17/25 1314 (!) 158/72 -- -- 64 -- 93 %   04/17/25 1300 -- -- -- -- 16 --   04/17/25 1259 (!) 157/73 -- -- 63 -- 94 %   04/17/25 1244 (!) 162/73 -- -- 68 -- 94 %   04/17/25 1200 (!) 153/70 -- -- 70 -- 97 %   04/17/25 1146 (!) 154/70 97.8  F (36.6  C) Axillary 74 19 96 %        ================================================================    PHYSICAL EXAM     VITAL SIGNS: BP (!) 161/75   Pulse 76   Temp 97.8  F (36.6  C) (Axillary)   Resp 16   SpO2 94%    Constitutional:  Awake, no acute distress   HENT:  Atraumatic, oropharynx without exudate or erythema, membranes moist  Lymph:  No adenopathy  Eyes: EOM intact, PERRL, no injection  Neck: Supple  Respiratory:  Clear to auscultation bilaterally, no wheezes or crackles   Cardiovascular:  Regular rate and rhythm, single S1 and S2   GI:  Soft, nontender, nondistended, no rebound or guarding   Musculoskeletal:  Moves all extremities, no lower extremity edema, no deformities    Skin:  Warm, dry, small abrasions and skin tears along the right forearm and elbow  Neurologic:  Alert and cooperative, no focal deficits noted, following commands in all extremities      ================================================================  LAB       All pertinent labs reviewed and interpreted.   Labs Ordered and Resulted from Time of ED Arrival to Time of ED Departure - No data to display     ===============================================================  RADIOLOGY       Reviewed all pertinent imaging. Please see  official radiology report.   Elbow XR, G/E 3 views, right   Final Result   IMPRESSION: No definite fracture is identified. No significant degenerative changes. No elbow joint effusion. Osteopenia.       Radius/Ulna XR, PA & LAT, right   Final Result   IMPRESSION: No definite fracture is identified. There is normal joint alignment. Degenerative changes throughout the wrist. Osteopenia.      CT Cervical Spine w/o Contrast   Final Result   IMPRESSION:   HEAD CT:   1.  Interval significant enlargement of the mixed density subdural collection overlying the left cerebral convexity with new areas of hyperdense hemorrhage.   2.  Associated mass effect on the left cerebral hemisphere with up to 10 mm rightward midline shift and subfalcine herniation.      CERVICAL SPINE CT:   No acute fracture or traumatic subluxation of the cervical spine.         Imaging findings discussed with Dr. Corona by Dr. Hayes on 4/17/2025 12:30 PM CDT         Head CT w/o contrast   Final Result   IMPRESSION:   HEAD CT:   1.  Interval significant enlargement of the mixed density subdural collection overlying the left cerebral convexity with new areas of hyperdense hemorrhage.   2.  Associated mass effect on the left cerebral hemisphere with up to 10 mm rightward midline shift and subfalcine herniation.      CERVICAL SPINE CT:   No acute fracture or traumatic subluxation of the cervical spine.         Imaging findings discussed with Dr. Corona by Dr. Hayes on 4/17/2025 12:30 PM CDT         Head CT w/o contrast    (Results Pending)         ================================================================  EKG       N/A    ================================================================  PROCEDURES     Critical Care  Performed by: Mabel Corona MD  Authorized by: Mabel Corona MD  Total critical care time: 80 minutes  Critical care time was exclusive of separately billable procedures and treating other patients.  Critical care was necessary  to treat or prevent imminent or life-threatening deterioration of the following conditions: Traumatic subdural hygroma with intracranial hemorrhage  Critical care was time spent personally by me on the following activities: development of treatment plan with patient or surrogate, discussions with consultants, examination of patient, evaluation of patient's response to treatment, obtaining history from patient or surrogate, ordering and performing treatments and interventions, ordering and review of laboratory studies, ordering and review of radiographic studies and re-evaluation of patient's condition, this excludes any separately billable procedures.        I, Princess Graham, am serving as a scribe to document services personally performed by Dr. Corona based on my observation and the provider's statements to me. I, Mabel Corona MD attest that Princess Graham is acting in a scribe capacity, has observed my performance of the services and has documented them in accordance with my direction.   Mabel Corona M.D.   Emergency Medicine   [facility] EMERGENCY DEPARTMENT  [facility] [address]  [phone]  Dept: [phone]      Mabel Corona MD  04/17/25 8284

## 2025-04-17 NOTE — CONSULTS
"I placed the consult for Ora when I was asked by the DON to give information on Ora. I was called by Sae the ROBBIN at [facility] at [phone].    Ora lives at [facility]. She is a high level of cares at baseline. I have information from her family that they are already working with [facility] to sign her onto their services next week. But after her fall they called hospice today to see if they can start sooner.    I got a call from Lois at [facility] [phone]. She states, \"we are able to accept Ora onto our services later tonight. Can you let us know when she is discharging from the ER and we will be present at her facility to help her and open services.    At the same time I was on the phone with Lois I got a call from Sae the ROBBIN of [facility]. I called her back. Sae states, \"I don't have nursing staff this evening and I need much more notice to get evening staffing so she cannot return tonight and it has to be 4/18/25 in the AM.\" I talked with Dr Corona and she told me \"patient has no reason to stay in the hospital. I spoke with family and they want no interventions, just want the 2nd CT and that is all we are waiting on\". I told Sae that this was the plan for the CT at 1800 and then soon after that she can discharge back to Assisted Living.I also told her about hospice already planned to come and sign patient on. I told Sae patient is returning tonight. She states, \"understanding, but I'm writing it up because you should keep her overnight\".    I reviewed this with both my supervisor and manager and they both agreed with discharge need if no medical needs to stay in hospital and that facility needs to take patient back tonight. Son Sanford also aware of all this and \"I want my mom to return to her Assisted Living tonight\".    Sanford [phone].    Sanford son notified of  [employer] transport and that this would be private pay. I called and set " up a window of 1914 - 1959.    I called and notified [facility] of this discharge time as Lois had requested.

## 2025-04-17 NOTE — ED PROVIDER NOTES
EMERGENCY DEPARTMENT SIGN OUT NOTE        ED COURSE AND MEDICAL DECISION MAKING  Patient was signed out to me by Dr Mabel Reese at 1745    In brief, Ora Gonzalez is a 89 year old female who initially presented after a fall     At time of sign out, disposition was pending repeat head CT then discharge to hospice regardless.    Repeat head CT without much change.  I did confirm with care manager that patient will be discharged back to her facility and [facility] hospice will be meeting her there.  Son is in full agreement.    6:54 PM I spoke with Pema Rick with Neurosurgery to update her on plan for patient to discharge.  Per consult note earlier today, agrees this appears to be the plan    Patient reassessed.  Awake and alert but fairly non verbal/nonsensical speech    FINAL IMPRESSION    1. Subdural hygroma    2. Intracranial hemorrhage (H)        ED MEDS  Medications - No data to display    LAB  Labs Ordered and Resulted from Time of ED Arrival to Time of ED Departure - No data to display      RADIOLOGY    Head CT w/o contrast   Final Result   IMPRESSION:   1.  No significant change from prior earlier same day CT.   2.  Large mixed density subdural collection left cerebral convexity, measures up to 3.4 cm in maximum thickness, similar to prior.   3.  Midline shift left to right, 10 mm, similar to prior.   4.  Mild entrapment of left temporal horn, mild right hydrocephalus, similar to prior.         Elbow XR, G/E 3 views, right   Final Result   IMPRESSION: No definite fracture is identified. No significant degenerative changes. No elbow joint effusion. Osteopenia.       Radius/Ulna XR, PA & LAT, right   Final Result   IMPRESSION: No definite fracture is identified. There is normal joint alignment. Degenerative changes throughout the wrist. Osteopenia.      CT Cervical Spine w/o Contrast   Final Result   IMPRESSION:   HEAD CT:   1.  Interval significant enlargement of the mixed density subdural collection  overlying the left cerebral convexity with new areas of hyperdense hemorrhage.   2.  Associated mass effect on the left cerebral hemisphere with up to 10 mm rightward midline shift and subfalcine herniation.      CERVICAL SPINE CT:   No acute fracture or traumatic subluxation of the cervical spine.         Imaging findings discussed with Dr. Corona by Dr. Hayes on 4/17/2025 12:30 PM CDT         Head CT w/o contrast   Final Result   IMPRESSION:   HEAD CT:   1.  Interval significant enlargement of the mixed density subdural collection overlying the left cerebral convexity with new areas of hyperdense hemorrhage.   2.  Associated mass effect on the left cerebral hemisphere with up to 10 mm rightward midline shift and subfalcine herniation.      CERVICAL SPINE CT:   No acute fracture or traumatic subluxation of the cervical spine.         Imaging findings discussed with Dr. Corona by Dr. Hayes on 4/17/2025 12:30 PM CDT             DISCHARGE MEDS  New Prescriptions    No medications on file         Maggie Parks DO  Emergency Medicine  [facility] EMERGENCY DEPARTMENT  [facility] [facility] [id number] [phone]     Maggie Parks DO  04/17/25 1930

## 2025-04-17 NOTE — ED NOTES
Decreased functioning and major decline since Monday.   [phone] [facility]. Reports family would like to sign up for [facility] hospice requesting eval here. Nas at [place]- updated otherwise and POC communicated.

## 2025-04-17 NOTE — ED TRIAGE NOTES
Pt arrives via [facility] from [facility] w/ reports of fall today that was unwitnessed. No blood thinners. Pt presents w/ skin tear to the right forearm and elbow, bleeding controlled. Reports generalized pain everywhere unable to specify any specific pain from fall.

## 2025-04-17 NOTE — CONSULTS
[facility]    Neurosurgery Consultation     Date of Admission:  4/17/2025  Date of Consult (When I saw the patient): 04/17/25    Assessment and Plan  Ora Gonzalez is a 89 year old female with history of Alzheimer's who was recently admitted on 3/1/25 after a fall. Imaging revealed SAH, SDH, and nondisplaced skull fracture. NSGY recommended outpatient follow-up at that time.     Patient presented today 4/17/25 after an  unwitnessed fall at her memory care facility. Imaging reveals significant enlargement of the mixed density SDH overlying the left cerebral convexity with new areas of hyperdense hemorrhage, now measures 3.4 cm, previously 0.8cm, with associated mass effect and 10 mm rightward midline shift.     Patient is not a reliable historian due to Alzheimer's. Spoke to patient's son Gomez. Patient is DNR DNI. Family is not interested in surgical intervention due to patient's age and Alzheimer's. Patient's son Gomez also declined outpatient follow-up with our NSGY clinic. He states family prefers to focus on comfort measures at this time.     ED plans to repeat a head CT at 6 hours to assess for stability.     I have discussed the following assessment and plan with Dr. Rodas, ED Dr. Corona, and patient's son Gomez.     Rafaela Ramesh CNP  [facility] Neurosurgery  Clinic phone number: [phone]  Securely message or page via Epic Secure Chat, [employer], or [facility]     Code Status    Prior    Reason for Consult   I was asked by Dr. Corona to evaluate this patient for:  Increased SDH      Primary Care Physician   Provider Not In System    Chief Complaint   Fall     History is obtained from the electronic health record, emergency department physician, and patient's son    History of Present Illness   Ora Gonzalez is a 89 year old female with history of Alzheimer's who was recently admitted on 3/1/25 after a fall. Imaging revealed SAH, SDH, and nondisplaced skull fracture. NSGY  recommended outpatient follow-up at that time.     Patient presented today 4/17/25 after an  unwitnessed fall at her memory care facility. Imaging reveals significant enlargement of the mixed density SDH overlying the left cerebral convexity with new areas of hyperdense hemorrhage, now measures 3.4 cm, previously 0.8cm, with associated mass effect and 10 mm rightward midline shift. NSGY was consulted for further evaluation.     On exam - patient is awake and alert to voice, PERRL, minimal speech but able to state name and answer simple yes/no questions, moving all extremities to command. Patient denies headache or pain.     No blood thinners, aspirin, or NSAIDS documented in med list.     Data   CT HEAD W/O CONTRAST, CT CERVICAL SPINE W/O CONTRAST  LOCATION: [facility]  DATE: 4/17/2025                                                        IMPRESSION:  1.  Interval significant enlargement of the mixed density subdural collection overlying the left cerebral convexity with new areas of hyperdense hemorrhage.  2.  Associated mass effect on the left cerebral hemisphere with up to 10 mm rightward midline shift and subfalcine herniation.    Physical Exam   Vital signs with ranges:   Temp:  [97.8  F (36.6  C)] 97.8  F (36.6  C)  Pulse:  [63-76] 76  Resp:  [16-19] 16  BP: (153-187)/(69-81) 161/75  SpO2:  [93 %-97 %] 94 %  0 lbs 0 oz    NEUROLOGICAL EXAMINATION:   Baseline Alzheimer's   Awake and alert to voice  PERRL  Minimal speech, able to state name and answer simple yes/no questions   Moving all extremities to command     Past Medical History   I have reviewed this patient's medical history and updated it with pertinent information if needed.   Past Medical History:   Diagnosis Date    Adjustment disorder with mixed anxiety and depressed mood 1/22/2016    Adjustment reaction to chronic stress 1/22/2016    Chronic kidney disease     History of pulmonary embolism 7/8/2020    Hypertension     ITP  (idiopathic thrombocytopenic purpura)     Osteoporosis     Paroxysmal atrial fibrillation (H)     Pulmonary embolism (H) 2/8/2016    Thrombocytopenia        Past Surgical History   I have reviewed this patient's surgical history and updated it with pertinent information if needed.  Past Surgical History:   Procedure Laterality Date    CATARACT EXTRACTION Left 10/2014    ESOPHAGOSCOPY, GASTROSCOPY, DUODENOSCOPY (EGD), COMBINED N/A 4/13/2017    Procedure: ESOPHAGOGASTRODUODENOSCOPY (EGD);  Surgeon: Juanpablo John MD;  Location: [facility] GI;  Service:     HEMORRHOIDECTOMY INTERNAL LIGATION      Description: Hemorrhoidectomy;  Recorded: 05/08/2008;  Comments: with fissure    IR EMBOLIZATION VASCULAR NON HEAD/NECK  4/12/2017    LAPAROSCOPIC SPLENECTOMY N/A 12/20/2015    Procedure: LAPAROSCOPIC TO CONVERSION TO OPEN SPLENECTOMY;  Surgeon: Herb Mckeon MD;  Location: [facility] OR;  Service:     OTHER SURGICAL HISTORY      blake barboza guilherme    [facility]  4/15/2017            Prior to Admission Medications   Prior to Admission Medications   Prescriptions Last Dose Informant Patient Reported? Taking?   acetaminophen (TYLENOL) 325 MG tablet   No No   Sig: Take 3 tablets (975 mg) by mouth every 8 hours as needed for mild pain.   calcium carbonate-vitamin D (CALTRATE) 600-10 MG-MCG per tablet   Yes No   Sig: Take 1 tablet by mouth 2 times daily.   midodrine (PROAMATINE) 2.5 MG tablet   No No   Sig: Take 1 tablet (2.5 mg) by mouth 2 times daily.      Facility-Administered Medications: None     Allergies   No Known Allergies    Social History   I have reviewed this patient's social history and updated it with pertinent information if needed. Ora Gonzalez  reports that she has quit smoking. Her smoking use included cigarettes. She has never used smokeless tobacco. She reports that she does not currently use alcohol.    Family History   I have reviewed this patient's family history and updated it with pertinent information  "if needed.   Family History   Problem Relation Age of Onset    Depression Father     Suicidality Father     Alzheimer Disease Sister     No Known Problems Mother          90s of \"old age\"    No Known Problems Father     Diabetes Type 2  Son     Other - See Comments Other         multiple family members with silicosis / black lung    Parkinsonism Daughter     Alzheimer Disease Sister     No Known Problems Brother     No Known Problems Sister     No Known Problems Brother     No Known Problems Brother     No Known Problems Son        Review of Systems   10 point ROS negative other than symptoms noted in HPI.      "

## 2025-04-17 NOTE — ED NOTES
Bed: JNEDH-I  Expected date: 4/17/25  Expected time: 11:38 AM  Means of arrival:   Comments:  89 female/unwitnessed fall/[place]

## 2025-04-18 NOTE — ED NOTES
Sanford son updated as well as [facility] hospice nurse who is waiting at [facility] for their evaluation. DON at [facility] did not answer phone but is aware of discharge. I left her a message that patient was returning when transport team was here.